# Patient Record
Sex: FEMALE | Race: WHITE | NOT HISPANIC OR LATINO | Employment: FULL TIME | URBAN - METROPOLITAN AREA
[De-identification: names, ages, dates, MRNs, and addresses within clinical notes are randomized per-mention and may not be internally consistent; named-entity substitution may affect disease eponyms.]

---

## 2017-06-26 ENCOUNTER — TELEPHONE (OUTPATIENT)
Dept: INTERNAL MEDICINE | Facility: CLINIC | Age: 62
End: 2017-06-26

## 2017-06-26 NOTE — TELEPHONE ENCOUNTER
----- Message from Mario Mosher sent at 6/22/2017  6:46 PM CDT -----  Contact: Patient's Daughter  Patient's Daughter is a patient of yours that really likes Dr. Garrett as her Doctor. She has referred her mother who needs a check up due to high blood pressure and heart problems.  Patient has scheduled an appoinment with Dr. Garrett but it isn't until 8/31 and they really need to be seen sooner. Please call Patient at [phone].

## 2017-06-26 NOTE — TELEPHONE ENCOUNTER
----- Message from Joann Arnold sent at 6/26/2017 10:48 AM CDT -----  Contact: Pt at [phone]  Patient is returning a phone call.  Who left a message for the patient:  Ekaterina

## 2017-06-28 ENCOUNTER — HOSPITAL ENCOUNTER (EMERGENCY)
Facility: HOSPITAL | Age: 62
Discharge: HOME | End: 2017-06-28
Attending: EMERGENCY MEDICINE
Payer: COMMERCIAL

## 2017-06-28 VITALS
HEIGHT: 59 IN | WEIGHT: 110 LBS | DIASTOLIC BLOOD PRESSURE: 53 MMHG | BODY MASS INDEX: 22.18 KG/M2 | HEART RATE: 59 BPM | RESPIRATION RATE: 16 BRPM | OXYGEN SATURATION: 100 % | SYSTOLIC BLOOD PRESSURE: 131 MMHG | TEMPERATURE: 98 F

## 2017-06-28 DIAGNOSIS — S69.91XA HAND INJURY, RIGHT, INITIAL ENCOUNTER: ICD-10-CM

## 2017-06-28 DIAGNOSIS — S09.90XA CLOSED HEAD INJURY, INITIAL ENCOUNTER: ICD-10-CM

## 2017-06-28 DIAGNOSIS — S00.93XA TRAUMATIC HEMATOMA OF HEAD, INITIAL ENCOUNTER: Primary | ICD-10-CM

## 2017-06-28 DIAGNOSIS — I10 ESSENTIAL HYPERTENSION: ICD-10-CM
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
## 2017-06-28 LAB
ALBUMIN SERPL BCP-MCNC: 4.4 G/DL
ALP SERPL-CCNC: 71 U/L
ALT SERPL W/O P-5'-P-CCNC: 17 U/L
ANION GAP SERPL CALC-SCNC: 11 MMOL/L
AST SERPL-CCNC: 22 U/L
BASOPHILS # BLD AUTO: 0.03 K/UL
BASOPHILS NFR BLD: 0.4 %
BILIRUB SERPL-MCNC: 0.5 MG/DL
BILIRUB UR QL STRIP: NEGATIVE
BUN SERPL-MCNC: 11 MG/DL
CALCIUM SERPL-MCNC: 9.9 MG/DL
CHLORIDE SERPL-SCNC: 105 MMOL/L
CLARITY UR: CLEAR
CO2 SERPL-SCNC: 24 MMOL/L
COLOR UR: YELLOW
CREAT SERPL-MCNC: 0.6 MG/DL
DIFFERENTIAL METHOD: ABNORMAL
EOSINOPHIL # BLD AUTO: 0.1 K/UL
EOSINOPHIL NFR BLD: 0.8 %
ERYTHROCYTE [DISTWIDTH] IN BLOOD BY AUTOMATED COUNT: 12.4 %
EST. GFR  (AFRICAN AMERICAN): >60 ML/MIN/1.73 M^2
EST. GFR  (NON AFRICAN AMERICAN): >60 ML/MIN/1.73 M^2
GLUCOSE SERPL-MCNC: 114 MG/DL
GLUCOSE UR QL STRIP: NEGATIVE
HCT VFR BLD AUTO: 41.6 %
HGB BLD-MCNC: 13.8 G/DL
HGB UR QL STRIP: ABNORMAL
KETONES UR QL STRIP: NEGATIVE
LEUKOCYTE ESTERASE UR QL STRIP: NEGATIVE
LYMPHOCYTES # BLD AUTO: 1.3 K/UL
LYMPHOCYTES NFR BLD: 16.9 %
MCH RBC QN AUTO: 28.9 PG
MCHC RBC AUTO-ENTMCNC: 33.2 %
MCV RBC AUTO: 87 FL
MONOCYTES # BLD AUTO: 0.8 K/UL
MONOCYTES NFR BLD: 10.2 %
NEUTROPHILS # BLD AUTO: 5.6 K/UL
NEUTROPHILS NFR BLD: 71.4 %
NITRITE UR QL STRIP: NEGATIVE
PH UR STRIP: >8 [PH] (ref 5–8)
PLATELET # BLD AUTO: 312 K/UL
PMV BLD AUTO: 9.8 FL
POCT GLUCOSE: 124 MG/DL (ref 70–110)
POTASSIUM SERPL-SCNC: 3.9 MMOL/L
PROT SERPL-MCNC: 7.6 G/DL
PROT UR QL STRIP: NEGATIVE
RBC # BLD AUTO: 4.78 M/UL
SODIUM SERPL-SCNC: 140 MMOL/L
SP GR UR STRIP: 1.01 (ref 1–1.03)
TROPONIN I SERPL DL<=0.01 NG/ML-MCNC: 0.01 NG/ML
URN SPEC COLLECT METH UR: ABNORMAL
UROBILINOGEN UR STRIP-ACNC: NEGATIVE EU/DL
WBC # BLD AUTO: 7.86 K/UL

## 2017-06-28 PROCEDURE — 63600175 PHARM REV CODE 636 W HCPCS: Performed by: PHYSICIAN ASSISTANT

## 2017-06-28 PROCEDURE — 93010 ELECTROCARDIOGRAM REPORT: CPT | Mod: S$GLB,,, | Performed by: INTERNAL MEDICINE

## 2017-06-28 PROCEDURE — 93005 ELECTROCARDIOGRAM TRACING: CPT

## 2017-06-28 PROCEDURE — 85025 COMPLETE CBC W/AUTO DIFF WBC: CPT

## 2017-06-28 PROCEDURE — 99284 EMERGENCY DEPT VISIT MOD MDM: CPT | Mod: 25

## 2017-06-28 PROCEDURE — 84484 ASSAY OF TROPONIN QUANT: CPT

## 2017-06-28 PROCEDURE — 25000003 PHARM REV CODE 250: Performed by: PHYSICIAN ASSISTANT

## 2017-06-28 PROCEDURE — 29125 APPL SHORT ARM SPLINT STATIC: CPT | Mod: RT

## 2017-06-28 PROCEDURE — 80053 COMPREHEN METABOLIC PANEL: CPT

## 2017-06-28 PROCEDURE — 82962 GLUCOSE BLOOD TEST: CPT

## 2017-06-28 PROCEDURE — 96374 THER/PROPH/DIAG INJ IV PUSH: CPT

## 2017-06-28 PROCEDURE — 81003 URINALYSIS AUTO W/O SCOPE: CPT

## 2017-06-28 RX ORDER — MECLIZINE HYDROCHLORIDE 25 MG/1
25 TABLET ORAL 3 TIMES DAILY PRN
Qty: 20 TABLET | Refills: 0 | Status: SHIPPED | OUTPATIENT
Start: 2017-06-28 | End: 2017-09-07 | Stop reason: SDUPTHER

## 2017-06-28 RX ORDER — NAPROXEN 375 MG/1
375 TABLET ORAL 2 TIMES DAILY WITH MEALS
Qty: 30 TABLET | Refills: 0 | Status: SHIPPED | OUTPATIENT
Start: 2017-06-28 | End: 2017-08-17

## 2017-06-28 RX ORDER — MECLIZINE HYDROCHLORIDE 25 MG/1
25 TABLET ORAL
Status: COMPLETED | OUTPATIENT
Start: 2017-06-28 | End: 2017-06-28

## 2017-06-28 RX ORDER — KETOROLAC TROMETHAMINE 30 MG/ML
15 INJECTION, SOLUTION INTRAMUSCULAR; INTRAVENOUS
Status: COMPLETED | OUTPATIENT
Start: 2017-06-28 | End: 2017-06-28

## 2017-06-28 RX ORDER — ONDANSETRON 2 MG/ML
4 INJECTION INTRAMUSCULAR; INTRAVENOUS
Status: DISCONTINUED | OUTPATIENT
Start: 2017-06-28 | End: 2017-06-28

## 2017-06-28 RX ADMIN — MECLIZINE HYDROCHLORIDE 25 MG: 25 TABLET ORAL at 11:06

## 2017-06-28 RX ADMIN — KETOROLAC TROMETHAMINE 15 MG: 30 INJECTION, SOLUTION INTRAMUSCULAR at 11:06

## 2017-06-28 NOTE — ED NOTES
Patient presents to ED secondary to fall. Pt states while taking shower this morning she fell and is unsure of why she fell. Unsure of LOC. Pt landed on right wrist and struck right side of head. C/o headache, nausea and ears ringing. States has been having some uncertainty with blood pressure medicines recently and a recent episode of asymptomatic bradycardia. Hematoma noted to right forehead and right wrist swelling noted.

## 2017-06-28 NOTE — ED PROVIDER NOTES
Encounter Date: 6/28/2017       History     Chief Complaint   Patient presents with    Fall     Fall while taking a shower this morning, landed on R wrist and hitting R side of head, +headache, +nausea, +ears ringing. Unsure if lost consciousness.      Nontoxic/afebrile 61-year-old female with PMH of HTN and breast cancer presents to the ED with pain following a fall.  She states that she was in the shower and is unsure of mechanism as felt some dizziness and headache today but reports she could've slipped as well.  She states that she was able to get up without assistance and did not have a prolonged period on the ground.  She is unsure of LOC and complains of right-sided headache, nausea and ringing of bilateral ears.  She denies vision changes, posterior neck pain, vomiting, chest pain, shortness of breath, abdominal pain, numbness, tingling, weakness, bowel or bladder incontinence.  She presents to the ED with hematoma to the right forehead.  She complains of right wrist and right third toe pain that began after fall.  She reports pain to wrist as a constant ache that is exacerbated by palpation and certain movements.  She describes the taping is in minimal discomfort and is able to bear weight.  She states that she has been having headaches intermittently for some time that she believes to be related to her blood pressure as this typically occurs when her blood pressure is elevated.      The history is provided by the patient.     Review of patient's allergies indicates:   Allergen Reactions    Demerol [meperidine] Other (See Comments)     headache    Pyridium [phenazopyridine] Nausea Only     Past Medical History:   Diagnosis Date    Cancer     breast    Hypertension      Past Surgical History:   Procedure Laterality Date    BREAST SURGERY      HERNIA REPAIR      HYSTERECTOMY       History reviewed. No pertinent family history.  Social History   Substance Use Topics    Smoking status: Never Smoker     Smokeless tobacco: Never Used    Alcohol use Not on file     Review of Systems   Constitutional: Negative for activity change, appetite change, chills and fever.   HENT: Positive for facial swelling. Negative for ear pain and sore throat.         Ringing of ears   Eyes: Negative for photophobia and visual disturbance.   Respiratory: Negative for cough, chest tightness and shortness of breath.    Cardiovascular: Negative for chest pain, palpitations and leg swelling.   Gastrointestinal: Positive for nausea. Negative for abdominal pain and vomiting.   Genitourinary: Negative for dysuria and flank pain.   Musculoskeletal: Positive for arthralgias. Negative for back pain, neck pain and neck stiffness.        Right wrist pain, right toe pain   Skin: Positive for color change. Negative for rash and wound.   Neurological: Positive for headaches. Negative for seizures, weakness and numbness.        Possible LOC   Hematological: Does not bruise/bleed easily.   Psychiatric/Behavioral: Negative for confusion.       Physical Exam     Initial Vitals [06/28/17 0927]   BP Pulse Resp Temp SpO2   (!) 145/65 69 16 98.1 °F (36.7 °C) 98 %      MAP       91.67         Physical Exam    Nursing note and vitals reviewed.  Constitutional: Vital signs are normal. She appears well-developed and well-nourished. She is cooperative.  Non-toxic appearance. She does not appear ill.   HENT:   Head: Normocephalic. Head is with contusion. Head is without raccoon's eyes, without Hatch's sign and without abrasion.       Right Ear: No hemotympanum.   Left Ear: No hemotympanum.   Eyes: Conjunctivae, EOM and lids are normal. Pupils are equal, round, and reactive to light.   Neck: Normal range of motion. Neck supple. No spinous process tenderness and no muscular tenderness present. No neck rigidity.       Cardiovascular: Normal rate and regular rhythm.   Pulmonary/Chest: Breath sounds normal. No respiratory distress. She has no wheezes. She has no  rhonchi.   Abdominal: Soft. Normal appearance and bowel sounds are normal. There is no rigidity and no guarding.   Musculoskeletal:        Right wrist: She exhibits decreased range of motion, tenderness and swelling. She exhibits no crepitus and no deformity.        Right foot: There is tenderness. There is normal range of motion, no bony tenderness, no swelling and no crepitus.        Feet:    Neurological: She is alert and oriented to person, place, and time. No sensory deficit. Gait normal. GCS eye subscore is 4. GCS verbal subscore is 5. GCS motor subscore is 6.   Skin: Skin is warm, dry and intact. Bruising and ecchymosis noted. No rash noted.   Psychiatric: She has a normal mood and affect. Her speech is normal and behavior is normal. Thought content normal.         ED Course   Splint Application  Date/Time: 6/28/2017 9:13 AM  Performed by: JEREMIAH SOLIS  Authorized by: LEFORT, GUY J.   Consent Done: Yes  Consent: Verbal consent obtained.  Consent given by: parent  Patient understanding: patient states understanding of the procedure being performed  Imaging studies: imaging studies available  Location details: right arm  Splint type: thumb spica  Supplies used: cotton padding,  elastic bandage and plaster  Post-procedure: The splinted body part was neurovascularly unchanged following the procedure.  Patient tolerance: Patient tolerated the procedure well with no immediate complications        Labs Reviewed   CBC W/ AUTO DIFFERENTIAL - Abnormal; Notable for the following:        Result Value    Lymph% 16.9 (*)     All other components within normal limits   COMPREHENSIVE METABOLIC PANEL - Abnormal; Notable for the following:     Glucose 114 (*)     All other components within normal limits   URINALYSIS - Abnormal; Notable for the following:     pH, UA >8.0 (*)     Occult Blood UA Trace (*)     All other components within normal limits   POCT GLUCOSE - Abnormal; Notable for the following:     POCT Glucose  124 (*)     All other components within normal limits   TROPONIN I         Imaging Results          CT Cervical Spine Without Contrast (Final result)  Result time 06/28/17 11:20:59    Final result by Richy Porras MD (06/28/17 11:20:59)                 Impression:        1.  No acute fractures.  2.  Hypertrophic facet arthropathy at C3-C4, C4-C5 and C5-C6 as above.            Electronically signed by: RICHY PORRAS MD  Date:     06/28/17  Time:    11:20              Narrative:    History: Trauma.    Procedure: Axial CT scans of the cervical spine are performed from the posterior fossa to T1 vertebra..  Coronal and sagittal reformats performed.    Findings:    There is normal anatomic alignment of the osseous segments of the cervical spine without subluxations.  Throughout the cervical spine there are no acute fractures.  No osteoblastic or lytic lesions.  Prevertebral soft tissues are within normal limits.  The craniovertebral alignment is also within normal limits.  No facet subluxations or locked facets noted.    C2-C3: No disc herniations or spinal stenosis or foraminal stenosis.    C3-C4: There is a foraminal uncovertebral joint osteophyte and severe hypertrophic facet arthropathy on the left resulting in a moderate to severe left foraminal stenosis.  Within the center canal there is a left lateral central canal osteophyte extending into the left neural foramen.  Right neural foramen is unremarkable.  No significant central canal spinal stenosis.    C4-C5: Spondylosis associated posterior osteophytes.  There is marked hypertrophic right facet arthropathy encroaching the right neural foramen resulting in moderate right foraminal stenosis.  There is mild left foraminal stenosis.    C5-C6: There is a right hypertrophic degenerative facet arthropathy with mild to moderate right foraminal stenosis.  Left neural foramen is unremarkable.  There is mild degenerative disc disease.    C6-C7: No spinal stenosis or  foraminal stenosis.    C7-T1: No spinal stenosis or foraminal stenosis.                             CT Head Without Contrast (Final result)  Result time 06/28/17 11:13:07    Final result by Richy Porras MD (06/28/17 11:13:07)                 Impression:        1.  Small scalp hematoma as above.  2.  No acute intracranial processes.        Electronically signed by: RICHY PORRAS MD  Date:     06/28/17  Time:    11:13              Narrative:    History: Patient fell.    Procedure: Axial CT scans of head are performed from the base of the skull to the cranial vertex without contrast.  Coronal and sagittal reformats are performed.    Findings:    Findings:    Slight asymmetry of the lateral ventricles is felt to be a normal variant.  No hydrocephalus, acute intracranial hemorrhages, abnormal extra-axial fluid collections or shift of midline structures.  There is no parenchymal hemorrhages or signs for herniations.  The gray/white matter delineation is preserved throughout the cervical hemispheres.    Posterior fossa structures are unremarkable.    There is no cranial vault fractures.  Small scalp lipoma or the right frontotemporal region.                             X-Ray Chest PA And Lateral (Final result)  Result time 06/28/17 10:50:21    Final result by Halima Ramirez MD (06/28/17 10:50:21)                 Impression:     No acute cardiopulmonary process is identified.        Electronically signed by: HALIMA RAMIREZ MD  Date:     06/28/17  Time:    10:50              Narrative:    XR CHEST PA AND LATERAL.  There are no previous examinations for comparison.  Surgical clips overlie the chest. The cardiac silhouette is not enlarged.  Pulmonary vascularity is not increased.  The lungs are free of lobar consolidation and alveolar edema.  There is no pleural effusion or pneumothorax.  There is a mild thoracic dextroscoliosis.  Otherwise, the visualized osseous structures are intact. There are atherosclerotic  calcifications of the aorta.                             X-Ray Wrist Complete Right (Final result)  Result time 06/28/17 10:48:38    Final result by Halima Ramirez MD (06/28/17 10:48:38)                 Impression:     No fracture.      Electronically signed by: HALIMA RAMIREZ MD  Date:     06/28/17  Time:    10:48              Narrative:    XR WRIST COMPLETE 3 VIEWS RIGHT.  There are no previous examinations for comparison. No acute fracture or bony destructive process is seen.  Alignment is normal.                             X-Ray Hand 3 View Right (Final result)  Result time 06/28/17 10:48:17    Final result by Halima Ramirez MD (06/28/17 10:48:17)                 Impression:     As above.      Electronically signed by: HALIMA RAMIREZ MD  Date:     06/28/17  Time:    10:48              Narrative:    XR HAND COMPLETE 3 VIEW RIGHT.  There are no previous examinations for comparison.  Device on the index finger obscures anatomy somewhat.  Well-corticated ossific density along the radial aspect of the interphalangeal joint of the thumb is most likely related to a remote injury, but clinical correlation as to the site of the patient's symptoms is recommended given history of trauma.  There is mild narrowing of the interphalangeal joints of the fingers and thumb. No acute fracture or bony destructive process is seen.  Alignment is normal.                             X-Ray Toe 2 or More Views Right (Final result)  Result time 06/28/17 10:45:25    Final result by Halima Ramirez MD (06/28/17 10:45:25)                 Impression:     No fracture.      Electronically signed by: HALIMA RAMIREZ MD  Date:     06/28/17  Time:    10:45              Narrative:    XR TOE 2 VIEW.  There are no previous examinations for comparison. No acute fracture or bony destructive process is seen.  Alignment is normal.                                   Medical Decision Making:   Initial Assessment:   Nontoxic/afebrile  61-year-old female with PMH of HTN and breast cancer presents to the ED with pain following a fall.  She states that she was in the shower and is unsure of mechanism as felt some dizziness and headache today but reports she could've slipped as well.  She states that she was able to get up without assistance and did not have a prolonged period on the ground.  She is unsure of LOC and complains of right-sided headache, nausea and ringing of bilateral ears.  She denies vision changes, posterior neck pain, vomiting, chest pain, shortness of breath, abdominal pain, numbness, tingling, weakness, bowel or bladder incontinence.  She presents to the ED with hematoma to the right forehead.  She complains of right wrist and right third toe pain that began after fall.  She reports pain to wrist as a constant ache that is exacerbated by palpation and certain movements.  She describes the taping is in minimal discomfort and is able to bear weight.  She states that she has been having headaches intermittently for some time that she believes to be related to her blood pressure as this typically occurs when her blood pressure is elevated.  Physical exam reveals well-appearing female in no obvious distress that is alert and oriented ×3.  HEENT is significant for right-sided forehead hematoma with TTP; no crepitus or bony instability.  Remaining head exam is unremarkable.  TM with no hemotympanum, nose normal oropharynx clear, mucous membranes moist and free of pallor.  Neck with full range of motion mild TTP of the right trapezius region with no midline tenderness, deformity or step-off.  Lungs clear to auscultation; heart regular rate and rhythm.  Abdomen soft and nontender.  Full range of motion of all extremities and strength 5 out of 5 bilaterally with exception of the right wrist.  Limited range of motion on extension and TTP of the distal radius some minimal edema noted no bony deformity or  Crepitus.  TTP of the right third  toe with no edema, deformity, crepitus intact with full range of motion.  Neurovascularly intact.  Cranial nerves II through XII intact.  Differential Diagnosis:   Acute intracranial abnormality, hematoma, fracture, neck strain, acute coronary syndrome, arrhythmia, mild dehydration, electrolyte abnormality, UTI, uncontrolled hypertension  Clinical Tests:   Lab Tests: Ordered and Reviewed  Radiological Study: Ordered and Reviewed  ED Management:  CT of head and neck unremarkable for acute changes other than soft tissue hematoma.  Labs are unremarkable.  EKG unremarkable for acute changes.  X-ray of the right wrist with no definite fracture although as patient has some tenderness to palpation of the affected region we will place an thumb spica splint with instructions to follow-up with her primary in 1 week for reevaluation.  X-ray of the right foot unremarkable.  Symptoms reduced with Antivert and Toradol in the ED. Discussed monitoring BP as it was elevated, low sodium diet, and that patient should follow up with PCP next week. Patient was cautioned on when to return to ED. Patient verbalized understanding and agreement with the treatment plan                         ED Course     Clinical Impression:   The primary encounter diagnosis was Traumatic hematoma of head, initial encounter. Diagnoses of Fall, Hand injury, right, initial encounter, Closed head injury, initial encounter, and Essential hypertension were also pertinent to this visit.                           ALLISON Rodríguez  07/04/17 0916

## 2017-07-05 ENCOUNTER — NURSE TRIAGE (OUTPATIENT)
Dept: ADMINISTRATIVE | Facility: CLINIC | Age: 62
End: 2017-07-05

## 2017-07-05 NOTE — TELEPHONE ENCOUNTER
Answer Assessment - Initial Assessment Questions  Pt seen in ER last week after a fall in shower. Has a partial cast on arm up to elbow with thumb covered. Has a possible wrist fx but is to f/u with ortho -has appt next week for evaluation. Today has had intermittent tingling/numbness to thumb which has not been present in the past. No swelling/ daughter reported the tip of thumb is pink/warm but cannot visualize the rest of the thumb.    Protocols used: [facility] [employer] DEFICIT-A-    Pt reluctant to go to ER due to high deductible- contacted Dr Mike's nurse who advised Dr Mike would send her to ER - advised pt of this- if she doesn't go to ER would recommend she monitor for any swelling/discoloration or temp changes in part of thumb she can see. F/u with clinic in am if she doesn't seek tx this pm

## 2017-07-06 ENCOUNTER — OFFICE VISIT (OUTPATIENT)
Dept: INTERNAL MEDICINE | Facility: CLINIC | Age: 62
End: 2017-07-06
Payer: COMMERCIAL

## 2017-07-06 ENCOUNTER — HOSPITAL ENCOUNTER (OUTPATIENT)
Dept: RADIOLOGY | Facility: HOSPITAL | Age: 62
Discharge: HOME | End: 2017-07-06
Attending: INTERNAL MEDICINE
Payer: COMMERCIAL

## 2017-07-06 VITALS
HEART RATE: 68 BPM | TEMPERATURE: 98 F | BODY MASS INDEX: 21.34 KG/M2 | WEIGHT: 108.69 LBS | SYSTOLIC BLOOD PRESSURE: 138 MMHG | DIASTOLIC BLOOD PRESSURE: 63 MMHG | HEIGHT: 60 IN

## 2017-07-06 DIAGNOSIS — M85.89 OSTEOPENIA OF MULTIPLE SITES: ICD-10-CM
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
## 2017-07-06 DIAGNOSIS — S00.83XD TRAUMATIC ECCHYMOSIS OF FACE, SUBSEQUENT ENCOUNTER: ICD-10-CM

## 2017-07-06 DIAGNOSIS — M79.601 RIGHT ARM PAIN: ICD-10-CM
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
## 2017-07-06 PROCEDURE — 99999 PR PBB SHADOW E&M-EST. PATIENT-LVL III: CPT | Mod: PBBFAC,,, | Performed by: INTERNAL MEDICINE

## 2017-07-06 PROCEDURE — 73090 X-RAY EXAM OF FOREARM: CPT | Mod: 26,RT,, | Performed by: RADIOLOGY

## 2017-07-06 PROCEDURE — 73130 X-RAY EXAM OF HAND: CPT | Mod: TC,PO,RT

## 2017-07-06 PROCEDURE — 99204 OFFICE O/P NEW MOD 45 MIN: CPT | Mod: S$GLB,,, | Performed by: INTERNAL MEDICINE

## 2017-07-06 PROCEDURE — 73090 X-RAY EXAM OF FOREARM: CPT | Mod: TC,PO,RT

## 2017-07-06 PROCEDURE — 73130 X-RAY EXAM OF HAND: CPT | Mod: 26,RT,, | Performed by: RADIOLOGY

## 2017-07-06 RX ORDER — SYRINGE WITH NEEDLE, 1 ML 25GX5/8"
SYRINGE, EMPTY DISPOSABLE MISCELLANEOUS
Refills: 0 | COMMUNITY
Start: 2017-06-29 | End: 2018-02-16 | Stop reason: SDUPTHER

## 2017-07-06 RX ORDER — LOSARTAN POTASSIUM 50 MG/1
25 TABLET ORAL
COMMUNITY
Start: 2017-06-27 | End: 2017-08-17 | Stop reason: SDUPTHER

## 2017-07-06 RX ORDER — CYANOCOBALAMIN 1000 UG/ML
INJECTION, SOLUTION INTRAMUSCULAR; SUBCUTANEOUS
COMMUNITY
Start: 2017-06-27 | End: 2018-02-19 | Stop reason: SDUPTHER

## 2017-07-06 RX ORDER — CLONIDINE HYDROCHLORIDE 0.1 MG/1
0.1 TABLET ORAL DAILY PRN
COMMUNITY
End: 2018-04-17

## 2017-07-06 RX ORDER — AMLODIPINE BESYLATE 5 MG/1
5 TABLET ORAL DAILY
Refills: 0 | COMMUNITY
Start: 2017-05-20 | End: 2017-08-17 | Stop reason: SDUPTHER

## 2017-07-06 RX ORDER — METOPROLOL SUCCINATE 50 MG/1
25 TABLET, EXTENDED RELEASE ORAL DAILY
COMMUNITY
End: 2017-08-17 | Stop reason: SDUPTHER

## 2017-07-06 NOTE — PROGRESS NOTES
Subjective:       Patient ID: Penny Cervantes is a 61 y.o. female who presents for Numbness (right thumb); Fall; and Arm Injury      Arm Pain    The incident occurred 5 to 7 days ago. The incident occurred at home. The injury mechanism was a fall. The pain is present in the right fingers and right wrist. The quality of the pain is described as aching. The pain does not radiate. The pain is moderate. The pain has been intermittent since the incident. Associated symptoms include numbness (episode of thumb numbness yesterday that resolved). Pertinent negatives include no chest pain or tingling. Nothing aggravates the symptoms. She has tried immobilization for the symptoms. The treatment provided mild relief.   Fall   The accident occurred 5 to 7 days ago. The fall occurred while standing. Impact surface: in shower. There was no blood loss. The point of impact was the face and right wrist. The pain is present in the right hand and right wrist. The pain is mild. The symptoms are aggravated by pressure on injury. Associated symptoms include numbness (episode of thumb numbness yesterday that resolved). Pertinent negatives include no abdominal pain, bowel incontinence, fever, headaches (resolved), loss of consciousness, nausea, tingling, visual change or vomiting. She has tried immobilization for the symptoms.      60yo F with breast CA s/p chemo/XRT in 2010 treated at  who presents to clinic for ER follow-up after a possible syncopal episode in her home on 6/28. She felt her usual self prior to the episode and denies dizziness, no palpitations and no chest pain prior. She cannot recall event and may have lost consciousness. She reports headache, confusion and tinnitus right ear. She went to the ER for evaluation. CT cervical spine showed degenerative changes, CT head showed small scalp hematoma, hand x-ray showed density IP joint of thumb but no obvious fracture. Thumb splint was applied and right arm was wrapped in  the ER on 6/28.       Review of Systems   Constitutional: Negative for chills, fatigue and fever.   HENT: Negative for congestion, rhinorrhea, sinus pressure and tinnitus (resolved).    Eyes: Negative for visual disturbance.   Respiratory: Negative for cough, chest tightness and shortness of breath.    Cardiovascular: Negative for chest pain, palpitations and leg swelling.   Gastrointestinal: Negative for abdominal pain, bowel incontinence, diarrhea, nausea and vomiting.   Musculoskeletal: Negative for arthralgias and myalgias.   Skin: Negative for rash.   Neurological: Positive for numbness (episode of thumb numbness yesterday that resolved). Negative for dizziness, tingling, tremors, loss of consciousness, speech difficulty, weakness, light-headedness and headaches (resolved).   Hematological: Negative for adenopathy.   Psychiatric/Behavioral: Negative for confusion.       Objective:      Physical Exam   Constitutional: She is oriented to person, place, and time. Vital signs are normal. She appears well-developed and well-nourished. No distress.   HENT:   Head: Normocephalic and atraumatic.   Right Ear: Hearing and external ear normal.   Left Ear: Hearing and external ear normal.   Nose: Nose normal.   Mouth/Throat: Uvula is midline and oropharynx is clear and moist. No oropharyngeal exudate.   Eyes: Lids are normal.   Neck: Normal range of motion. Neck supple.   Cardiovascular: Normal rate, regular rhythm, normal heart sounds and intact distal pulses.    No murmur heard.  Pulmonary/Chest: Effort normal and breath sounds normal. She has no wheezes.   Abdominal: Soft. Bowel sounds are normal. There is no tenderness.   Musculoskeletal: She exhibits no edema.        Right shoulder: She exhibits tenderness.        Right wrist: She exhibits normal range of motion and no bony tenderness.        Cervical back: She exhibits no bony tenderness, no pain and no spasm.        Thoracic back: She exhibits no bony tenderness and  no pain.        Lumbar back: She exhibits no pain.        Right hand: She exhibits normal range of motion.   Normal sensation in fingers, no color change, no edema   Neurological: She is alert and oriented to person, place, and time. Coordination and gait normal.   Skin: Skin is warm, dry and intact. No rash noted. She is not diaphoretic.   Psychiatric: She has a normal mood and affect.   Vitals reviewed.      Assessment:       1. Fall, subsequent encounter    2. Right arm pain    3. Traumatic ecchymosis of face, subsequent encounter    4. Osteopenia of multiple sites        Plan:       -- will check x-ray of wrist and forearm, keep in sling for now  -- may use tylenol three times daily as needed for pain  -- may use ice and elevate arm if needed  -- will obtain outside records from Dr. Chau, Dr. Rosenberg and Dr. Santana    [facility] once records are received and will establish care at [facility]

## 2017-07-07 ENCOUNTER — OFFICE VISIT (OUTPATIENT)
Dept: INTERNAL MEDICINE | Facility: CLINIC | Age: 62
End: 2017-07-07
Payer: COMMERCIAL

## 2017-07-07 VITALS
DIASTOLIC BLOOD PRESSURE: 70 MMHG | BODY MASS INDEX: 21.34 KG/M2 | HEART RATE: 73 BPM | WEIGHT: 108.69 LBS | HEIGHT: 60 IN | TEMPERATURE: 98 F | SYSTOLIC BLOOD PRESSURE: 114 MMHG
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
## 2017-07-07 DIAGNOSIS — M25.539 WRIST PAIN, ACUTE, UNSPECIFIED LATERALITY: Primary | ICD-10-CM

## 2017-07-07 DIAGNOSIS — M79.644 THUMB PAIN, RIGHT: ICD-10-CM

## 2017-07-07 PROCEDURE — 99999 PR PBB SHADOW E&M-EST. PATIENT-LVL III: CPT | Mod: PBBFAC,,, | Performed by: INTERNAL MEDICINE

## 2017-07-07 PROCEDURE — 99213 OFFICE O/P EST LOW 20 MIN: CPT | Mod: S$GLB,,, | Performed by: INTERNAL MEDICINE

## 2017-07-07 NOTE — PROGRESS NOTES
Subjective:       Patient ID: Penny Cervantes is a 61 y.o. female who presents for Numbness (right hand)      Hand Pain    The incident occurred at home. The injury mechanism was a fall. The pain is present in the right fingers and right wrist. The quality of the pain is described as aching. The pain does not radiate. The pain is mild. The pain has been fluctuating since the incident. Pertinent negatives include no chest pain or tingling. The symptoms are aggravated by movement. She has tried immobilization for the symptoms. The treatment provided mild relief.      Previous x-ray showed an area of density along IP joint of right thumb that could not be clearly seen on last x-ray. Removed splinting material.      Review of Systems   Constitutional: Negative for chills, fatigue and fever.   HENT: Negative for congestion, rhinorrhea and sinus pressure.    Eyes: Negative for visual disturbance.   Respiratory: Negative for cough and shortness of breath.    Cardiovascular: Negative for chest pain and leg swelling.   Gastrointestinal: Negative for abdominal pain, nausea and vomiting.   Musculoskeletal: Negative for arthralgias and myalgias.   Skin: Negative for rash.   Neurological: Negative for dizziness, tingling, weakness, light-headedness and headaches.       Objective:      Physical Exam   Constitutional: She is oriented to person, place, and time. Vital signs are normal. She appears well-developed and well-nourished. No distress.   HENT:   Head: Normocephalic and atraumatic.   Right Ear: Hearing and external ear normal.   Left Ear: Hearing and external ear normal.   Nose: Nose normal.   Mouth/Throat: Uvula is midline.   Eyes: Lids are normal.   Neck: Normal range of motion. Neck supple.   Cardiovascular: Normal rate, regular rhythm, normal heart sounds and intact distal pulses.    No murmur heard.  Pulmonary/Chest: Effort normal and breath sounds normal. She has no wheezes.   Abdominal: Soft. Bowel sounds are  normal. There is no tenderness.   Musculoskeletal: She exhibits no edema.        Right wrist: She exhibits tenderness (mild tenderness to palpation base of thumb). She exhibits normal range of motion, no swelling, no deformity and no laceration.   Neurological: She is alert and oriented to person, place, and time.   Skin: Skin is warm, dry and intact. No rash noted. She is not diaphoretic.   Psychiatric: She has a normal mood and affect.   Vitals reviewed.      Assessment:       1. Wrist pain, acute, unspecified laterality    2. Thumb pain, right    3. Fall, subsequent encounter        Plan:     -- removed splint material and examined wrist  -- advised removable wrist splint to wear day and night  -- tylenol as needed for pain    Kate Gil MD

## 2017-08-17 ENCOUNTER — OFFICE VISIT (OUTPATIENT)
Dept: INTERNAL MEDICINE | Facility: CLINIC | Age: 62
End: 2017-08-17
Payer: COMMERCIAL

## 2017-08-17 VITALS
HEART RATE: 67 BPM | WEIGHT: 110.88 LBS | SYSTOLIC BLOOD PRESSURE: 129 MMHG | HEIGHT: 60 IN | TEMPERATURE: 98 F | BODY MASS INDEX: 21.77 KG/M2 | RESPIRATION RATE: 16 BRPM | DIASTOLIC BLOOD PRESSURE: 74 MMHG

## 2017-08-17 DIAGNOSIS — Z85.3 HISTORY OF LEFT BREAST CANCER: ICD-10-CM

## 2017-08-17 DIAGNOSIS — D22.9 CHANGE IN MOLE: ICD-10-CM

## 2017-08-17 DIAGNOSIS — Z12.31 SCREENING MAMMOGRAM FOR HIGH-RISK PATIENT: ICD-10-CM

## 2017-08-17 DIAGNOSIS — I10 ESSENTIAL HYPERTENSION: Primary | ICD-10-CM

## 2017-08-17 DIAGNOSIS — Z85.828 HISTORY OF SKIN CANCER: ICD-10-CM

## 2017-08-17 PROCEDURE — 3078F DIAST BP <80 MM HG: CPT | Mod: S$GLB,,, | Performed by: INTERNAL MEDICINE

## 2017-08-17 PROCEDURE — 99999 PR PBB SHADOW E&M-EST. PATIENT-LVL IV: CPT | Mod: PBBFAC,,, | Performed by: INTERNAL MEDICINE

## 2017-08-17 PROCEDURE — 3008F BODY MASS INDEX DOCD: CPT | Mod: S$GLB,,, | Performed by: INTERNAL MEDICINE

## 2017-08-17 PROCEDURE — 3074F SYST BP LT 130 MM HG: CPT | Mod: S$GLB,,, | Performed by: INTERNAL MEDICINE

## 2017-08-17 PROCEDURE — 99213 OFFICE O/P EST LOW 20 MIN: CPT | Mod: S$GLB,,, | Performed by: INTERNAL MEDICINE

## 2017-08-17 RX ORDER — LOSARTAN POTASSIUM 50 MG/1
25 TABLET ORAL DAILY
Qty: 90 TABLET | Refills: 1 | Status: SHIPPED | OUTPATIENT
Start: 2017-08-17 | End: 2018-02-16 | Stop reason: SDUPTHER

## 2017-08-17 RX ORDER — AMLODIPINE BESYLATE 5 MG/1
5 TABLET ORAL DAILY
Qty: 90 TABLET | Refills: 1 | Status: SHIPPED | OUTPATIENT
Start: 2017-08-17 | End: 2018-02-16 | Stop reason: SDUPTHER

## 2017-08-17 RX ORDER — METOPROLOL SUCCINATE 25 MG/1
25 TABLET, EXTENDED RELEASE ORAL DAILY
Qty: 90 TABLET | Refills: 1 | Status: SHIPPED | OUTPATIENT
Start: 2017-08-17 | End: 2018-02-16 | Stop reason: SDUPTHER

## 2017-08-25 DIAGNOSIS — Z12.11 COLON CANCER SCREENING: ICD-10-CM

## 2017-09-01 ENCOUNTER — HOSPITAL ENCOUNTER (OUTPATIENT)
Dept: RADIOLOGY | Facility: HOSPITAL | Age: 62
Discharge: HOME | End: 2017-09-01
Attending: INTERNAL MEDICINE
Payer: COMMERCIAL

## 2017-09-01 VITALS — BODY MASS INDEX: 22.18 KG/M2 | WEIGHT: 110 LBS | HEIGHT: 59 IN

## 2017-09-01 DIAGNOSIS — Z85.3 HISTORY OF LEFT BREAST CANCER: ICD-10-CM

## 2017-09-01 DIAGNOSIS — Z12.31 SCREENING MAMMOGRAM FOR HIGH-RISK PATIENT: ICD-10-CM

## 2017-09-01 PROCEDURE — 77066 DX MAMMO INCL CAD BI: CPT | Mod: TC

## 2017-09-01 PROCEDURE — 77062 BREAST TOMOSYNTHESIS BI: CPT | Mod: 26,,, | Performed by: RADIOLOGY

## 2017-09-01 PROCEDURE — 77066 DX MAMMO INCL CAD BI: CPT | Mod: 26,,, | Performed by: RADIOLOGY

## 2017-09-06 ENCOUNTER — TELEPHONE (OUTPATIENT)
Dept: INTERNAL MEDICINE | Facility: CLINIC | Age: 62
End: 2017-09-06

## 2017-09-06 NOTE — TELEPHONE ENCOUNTER
Spoke with patient, notified some records received. resubmited request to Dr Rosenberg with new dates this am

## 2017-09-07 ENCOUNTER — OFFICE VISIT (OUTPATIENT)
Dept: INTERNAL MEDICINE | Facility: CLINIC | Age: 62
End: 2017-09-07
Payer: COMMERCIAL

## 2017-09-07 VITALS
BODY MASS INDEX: 21.82 KG/M2 | WEIGHT: 111.13 LBS | HEIGHT: 60 IN | HEART RATE: 65 BPM | SYSTOLIC BLOOD PRESSURE: 110 MMHG | TEMPERATURE: 98 F | DIASTOLIC BLOOD PRESSURE: 62 MMHG

## 2017-09-07 DIAGNOSIS — Z00.00 ANNUAL PHYSICAL EXAM: Primary | ICD-10-CM

## 2017-09-07 DIAGNOSIS — E53.8 B12 DEFICIENCY: ICD-10-CM

## 2017-09-07 DIAGNOSIS — N30.10 INTERSTITIAL CYSTITIS: ICD-10-CM

## 2017-09-07 DIAGNOSIS — I10 ESSENTIAL HYPERTENSION: ICD-10-CM

## 2017-09-07 DIAGNOSIS — Z85.3 HISTORY OF BREAST CANCER: ICD-10-CM

## 2017-09-07 DIAGNOSIS — M85.89 OSTEOPENIA OF MULTIPLE SITES: ICD-10-CM

## 2017-09-07 DIAGNOSIS — Z11.59 NEED FOR HEPATITIS C SCREENING TEST: ICD-10-CM

## 2017-09-07 PROCEDURE — 99999 PR PBB SHADOW E&M-EST. PATIENT-LVL V: CPT | Mod: PBBFAC,,, | Performed by: INTERNAL MEDICINE

## 2017-09-07 PROCEDURE — 99396 PREV VISIT EST AGE 40-64: CPT | Mod: S$GLB,,, | Performed by: INTERNAL MEDICINE

## 2017-09-07 RX ORDER — MECLIZINE HYDROCHLORIDE 25 MG/1
25 TABLET ORAL 3 TIMES DAILY PRN
Qty: 30 TABLET | Refills: 0 | Status: ON HOLD | OUTPATIENT
Start: 2017-09-07 | End: 2018-07-10 | Stop reason: HOSPADM

## 2017-09-07 RX ORDER — MECLIZINE HYDROCHLORIDE 25 MG/1
25 TABLET ORAL 3 TIMES DAILY PRN
Qty: 30 TABLET | Refills: 0 | Status: SHIPPED | OUTPATIENT
Start: 2017-09-07 | End: 2017-09-07 | Stop reason: SDUPTHER

## 2017-09-07 RX ORDER — TRIAMCINOLONE ACETONIDE 1 MG/G
PASTE DENTAL 2 TIMES DAILY
Qty: 5 G | Refills: 2 | Status: SHIPPED | OUTPATIENT
Start: 2017-09-07 | End: 2017-10-07

## 2017-09-07 NOTE — PROGRESS NOTES
"Subjective:      Penny Cervantes is a 61 y.o. female who presents for annual exam.    Family History:  family history includes Heart disease in her father and mother; Heart disease (age of onset: 43) in her brother; Heart disease (age of onset: 55) in her sister; Hypertension in her sister; Ovarian cancer in her sister.    Health Maintenance:  Health Maintenance       Date Due Completion Date    Hepatitis C Screening 1955 ---    Lipid Panel 1955 ---    TETANUS VACCINE 10/19/1974 ---    Colonoscopy 10/19/2006 ---    Zoster Vaccine 10/19/2016 ---    Influenza Vaccine 08/01/2017 ---    Mammogram 09/01/2019 9/1/2017      MMG 8/2017  Colonoscopy - Dr. Nieves  OB/GYN ~3 years ago    Shingles vaccine done    Eye exam: glasses, within last year, Dr. Katz  Dental Exam: due    Exercise: walks regularly 30 minutes 5x wek  Diet: overall healthy  Body mass index is 22.07 kg/m².    Meds:   Current Outpatient Prescriptions:     amlodipine (NORVASC) 5 MG tablet, Take 1 tablet (5 mg total) by mouth once daily., Disp: 90 tablet, Rfl: 1    cloNIDine (CATAPRES) 0.1 MG tablet, Take 0.1 mg by mouth daily as needed., Disp: , Rfl:     cyanocobalamin 1,000 mcg/mL injection, , Disp: , Rfl:     ERGOCALCIFEROL, VITAMIN D2, (VITAMIN D ORAL), Take by mouth., Disp: , Rfl:     losartan (COZAAR) 50 MG tablet, Take 0.5 tablets (25 mg total) by mouth once daily., Disp: 90 tablet, Rfl: 1    meclizine (ANTIVERT) 25 mg tablet, Take 1 tablet (25 mg total) by mouth 3 (three) times daily as needed., Disp: 30 tablet, Rfl: 0    metoprolol succinate (TOPROL-XL) 25 MG 24 hr tablet, Take 1 tablet (25 mg total) by mouth once daily., Disp: 90 tablet, Rfl: 1    BD LUER-MERCY SYRINGE 3 mL 25 gauge x 1" Syrg, use as directed EVERY 2 WEEKS., Disp: , Rfl: 0    triamcinolone acetonide 0.1% (KENALOG) 0.1 % paste, Place onto teeth 2 (two) times daily., Disp: 5 g, Rfl: 2    PMHx:   Past Medical History:   Diagnosis Date    Benign positional " vertigo     Cancer     breast    Hypertension     Interstitial cystitis        PSHx:  Past Surgical History:   Procedure Laterality Date    BONE RESECTION, RIB      right side    BREAST BIOPSY Right 2005    BREAST LUMPECTOMY Left 2009    BREAST SURGERY      HERNIA REPAIR      HYSTERECTOMY         SocHx:   Social History     Social History    Marital status:      Spouse name: N/A    Number of children: N/A    Years of education: N/A     Social History Main Topics    Smoking status: Never Smoker    Smokeless tobacco: Never Used    Alcohol use Yes      Comment: 2-3 per month    Drug use: Unknown    Sexual activity: Not Asked     Other Topics Concern    None     Social History Narrative    None       Review of Systems   Constitutional: Negative for chills, diaphoresis, fatigue and fever.   HENT: Positive for mouth sores (frequent, treats with kenalog in orabase). Negative for congestion, dental problem, ear pain, postnasal drip, rhinorrhea, sinus pressure and sore throat.    Eyes: Negative for visual disturbance.   Respiratory: Negative for cough, chest tightness, shortness of breath and wheezing.    Cardiovascular: Negative for chest pain, palpitations and leg swelling.   Gastrointestinal: Negative for abdominal pain, constipation, diarrhea, nausea and vomiting.   Genitourinary: Negative for decreased urine volume, dysuria, flank pain and hematuria.   Musculoskeletal: Negative for arthralgias and myalgias.   Skin: Negative for rash.   Neurological: Positive for dizziness (intermittent dizziness related to BPPV, evaluated by ENT physicians, underwent vistibular rehabilitation). Negative for weakness, light-headedness, numbness and headaches.   Hematological: Negative for adenopathy.   Psychiatric/Behavioral: Negative for dysphoric mood. The patient is not nervous/anxious.        Objective:      Physical Exam   Constitutional: She is oriented to person, place, and time. Vital signs are normal.  She appears well-developed and well-nourished. No distress.   HENT:   Head: Normocephalic and atraumatic.   Right Ear: Hearing, tympanic membrane, external ear and ear canal normal. Tympanic membrane is not erythematous and not bulging.   Left Ear: Hearing, tympanic membrane, external ear and ear canal normal. Tympanic membrane is not erythematous and not bulging.   Nose: Nose normal.   Mouth/Throat: Uvula is midline, oropharynx is clear and moist and mucous membranes are normal. No oropharyngeal exudate or posterior oropharyngeal erythema.   Eyes: Conjunctivae, EOM and lids are normal. Pupils are equal, round, and reactive to light. No scleral icterus.   Neck: Normal range of motion. Neck supple. No thyroid mass and no thyromegaly present.   Cardiovascular: Normal rate, regular rhythm, normal heart sounds and intact distal pulses.    No murmur heard.  Pulmonary/Chest: Effort normal and breath sounds normal. She has no wheezes.   Abdominal: Soft. Bowel sounds are normal. She exhibits no distension. There is no hepatosplenomegaly. There is no tenderness. There is no rigidity, no rebound and no guarding.   Musculoskeletal: Normal range of motion. She exhibits no edema.   Lymphadenopathy:     She has no cervical adenopathy.        Right: No supraclavicular adenopathy present.        Left: No supraclavicular adenopathy present.   Neurological: She is alert and oriented to person, place, and time. She has normal reflexes. Coordination and gait normal.   Skin: Skin is warm, dry and intact. No rash noted. She is not diaphoretic.   Psychiatric: She has a normal mood and affect.   Vitals reviewed.      Assessment:       1. Annual physical exam    2. Essential hypertension    3. Interstitial cystitis    4. B12 deficiency    5. Osteopenia of multiple sites    6. History of breast cancer    7. Need for hepatitis C screening test        Plan:       1,7. Ordered CBC, CMP, TSH, screening hepatitis C, vitamin D, lipid panel and  U/A. Will obtain records from outside providers. Referral to OB/GYN.  2. BP is well controlled, has not needed PRN clonidine in a few weeks  3. Previously on Urised but off market, will refer to Urology  4. Check vitamin B12, MMA, homocysteine  5. Adequate calcium and vitamin D intake, may use supplement containing 1200mg calcium and 800 IU vitamin D.  6. Will obtain records from oncologist, recent MMG normal.    RTC in 4 months or sooner if needed    Kate Gil MD

## 2017-09-10 PROBLEM — I10 ESSENTIAL HYPERTENSION: Status: ACTIVE | Noted: 2017-09-10

## 2017-09-10 PROBLEM — N30.10 INTERSTITIAL CYSTITIS: Status: ACTIVE | Noted: 2017-09-10

## 2017-09-10 PROBLEM — E53.8 B12 DEFICIENCY: Status: ACTIVE | Noted: 2017-09-10

## 2017-09-13 ENCOUNTER — LAB VISIT (OUTPATIENT)
Dept: LAB | Facility: HOSPITAL | Age: 62
End: 2017-09-13
Attending: INTERNAL MEDICINE
Payer: COMMERCIAL

## 2017-09-13 DIAGNOSIS — Z00.00 ANNUAL PHYSICAL EXAM: ICD-10-CM

## 2017-09-13 DIAGNOSIS — Z11.59 NEED FOR HEPATITIS C SCREENING TEST: ICD-10-CM

## 2017-09-13 DIAGNOSIS — E53.8 B12 DEFICIENCY: ICD-10-CM

## 2017-09-13 LAB
25(OH)D3+25(OH)D2 SERPL-MCNC: 56 NG/ML
ALBUMIN SERPL BCP-MCNC: 4.1 G/DL
ALP SERPL-CCNC: 67 U/L
ALT SERPL W/O P-5'-P-CCNC: 22 U/L
ANION GAP SERPL CALC-SCNC: 9 MMOL/L
AST SERPL-CCNC: 20 U/L
BASOPHILS # BLD AUTO: 0.05 K/UL
BASOPHILS NFR BLD: 1.3 %
BILIRUB SERPL-MCNC: 0.6 MG/DL
BUN SERPL-MCNC: 11 MG/DL
CALCIUM SERPL-MCNC: 9.5 MG/DL
CHLORIDE SERPL-SCNC: 106 MMOL/L
CHOLEST SERPL-MCNC: 216 MG/DL
CHOLEST/HDLC SERPL: 3.5 {RATIO}
CO2 SERPL-SCNC: 27 MMOL/L
CREAT SERPL-MCNC: 0.6 MG/DL
DIFFERENTIAL METHOD: ABNORMAL
EOSINOPHIL # BLD AUTO: 0.2 K/UL
EOSINOPHIL NFR BLD: 4 %
ERYTHROCYTE [DISTWIDTH] IN BLOOD BY AUTOMATED COUNT: 12.6 %
EST. GFR  (AFRICAN AMERICAN): >60 ML/MIN/1.73 M^2
EST. GFR  (NON AFRICAN AMERICAN): >60 ML/MIN/1.73 M^2
ESTIMATED AVG GLUCOSE: 103 MG/DL
GLUCOSE SERPL-MCNC: 87 MG/DL
HBA1C MFR BLD HPLC: 5.2 %
HCT VFR BLD AUTO: 40.4 %
HCYS SERPL-SCNC: 6.8 UMOL/L
HDLC SERPL-MCNC: 61 MG/DL
HDLC SERPL: 28.2 %
HGB BLD-MCNC: 13.2 G/DL
LDLC SERPL CALC-MCNC: 141.6 MG/DL
LYMPHOCYTES # BLD AUTO: 1.5 K/UL
LYMPHOCYTES NFR BLD: 37.5 %
MCH RBC QN AUTO: 28.8 PG
MCHC RBC AUTO-ENTMCNC: 32.7 G/DL
MCV RBC AUTO: 88 FL
MONOCYTES # BLD AUTO: 0.6 K/UL
MONOCYTES NFR BLD: 14.5 %
NEUTROPHILS # BLD AUTO: 1.7 K/UL
NEUTROPHILS NFR BLD: 42.7 %
NONHDLC SERPL-MCNC: 155 MG/DL
PLATELET # BLD AUTO: 286 K/UL
PMV BLD AUTO: 10.1 FL
POTASSIUM SERPL-SCNC: 3.8 MMOL/L
PROT SERPL-MCNC: 7.5 G/DL
RBC # BLD AUTO: 4.58 M/UL
SODIUM SERPL-SCNC: 142 MMOL/L
TRIGL SERPL-MCNC: 67 MG/DL
TSH SERPL DL<=0.005 MIU/L-ACNC: 1.03 UIU/ML
VIT B12 SERPL-MCNC: >2000 PG/ML
WBC # BLD AUTO: 4 K/UL

## 2017-09-13 PROCEDURE — 80061 LIPID PANEL: CPT

## 2017-09-13 PROCEDURE — 84443 ASSAY THYROID STIM HORMONE: CPT

## 2017-09-13 PROCEDURE — 80053 COMPREHEN METABOLIC PANEL: CPT

## 2017-09-13 PROCEDURE — 83921 ORGANIC ACID SINGLE QUANT: CPT

## 2017-09-13 PROCEDURE — 83090 ASSAY OF HOMOCYSTEINE: CPT

## 2017-09-13 PROCEDURE — 85025 COMPLETE CBC W/AUTO DIFF WBC: CPT

## 2017-09-13 PROCEDURE — 86803 HEPATITIS C AB TEST: CPT

## 2017-09-13 PROCEDURE — 82306 VITAMIN D 25 HYDROXY: CPT

## 2017-09-13 PROCEDURE — 83036 HEMOGLOBIN GLYCOSYLATED A1C: CPT

## 2017-09-13 PROCEDURE — 82607 VITAMIN B-12: CPT

## 2017-09-14 ENCOUNTER — DOCUMENTATION ONLY (OUTPATIENT)
Dept: INTERNAL MEDICINE | Facility: CLINIC | Age: 62
End: 2017-09-14

## 2017-09-14 LAB — HCV AB SERPL QL IA: NEGATIVE

## 2017-09-15 NOTE — PROGRESS NOTES
Reviewed records from [facility] from 2/2017, DEXA scan 2/11/2016 that showed osteopenia right femoral neck, lumbar spine and left femoral neck. Received records from Dr. Santana. Patient has non-obstructive CAD by cath 5/2012. Records scanned to [facility].

## 2017-09-16 ENCOUNTER — PATIENT MESSAGE (OUTPATIENT)
Dept: INTERNAL MEDICINE | Facility: CLINIC | Age: 62
End: 2017-09-16

## 2017-09-16 LAB — METHYLMALONATE SERPL-SCNC: 0.13 UMOL/L

## 2017-09-18 NOTE — TELEPHONE ENCOUNTER
Would like to cut back on B12 injections to once a month due to elevated B12 levels. Forwarded to Dr Gil.

## 2017-10-06 ENCOUNTER — INITIAL CONSULT (OUTPATIENT)
Dept: DERMATOLOGY | Facility: CLINIC | Age: 62
End: 2017-10-06
Payer: COMMERCIAL

## 2017-10-06 DIAGNOSIS — Z85.828 HISTORY OF SKIN CANCER: ICD-10-CM

## 2017-10-06 DIAGNOSIS — D22.9 NEVUS OF MULTIPLE SITES: ICD-10-CM

## 2017-10-06 DIAGNOSIS — I78.1 SPIDER VEINS: ICD-10-CM

## 2017-10-06 DIAGNOSIS — L81.4 LENTIGINES: ICD-10-CM

## 2017-10-06 DIAGNOSIS — L81.1 MELASMA: ICD-10-CM

## 2017-10-06 DIAGNOSIS — L57.0 MULTIPLE ACTINIC KERATOSES: Primary | ICD-10-CM

## 2017-10-06 PROCEDURE — 17003 DESTRUCT PREMALG LES 2-14: CPT | Mod: S$GLB,,, | Performed by: DERMATOLOGY

## 2017-10-06 PROCEDURE — 17000 DESTRUCT PREMALG LESION: CPT | Mod: S$GLB,,, | Performed by: DERMATOLOGY

## 2017-10-06 PROCEDURE — 99999 PR PBB SHADOW E&M-EST. PATIENT-LVL II: CPT | Mod: PBBFAC,,, | Performed by: DERMATOLOGY

## 2017-10-06 PROCEDURE — 99203 OFFICE O/P NEW LOW 30 MIN: CPT | Mod: 25,S$GLB,, | Performed by: DERMATOLOGY

## 2017-10-06 RX ORDER — HYDROQUINONE 40 MG/G
CREAM TOPICAL
Qty: 28.35 G | Refills: 3 | Status: SHIPPED | OUTPATIENT
Start: 2017-10-06 | End: 2017-10-13 | Stop reason: SDUPTHER

## 2017-10-06 RX ORDER — METOPROLOL TARTRATE 25 MG/1
TABLET, FILM COATED ORAL
COMMUNITY
Start: 2017-08-17 | End: 2018-02-16 | Stop reason: SDUPTHER

## 2017-10-06 NOTE — LETTER
October 6, 2017      Kate Gil MD  [address]  [place] [address]           [place] [facility]  [address]  [place] [address]  Phone: [phone]  Fax: [phone]          Patient: Penny Cervantes   MR Number: [id number]   YOB: 1955   Date of Visit: 10/6/2017       Dear Dr. Kate Gil:    Thank you for referring Penny Cervantes to me for evaluation. Attached you will find relevant portions of my assessment and plan of care.    If you have questions, please do not hesitate to call me. I look forward to following Penny Cervantes along with you.    Sincerely,    Maegan Elena MD    Enclosure  CC:  No Recipients    If you would like to receive this communication electronically, please contact [email or web address] or [phone] to request more information on [employer] access.    For providers and/or their staff who would like to refer a patient to [facility], please contact us through our one-stop-shop provider referral line, [facility], at [phone].    If you feel you have received this communication in error or would no longer like to receive these types of communications, please e-mail [email or web address]

## 2017-10-06 NOTE — PROGRESS NOTES
"  Subjective:       Patient ID:  Penny Cervantes is a 61 y.o. female who presents for   Chief Complaint   Patient presents with    Spot     left arm      History of Present Illness: The patient presents with chief complaint of lesions.  Location: right arm  Duration: months  Signs/Symptoms: none    Prior treatments: none    Also would like mole check no lesions of concern.           Review of Systems   Constitutional: Negative for fever.   Skin: Negative for itching and rash.   Hematologic/Lymphatic: Does not bruise/bleed easily.        Objective:    Physical Exam   Constitutional: She appears well-developed and well-nourished. No distress.   Neurological: She is alert and oriented to person, place, and time. She is not disoriented.   Psychiatric: She has a normal mood and affect.   Skin:   Areas Examined (abnormalities noted in diagram):   Scalp / Hair Palpated and Inspected  Head / Face Inspection Performed  Neck Inspection Performed  Chest / Axilla Inspection Performed  Abdomen Inspection Performed  Genitals / Buttocks / Groin Inspection Performed  Back Inspection Performed  RUE Inspected  LUE Inspection Performed  RLE Inspected  LLE Inspection Performed  Nails and Digits Inspection Performed                   Diagram Legend     Erythematous scaling macule/papule c/w actinic keratosis       See annotation      Assessment / Plan:        Multiple actinic keratoses   Cryosurgery Procedure Note    Verbal consent from the patient is obtained and the patient is aware of the precancerous quality and need for treatment of these lesions. Liquid nitrogen cryosurgery is applied to the 2 actinic keratoses, as detailed in the physical exam, to produce a freeze injury.    History of skin cancer  Comments:  scc left arm removed elsewhere no recurrence                3. Melasma  hydroquinone 4 % Crea   4. Spider veins   Referred for tx    5. Lentigines   sunscreen    6. Nevus of multiple sites   The "ABCD" rules to observe " pigmented lesions were reviewed.  Brochure provided                   Return in about 1 year (around 10/6/2018). Sooner if lesions on left arm recur

## 2017-10-11 ENCOUNTER — PATIENT MESSAGE (OUTPATIENT)
Dept: DERMATOLOGY | Facility: CLINIC | Age: 62
End: 2017-10-11

## 2017-10-13 ENCOUNTER — PATIENT MESSAGE (OUTPATIENT)
Dept: DERMATOLOGY | Facility: CLINIC | Age: 62
End: 2017-10-13

## 2017-10-13 DIAGNOSIS — L81.1 MELASMA: ICD-10-CM

## 2017-10-13 RX ORDER — HYDROQUINONE 40 MG/G
CREAM TOPICAL
Qty: 28.35 G | Refills: 3 | Status: SHIPPED | OUTPATIENT
Start: 2017-10-13 | End: 2017-10-18 | Stop reason: SDUPTHER

## 2017-10-18 ENCOUNTER — CLINICAL SUPPORT (OUTPATIENT)
Dept: CARDIOLOGY | Facility: CLINIC | Age: 62
End: 2017-10-18
Attending: INTERNAL MEDICINE
Payer: COMMERCIAL

## 2017-10-18 ENCOUNTER — OFFICE VISIT (OUTPATIENT)
Dept: INTERNAL MEDICINE | Facility: CLINIC | Age: 62
End: 2017-10-18
Payer: COMMERCIAL

## 2017-10-18 VITALS
SYSTOLIC BLOOD PRESSURE: 142 MMHG | HEART RATE: 67 BPM | WEIGHT: 109.81 LBS | RESPIRATION RATE: 16 BRPM | DIASTOLIC BLOOD PRESSURE: 86 MMHG | TEMPERATURE: 98 F | HEIGHT: 60 IN | BODY MASS INDEX: 21.56 KG/M2

## 2017-10-18 DIAGNOSIS — R20.0 NUMBNESS OF LEFT FOOT: ICD-10-CM

## 2017-10-18 DIAGNOSIS — L81.1 MELASMA: ICD-10-CM

## 2017-10-18 DIAGNOSIS — M79.662 PAIN OF LEFT CALF: ICD-10-CM

## 2017-10-18 DIAGNOSIS — R07.89 CHEST TIGHTNESS: Primary | ICD-10-CM

## 2017-10-18 PROCEDURE — 99214 OFFICE O/P EST MOD 30 MIN: CPT | Mod: S$GLB,,, | Performed by: INTERNAL MEDICINE

## 2017-10-18 PROCEDURE — 99999 PR PBB SHADOW E&M-EST. PATIENT-LVL III: CPT | Mod: PBBFAC,,, | Performed by: INTERNAL MEDICINE

## 2017-10-18 PROCEDURE — 93010 ELECTROCARDIOGRAM REPORT: CPT | Mod: S$GLB,,, | Performed by: INTERNAL MEDICINE

## 2017-10-18 PROCEDURE — 93971 EXTREMITY STUDY: CPT | Mod: S$GLB,,, | Performed by: INTERNAL MEDICINE

## 2017-10-18 PROCEDURE — 93005 ELECTROCARDIOGRAM TRACING: CPT | Mod: S$GLB,,, | Performed by: INTERNAL MEDICINE

## 2017-10-18 RX ORDER — HYDROQUINONE 40 MG/G
CREAM TOPICAL
Qty: 28.35 G | Refills: 3 | Status: SHIPPED | OUTPATIENT
Start: 2017-10-18 | End: 2021-07-28

## 2017-10-18 NOTE — PROGRESS NOTES
Subjective:       Patient ID: Penny Cervantes is a 61 y.o. female.    Chief Complaint: Numbness (tingling in left foot and leg ) and Fatigue (in shoulders)    HPI  This is a new patient to me but established to [facility].    Numbness of left foot and discomfort of left calf. Started four days ago. No weakness of leg. No new shoes. No swelling in calf. No trauma. Walks 30 minutes daily. Calf pain now resolved, but still with paresthesias of left dorsal and plantar foot.    Indigestion started yesterday. Some tightness/ pressure in chest. Waxed and waned yesterday, not constant. No dyspnea, lightheadedness. Resolved without intervention by today. BP was elevated during the time, but unable to check it until later when she got home and systolic was 147.    Review of Systems   Constitutional: Positive for fatigue. Negative for activity change.   Respiratory: Positive for chest tightness. Negative for shortness of breath.    Cardiovascular: Negative for chest pain and leg swelling.   Gastrointestinal: Negative for nausea and vomiting.   Endocrine: Negative for polydipsia.   Genitourinary: Positive for frequency.   Musculoskeletal: Positive for arthralgias.   Skin: Negative for color change, rash and wound.   Neurological: Positive for numbness. Negative for weakness.   Hematological: Negative for adenopathy.       Objective:        Vitals:    10/18/17 1043   BP: (!) 142/86   Pulse: 67   Resp: 16   Temp: 98.2 °F (36.8 °C)     Body mass index is 21.8 kg/m².    Physical Exam   Constitutional: She is oriented to person, place, and time. She appears well-developed and well-nourished. No distress.   HENT:   Head: Normocephalic and atraumatic.   Nose: Nose normal.   Eyes: Conjunctivae and EOM are normal. Right eye exhibits no discharge. Left eye exhibits no discharge.   Neck: Normal range of motion. Neck supple.   Cardiovascular: Normal rate, regular rhythm, normal heart sounds and intact distal pulses.    Pulses:        Dorsalis pedis pulses are 2+ on the left side.   Pulmonary/Chest: Effort normal and breath sounds normal.   Abdominal: Soft. Bowel sounds are normal.   Musculoskeletal: Normal range of motion. She exhibits no edema.        Left ankle: She exhibits normal range of motion.        Left lower leg: She exhibits no tenderness and no swelling.        Left foot: There is normal range of motion, no tenderness and no deformity.   Pain in left calf when foot dorsiflexed  Paresthesias of left dorsi and plantar foot, no tenderness   Feet:   Left Foot:   Skin Integrity: Negative for ulcer, blister, skin breakdown, erythema or warmth.   Neurological: She is alert and oriented to person, place, and time.   Skin: Skin is warm and dry. She is not diaphoretic. No erythema.   Psychiatric: She has a normal mood and affect. Her behavior is normal. Thought content normal.       Assessment:     1. Chest tightness    2. Pain of left calf    3. Numbness of left foot           Plan:         1. Chest tightness  - EKG 12-lead; Future - unchanged from baseline, no ischemic changes  - potentially GI related as it resolved with eating- try H2 blocker if returns, also discussed ER precautions.    2. Pain of left calf  - Cardiology Lab US Lower Extremity Veins Left; Future    3. Numbness of left foot  - Ambulatory referral to Podiatry  - possibly plantar fasciitis     If symptoms worsen/ do not improve, patient was advised to return to clinic or go to the emergency room.

## 2017-10-19 ENCOUNTER — PATIENT MESSAGE (OUTPATIENT)
Dept: INTERNAL MEDICINE | Facility: CLINIC | Age: 62
End: 2017-10-19

## 2017-11-20 ENCOUNTER — DOCUMENTATION ONLY (OUTPATIENT)
Dept: INTERNAL MEDICINE | Facility: CLINIC | Age: 62
End: 2017-11-20

## 2017-11-20 NOTE — PROGRESS NOTES
Received records from Dr. Rosenberg, treated for triple negative left breast infiltrating ductal carcinoma s/p lumpectomy 10/20/2009. She was treated with chemo and radiation. DEXA scan showed osteopenia 9/2011 but no other bone scans were found. Will scan records to [facility].

## 2017-12-21 ENCOUNTER — LAB VISIT (OUTPATIENT)
Dept: LAB | Facility: HOSPITAL | Age: 62
End: 2017-12-21
Attending: INTERNAL MEDICINE
Payer: COMMERCIAL

## 2017-12-21 DIAGNOSIS — Z12.11 COLON CANCER SCREENING: ICD-10-CM

## 2017-12-21 LAB — HEMOCCULT STL QL IA: NEGATIVE

## 2017-12-21 PROCEDURE — 82274 ASSAY TEST FOR BLOOD FECAL: CPT

## 2018-02-16 ENCOUNTER — LAB VISIT (OUTPATIENT)
Dept: LAB | Facility: HOSPITAL | Age: 63
End: 2018-02-16
Attending: INTERNAL MEDICINE
Payer: COMMERCIAL

## 2018-02-16 ENCOUNTER — OFFICE VISIT (OUTPATIENT)
Dept: INTERNAL MEDICINE | Facility: CLINIC | Age: 63
End: 2018-02-16
Payer: COMMERCIAL

## 2018-02-16 VITALS
DIASTOLIC BLOOD PRESSURE: 80 MMHG | BODY MASS INDEX: 21.47 KG/M2 | TEMPERATURE: 98 F | WEIGHT: 109.38 LBS | HEART RATE: 72 BPM | HEIGHT: 60 IN | SYSTOLIC BLOOD PRESSURE: 130 MMHG

## 2018-02-16 DIAGNOSIS — N30.10 INTERSTITIAL CYSTITIS: ICD-10-CM

## 2018-02-16 DIAGNOSIS — E53.8 B12 DEFICIENCY: ICD-10-CM

## 2018-02-16 DIAGNOSIS — R82.90 FOUL SMELLING URINE: ICD-10-CM

## 2018-02-16 DIAGNOSIS — I10 ESSENTIAL HYPERTENSION: Primary | ICD-10-CM

## 2018-02-16 DIAGNOSIS — M85.89 OSTEOPENIA OF MULTIPLE SITES: ICD-10-CM

## 2018-02-16 LAB
BASOPHILS # BLD AUTO: 0.06 K/UL
BASOPHILS NFR BLD: 1.2 %
DIFFERENTIAL METHOD: NORMAL
EOSINOPHIL # BLD AUTO: 0.1 K/UL
EOSINOPHIL NFR BLD: 1.4 %
ERYTHROCYTE [DISTWIDTH] IN BLOOD BY AUTOMATED COUNT: 12.4 %
HCT VFR BLD AUTO: 40.3 %
HGB BLD-MCNC: 13.2 G/DL
IMM GRANULOCYTES # BLD AUTO: 0.01 K/UL
IMM GRANULOCYTES NFR BLD AUTO: 0.2 %
LYMPHOCYTES # BLD AUTO: 1.8 K/UL
LYMPHOCYTES NFR BLD: 36.2 %
MCH RBC QN AUTO: 29.1 PG
MCHC RBC AUTO-ENTMCNC: 32.8 G/DL
MCV RBC AUTO: 89 FL
MONOCYTES # BLD AUTO: 0.6 K/UL
MONOCYTES NFR BLD: 12.3 %
NEUTROPHILS # BLD AUTO: 2.4 K/UL
NEUTROPHILS NFR BLD: 48.7 %
NRBC BLD-RTO: 0 /100 WBC
PLATELET # BLD AUTO: 317 K/UL
PMV BLD AUTO: 10.2 FL
RBC # BLD AUTO: 4.54 M/UL
VIT B12 SERPL-MCNC: 790 PG/ML
WBC # BLD AUTO: 4.89 K/UL

## 2018-02-16 PROCEDURE — 3008F BODY MASS INDEX DOCD: CPT | Mod: S$GLB,,, | Performed by: INTERNAL MEDICINE

## 2018-02-16 PROCEDURE — 99213 OFFICE O/P EST LOW 20 MIN: CPT | Mod: S$GLB,,, | Performed by: INTERNAL MEDICINE

## 2018-02-16 PROCEDURE — 36415 COLL VENOUS BLD VENIPUNCTURE: CPT | Mod: PO

## 2018-02-16 PROCEDURE — 82607 VITAMIN B-12: CPT

## 2018-02-16 PROCEDURE — 85025 COMPLETE CBC W/AUTO DIFF WBC: CPT

## 2018-02-16 PROCEDURE — 99999 PR PBB SHADOW E&M-EST. PATIENT-LVL III: CPT | Mod: PBBFAC,,, | Performed by: INTERNAL MEDICINE

## 2018-02-16 RX ORDER — SYRINGE WITH NEEDLE, 1 ML 25GX5/8"
SYRINGE, EMPTY DISPOSABLE MISCELLANEOUS
Qty: 30 SYRINGE | Refills: 0 | Status: SHIPPED | OUTPATIENT
Start: 2018-02-16 | End: 2021-10-13

## 2018-02-16 RX ORDER — LOSARTAN POTASSIUM 50 MG/1
25 TABLET ORAL DAILY
Qty: 90 TABLET | Refills: 3 | Status: SHIPPED | OUTPATIENT
Start: 2018-02-16 | End: 2019-03-04 | Stop reason: SDUPTHER

## 2018-02-16 RX ORDER — METOPROLOL SUCCINATE 25 MG/1
25 TABLET, EXTENDED RELEASE ORAL DAILY
Qty: 90 TABLET | Refills: 3 | Status: SHIPPED | OUTPATIENT
Start: 2018-02-16 | End: 2018-02-20 | Stop reason: SDUPTHER

## 2018-02-16 RX ORDER — AMLODIPINE BESYLATE 5 MG/1
5 TABLET ORAL DAILY
Qty: 90 TABLET | Refills: 3 | Status: SHIPPED | OUTPATIENT
Start: 2018-02-16 | End: 2019-05-08 | Stop reason: SDUPTHER

## 2018-02-16 NOTE — PROGRESS NOTES
Subjective:       Patient ID: Penny Cervantes is a 62 y.o. female who presents for Follow-up and Hypertension      Hypertension   This is a chronic problem. The current episode started more than 1 year ago. The problem is unchanged. The problem is controlled. Pertinent negatives include no anxiety, chest pain, headaches, malaise/fatigue, palpitations or shortness of breath. There are no associated agents to hypertension. Risk factors for coronary artery disease include family history. Past treatments include angiotensin blockers and beta blockers. The current treatment provides moderate improvement. There are no compliance problems.  There is no history of kidney disease or heart failure. There is no history of chronic renal disease or a thyroid problem.          Review of Systems   Constitutional: Negative for chills, fatigue, fever and malaise/fatigue.   HENT: Negative for congestion, rhinorrhea and sinus pressure.    Eyes: Negative for redness and visual disturbance.   Respiratory: Negative for cough and shortness of breath.    Cardiovascular: Negative for chest pain, palpitations and leg swelling.   Gastrointestinal: Negative for abdominal pain, diarrhea, nausea and vomiting.   Genitourinary: Negative for dysuria, hematuria, vaginal bleeding and vaginal discharge.        Reports changes in vaginal smell or urine smell   Musculoskeletal: Negative for arthralgias and myalgias.   Skin: Negative for rash.   Neurological: Negative for dizziness, weakness, light-headedness and headaches.   Hematological: Negative for adenopathy.       Objective:      Physical Exam   Constitutional: She is oriented to person, place, and time. Vital signs are normal. She appears well-developed and well-nourished. No distress.   HENT:   Head: Normocephalic and atraumatic.   Right Ear: Hearing and external ear normal.   Left Ear: Hearing and external ear normal.   Nose: Nose normal.   Mouth/Throat: Uvula is midline and mucous membranes  are normal.   Eyes: Lids are normal.   Neck: Full passive range of motion without pain.   Cardiovascular: Normal rate, regular rhythm, normal heart sounds and intact distal pulses.    No murmur heard.  Pulmonary/Chest: Effort normal and breath sounds normal. She has no wheezes.   Abdominal: Soft. Bowel sounds are normal. She exhibits no distension. There is no tenderness.   Musculoskeletal: Normal range of motion. She exhibits no edema.   Neurological: She is alert and oriented to person, place, and time.   Skin: Skin is warm, dry and intact. No rash noted. She is not diaphoretic.   Psychiatric: She has a normal mood and affect.   Vitals reviewed.      Assessment:       1. Essential hypertension    2. Osteopenia of multiple sites    3. B12 deficiency    4. Interstitial cystitis    5. Foul smelling urine        Plan:       1. Essential hypertension  - amLODIPine (NORVASC) 5 MG tablet; Take 1 tablet (5 mg total) by mouth once daily.  Dispense: 90 tablet; Refill: 3  - metoprolol succinate (TOPROL-XL) 25 MG 24 hr tablet; Take 1 tablet (25 mg total) by mouth once daily.  Dispense: 90 tablet; Refill: 3  - losartan (COZAAR) 50 MG tablet; Take 0.5 tablets (25 mg total) by mouth once daily.  Dispense: 90 tablet; Refill: 3    2. Osteopenia of multiple sites  - DXA Bone Density Spine And Hip; Future    3. B12 deficiency  - Vitamin B12; Future  - CBC auto differential; Future    4. Interstitial cystitis  - [id number]-[id number].blue-sal-naphos (URIMAR-T) 120-0.12-10.8 mg Tab; Take 1 tablet by mouth 4 (four) times daily as needed.  Dispense: 40 tablet; Refill: 1  - patient will re-establish care with Urology    5. Foul smelling urine  - Urinalysis; Future  - Urine culture; Future    Kate Gil MD

## 2018-02-19 ENCOUNTER — PATIENT MESSAGE (OUTPATIENT)
Dept: INTERNAL MEDICINE | Facility: CLINIC | Age: 63
End: 2018-02-19

## 2018-02-19 ENCOUNTER — APPOINTMENT (OUTPATIENT)
Dept: RADIOLOGY | Facility: CLINIC | Age: 63
End: 2018-02-19
Attending: INTERNAL MEDICINE
Payer: COMMERCIAL

## 2018-02-19 DIAGNOSIS — M85.89 OSTEOPENIA OF MULTIPLE SITES: ICD-10-CM

## 2018-02-19 DIAGNOSIS — I10 ESSENTIAL HYPERTENSION: ICD-10-CM

## 2018-02-19 PROCEDURE — 77080 DXA BONE DENSITY AXIAL: CPT | Mod: 26,,, | Performed by: INTERNAL MEDICINE

## 2018-02-19 PROCEDURE — 77080 DXA BONE DENSITY AXIAL: CPT | Mod: TC,PO

## 2018-02-19 RX ORDER — CYANOCOBALAMIN 1000 UG/ML
1000 INJECTION, SOLUTION INTRAMUSCULAR; SUBCUTANEOUS
Qty: 1 ML | Refills: 5 | Status: SHIPPED | OUTPATIENT
Start: 2018-02-19 | End: 2018-10-14 | Stop reason: SDUPTHER

## 2018-02-20 RX ORDER — METOPROLOL TARTRATE 25 MG/1
25 TABLET, FILM COATED ORAL DAILY
Qty: 90 TABLET | Refills: 3 | Status: SHIPPED | OUTPATIENT
Start: 2018-02-20 | End: 2018-03-13

## 2018-02-20 NOTE — TELEPHONE ENCOUNTER
Spoke with patient, Pharmacy filled generic toprol xl(metaprolol succinate) 25mg/ 24hr tablet.  She has problems with this medication so she has been taking Metoprolol tartate(lopressor) 25mg tablet daily. Will send pics of rx tomorrow to verify and update. She has good results with metoprolol tartate as she has been taking them. Would like to be changed back to generic lopressor.

## 2018-02-22 ENCOUNTER — PATIENT MESSAGE (OUTPATIENT)
Dept: INTERNAL MEDICINE | Facility: CLINIC | Age: 63
End: 2018-02-22

## 2018-02-22 ENCOUNTER — TELEPHONE (OUTPATIENT)
Dept: INTERNAL MEDICINE | Facility: CLINIC | Age: 63
End: 2018-02-22

## 2018-02-22 NOTE — TELEPHONE ENCOUNTER
Bone density was done but final report not available.     Readings from 2/19/18 bone scan shows osteopenia at the femoral neck and lumbar spine. The measurements are a little lower than her last bone scan numbers in 2011.     Will release report once available.

## 2018-02-26 ENCOUNTER — OFFICE VISIT (OUTPATIENT)
Dept: OBSTETRICS AND GYNECOLOGY | Facility: CLINIC | Age: 63
End: 2018-02-26
Payer: COMMERCIAL

## 2018-02-26 ENCOUNTER — PATIENT MESSAGE (OUTPATIENT)
Dept: INTERNAL MEDICINE | Facility: CLINIC | Age: 63
End: 2018-02-26

## 2018-02-26 VITALS
HEIGHT: 59 IN | SYSTOLIC BLOOD PRESSURE: 112 MMHG | BODY MASS INDEX: 22.06 KG/M2 | DIASTOLIC BLOOD PRESSURE: 68 MMHG | WEIGHT: 109.44 LBS

## 2018-02-26 DIAGNOSIS — Z12.4 ENCOUNTER FOR SCREENING FOR CERVICAL CANCER: Primary | ICD-10-CM

## 2018-02-26 DIAGNOSIS — N89.8 VAGINAL ODOR: ICD-10-CM

## 2018-02-26 PROCEDURE — 99999 PR PBB SHADOW E&M-EST. PATIENT-LVL III: CPT | Mod: PBBFAC,,, | Performed by: OBSTETRICS & GYNECOLOGY

## 2018-02-26 PROCEDURE — 99386 PREV VISIT NEW AGE 40-64: CPT | Mod: S$GLB,,, | Performed by: OBSTETRICS & GYNECOLOGY

## 2018-02-26 RX ORDER — METRONIDAZOLE 500 MG/1
500 TABLET ORAL 2 TIMES DAILY
Qty: 14 TABLET | Refills: 0 | Status: SHIPPED | OUTPATIENT
Start: 2018-02-26 | End: 2018-03-05

## 2018-02-26 RX ORDER — TRIAMCINOLONE ACETONIDE 1 MG/G
PASTE DENTAL
COMMUNITY
Start: 2018-02-13 | End: 2018-10-09

## 2018-02-26 NOTE — PROGRESS NOTES
"  Chief Complaint: Well Woman Exam, Abnormal Vaginal Discharge     HPI:      Penny Cervantes is a 62 y.o.  who presents for annual exam. She is currently complaining of vaginal odor. Odor is intermittent and not associated with any discharge. Patient has had atrophy for years.      Ms. Cervantes is not currently sexually active. Patient had a hysterectomy in  secondary to endometriosis. No LMP recorded. Patient is postmenopausal.     Previous Pap:  NILM ()  Previous Mammogram: Bi-Rads 2 (2017)  Most Recent Dexa: Pending (2017)    Ms. Cervantes confirms that she wears her seatbelt when riding in the car and does not text while driving.     OB History      Para Term  AB Living    4 3 3   1 2    SAB TAB Ectopic Multiple Live Births            2          ROS:     GENERAL: Denies unintentional weight gain or weight loss. Feeling well overall.   SKIN: Denies rash or lesions.   HEENT: Denies headaches, or vision changes.   CARDIOVASCULAR: Denies palpitations or chest pain.   RESPIRATORY: Denies shortness of breath or dyspnea on exertion.  BREASTS: Denies pain, lumps, or nipple discharge.   ABDOMEN: Denies abdominal pain, constipation, diarrhea, nausea, vomiting, or change in appetite.   URINARY: Denies frequency, dysuria, hematuria.  NEUROLOGIC: Denies syncope or weakness.   PSYCHIATRIC: Denies depression, anxiety or mood swings.      Physical Exam:      PHYSICAL EXAM:  /68   Ht 4' 11" (1.499 m)   Wt 49.6 kg (109 lb 7.3 oz)   BMI 22.11 kg/m²   Body mass index is 22.11 kg/m².     APPEARANCE: Well nourished, well developed, in no acute distress.  PSYCH: Appropriate mood and affect.  SKIN: No acne or hirsutism  NECK: Neck symmetric without masses or thyromegaly  NODES: No inguinal, axillary, or supraclavicular lymph node enlargement  CHEST: Normal respiratory effort.  ABDOMEN: Soft.  No tenderness or masses.   BREASTS: Symmetrical, no skin changes or visible lesions.  No palpable masses " or nipple discharge bilaterally.  PELVIC: Normal external genitalia without lesions.  Normal hair distribution.  Adequate perineal body, normal urethral meatus.  Vagina moist and well rugated without lesions or discharge.  Cervix pink, without lesions, discharge or tenderness.  No significant cystocele or rectocele.  Bimanual exam shows uterus to be normal size, regular, mobile and nontender.  Adnexa without masses or tenderness.    EXTREMITIES: No edema.    Assessment/Plan:     Encounter for screening for cervical cancer   -     Liquid-based pap smear, screening  -     HPV High Risk Genotypes, PCR    Vaginal odor  -     metroNIDAZOLE (FLAGYL) 500 MG tablet; Take 1 tablet (500 mg total) by mouth 2 (two) times daily. Do not drink alcohol while taking this medication.  Dispense: 14 tablet; Refill: 0        -     If odor persists, discussed topical estrogen tx.    Counseling:     Patient was counseled today on current ASCCP pap guidelines, the recommendation for yearly pelvic exams, healthy diet and exercise routines, breast self awareness and annual mammograms. She is to see her PCP for other health maintenance.       Use of the [facility] Patient Portal discussed and encouraged during today's visit.

## 2018-02-27 NOTE — TELEPHONE ENCOUNTER
Spoke with patient. Results reviewed per Dr. Gil notes. Verbalized understanding. Will call with further concerns

## 2018-03-13 ENCOUNTER — OFFICE VISIT (OUTPATIENT)
Dept: CARDIOLOGY | Facility: CLINIC | Age: 63
End: 2018-03-13
Payer: COMMERCIAL

## 2018-03-13 VITALS
WEIGHT: 109 LBS | HEART RATE: 73 BPM | HEIGHT: 59 IN | DIASTOLIC BLOOD PRESSURE: 74 MMHG | BODY MASS INDEX: 21.97 KG/M2 | SYSTOLIC BLOOD PRESSURE: 166 MMHG

## 2018-03-13 DIAGNOSIS — I10 ESSENTIAL HYPERTENSION: ICD-10-CM

## 2018-03-13 DIAGNOSIS — N30.10 INTERSTITIAL CYSTITIS: ICD-10-CM

## 2018-03-13 DIAGNOSIS — R07.9 CHEST PAIN, UNSPECIFIED TYPE: Primary | ICD-10-CM

## 2018-03-13 PROCEDURE — 99999 PR PBB SHADOW E&M-EST. PATIENT-LVL III: CPT | Mod: PBBFAC,,, | Performed by: INTERNAL MEDICINE

## 2018-03-13 PROCEDURE — 93000 ELECTROCARDIOGRAM COMPLETE: CPT | Mod: S$GLB,,, | Performed by: INTERNAL MEDICINE

## 2018-03-13 PROCEDURE — 3077F SYST BP >= 140 MM HG: CPT | Mod: CPTII,S$GLB,, | Performed by: INTERNAL MEDICINE

## 2018-03-13 PROCEDURE — 99204 OFFICE O/P NEW MOD 45 MIN: CPT | Mod: S$GLB,,, | Performed by: INTERNAL MEDICINE

## 2018-03-13 PROCEDURE — 3078F DIAST BP <80 MM HG: CPT | Mod: CPTII,S$GLB,, | Performed by: INTERNAL MEDICINE

## 2018-03-13 RX ORDER — METOPROLOL SUCCINATE 25 MG/1
25 TABLET, EXTENDED RELEASE ORAL DAILY
COMMUNITY
End: 2019-03-06 | Stop reason: SDUPTHER

## 2018-03-16 NOTE — PROGRESS NOTES
Subjective:    Patient ID:  Penny Cervantes is a 62 y.o. female who presents for evaluation of Chest Pain      HPI  63 y/o female with hx of HTN, interstitial cystitis who presents for evaluation of CP. Has been having intermittent, exertional CP with increasing frequency. ECG with NSR with nonspecific ST/TW. Has some mild MURILLO. Denies orthopnea, PND, syncope, palps, LE edema. Non smoker and rare EtOH. BP in clinic today elevated in 160's.     Review of Systems   Cardiovascular: Positive for chest pain and dyspnea on exertion.        Objective:    Physical Exam   Constitutional: She is oriented to person, place, and time. She appears well-developed and well-nourished.   HENT:   Head: Normocephalic and atraumatic.   Neck: Neck supple. No JVD present.   Cardiovascular: Normal rate, regular rhythm and normal heart sounds.    Pulses:       Carotid pulses are 2+ on the right side, and 2+ on the left side.       Radial pulses are 2+ on the right side, and 2+ on the left side.        Femoral pulses are 2+ on the right side, and 2+ on the left side.       Dorsalis pedis pulses are 2+ on the right side, and 2+ on the left side.        Posterior tibial pulses are 2+ on the right side, and 2+ on the left side.   Pulmonary/Chest: Effort normal and breath sounds normal.   Abdominal: Soft. Bowel sounds are normal.   Musculoskeletal: She exhibits no edema.   Neurological: She is alert and oriented to person, place, and time.   Skin: Skin is warm and dry.   Psychiatric: She has a normal mood and affect. Her behavior is normal. Thought content normal.         Assessment:       1. Chest pain, unspecified type    2. Essential hypertension    3. Interstitial cystitis      63 y/o female with hx and presentation as above. CP concerning for angina and she has risk factors for CAD and will evaluate with non invasive cardiac stress imaging and 2DE. Needs better BP control. Had been taking metoprolol tartrate once daily. Will D/C and start  succinate. BP log BID.        Plan:       -Nuclear SPECT  -2DE with CFD  -BP log  -F/u in 1 month

## 2018-03-20 ENCOUNTER — HOSPITAL ENCOUNTER (OUTPATIENT)
Dept: CARDIOLOGY | Facility: HOSPITAL | Age: 63
Discharge: HOME | End: 2018-03-20
Attending: INTERNAL MEDICINE
Payer: COMMERCIAL

## 2018-03-20 ENCOUNTER — HOSPITAL ENCOUNTER (OUTPATIENT)
Dept: RADIOLOGY | Facility: HOSPITAL | Age: 63
Discharge: HOME | End: 2018-03-20
Attending: INTERNAL MEDICINE
Payer: COMMERCIAL

## 2018-03-20 DIAGNOSIS — R07.9 CHEST PAIN, UNSPECIFIED TYPE: ICD-10-CM

## 2018-03-20 LAB
DIASTOLIC DYSFUNCTION: NO
DIASTOLIC DYSFUNCTION: NO
MITRAL VALVE MOBILITY: NORMAL
RETIRED EF AND QEF - SEE NOTES: 55 (ref 55–65)

## 2018-03-20 PROCEDURE — A9502 TC99M TETROFOSMIN: HCPCS

## 2018-03-20 PROCEDURE — 93016 CV STRESS TEST SUPVJ ONLY: CPT | Mod: ,,, | Performed by: INTERNAL MEDICINE

## 2018-03-20 PROCEDURE — 93017 CV STRESS TEST TRACING ONLY: CPT

## 2018-03-20 PROCEDURE — 93306 TTE W/DOPPLER COMPLETE: CPT

## 2018-03-20 PROCEDURE — 93018 CV STRESS TEST I&R ONLY: CPT | Mod: ,,, | Performed by: INTERNAL MEDICINE

## 2018-03-20 PROCEDURE — 78452 HT MUSCLE IMAGE SPECT MULT: CPT | Mod: 26,,, | Performed by: RADIOLOGY

## 2018-03-20 PROCEDURE — 93306 TTE W/DOPPLER COMPLETE: CPT | Mod: 26,,, | Performed by: INTERNAL MEDICINE

## 2018-03-26 ENCOUNTER — CLINICAL SUPPORT (OUTPATIENT)
Dept: CARDIOLOGY | Facility: CLINIC | Age: 63
End: 2018-03-26
Attending: INTERNAL MEDICINE
Payer: COMMERCIAL

## 2018-03-26 ENCOUNTER — OFFICE VISIT (OUTPATIENT)
Dept: INTERNAL MEDICINE | Facility: CLINIC | Age: 63
End: 2018-03-26
Payer: COMMERCIAL

## 2018-03-26 ENCOUNTER — TELEPHONE (OUTPATIENT)
Dept: INTERNAL MEDICINE | Facility: CLINIC | Age: 63
End: 2018-03-26

## 2018-03-26 ENCOUNTER — HOSPITAL ENCOUNTER (OUTPATIENT)
Dept: RADIOLOGY | Facility: HOSPITAL | Age: 63
Discharge: HOME | End: 2018-03-26
Attending: INTERNAL MEDICINE
Payer: COMMERCIAL

## 2018-03-26 VITALS
WEIGHT: 108.94 LBS | DIASTOLIC BLOOD PRESSURE: 71 MMHG | BODY MASS INDEX: 21.96 KG/M2 | TEMPERATURE: 98 F | HEIGHT: 59 IN | RESPIRATION RATE: 16 BRPM | HEART RATE: 66 BPM | SYSTOLIC BLOOD PRESSURE: 133 MMHG

## 2018-03-26 DIAGNOSIS — R58 ECCHYMOSIS: ICD-10-CM

## 2018-03-26 DIAGNOSIS — I80.8 PHLEBITIS OF LEFT ARM: Primary | ICD-10-CM

## 2018-03-26 DIAGNOSIS — M79.89 LEFT ARM SWELLING: ICD-10-CM

## 2018-03-26 DIAGNOSIS — Z85.3 HISTORY OF LEFT BREAST CANCER: ICD-10-CM

## 2018-03-26 DIAGNOSIS — M79.622 AXILLARY PAIN, LEFT: ICD-10-CM

## 2018-03-26 PROCEDURE — 3075F SYST BP GE 130 - 139MM HG: CPT | Mod: CPTII,S$GLB,, | Performed by: INTERNAL MEDICINE

## 2018-03-26 PROCEDURE — 99214 OFFICE O/P EST MOD 30 MIN: CPT | Mod: S$GLB,,, | Performed by: INTERNAL MEDICINE

## 2018-03-26 PROCEDURE — 76882 US LMTD JT/FCL EVL NVASC XTR: CPT | Mod: TC

## 2018-03-26 PROCEDURE — 76882 US LMTD JT/FCL EVL NVASC XTR: CPT | Mod: 26,,, | Performed by: RADIOLOGY

## 2018-03-26 PROCEDURE — 93971 EXTREMITY STUDY: CPT | Mod: S$GLB,,, | Performed by: INTERNAL MEDICINE

## 2018-03-26 PROCEDURE — 99999 PR PBB SHADOW E&M-EST. PATIENT-LVL IV: CPT | Mod: PBBFAC,,, | Performed by: INTERNAL MEDICINE

## 2018-03-26 PROCEDURE — 3078F DIAST BP <80 MM HG: CPT | Mod: CPTII,S$GLB,, | Performed by: INTERNAL MEDICINE

## 2018-03-26 NOTE — PROGRESS NOTES
Subjective:       Patient ID: Penny Cervantes is a 62 y.o. female who presents for bruise on lt arm      HPI     61 yo F with HTN who presents with left arm tenderness, mild swelling and upper arm ecchymoses starting a few days ago. She was recently evaluated for chest pain and had an exercise stress test on 3/20 with injection into the dorsal right hand during the procedure. There were no problems during the injection. She denies arm erythema, no fever or chills, no sweats. Denies any open lesions on the arm.       Review of Systems   Constitutional: Negative for chills, fatigue and fever.   HENT: Negative for congestion, rhinorrhea and sinus pressure.    Eyes: Negative for visual disturbance.   Respiratory: Negative for cough and shortness of breath.    Cardiovascular: Negative for chest pain, palpitations and leg swelling.   Gastrointestinal: Negative for abdominal pain, blood in stool, diarrhea, nausea and vomiting.   Musculoskeletal: Negative for arthralgias and myalgias.        Mild swelling left wrist   Skin: Positive for color change (left upper arm ecchymoses). Negative for rash.   Neurological: Negative for dizziness, weakness, light-headedness and headaches.       Objective:      Physical Exam   Constitutional: She is oriented to person, place, and time. Vital signs are normal. She appears well-developed and well-nourished. No distress.   HENT:   Head: Normocephalic and atraumatic.   Right Ear: Hearing and external ear normal.   Left Ear: Hearing and external ear normal.   Nose: Nose normal.   Mouth/Throat: Uvula is midline and mucous membranes are normal.   Eyes: Conjunctivae and lids are normal.   Neck: Full passive range of motion without pain.   Cardiovascular: Normal rate, regular rhythm, normal heart sounds and intact distal pulses.    No murmur heard.  Pulmonary/Chest: Effort normal and breath sounds normal. She has no wheezes.   Abdominal: Soft. Bowel sounds are normal. She exhibits no  distension. There is no tenderness.   Musculoskeletal: Normal range of motion. She exhibits no edema.        Left shoulder: She exhibits tenderness and pain. She exhibits normal range of motion and no swelling.        Left elbow: She exhibits normal range of motion. No tenderness found.        Left wrist: She exhibits tenderness and swelling. She exhibits normal range of motion.        Left upper arm: She exhibits tenderness. She exhibits no swelling.        Left forearm: She exhibits tenderness. She exhibits no swelling.   + tenderness along left upper arm, + tenderness in left axilla   Neurological: She is alert and oriented to person, place, and time.   Skin: Skin is warm, dry and intact. Ecchymosis (left upper arm) noted. No rash noted. She is not diaphoretic.   Psychiatric: She has a normal mood and affect.   Vitals reviewed.      Assessment:       1. Phlebitis of left arm    2. Left arm swelling    3. Ecchymosis    4. Axillary pain, left    5. History of left breast cancer        Plan:       1. Phlebitis of left arm  - related to IV placement during procedure  - may use tylenol as needed for pain, cold compresses as needed    2. Left arm swelling  - CBC auto differential; Future  - Basic metabolic panel; Future  - Sedimentation rate, manual; Future  - C-reactive protein; Future  - Cardiology Lab US Upper Extremity Veins Left; Future  - US Extremity Non Vascular Limited Left; Future    3. Ecchymosis  - CBC auto differential; Future  - Protime-INR; Future  - Cardiology Lab US Upper Extremity Veins Left; Future  - US Extremity Non Vascular Limited Left; Future    4. Axillary pain, left  - US Upper Extremity Veins Left; Future    5. History of left breast cancer  - US Upper Extremity Veins Left; Future  - Cardiology Lab US Upper Extremity Veins Left; Future    Will delay labs and will be done if no improvement over the next few days    Kate Gil MD

## 2018-04-17 ENCOUNTER — OFFICE VISIT (OUTPATIENT)
Dept: CARDIOLOGY | Facility: CLINIC | Age: 63
End: 2018-04-17
Payer: COMMERCIAL

## 2018-04-17 VITALS
DIASTOLIC BLOOD PRESSURE: 65 MMHG | SYSTOLIC BLOOD PRESSURE: 122 MMHG | HEIGHT: 59 IN | HEART RATE: 74 BPM | WEIGHT: 108 LBS | BODY MASS INDEX: 21.77 KG/M2

## 2018-04-17 DIAGNOSIS — M85.89 OSTEOPENIA OF MULTIPLE SITES: ICD-10-CM

## 2018-04-17 DIAGNOSIS — E53.8 B12 DEFICIENCY: ICD-10-CM

## 2018-04-17 DIAGNOSIS — N30.10 INTERSTITIAL CYSTITIS: ICD-10-CM

## 2018-04-17 DIAGNOSIS — I10 ESSENTIAL HYPERTENSION: Primary | ICD-10-CM

## 2018-04-17 PROCEDURE — 3078F DIAST BP <80 MM HG: CPT | Mod: CPTII,S$GLB,, | Performed by: INTERNAL MEDICINE

## 2018-04-17 PROCEDURE — 3074F SYST BP LT 130 MM HG: CPT | Mod: CPTII,S$GLB,, | Performed by: INTERNAL MEDICINE

## 2018-04-17 PROCEDURE — 99214 OFFICE O/P EST MOD 30 MIN: CPT | Mod: S$GLB,,, | Performed by: INTERNAL MEDICINE

## 2018-04-17 PROCEDURE — 99999 PR PBB SHADOW E&M-EST. PATIENT-LVL III: CPT | Mod: PBBFAC,,, | Performed by: INTERNAL MEDICINE

## 2018-04-17 NOTE — PROGRESS NOTES
"Subjective:    Patient ID:  Penny Cervantes is a 62 y.o. female who presents for follow-up of Hypertension      HPI  63 y/o female with hx of HTN, interstitial cystitis who presents for f/u.   Last clinic visit was seen for CP and subsequent stress test and 2DE were normal. BP had been erratic and was taking lopressor once daily. Was switched to toprol once daily. Has BP log available and -130's. A few outliers but average appears close to goal. Symptoms have significantly improved. She "knows" when her BP is high bc she feels lightheadedness. On further evaluation this mostly happens when she bends over to do something and stands up abruptly. Denies orthopnea, PND, syncope, palps, LE edema. Non smoker and rare EtOH. SBP in clinic today 130's.     Review of Systems   Constitution: Negative for weakness and malaise/fatigue.   HENT: Negative for congestion.    Eyes: Negative for blurred vision.   Cardiovascular: Negative for chest pain, claudication, cyanosis, dyspnea on exertion, irregular heartbeat, leg swelling, near-syncope, orthopnea, palpitations, paroxysmal nocturnal dyspnea and syncope.   Respiratory: Negative for shortness of breath.    Endocrine: Negative for polyuria.   Hematologic/Lymphatic: Negative for bleeding problem.   Skin: Negative for itching and rash.   Musculoskeletal: Negative for joint swelling, muscle cramps and muscle weakness.   Gastrointestinal: Negative for abdominal pain, hematemesis, hematochezia, melena, nausea and vomiting.   Genitourinary: Negative for dysuria and hematuria.   Neurological: Negative for dizziness, focal weakness, headaches, light-headedness and loss of balance.   Psychiatric/Behavioral: Negative for depression. The patient is not nervous/anxious.         Objective:    Physical Exam   Constitutional: She is oriented to person, place, and time. She appears well-developed and well-nourished.   HENT:   Head: Normocephalic and atraumatic.   Neck: Neck supple. No " JVD present.   Cardiovascular: Normal rate, regular rhythm and normal heart sounds.    Pulses:       Carotid pulses are 2+ on the right side, and 2+ on the left side.       Radial pulses are 2+ on the right side, and 2+ on the left side.        Femoral pulses are 2+ on the right side, and 2+ on the left side.       Dorsalis pedis pulses are 2+ on the right side, and 2+ on the left side.        Posterior tibial pulses are 2+ on the right side, and 2+ on the left side.   Pulmonary/Chest: Effort normal and breath sounds normal.   Abdominal: Soft. Bowel sounds are normal.   Musculoskeletal: She exhibits no edema.   Neurological: She is alert and oriented to person, place, and time.   Skin: Skin is warm and dry.   Psychiatric: She has a normal mood and affect. Her behavior is normal. Thought content normal.         Assessment:       1. Essential hypertension    2. Interstitial cystitis    3. B12 deficiency    4. Osteopenia of multiple sites      63 y/o female with hx and presentation as above. Improved clinically and BP mostly controlled on current regimen. No changes. Told to check BP intermittently, limit salt intake, and regular exercise.        Plan:       -Continue current medical management  - f/u in 6 months

## 2018-07-09 ENCOUNTER — HOSPITAL ENCOUNTER (INPATIENT)
Facility: HOSPITAL | Age: 63
LOS: 2 days | Discharge: HOME | DRG: 069 | End: 2018-07-12
Attending: EMERGENCY MEDICINE | Admitting: INTERNAL MEDICINE
Payer: COMMERCIAL

## 2018-07-09 DIAGNOSIS — I63.9 STROKE: ICD-10-CM

## 2018-07-09 DIAGNOSIS — R27.0 ATAXIA: ICD-10-CM

## 2018-07-09 DIAGNOSIS — G45.9 TIA (TRANSIENT ISCHEMIC ATTACK): Primary | ICD-10-CM

## 2018-07-09 DIAGNOSIS — R53.1 GENERALIZED WEAKNESS: ICD-10-CM

## 2018-07-09 DIAGNOSIS — R26.81 GAIT INSTABILITY: ICD-10-CM

## 2018-07-09 DIAGNOSIS — G45.9 TRANSIENT CEREBRAL ISCHEMIA, UNSPECIFIED TYPE: ICD-10-CM

## 2018-07-09 DIAGNOSIS — M25.571 RIGHT ANKLE PAIN: ICD-10-CM

## 2018-07-09 DIAGNOSIS — I10 ESSENTIAL HYPERTENSION: ICD-10-CM

## 2018-07-09 LAB
ALBUMIN SERPL BCP-MCNC: 4.3 G/DL
ALP SERPL-CCNC: 84 U/L
ALT SERPL W/O P-5'-P-CCNC: 19 U/L
ANION GAP SERPL CALC-SCNC: 7 MMOL/L
AST SERPL-CCNC: 20 U/L
BASOPHILS # BLD AUTO: 0.03 K/UL
BASOPHILS NFR BLD: 0.5 %
BILIRUB SERPL-MCNC: 0.4 MG/DL
BILIRUB UR QL STRIP: NEGATIVE
BUN SERPL-MCNC: 14 MG/DL
CALCIUM SERPL-MCNC: 9.9 MG/DL
CHLORIDE SERPL-SCNC: 106 MMOL/L
CHOLEST SERPL-MCNC: 194 MG/DL
CHOLEST/HDLC SERPL: 3.1 {RATIO}
CLARITY UR: CLEAR
CO2 SERPL-SCNC: 28 MMOL/L
COLOR UR: YELLOW
CREAT SERPL-MCNC: 0.7 MG/DL (ref 0.5–1.4)
CREAT SERPL-MCNC: 0.8 MG/DL
DIFFERENTIAL METHOD: ABNORMAL
EOSINOPHIL # BLD AUTO: 0.1 K/UL
EOSINOPHIL NFR BLD: 2 %
ERYTHROCYTE [DISTWIDTH] IN BLOOD BY AUTOMATED COUNT: 12.4 %
EST. GFR  (AFRICAN AMERICAN): >60 ML/MIN/1.73 M^2
EST. GFR  (NON AFRICAN AMERICAN): >60 ML/MIN/1.73 M^2
GLUCOSE SERPL-MCNC: 113 MG/DL
GLUCOSE UR QL STRIP: NEGATIVE
HCT VFR BLD AUTO: 38.4 %
HDLC SERPL-MCNC: 63 MG/DL
HDLC SERPL: 32.5 %
HGB BLD-MCNC: 12.2 G/DL
HGB UR QL STRIP: ABNORMAL
INR PPP: 0.9
KETONES UR QL STRIP: NEGATIVE
LDLC SERPL CALC-MCNC: 111.2 MG/DL
LEUKOCYTE ESTERASE UR QL STRIP: NEGATIVE
LYMPHOCYTES # BLD AUTO: 2.1 K/UL
LYMPHOCYTES NFR BLD: 32.4 %
MCH RBC QN AUTO: 28 PG
MCHC RBC AUTO-ENTMCNC: 31.8 G/DL
MCV RBC AUTO: 88 FL
MONOCYTES # BLD AUTO: 0.9 K/UL
MONOCYTES NFR BLD: 14.3 %
NEUTROPHILS # BLD AUTO: 3.3 K/UL
NEUTROPHILS NFR BLD: 50.6 %
NITRITE UR QL STRIP: NEGATIVE
NONHDLC SERPL-MCNC: 131 MG/DL
PH UR STRIP: 7 [PH] (ref 5–8)
PLATELET # BLD AUTO: 279 K/UL
PMV BLD AUTO: 9.6 FL
POCT GLUCOSE: 109 MG/DL (ref 70–110)
POTASSIUM SERPL-SCNC: 3.9 MMOL/L
PROT SERPL-MCNC: 7.3 G/DL
PROT UR QL STRIP: NEGATIVE
PROTHROMBIN TIME: 10 SEC
RBC # BLD AUTO: 4.36 M/UL
SAMPLE: NORMAL
SODIUM SERPL-SCNC: 141 MMOL/L
SP GR UR STRIP: 1.01 (ref 1–1.03)
TRIGL SERPL-MCNC: 99 MG/DL
TSH SERPL DL<=0.005 MIU/L-ACNC: 1.38 UIU/ML
URN SPEC COLLECT METH UR: ABNORMAL
UROBILINOGEN UR STRIP-ACNC: NEGATIVE EU/DL
WBC # BLD AUTO: 6.45 K/UL

## 2018-07-09 PROCEDURE — 80053 COMPREHEN METABOLIC PANEL: CPT

## 2018-07-09 PROCEDURE — 25000003 PHARM REV CODE 250: Performed by: EMERGENCY MEDICINE

## 2018-07-09 PROCEDURE — 99244 OFF/OP CNSLTJ NEW/EST MOD 40: CPT | Mod: GT,,, | Performed by: PSYCHIATRY & NEUROLOGY

## 2018-07-09 PROCEDURE — 80061 LIPID PANEL: CPT

## 2018-07-09 PROCEDURE — 99285 EMERGENCY DEPT VISIT HI MDM: CPT | Mod: 25

## 2018-07-09 PROCEDURE — 85025 COMPLETE CBC W/AUTO DIFF WBC: CPT

## 2018-07-09 PROCEDURE — 81003 URINALYSIS AUTO W/O SCOPE: CPT

## 2018-07-09 PROCEDURE — 85610 PROTHROMBIN TIME: CPT

## 2018-07-09 PROCEDURE — 84443 ASSAY THYROID STIM HORMONE: CPT

## 2018-07-09 PROCEDURE — 93005 ELECTROCARDIOGRAM TRACING: CPT

## 2018-07-09 PROCEDURE — 82962 GLUCOSE BLOOD TEST: CPT

## 2018-07-09 PROCEDURE — 82565 ASSAY OF CREATININE: CPT

## 2018-07-09 RX ORDER — ASPIRIN 325 MG
325 TABLET, DELAYED RELEASE (ENTERIC COATED) ORAL
Status: COMPLETED | OUTPATIENT
Start: 2018-07-09 | End: 2018-07-09

## 2018-07-09 RX ADMIN — ASPIRIN 325 MG: 325 TABLET, DELAYED RELEASE ORAL at 11:07

## 2018-07-10 PROBLEM — G45.9 TIA (TRANSIENT ISCHEMIC ATTACK): Status: ACTIVE | Noted: 2018-07-10

## 2018-07-10 PROBLEM — I63.9 STROKE: Status: ACTIVE | Noted: 2018-07-10

## 2018-07-10 PROBLEM — R53.1 GENERALIZED WEAKNESS: Status: RESOLVED | Noted: 2018-07-09 | Resolved: 2018-07-10

## 2018-07-10 LAB
APTT BLDCRRT: 28.5 SEC
BASOPHILS # BLD AUTO: 0.03 K/UL
BASOPHILS NFR BLD: 0.6 %
CK MB SERPL-MCNC: 1.5 NG/ML
CK MB SERPL-RTO: 1.1 %
CK SERPL-CCNC: 136 U/L
DIASTOLIC DYSFUNCTION: YES
DIFFERENTIAL METHOD: NORMAL
EOSINOPHIL # BLD AUTO: 0.1 K/UL
EOSINOPHIL NFR BLD: 2 %
ERYTHROCYTE [DISTWIDTH] IN BLOOD BY AUTOMATED COUNT: 12.4 %
ESTIMATED AVG GLUCOSE: 105 MG/DL
ESTIMATED PA SYSTOLIC PRESSURE: 27.01
HBA1C MFR BLD HPLC: 5.3 %
HCT VFR BLD AUTO: 39.9 %
HGB BLD-MCNC: 13 G/DL
INR PPP: 1
LYMPHOCYTES # BLD AUTO: 1.7 K/UL
LYMPHOCYTES NFR BLD: 31.4 %
MCH RBC QN AUTO: 28.4 PG
MCHC RBC AUTO-ENTMCNC: 32.6 G/DL
MCV RBC AUTO: 87 FL
MITRAL VALVE MOBILITY: NORMAL
MONOCYTES # BLD AUTO: 0.8 K/UL
MONOCYTES NFR BLD: 14.3 %
NEUTROPHILS # BLD AUTO: 2.8 K/UL
NEUTROPHILS NFR BLD: 51.5 %
PLATELET # BLD AUTO: 305 K/UL
PMV BLD AUTO: 9.6 FL
PROTHROMBIN TIME: 10.4 SEC
RBC # BLD AUTO: 4.58 M/UL
RETIRED EF AND QEF - SEE NOTES: 65 (ref 55–65)
TROPONIN I SERPL DL<=0.01 NG/ML-MCNC: <0.006 NG/ML
WBC # BLD AUTO: 5.45 K/UL

## 2018-07-10 PROCEDURE — 97166 OT EVAL MOD COMPLEX 45 MIN: CPT

## 2018-07-10 PROCEDURE — 82553 CREATINE MB FRACTION: CPT

## 2018-07-10 PROCEDURE — 97530 THERAPEUTIC ACTIVITIES: CPT

## 2018-07-10 PROCEDURE — 85610 PROTHROMBIN TIME: CPT

## 2018-07-10 PROCEDURE — 97161 PT EVAL LOW COMPLEX 20 MIN: CPT

## 2018-07-10 PROCEDURE — 82550 ASSAY OF CK (CPK): CPT

## 2018-07-10 PROCEDURE — 85025 COMPLETE CBC W/AUTO DIFF WBC: CPT

## 2018-07-10 PROCEDURE — 25000003 PHARM REV CODE 250: Performed by: STUDENT IN AN ORGANIZED HEALTH CARE EDUCATION/TRAINING PROGRAM

## 2018-07-10 PROCEDURE — 84484 ASSAY OF TROPONIN QUANT: CPT

## 2018-07-10 PROCEDURE — 93306 TTE W/DOPPLER COMPLETE: CPT | Mod: 26,,, | Performed by: INTERNAL MEDICINE

## 2018-07-10 PROCEDURE — 25000003 PHARM REV CODE 250: Performed by: INTERNAL MEDICINE

## 2018-07-10 PROCEDURE — 85730 THROMBOPLASTIN TIME PARTIAL: CPT

## 2018-07-10 PROCEDURE — G8997 SWALLOW GOAL STATUS: HCPCS | Mod: CH

## 2018-07-10 PROCEDURE — 92610 EVALUATE SWALLOWING FUNCTION: CPT

## 2018-07-10 PROCEDURE — 94761 N-INVAS EAR/PLS OXIMETRY MLT: CPT

## 2018-07-10 PROCEDURE — G8988 SELF CARE GOAL STATUS: HCPCS | Mod: CH

## 2018-07-10 PROCEDURE — A4216 STERILE WATER/SALINE, 10 ML: HCPCS | Performed by: STUDENT IN AN ORGANIZED HEALTH CARE EDUCATION/TRAINING PROGRAM

## 2018-07-10 PROCEDURE — G0378 HOSPITAL OBSERVATION PER HR: HCPCS

## 2018-07-10 PROCEDURE — 93306 TTE W/DOPPLER COMPLETE: CPT

## 2018-07-10 PROCEDURE — G8998 SWALLOW D/C STATUS: HCPCS | Mod: CH

## 2018-07-10 PROCEDURE — G8987 SELF CARE CURRENT STATUS: HCPCS | Mod: CI

## 2018-07-10 PROCEDURE — G8996 SWALLOW CURRENT STATUS: HCPCS | Mod: CH

## 2018-07-10 PROCEDURE — 36415 COLL VENOUS BLD VENIPUNCTURE: CPT

## 2018-07-10 PROCEDURE — 83036 HEMOGLOBIN GLYCOSYLATED A1C: CPT

## 2018-07-10 RX ORDER — ATORVASTATIN CALCIUM 40 MG/1
40 TABLET, FILM COATED ORAL DAILY
Qty: 90 TABLET | Refills: 3 | Status: SHIPPED | OUTPATIENT
Start: 2018-07-11 | End: 2018-07-13 | Stop reason: SDUPTHER

## 2018-07-10 RX ORDER — AMLODIPINE BESYLATE 5 MG/1
5 TABLET ORAL DAILY
Status: DISCONTINUED | OUTPATIENT
Start: 2018-07-10 | End: 2018-07-11

## 2018-07-10 RX ORDER — ASPIRIN 81 MG/1
81 TABLET ORAL DAILY
Refills: 0 | COMMUNITY
Start: 2018-07-11 | End: 2019-02-14

## 2018-07-10 RX ORDER — ATORVASTATIN CALCIUM 40 MG/1
40 TABLET, FILM COATED ORAL DAILY
Status: DISCONTINUED | OUTPATIENT
Start: 2018-07-10 | End: 2018-07-12 | Stop reason: HOSPADM

## 2018-07-10 RX ORDER — ASPIRIN 81 MG/1
81 TABLET ORAL DAILY
Status: DISCONTINUED | OUTPATIENT
Start: 2018-07-10 | End: 2018-07-12 | Stop reason: HOSPADM

## 2018-07-10 RX ORDER — TRAMADOL HYDROCHLORIDE 50 MG/1
50 TABLET ORAL ONCE
Status: COMPLETED | OUTPATIENT
Start: 2018-07-10 | End: 2018-07-10

## 2018-07-10 RX ORDER — SODIUM CHLORIDE 0.9 % (FLUSH) 0.9 %
3 SYRINGE (ML) INJECTION EVERY 8 HOURS
Status: DISCONTINUED | OUTPATIENT
Start: 2018-07-10 | End: 2018-07-12 | Stop reason: HOSPADM

## 2018-07-10 RX ORDER — ACETAMINOPHEN 325 MG/1
325 TABLET ORAL EVERY 6 HOURS PRN
Status: DISCONTINUED | OUTPATIENT
Start: 2018-07-10 | End: 2018-07-12 | Stop reason: HOSPADM

## 2018-07-10 RX ORDER — ONDANSETRON 2 MG/ML
4 INJECTION INTRAMUSCULAR; INTRAVENOUS EVERY 6 HOURS PRN
Status: DISCONTINUED | OUTPATIENT
Start: 2018-07-10 | End: 2018-07-12 | Stop reason: HOSPADM

## 2018-07-10 RX ADMIN — TRAMADOL HYDROCHLORIDE 50 MG: 50 TABLET, COATED ORAL at 06:07

## 2018-07-10 RX ADMIN — ATORVASTATIN CALCIUM 40 MG: 40 TABLET, FILM COATED ORAL at 10:07

## 2018-07-10 RX ADMIN — ACETAMINOPHEN 325 MG: 325 TABLET, FILM COATED ORAL at 12:07

## 2018-07-10 RX ADMIN — AMLODIPINE BESYLATE 5 MG: 5 TABLET ORAL at 09:07

## 2018-07-10 RX ADMIN — SODIUM CHLORIDE, PRESERVATIVE FREE 3 ML: 5 INJECTION INTRAVENOUS at 02:07

## 2018-07-10 RX ADMIN — SODIUM CHLORIDE, PRESERVATIVE FREE 3 ML: 5 INJECTION INTRAVENOUS at 05:07

## 2018-07-10 RX ADMIN — ASPIRIN 81 MG: 81 TABLET, COATED ORAL at 10:07

## 2018-07-10 RX ADMIN — SODIUM CHLORIDE, PRESERVATIVE FREE 3 ML: 5 INJECTION INTRAVENOUS at 09:07

## 2018-07-10 NOTE — PLAN OF CARE
Problem: Physical Therapy Goal  Goal: Physical Therapy Goal  Outcome: Unable to achieve outcome(s) by discharge  PT evaluated pt and pt would benefit from OPPT. Pt states she hopes to be returning to work tomorrow. Pt was agreeable to OPPT if her gait instability and decreased balance have not improved. Pt's RW was delivered to room and PT fitted pt for walker.

## 2018-07-10 NOTE — CONSULTS
"[facility]  Neurology Consult Note    Patient Name: Penny Cervantes  MRN: [id number]  Admission Date: 7/9/2018  Hospital Length of Stay: 0 days  Principal Problem:TIA (transient ischemic attack)    Consults  Subjective:     63 y/o female with pmhx of  Breast cancer (squamous cell ca. S/p lumpectomy + chemoradiation), BPPV, CAD, HTN, who presented to ER complaining of headache and generalized weakness. Patient states that she would feel light headed when attempting to a stand from a sitting position. Patient noticed episode  When she would have elevated blood pressure which she endorsed a "whooshing feeling" followed by a headache. Patient states that she has numbness and tingling in her lower extremities causing her to fall. Patient states that she was taking clonidine in the past, but her cardiologist discontinued her medication. Patient experienced difficulty with ambulation which prompted her visit to the ER. Patient denied any fevers, chills, n/v, night sweats, vision changes, loss of hearing, SOB abdominal pain or urinary or fecal incontience.       Past Medical History:   Diagnosis Date    Benign positional vertigo     Breast cancer     s/p chemo XRT, left breast triple negative    Coronary artery disease, non-occlusive     cath in 2012    Hypertension     Interstitial cystitis     Osteopenia     Squamous cell carcinoma        Past Surgical History:   Procedure Laterality Date    BONE RESECTION, RIB      right side    BREAST BIOPSY Right 2005    BREAST LUMPECTOMY Left 2009    BREAST SURGERY      HERNIA REPAIR      HYSTERECTOMY         Review of patient's allergies indicates:   Allergen Reactions    Demerol [meperidine] Other (See Comments)     headache    Pyridium [phenazopyridine] Nausea Only       Current Neurological Medications:     No current facility-administered medications on file prior to encounter.      Current Outpatient Prescriptions on File Prior to Encounter " "  Medication Sig    amLODIPine (NORVASC) 5 MG tablet Take 1 tablet (5 mg total) by mouth once daily.    BD LUER-MERCY SYRINGE 3 mL 25 gauge x 1" Syrg use as directed EVERY 2 WEEKS.    cyanocobalamin 1,000 mcg/mL injection Inject 1 mL (1,000 mcg total) into the muscle every 30 days.    ERGOCALCIFEROL, VITAMIN D2, (VITAMIN D ORAL) Take by mouth.    hydroquinone 4 % Crea Use hs on dark spots    losartan (COZAAR) 50 MG tablet Take 0.5 tablets (25 mg total) by mouth once daily.    ncksnm-dmxtn-u.blue-sal-naphos (URIMAR-T) 120-0.12-10.8 mg Tab Take 1 tablet by mouth 4 (four) times daily as needed.    metoprolol succinate (TOPROL-XL) 25 MG 24 hr tablet Take 25 mg by mouth once daily.    meclizine (ANTIVERT) 25 mg tablet Take 1 tablet (25 mg total) by mouth 3 (three) times daily as needed.    triamcinolone acetonide 0.1% (KENALOG) 0.1 % paste       Family History     Problem Relation (Age of Onset)    Heart disease Mother, Father, Sister (55), Brother (43)    Hypertension Sister    Ovarian cancer Sister        Social History Main Topics    Smoking status: Never Smoker    Smokeless tobacco: Never Used    Alcohol use Yes      Comment: 2-3 per month    Drug use: No    Sexual activity: No     Review of Systems  Objective:     Vital Signs (Most Recent):  Temp: 96.3 °F (35.7 °C) (07/10/18 0755)  Pulse: 73 (07/10/18 0800)  Resp: 18 (07/10/18 0755)  BP: 137/62 (07/10/18 0755)  SpO2: 96 % (07/10/18 0747) Vital Signs (24h Range):  Temp:  [96.3 °F (35.7 °C)-97.7 °F (36.5 °C)] 96.3 °F (35.7 °C)  Pulse:  [60-79] 73  Resp:  [12-21] 18  SpO2:  [95 %-100 %] 96 %  BP: (117-179)/(51-77) 137/62     Weight: 48.6 kg (107 lb 3.2 oz)  Body mass index is 21.65 kg/m².    Physical Exam   Constitutional: She is oriented to person, place, and time.   Neurological: She is oriented to person, place, and time. She has a normal Finger-Nose-Finger Test and a normal Heel to Shin Test.   Reflex Scores:       Bicep reflexes are 2+ on the right " side and 2+ on the left side.       Brachioradialis reflexes are 2+ on the right side and 2+ on the left side.       Achilles reflexes are 2+ on the right side and 2+ on the left side.  Psychiatric: Her speech is normal.       NEUROLOGICAL EXAMINATION:     MENTAL STATUS   Oriented to person, place, and time.   Attention: normal.   Speech: speech is normal     CRANIAL NERVES   Cranial nerves II through XII intact.     MOTOR EXAM   Muscle bulk: normal  Overall muscle tone: normal  Right arm tone: normal  Left arm tone: normal  Left leg tone: normal    Strength   Right biceps: 5/5  Left biceps: 5/5  Right triceps: 5/5  Left triceps: 5/5  Right quadriceps: 5/5  Left quadriceps: 5/5  Right hamstrin/5  Left hamstrin/5    REFLEXES     Reflexes   Right brachioradialis: 2+  Left brachioradialis: 2+  Right biceps: 2+  Left biceps: 2+  Right achilles: 2+  Left achilles: 2+    SENSORY EXAM   Light touch normal.   Proprioception normal.     GAIT AND COORDINATION      Coordination   Finger to nose coordination: normal  Heel to shin coordination: normal       Patient does show signs of ataxia 2/2 to peripheral neuropathy.        Significant Labs:     Recent Labs  Lab 07/10/18  0501   WBC 5.45   RBC 4.58   HGB 13.0   HCT 39.9      MCV 87   MCH 28.4   MCHC 32.6       Recent Labs  Lab 18  2215      K 3.9      CO2 28   BUN 14   CREATININE 0.8     Imaging  Imaging Results          X-Ray Ankle Complete Right (Final result)  Result time 07/10/18 00:01:21    Final result by Lucio Saldivar MD (07/10/18 00:01:21)                 Impression:      No acute osseous abnormality identified.      Electronically signed by: Lucio Saldivar MD  Date:    07/10/2018  Time:    00:01             Narrative:    EXAMINATION:  XR ANKLE COMPLETE 3 VIEW RIGHT    CLINICAL HISTORY:  Pain in right ankle and joints of right foot    TECHNIQUE:  AP, lateral, and oblique images of the right ankle were  performed.    COMPARISON:  None    FINDINGS:  No evidence of fracture, dislocation, or osseous destructive process.  Ankle mortise is maintained.                               X-Ray Chest AP Portable (Final result)  Result time 07/09/18 23:01:06    Final result by Kartik Ontiveros MD (07/09/18 23:01:06)                 Impression:      No radiographic evidence of acute intrathoracic process.      Electronically signed by: Kartik Ontiveros MD  Date:    07/09/2018  Time:    23:01             Narrative:    EXAMINATION:  XR CHEST AP PORTABLE    CLINICAL HISTORY:  Stroke;    TECHNIQUE:  Single frontal view of the chest was performed.    COMPARISON:  06/28/2017    FINDINGS:  Cardiac monitoring leads overlie the chest.  Cardiomediastinal silhouette appears within normal limits.  The lungs appear symmetrically aerated without evidence of confluent airspace consolidation or pleural effusion.  There is no evidence of pneumothorax.  Surgical clips overlie the bilateral luca thoraces.  Osseous structures demonstrate stable appearing degenerative changes.                               CT Head Without Contrast (Final result)  Result time 07/09/18 22:30:04    Final result by Lucio Saldivar MD (07/09/18 22:30:04)                 Impression:      No acute intracranial abnormalities identified.      Electronically signed by: Lucio Saldivar MD  Date:    07/09/2018  Time:    22:30             Narrative:    EXAMINATION:  CT HEAD WITHOUT CONTRAST    CLINICAL HISTORY:  ataxia;    TECHNIQUE:  Low dose axial images were obtained through the head.  Coronal and sagittal reformations were also performed. Contrast was not administered.    COMPARISON:  CT head from June 2017.    FINDINGS:  No evidence of acute/recent major vascular distribution cerebral infarction, intraparenchymal hemorrhage, or intra-axial space occupying lesion. The ventricular system is normal in size and configuration with no evidence of hydrocephalus. No effacement of  the skull-base cisterns. No abnormal extra-axial fluid collections or blood products. The visualized paranasal sinuses and mastoid air cells are clear. The calvarium shows no significant abnormality.                                Assessment and Plan:   61 y/o female with pmhx of  Breast cancer (squamous cell ca. S/p lumpectomy + chemoradiation), BPPV, CAD, HTN who has Hypertensive Emergency.     -Continue ASA/Statin daily   -Continue blood pressure control   -Patient to follow up with cardiologist for close monitoring   -Patient may benefit from a front wheel walker 2/2 peripheral neuropathy due to chemotherapy vs. b12 deficiency under treatment and impaired glucose tolerance   -Outpatient PT   -Have patient follow up in Neurology clinic in 4 weeks       Neurology will sign off. Thank you for the consult     Discussed with Neurology resident and Dr. Ledesma.    Wilmer Leiva MD  Family Medicine, PGY-1     Active Diagnoses:    Diagnosis Date Noted POA    PRINCIPAL PROBLEM:  TIA (transient ischemic attack) [G45.9] 07/10/2018 Yes    Generalized weakness [R53.1] 07/09/2018 Yes      Problems Resolved During this Admission:    Diagnosis Date Noted Date Resolved POA       VTE Risk Mitigation         Ordered     IP VTE LOW RISK PATIENT  Once      07/10/18 0224     Place sequential compression device  Until discontinued      07/10/18 0224              Wilmer Leiva MD  Family Medicine, PGY-1   [facility]

## 2018-07-10 NOTE — PLAN OF CARE
Problem: Occupational Therapy Goal  Goal: Occupational Therapy Goal  Goals to be met by: 08/10/18     Patient will increase functional independence with ADLs by performing:    LE Dressing with Modified [place].  Grooming while standing at sink with Modified [place].  Increased functional strength to WFL for ADLs.    Outcome: Ongoing (interventions implemented as appropriate)  OT eval/tx completed this date. Pt lives alone in [facility] w/ THE; T/S combo. PLOF: (I) w/ all I/BADLs incl standing tub showers, driving & home mgmt. Currently, pt c/o mod HA. BP in supine 147/68, /70, standing 130/65. Pt w/ LOB to L x 2 during 2 min static stand, but no A req'ed for recovery. Pt w/ unsteady gait amb w/o DME around room for toileting on toilet w/ Sup. Pt req'ed immediate return to supine 2/2 increased intensity & pounding of HA. Nsg notified & ice pack provided. Edu/tx w/ return demo of UB HEP w/ deep breathing. Rec use of shower chair upon return home 2/2 PMHx: BPV. Pt/friend verbalized understanding.    Pt presents w/ decreased overall endurance/conditioning, balance/mobility & coordination w/ subsequent decline in (I)/safety w/ BADLs, fxnl mobility & fxnl t/f's. OT 3x/wk to increase phys/fxnl status & maximize potential to achieve established goals for d/c-->home w/ HHOT/PT (pending progress) & rec shower chair.

## 2018-07-10 NOTE — SUBJECTIVE & OBJECTIVE
"    Woke up with symptoms?: no  Last known normal:    1800    Recent bleeding noted: no  Does the patient take any Blood Thinners? no  Medications: No relevant medications      Past Medical History: HTN, cystitis, B12 def    Past Surgical History: no major surgeries within the last 2 weeks    Family History: no relevant history    Social History: no smoking, no drinking, no drugs    Allergies: Demerol [Meperidine]  Pyridium [Phenazopyridine] No relevant allergies    Review of Systems   Constitutional: Negative for appetite change.   HENT: Negative for congestion.    Eyes: Negative for discharge.   Respiratory: Negative for shortness of breath.    Cardiovascular: Negative for chest pain.   Gastrointestinal: Negative for abdominal pain.   Endocrine: Negative for cold intolerance.   Genitourinary: Negative for difficulty urinating.   Musculoskeletal: Negative for joint swelling.   Skin: Negative for color change.     Objective:   Vitals: Blood pressure (!) 179/77, pulse 79, temperature 97.7 °F (36.5 °C), temperature source Oral, resp. rate 18, height 4' 11" (1.499 m), weight 49.9 kg (110 lb), SpO2 99 %.     CT READ: Yes  No hemmorhage. No mass effect. No early infarct signs.     Physical Exam   Constitutional: No distress.   HENT:   Head: Normocephalic.   Right Ear: External ear normal.   Left Ear: External ear normal.   Eyes: Conjunctivae are normal.   Neck: Normal range of motion.   Pulmonary/Chest: Effort normal. No stridor.   Abdominal: There is no tenderness.   Skin: Skin is dry. No rash noted.         "

## 2018-07-10 NOTE — PT/OT/SLP EVAL
Occupational Therapy   Evaluation & tx    Name: Penny Cervantes  MRN: [id number]  Admitting Diagnosis:  TIA (transient ischemic attack)      Recommendations:     Discharge Recommendations: home health OT, home health PT  Discharge Equipment Recommendations:  shower chair, grab bar  Barriers to discharge:  Decreased caregiver support    History:     Occupational Profile:  Living Environment: alone in [facility] w/ THE; T/S combo  Previous level of function: (I) w/o DME  Roles and Routines: drives, works FT, cooks, home mgmt, standing tub showers  Equipment Owned:  none  Assistance upon Discharge: PRN S/A    Past Medical History:   Diagnosis Date    Benign positional vertigo     Breast cancer     s/p chemo XRT, left breast triple negative    Coronary artery disease, non-occlusive     cath in 2012    Hypertension     Interstitial cystitis     Osteopenia     Squamous cell carcinoma        Past Surgical History:   Procedure Laterality Date    BONE RESECTION, RIB      right side    BREAST BIOPSY Right 2005    BREAST LUMPECTOMY Left 2009    BREAST SURGERY      HERNIA REPAIR      HYSTERECTOMY         Subjective     Chief Complaint: weakness, HA, unsteady gair  Patient/Family stated goals: return to PLOF  Communicated with: nsg prior to session.  Pain/Comfort:  · Pain Rating 1: 4/10  · Location 1: head  · Pain Addressed 1: Reposition, Distraction, Cessation of Activity, Nurse notified (ice pack provided)  · Pain Rating Post-Intervention 1: 5/10    Patients cultural, spiritual, Spiritism conflicts given the current situation:      Objective:     Patient found with: bed alarm, telemetry    General Precautions: Standard, fall   Orthopedic Precautions:N/A   Braces: N/A     Occupational Performance:    Bed Mobility:    · Patient completed Supine to Sit with supervision  · Patient completed Sit to Supine with supervision    Functional Mobility/Transfers:  · Patient completed Sit <> Stand Transfer with supervision  with  no  assistive device   · Patient completed Toilet Transfer Step Transfer technique with supervision with  no AD  · Functional Mobility: w/o DME around room w/ Sup    Activities of Daily Living:  · Grooming: stand by assistance standing at sink  · Toileting: supervision on toilet    Cognitive/Visual Perceptual:  Grossly WFL    Physical Exam:  B UEs WFL at 4-/5    Sit balance: F to F+    Stand balance: F- to F    Patient left HOB elevated with all lines intact, call button in reach, bed alarm on, nsg notified and friend present    [facility] 6 Click:  [facility] Total Score: 20    Treatment & Education:    Education:  OT eval/tx completed this date. Pt lives alone in [facility] w/ THE; T/S combo. PLOF: (I) w/ all I/BADLs incl standing tub showers, driving & home mgmt. Currently, pt c/o mod HA. BP in supine 147/68, /70, standing 130/65. Pt w/ LOB to L x 2 during 2 min static stand, but no A req'ed for recovery. Pt w/ unsteady gait amb w/o DME around room for toileting on toilet w/ Sup. Pt req'ed immediate return to supine 2/2 increased intensity & pounding of HA. Nsg notified & ice pack provided. Edu/tx w/ return demo of UB HEP w/ deep breathing. Rec use of shower chair upon return home 2/2 PMHx: BPV. Pt/friend verbalized understanding.      Assessment:   Pt presents w/ decreased overall endurance/conditioning, balance/mobility & coordination w/ subsequent decline in (I)/safety w/ BADLs, fxnl mobility & fxnl t/f's. OT 3x/wk to increase phys/fxnl status & maximize potential to achieve established goals for d/c-->home w/ HHOT/PT (pending progress) & rec shower chair.    Penny Cervantes is a 62 y.o. female with a medical diagnosis of TIA (transient ischemic attack).  She presents with ..  Performance deficits affecting function are weakness, impaired endurance, gait instability, impaired functional mobilty, impaired self care skills, impaired balance, decreased lower extremity function, decreased upper extremity function, decreased  "coordination, decreased safety awareness, pain.      Rehab Prognosis:  good; patient would benefit from acute skilled OT services to address these deficits and reach maximum level of function.         Clinical Decision Makin.  OT Mod:  "Pt evaluation falls under moderate complexity for evaluation coding due to identification of 3-5 performance deficits noted as stated above. Eval required Min/Mod assistance to complete on this date and detailed assessment(s) were utilized. Moreover, an expanded review of history and occupational profile obtained with additional review of cognitive, physical and psychosocial hx."     Plan:     Patient to be seen 3 x/week to address the above listed problems via self-care/home management, therapeutic activities, therapeutic exercises  · Plan of Care Expires: 08/10/18  · Plan of Care Reviewed with: patient, friend    This Plan of care has been discussed with the patient who was involved in its development and understands and is in agreement with the identified goals and treatment plan    GOALS:    Occupational Therapy Goals        Problem: Occupational Therapy Goal    Goal Priority Disciplines Outcome Interventions   Occupational Therapy Goal     OT, PT/OT Ongoing (interventions implemented as appropriate)    Description:  Goals to be met by: 08/10/18     Patient will increase functional independence with ADLs by performing:    LE Dressing with Modified [place].  Grooming while standing at sink with Modified [place].  Increased functional strength to WFL for ADLs.                      Time Tracking:     OT Date of Treatment: 07/10/18  OT Start Time: 1104  OT Stop Time: 1142  OT Total Time (min): 38 min    Billable Minutes:Evaluation 10  Therapeutic Activity 28  Total Time 38    JUAN A Booth  7/10/2018    "

## 2018-07-10 NOTE — PT/OT/SLP EVAL
"Physical Therapy Evaluation and Treatment session    Patient Name:  Penny Cervantes   MRN:  [id number]    Recommendations:     Discharge Recommendations:  outpatient PT   Discharge Equipment Recommendations: walker, rolling, shower chair, grab bar   Barriers to discharge: Decreased caregiver support    Assessment:     Penny Cervantes is a 62 y.o. female admitted with a medical diagnosis of TIA (transient ischemic attack).  She presents with the following impairments/functional limitations:  impaired balance, gait instability. Mrs. Cervantes presents with impaired balance, improvements noted with RW; however, pt had 2 episodes of LOB during ambulation with RW but was able to self correct to prevent LOB. Pt initially did not want HHPT/OPPT as pt works and is hoping to get back to work tomorrow. Pt was agreeable to OPPT if impaired balance has not improved by her follow up appointment with MD. Pt also agreeable and verbalized understanding of importance of safety and her current need to use RW.      Rehab Prognosis:  Fair+; patient would benefit from acute skilled PT services to address these deficits and reach maximum level of function.      Recent Surgery: * No surgery found *      Plan:    Pt to be discharged from acute care PT services 2* to hospital discharge. Pt would benefit from OPPT.     Subjective     Communicated with MILAN Summers prior to session.  Patient found supine with HOB elevated upon PT entry to room, agreeable to evaluation.      Chief Complaint: HA, decreased balance and strength. "If I can just go home and sleep in my own bed I think I'll be better"  Patient comments/goals: improve current impairments and return to PLOF   Pain/Comfort:  · Pain Rating 1:  (Pt did not rate pain. )  · Location 1: head  · Pain Addressed 1: Reposition, Distraction, Nurse notified  · Pain Rating Post-Intervention 1:  (pt did not rate pain )    Patients cultural, spiritual, Hinduism conflicts given the current " "situation: None Verbalized to PT    Living Environment:  Pt lives alone in a H with TTE and a tub/shower. Pt works full time as an  and was completely independent PTA. Pt endorses 1 fall yesterday as well as a "bad fall" 2 years ago. Pt denies having vertigo and related her HA and impaired balance to when she has high blood pressure. Pt denies using/owning DME.  Patient has the following equipment: none.  DME owned (not currently used): none.  Upon discharge, patient will have assistance from her daughter.     Objective:     Patient found with: bed alarm, telemetry     General Precautions: Standard, fall   Orthopedic Precautions:N/A   Braces: N/A     Exams:  · Cognitive Exam:  Patient is oriented to Person, Place, Time and Situation and follows 100% of all  commands   · RLE ROM: WFL  · RLE Strength: WFL  · LLE ROM: WFL  · LLE Strength: WFL    Functional Mobility:  · Bed Mobility:     · Scooting: independence  · Supine to Sit: independence  · Sit to Supine: independence  · Transfers:     · Sit to Stand:  stand by assistance with no AD  · Toilet Transfer: contact guard assistance with  no AD  using  ambulation   · Gait: ~175 feet with CGA/Min A with RW. See details below.   · Balance: Fair     AM-PAC 6 CLICK MOBILITY  Total Score:21       Therapeutic Activities and Exercises:  ·  Bed mobility and transfers as listed above.   · Sit <>Stand without AD and SBA.   · Pt ambulated ~175 feet without AD and CGA. Pt with 4 episodes of LOB requiring Min A to recover.     PM Treatment session: from 7815-5605  · Pt ambulated ~175 feet with RW and CGA. Pt with 2 episode of LOB   · PT educated pt on proper safety techniques for transfers.     Patient left supine with all lines intact, call button in reach, bed alarm on and MILAN Summers notified.    GOALS:    Physical Therapy Goals        Problem: Physical Therapy Goal    Goal Priority Disciplines Outcome Goal Variances Interventions   Physical Therapy Goal     PT/OT, PT " Unable to achieve outcome(s) by discharge                     History:     Past Medical History:   Diagnosis Date    Benign positional vertigo     Breast cancer     s/p chemo XRT, left breast triple negative    Coronary artery disease, non-occlusive     cath in 2012    Hypertension     Interstitial cystitis     Osteopenia     Squamous cell carcinoma     Stroke 7/10/2018       Past Surgical History:   Procedure Laterality Date    BONE RESECTION, RIB      right side    BREAST BIOPSY Right 2005    BREAST LUMPECTOMY Left 2009    BREAST SURGERY      HERNIA REPAIR      HYSTERECTOMY         Clinical Decision Making:     History  Co-morbidities and personal factors that may impact the plan of care Examination  Body Structures and Functions, activity limitations and participation restrictions that may impact the plan of care Clinical Presentation   Decision Making/ Complexity Score   Co-morbidities:   [] Time since onset of injury / illness / exacerbation  [] Status of current condition  []Patient's cognitive status and safety concerns    [] Multiple Medical Problems (see med hx)  Personal Factors:   [] Patient's age  [] Prior Level of function   [] Patient's home situation (environment and family support)  [] Patient's level of motivation  [] Expected progression of patient      HISTORY:(criteria)    [] 32598 - no personal factors/history    [] 21057 - has 1-2 personal factor/comorbidity     [] 73519 - has >3 personal factor/comorbidity     Body Regions:  [] Objective examination findings  [] Head     []  Neck  [] Trunk   [] Upper Extremity  [] Lower Extremity    Body Systems:  [] For communication ability, affect, cognition, language, and learning style: the assessment of the ability to make needs known, consciousness, orientation (person, place, and time), expected emotional /behavioral responses, and learning preferences (eg, learning barriers, education  needs)  [] For the neuromuscular system: a general  assessment of gross coordinated movement (eg, balance, gait, locomotion, transfers, and transitions) and motor function  (motor control and motor learning)  [] For the musculoskeletal system: the assessment of gross symmetry, gross range of motion, gross strength, height, and weight  [] For the integumentary system: the assessment of pliability(texture), presence of scar formation, skin color, and skin integrity  [] For cardiovascular/pulmonary system: the assessment of heart rate, respiratory rate, blood pressure, and edema     Activity limitations:    [] Patient's cognitive status and saf ety concerns          [] Status of current condition      [] Weight bearing restriction  [] Cardiopulmunary Restriction    Participation Restrictions:   [] Goals and goal agreement with the patient     [] Rehab potential (prognosis) and probable outcome      Examination of Body System: (criteria)    [] 04954 - addressing 1-2 elements    [] 52197 - addressing a total of 3 or more elements     [] 19850 -  Addressing a total of 4 or more elements         Clinical Presentation: (criteria)  Choose one     On examination of body system using standardized tests and measures patient presents with (CHOOSE ONE) elements from any of the following: body structures and functions, activity limitations, and/or participation restrictions.  Leading to a clinical presentation that is considered (CHOOSE ONE)                              Clinical Decision Making  (Eval Complexity):  Choose One     Time Tracking:     PT Received On: 07/10/18  PT Start Time: 1500     PT Stop Time: 1512  PT Total Time (min): 12 min  + 15    Billable Minutes: Evaluation 12 and Therapeutic Activity 15      Sam Ghosh, PT, DPT  07/10/2018

## 2018-07-10 NOTE — ASSESSMENT & PLAN NOTE
Nonspecific global weakness, tremulousness in all limbs on the exam, history w/o focality. Yesterday fall possible syncope vs. Seizure. Today w/ elevated BP but labile, concern for dysautonomia.  At this time low suspicion for acute neurovascular syndrome. No further neurovascular workup warranted.

## 2018-07-10 NOTE — CONSULTS
[facility]  Vascular Neurology  Comprehensive Stroke Center  Tele-Consultation Note      Inpatient consult to Telemedicine-Stroke  Consult performed by: ORESTES BARCENAS  Consult ordered by: TAYLA ERVIN          Consulting Provider: Spoke Physician:: Dr. Tayla Ervin  Current Providers  No providers found    Patient Location: [facility]  Spoke hospital nurse at bedside with patient assisting consultant.     Patient information was obtained from patient.       Assessment/Plan:     STROKE DOCUMENTATION     Acute Stroke Times:   Acute Stroke Times   Stroke Team Arrival Time: 2231  CT Interpretation Time: 2231    NIH Scale:  1a. Level Of Consciousness: 0-->Alert: keenly responsive  1b. LOC Questions: 0-->Answers both questions correctly  1c. LOC Commands: 0-->Performs both tasks correctly  2. Best Gaze: 0-->Normal  3. Visual: 0-->No visual loss  4. Facial Palsy: 0-->Normal symmetrical movements  5a. Motor Arm, Left: 0-->No drift: limb holds 90 (or 45) degrees for full 10 secs  5b. Motor Arm, Right: 0-->No drift: limb holds 90 (or 45) degrees for full 10 secs  6a. Motor Leg, Left: 0-->No drift: leg holds 30 degree position for full 5 secs  6b. Motor Leg, Right: 0-->No drift: leg holds 30 degree position for full 5 secs  7. Limb Ataxia: 0-->Absent  8. Sensory: 0-->Normal: no sensory loss  9. Best Language: 0-->No aphasia: normal  10. Dysarthria: 0-->Normal  11. Extinction and Inattention (formerly Neglect): 0-->No abnormality  Total (NIH Stroke Scale): 0     Modified Jenna    [place] Coma Scale:    ABCD2 Score:    [id number] Score:   HAS -BLED Score:   ICH Score:   Hunt & Sawant Classification:       Diagnoses:   Generalized weakness    Nonspecific global weakness, tremulousness in all limbs on the exam, history w/o focality. Yesterday fall possible syncope vs. Seizure. Today w/ elevated BP but labile, concern for dysautonomia.  At this time low  "suspicion for acute neurovascular syndrome. No further neurovascular workup warranted.            Blood pressure (!) 179/77, pulse 79, temperature 97.7 °F (36.5 °C), temperature source Oral, resp. rate 18, height 4' 11" (1.499 m), weight 49.9 kg (110 lb), SpO2 99 %.  Alteplase Eligible?: No  Alteplase Recommendation: Alteplase not recommended due to Outside of treatment window   Possible Interventional Revascularization Candidate? No; No large vessel occlusion    Disposition Recommendation: admit to inpatient      Subjective:     History of Present Illness:  62F at around 6 pm sat down to eat and started getting "heavy headed" and a weird feeling, tried to ignore it and felt that it may have been her blood pressure, she got up to go to the restroom she got a real heavy head again. She was very off balance but says that there was nothing spinning or no movement. Blood pressure at one point went above 200 mmHg systolic. Generally weak and unable to ambulate, not one side specifically. The aforementioned symptoms have never happened before. There are no identified triggers or modifying factors. There have been no recurrent events. There are no other associated symptoms.    Had a fall yesterday, doesn't recall what happened, next thing she remembers she was on the wall, doesn't remember the fall, felt disoriented like she had the wind knocked out of her.          Woke up with symptoms?: no  Last known normal:    1800    Recent bleeding noted: no  Does the patient take any Blood Thinners? no  Medications: No relevant medications      Past Medical History: HTN, cystitis, B12 def    Past Surgical History: no major surgeries within the last 2 weeks    Family History: no relevant history    Social History: no smoking, no drinking, no drugs    Allergies: Demerol [Meperidine]  Pyridium [Phenazopyridine] No relevant allergies    Review of Systems   Constitutional: Negative for appetite change.   HENT: Negative for congestion.  " "  Eyes: Negative for discharge.   Respiratory: Negative for shortness of breath.    Cardiovascular: Negative for chest pain.   Gastrointestinal: Negative for abdominal pain.   Endocrine: Negative for cold intolerance.   Genitourinary: Negative for difficulty urinating.   Musculoskeletal: Negative for joint swelling.   Skin: Negative for color change.     Objective:   Vitals: Blood pressure (!) 179/77, pulse 79, temperature 97.7 °F (36.5 °C), temperature source Oral, resp. rate 18, height 4' 11" (1.499 m), weight 49.9 kg (110 lb), SpO2 99 %.     CT READ: Yes  No hemmorhage. No mass effect. No early infarct signs.     Physical Exam   Constitutional: No distress.   HENT:   Head: Normocephalic.   Right Ear: External ear normal.   Left Ear: External ear normal.   Eyes: Conjunctivae are normal.   Neck: Normal range of motion.   Pulmonary/Chest: Effort normal. No stridor.   Abdominal: There is no tenderness.   Skin: Skin is dry. No rash noted.             Recommended the emergency room physician to have a brief discussion with the patient and/or family if available regarding the risks and benefits of treatment, and to briefly document the occurrence of that discussion in his clinical encounter note.     The attending portion of this evaluation, treatment, and documentation was performed per Juan C Ramirez MD via audiovisual.    Billing code:  (non-stroke, some mimics)    · This patient has neurological symptom(s)/condition/illness, with minimal potential for morbidity and mortality.  · There is a low probability for acute neurological change leading to clinical and possibly life-threatening deterioration requiring highest level of physician preparedness for urgent intervention.  · Care was coordinated with other physicians involved in the patient's care.  · Radiologic studies and laboratory data were reviewed and interpreted, and plan of care was re-assessed based on the results.  · Diagnosis, treatment options and " prognosis may have been discussed with the patient and/or family members or caregiver.      Consult End Time: 10:46 PM     Juan C Ramirez MD  [facility]  Vascular Neurology   [facility]

## 2018-07-10 NOTE — H&P
"[facility] Internal Medicine History and Physical - Resident Note  Patient: Penny Cervantes   MRN: [id number]      Admitting Team: [facility] Hospitalist Team B  Attending Physician: Dr. Sylvester Dennis MD  Resident: Yosef  Interns: Dr. Chan    Date of Admit: 7/9/2018      Chief Complaint and Duration     Hypertension (To ER wtih c/o headache and hypertension starting earlier today.  pt states that she still feels "heavy headed" after taking her night blood pressure medications.  pt c/o falling yesterday but denies LOC or hitting her head.  daughter states that her gait was unsteady prior to arrival.) and Headache      Subjective:      History of Present Illness:  Penny Cervantes is a 62 y.o. Caucasion female with benign positional vertigo, h/o breast CA (squamous cell carcinoma s/p lumpectomy, chemoradiation), CAD (h/o non-occlusive cath), HTN, B12 Deficiency who patient presented to [facility] on 7/9/2018 with a primary complaint generalized weakness with Hypertension (To ER wtih c/o headache and hypertension starting earlier today.  pt states that she still feels "heavy headed" after taking her night blood pressure medications.  pt c/o falling yesterday but denies LOC or hitting her head.  daughter states that her gait was unsteady prior to arrival.) and Headache. Patient reports symptoms starting around 6-630pm with head feeling "heavy" and elevated BP. She attempted to take her home BP meds but did not help resolve her condition. She noted that her coordination was off as well. Persistent "heavy" headed feeling, elevated BP (at one point SBP ~200), difficulty with ambulation prompted patient to present to ED. Last known normal ~1800.    Of note, she had one unwitnessed fall yesterday with likely LOC (patient does not recall events). Did hurt back and ankle from fall. Was able to ambulate and no issues with BP until evening on day of presentation. No recent travel history, no sick contacts, no new foods, no new activities or " "events attended.    ED course:   - Stroke activation in ED  - Tele Stroke Dr. Villalba evaluated patient in ED, NIH stroke scale 0, low suspicion for acute neurovascular syndrome  - Recommends admit to inpatient    [facility] Hospitalist team consulted for Stroke activation/TIA. Symptoms resolved at time of evaluation.    History obtained from: Patient and Daughter    Review of Systems:  Pertinent items are noted in HPI. 12 point ROS negative other than noted in HPI and below.    Denies fever, chills, night sweats, fatigue, vision changes, hearing changes, chest pain, SOB, cough, abdominal pain, N/V/D, constipation, dysuria, polyuria, diarrhea, extremity tingling or numbness. Denies loss of continence or LOC during today's events.      Past Medical History:  Past Medical History:   Diagnosis Date    Benign positional vertigo     Breast cancer     s/p chemo XRT, left breast triple negative    Coronary artery disease, non-occlusive     cath in 2012    Hypertension     Interstitial cystitis     Osteopenia     Squamous cell carcinoma        Past Surgical History:  Past Surgical History:   Procedure Laterality Date    BONE RESECTION, RIB      right side    BREAST BIOPSY Right 2005    BREAST LUMPECTOMY Left 2009    BREAST SURGERY      HERNIA REPAIR      HYSTERECTOMY         Allergies:  Review of patient's allergies indicates:   Allergen Reactions    Demerol [meperidine] Other (See Comments)     headache    Pyridium [phenazopyridine] Nausea Only       Home Medications:  Prior to Admission medications    Medication Sig Start Date End Date Taking? Authorizing Provider   amLODIPine (NORVASC) 5 MG tablet Take 1 tablet (5 mg total) by mouth once daily. 2/16/18   Kate Gil MD   BD LUER-MERCY SYRINGE 3 mL 25 gauge x 1" Syrg use as directed EVERY 2 WEEKS. 2/16/18   Kate Gil MD   cyanocobalamin 1,000 mcg/mL injection Inject 1 mL (1,000 mcg total) into the muscle every 30 days. 2/19/18   Kate Gil MD " "  ERGOCALCIFEROL, VITAMIN D2, (VITAMIN D ORAL) Take by mouth.    Historical Provider, MD   hydroquinone 4 % Crea Use hs on dark spots 10/18/17   Maegan Elena MD   losartan (COZAAR) 50 MG tablet Take 0.5 tablets (25 mg total) by mouth once daily. 18   Kate Gil MD   meclizine (ANTIVERT) 25 mg tablet Take 1 tablet (25 mg total) by mouth 3 (three) times daily as needed. 17   Kate Gil MD   uyvaxh-sahpy-y.blue-sal-naphos (URIMAR-T) 120-0.12-10.8 mg Tab Take 1 tablet by mouth 4 (four) times daily as needed. 18   Kate Gil MD   metoprolol succinate (TOPROL-XL) 25 MG 24 hr tablet Take 25 mg by mouth once daily.    Historical Provider, MD   triamcinolone acetonide 0.1% (KENALOG) 0.1 % paste  18   Historical Provider, MD     Family History:  Family History   Problem Relation Age of Onset    Heart disease Mother     Heart disease Father     Heart disease Sister 55    Heart disease Brother 43    Hypertension Sister     Ovarian cancer Sister        Social History:  Social History   Substance Use Topics    Smoking status: Never Smoker    Smokeless tobacco: Never Used    Alcohol use Yes      Comment: 2-3 per month       Health Maintenance:     Primary Care Physician: Kate Gil MD    Immunizations:   Currently on File with [facility]: Reports uptodate with immunizations but not documented in system.  Most Recent Immunizations   Administered Date(s) Administered    Influenza 2017       Cancer Screening:  PAP: Not indicated    Mammogram: UTD  Colonoscopy/Colonc CA screen: is up to date with [employer].       Objective:     Last 24 Hour Vital Signs:  BP  Min: 117/58  Max: 179/77  Temp  Av.7 °F (36.5 °C)  Min: 97.7 °F (36.5 °C)  Max: 97.7 °F (36.5 °C)  Pulse  Av.8  Min: 60  Max: 79  Resp  Av.6  Min: 12  Max: 21  SpO2  Av.9 %  Min: 97 %  Max: 100 %  Height  Av' 11" (149.9 cm)  Min: 4' 11" (149.9 cm)  Max: 4' 11" (149.9 cm)  Weight  Av.9 " "kg (110 lb)  Min: 49.9 kg (110 lb)  Max: 49.9 kg (110 lb)  Body mass index is 22.22 kg/m².  No intake/output data recorded.    24 HR Intake & Output   No intake/output data recorded.     Physical Examination:  BP (!) 117/58   Pulse 62   Temp 97.7 °F (36.5 °C)   Resp 18   Ht 4' 11" (1.499 m)   Wt 49.9 kg (110 lb)   SpO2 97%   Breastfeeding? No   BMI 22.22 kg/m²     General appearance: alert, appears stated age, cooperative and no distress  HEENT: NCAT, EOMI, PERRLA, OP clear, MMM  Neck: no adenopathy, no carotid bruit, no JVD, supple, symmetrical, trachea midline and thyroid not enlarged, symmetric, no tenderness/mass/nodules  Back: symmetric, no curvature. ROM normal. No CVA tenderness.  Lungs: clear to auscultation bilaterally and no wheezing, no crackles  Chest wall: no tenderness  Heart: RRR, no m/r/g, cap refill <2sec, 2+ pulses throughout  Abdomen: Soft, ND, BS+, no TTP  Extremities: no c/c/e  Skin: Skin color, texture, turgor normal. No rashes or lesions  Neurologic: AAO x4 (person, place, time, events), 5/5 muscle strength throughout, sensation intact, finger to nose smooth and coordinated, CNII-XII grossly intact without focal deficits appreciated    Laboratory:  Most Recent Data:      Recent Labs  Lab 07/09/18  2215 07/09/18  2223   WBC  --  6.45   HGB  --  12.2   HCT  --  38.4   PLT  --  279   MCV  --  88   RDW  --  12.4     --    K 3.9  --      --    CO2 28  --    BUN 14  --    CREATININE 0.8  --    *  --    CALCIUM 9.9  --    PROT 7.3  --    ALBUMIN 4.3  --    BILITOT 0.4  --    AST 20  --    ALKPHOS 84  --    ALT 19  --        Coags:   Lab Results   Component Value Date    INR 0.9 07/09/2018       FLP:   Lab Results   Component Value Date    CHOL 194 07/09/2018    HDL 63 07/09/2018    LDLCALC 111.2 07/09/2018    TRIG 99 07/09/2018    CHOLHDL 32.5 07/09/2018       DM:   Lab Results   Component Value Date    HGBA1C 5.2 09/13/2017    LDLCALC 111.2 07/09/2018    CREATININE 0.8 " 07/09/2018       Thyroid:   Lab Results   Component Value Date    TSH 1.381 07/09/2018       Anemia:   Lab Results   Component Value Date    ZRKELKGP23 790 02/16/2018       Cardiac:   Lab Results   Component Value Date    TROPONINI 0.006 06/28/2017       Urinalysis:   Lab Results   Component Value Date    LABURIN No growth 02/16/2018    COLORU Yellow 07/09/2018    SPECGRAV 1.010 07/09/2018    NITRITE Negative 07/09/2018    KETONESU Negative 07/09/2018    UROBILINOGEN Negative 07/09/2018    WBCUA 0 02/16/2018       Trended Cardiac Data:  No results for input(s): TROPONINI, CKTOTAL, CKMB, BNP in the last 168 hours.    Microbiology Data:  None    Radiology:  X-ray Ankle Complete Right    Result Date: 7/10/2018  EXAMINATION: XR ANKLE COMPLETE 3 VIEW RIGHT CLINICAL HISTORY: Pain in right ankle and joints of right foot TECHNIQUE: AP, lateral, and oblique images of the right ankle were performed. COMPARISON: None FINDINGS: No evidence of fracture, dislocation, or osseous destructive process.  Ankle mortise is maintained.     No acute osseous abnormality identified. Electronically signed by: Lucio Saldivar MD Date:    07/10/2018 Time:    00:01    Ct Head Without Contrast    Result Date: 7/9/2018  EXAMINATION: CT HEAD WITHOUT CONTRAST CLINICAL HISTORY: ataxia; TECHNIQUE: Low dose axial images were obtained through the head.  Coronal and sagittal reformations were also performed. Contrast was not administered. COMPARISON: CT head from June 2017. FINDINGS: No evidence of acute/recent major vascular distribution cerebral infarction, intraparenchymal hemorrhage, or intra-axial space occupying lesion. The ventricular system is normal in size and configuration with no evidence of hydrocephalus. No effacement of the skull-base cisterns. No abnormal extra-axial fluid collections or blood products. The visualized paranasal sinuses and mastoid air cells are clear. The calvarium shows no significant abnormality.     No acute  intracranial abnormalities identified. Electronically signed by: Lucio Saldivar MD Date:    07/09/2018 Time:    22:30    X-ray Chest Ap Portable    Result Date: 7/9/2018  EXAMINATION: XR CHEST AP PORTABLE CLINICAL HISTORY: Stroke; TECHNIQUE: Single frontal view of the chest was performed. COMPARISON: 06/28/2017 FINDINGS: Cardiac monitoring leads overlie the chest.  Cardiomediastinal silhouette appears within normal limits.  The lungs appear symmetrically aerated without evidence of confluent airspace consolidation or pleural effusion.  There is no evidence of pneumothorax.  Surgical clips overlie the bilateral luca thoraces.  Osseous structures demonstrate stable appearing degenerative changes.     No radiographic evidence of acute intrathoracic process. Electronically signed by: Kartik Ontiveros MD Date:    07/09/2018 Time:    23:01      Current Medications:     Infusions:       Scheduled:       PRN:      Antibiotics and Day Number of Therapy:  None    Lines and Day Number of Therapy:  PIV     Assessment:     Penny Cervantes is a 62 y.o.female with  Present on Admission:   Generalized weakness   TIA (transient ischemic attack)       Plan:     1. Transient Ischemic Attack  - Patient presented with acute symptoms as indicated above in HPI that prompted stroke activation. Subsequently, Dr. Ramirez evaluated patient via telemedicine who deemed patient without need for TPA, reviewed CT, with no further recommendations other than to monitor closely and admit to inpatient.  - Stroke/TIA order set initiated  - Given , will continue on ASA 81  - Started on Lipitor 40mg PO daily  - Will plan to obtain ECHO and Carotid US  - Patient passed swallow eval without issue  - PT/OT on board  - Inpatient Neuro and Dietary to eval patient later in morning  - AM labs pending    2. HTN  - Patient's BP significantly elevated on presentation. Patient's BP improved gradually while in ED  - On my evaluation, patient's BP back at  baseline ~120/80 and without symptoms  - Will plan to resume home BP meds (patient takes all meds in PM) and monitor closely    3. Generalized Weakness likely 2/2 TIA - resolved  - Presented with weakness as noted in HPI, has since resolved  - Continue to monitor closely    4.   - PCP: Kate Gil MD  - Immunizations:   Immunization History   Administered Date(s) Administered    Influenza 09/29/2017   - Future appointments: No future appointments.    F: None  E: Replete as needed  N: Cardiac     Prophylaxis: SCD    Code Status: Full     Disposition: Admit to monitor closely, follow up consultants    Kaiden Navas DO  \[facility]\"" Internal Medicine-Pediatrics PGY-IV  \[facility]\"" Hospitalist Service Team B    \[facility]\"" Medicine Hospitalist Pager numbers:   \[facility]\"" Hospitalist Medicine Team A (Mary/George): [phone]  \[facility]\"" Hospitalist Medicine Team B (Sonny/Ravi):  [phone]

## 2018-07-10 NOTE — ED NOTES
"Pt presents to ED secondary to c/o HTN with accompanying symptoms including "heavy headed" which feels like dizziness, headache and feeling off balance per patient. Denies any numbness, weakness, tingling, CP, SOB but when attempting to ambulate patient- knees buckled and had to be assisted back in bed by this nurse and MD. Also reports an unwitnessed fall yesterday and unsure of LOC. C/o right ankle pain and right "rib/back" pain s/p fall. States was able to bear weight to ankle prior to this incident. Will continue to monitor. MD notified of patients symptoms and asked to come assess patient right away.   "

## 2018-07-10 NOTE — PT/OT/SLP EVAL
"Speech Language Pathology Evaluation  Bedside Swallow  Discharge    Patient Name:  Penny Cervantes   MRN:  [id number]  Admitting Diagnosis: TIA (transient ischemic attack)    Recommendations:                 General Recommendations:  Follow-up not indicated  Diet recommendations:  Regular, Thin   Aspiration Precautions: 1 bite/sip at a time, Alternating bites/sips, Feed only when awake/alert, HOB to 90 degrees, Meds whole 1 at a time, Remain upright 30 minutes post meal, Small bites/sips and Standard aspiration precautions   General Precautions: Standard, fall  Communication strategies:  none    History:     Past Medical History:   Diagnosis Date    Benign positional vertigo     Breast cancer     s/p chemo XRT, left breast triple negative    Coronary artery disease, non-occlusive     cath in 2012    Hypertension     Interstitial cystitis     Osteopenia     Squamous cell carcinoma     Stroke 7/10/2018       Past Surgical History:   Procedure Laterality Date    BONE RESECTION, RIB      right side    BREAST BIOPSY Right 2005    BREAST LUMPECTOMY Left 2009    BREAST SURGERY      HERNIA REPAIR      HYSTERECTOMY     History of Present Illness:  Penny Cervantes is a 62 y.o. Caucasion female with benign positional vertigo, h/o breast CA (squamous cell carcinoma s/p lumpectomy, chemoradiation), CAD (h/o non-occlusive cath), HTN, B12 Deficiency who [facility] with a primary complaint generalized weakness with Hypertension (To ER wtih c/o headache and hypertension starting earlier today.  pt states that she still feels "heavy headed" after taking her night blood pressure medications.  pt c/o falling yesterday but denies LOC or hitting her head.  daughter states that her gait was unsteady prior to arrival.) and Headache. Patient reports symptoms starting around 6-630pm with head feeling "heavy" and elevated BP. She attempted to take her home BP meds but did not help resolve her " "condition. She noted that her coordination was off as well. Persistent "heavy" headed feeling, elevated BP (at one point SBP ~200), difficulty with ambulation prompted patient to present to ED. Last known normal ~1800.     Of note, she had one unwitnessed fall yesterday with likely LOC (patient does not recall events). Did hurt back and ankle from fall. Was able to ambulate and no issues with BP until evening on day of presentation. No recent travel history, no sick contacts, no new foods, no new activities or events attended.     ED course:   - Stroke activation in ED  - Tele Stroke Dr. Villalba evaluated patient in ED, NIH stroke scale 0, low suspicion for acute neurovascular syndrome  - Recommends admit to inpatient     [facility] Hospitalist team consulted for Stroke activation/TIA. Symptoms resolved at time of evaluation.    Social History: Patient lives at home in [place], pt is indep with ADLs. Pt works full time    Prior Intubation HX:  none    Modified Barium Swallow: none per EMR    Chest X-Rays: The lungs appear symmetrically aerated without evidence of confluent airspace consolidation or pleural effusion.    Prior diet: reg/thin liquid diet    Occupation/hobbies/homemakinth grade education, works full time    Subjective     Consult received for clinical swallow eval/speech/lang eval this date, SLP did communicate with RN prior to eval/treat.    Patient goals: "I have a headache."    Pain/Comfort:  · Pain Rating 1: 5/10  · Location 1:  (headache)    Objective:     Pt seen in room for swallow eval. Pt admitted for TIA but symptoms have resolved.   Pt alert, awake and oriented x4. Pt with fluent verbal expression and able to follow simple and complex commands. Pt able to express self with no issues.   Pt reporting pain and SLP did notify RN.   Family member, sister in room during eval.     Oral Musculature Evaluation  · Oral Musculature: WFL  · Dentition: present and adequate  · Mucosal Quality: good, " adequate  · Mandibular Strength and Mobility: WFL  · Oral Labial Strength and Mobility: WFL  · Lingual Strength and Mobility: WFL  · Velar Elevation: WFL  · Buccal Strength and Mobility: WFL  · Volitional Cough: elicited  · Volitional Swallow: timely swallow  · Voice Prior to PO Intake: clear voice  · Oral Musculature Comments: no facial droop     Bedside Swallow Eval:   Consistencies Assessed:  · Thin liquids 1 icechip, 1 tsp sip water, 2 cup sips w/ straw, 2 cup sips w/o straw and 3 self regulated cup sips w/o straw  · Puree 2 bites of pudding  · Solids 2 bites of cracker     Oral Phase:   · WFL    Pharyngeal Phase:   · Pt demonstrated no coughing/choking on initial presentations.   · Pt demonstrated brief choking episode on bite of pudding while talking to SLP, pt was also semi reclined at 70 degree angle due to bad headache.  · Pt demonstrated no wet voice with any PO trials.  · Pt was able to throat clear in attempts to expel any material in throat.   · Pt able to recover from coughing event and demonstrated clear voice and adequate time to catch her breath.   · Pt was able to commence with further PO trials.    Compensatory Strategies  · Volitional cough/throat clear    Treatment: no further ST needs.     Assessment:     Penny Cervantes is a 62 y.o. female admitted with TIA who presents with timely oral and pharyngeal phase of the swallow.       Goals:    SLP Goals     Not on file          Multidisciplinary Problems (Resolved)        Problem: SLP Goal    Goal Priority Disciplines Outcome   SLP Goal   (Resolved)     SLP Outcome(s) achieved   Description:  Short Term Goals:  1. Pt will participate in ongoing swallow assessment to determine least restrictive diet. MET 7/10                      Plan:     · Patient to be seen:      · Plan of Care expires:     · Plan of Care reviewed with:  patient (sister, Yesenia)   · SLP Follow-Up:  No       Discharge recommendations:   (NO ST needs)   Barriers to Discharge:   "    Time Tracking:     SLP Treatment Date:   07/10/18  Speech Start Time:  1207  Speech Stop Time:  1222     Speech Total Time (min):  15 min    Billable Minutes: Eval Swallow and Oral Function 15    JF Villareal, CCC-SLP  07/10/2018     "I certify that I was present in the room directing the student and service delivery and guiding them using my skilled judgement. As the co-signing therapist, I have reviewed the student's documentation, and am responsible for the treatment, assessment and plan."     Darling NATH   Clinician  07/10/2018             "

## 2018-07-10 NOTE — CONSULTS
"  [facility]  Adult Nutrition  Consult Note    SUMMARY     Recommendations    Recommendation/Intervention:   1. Provided handout to aid with home diet   2. [place] preferences as able   3. RD to monitor    Goals: Meet >85% EEN  Nutrition Goal Status: new  Communication of RD Recs: reviewed with RN (plan of care)    D/C planning: Cardiac diet    Reason for Assessment    Reason for Assessment: consult  Diagnosis:  (TIA)  Relevant Medical History: HTN    General Information Comments: Pt denies issues with n/v. Diet advanced and pt tolerating diet but does not care for hospital food.  Discussed always available menu. Pt reports she follows a low sodium, heart healthy diet at home. Denies appetite or weight changes. Reports UBW: 108#. Pt appears thin with mild to moderate loss of lean body mass and fat mass in extremities. Does not utilize supplements at home.       Nutrition Risk Screen    Nutrition Risk Screen: no indicators present    Nutrition/Diet History    Food Preferences: Denies cultural, [place] food preferences  Do you have any cultural, spiritual, [place] conflicts, given your current situation?: no  Factors Affecting Nutritional Intake: None identified at this time    Anthropometrics    Temp: 96.3 °F (35.7 °C)  Height Method: Stated  Height: 4' 11" (149.9 cm)  Height (inches): 59 in  Weight Method: Bed Scale  Weight: 48.6 kg (107 lb 2.3 oz)  Weight (lb): 107.14 lb  Ideal Body Weight (IBW), Female: 95 lb  % Ideal Body Weight, Female (lb): 112.78 lb  BMI (Calculated): 21.7  BMI Grade: 18.5-24.9 - normal       Lab/Procedures/Meds    Pertinent Labs Reviewed: reviewed  Pertinent Medications Reviewed: reviewed    Physical Findings/Assessment    Overall Physical Appearance:  (thin)  Oral/Mouth Cavity: WDL  Skin: intact    Estimated/Assessed Needs    Weight Used For Calorie Calculations: 48.6 kg (107 lb 2.3 oz)  Energy Calorie Requirements (kcal): 9903-9040 kcal  Energy Need Method: " Kcal/kg  Protein Requirements: 50g  Weight Used For Protein Calculations: 48.6 kg (107 lb 2.3 oz)     Fluid Need Method: RDA Method  RDA Method (mL): 1200     Nutrition Prescription Ordered    Current Diet Order: cardiac    Evaluation of Received Nutrient/Fluid Intake    I/O: not fully recorded  Energy Calories Required: not meeting needs  Protein Required: not meeting needs  Fluid Required: not meeting needs  % Intake of Estimated Energy Needs:  50-75 %  % Meal Intake:  50 %    Nutrition Risk    Level of Risk/Frequency of Follow-up:  (1 x week)     Assessment and Plan    Nutrition Dx: Decreased need for sodium r/t HTN as evidenced by TIA  Nutrition Dx Status: new       Monitor and Evaluation    Food and Nutrient Intake: energy intake, food and beverage intake  Food and Nutrient Adminstration: diet order  Knowledge/Beliefs/Attitudes: food and nutrition knowledge/skill  Physical Activity and Function: nutrition-related ADLs and IADLs  Anthropometric Measurements: weight, weight change  Biochemical Data, Medical Tests and Procedures: electrolyte and renal panel  Nutrition-Focused Physical Findings: overall appearance     Nutrition Follow-Up    RD Follow-up?: Yes

## 2018-07-10 NOTE — PLAN OF CARE
Patient accompanied by sister.    Patient lives alone, is independent and drives, has daughter and her [facility].    Patient prefers appointments as late as possible.    [place] set for Neuro and PCP, will cancel Neuro if not needed.  PCP only has 10am appt for hospital follow ups.  Future Appointments  Date Time Provider Department Center   7/18/2018 10:00 AM Kate Gil MD [facility] IM [place]   8/29/2018 4:00 PM Demetrius Asif MD [facility] NEURO [facility]            07/10/18 1141   Discharge Assessment   Assessment Type Discharge Planning Assessment   Confirmed/corrected address and phone number on facesheet? Yes   Assessment information obtained from? Patient   Prior to hospitilization cognitive status: Alert/Oriented   Prior to hospitalization functional status: Independent   Current cognitive status: Alert/Oriented   Current Functional Status: Independent   Facility Arrived From: home or   Lives With alone   Able to Return to Prior Arrangements yes   Is patient able to care for self after discharge? Yes   Who are your caregiver(s) and their phone number(s)? Salvatore Cervantes Daughter     [phone]    Patient's perception of discharge disposition home or selfcare   Readmission Within The Last 30 Days no previous admission in last 30 days   Patient currently being followed by outpatient case management? No   Patient currently receives any other outside agency services? No   Equipment Currently Used at Home none   Do you have any problems affording any of your prescribed medications? No   Is the patient taking medications as prescribed? yes   Does the patient have transportation home? Yes   Transportation Available family or friend will provide   Discharge Plan A Home   Patient/Family In Agreement With Plan yes

## 2018-07-10 NOTE — ED NOTES
Pt updated on plan of care. Daughter at bedside. Belongings placed in bag and at bedside. Call light within reach. NAD noted. Will continue to monitor closely.

## 2018-07-10 NOTE — PLAN OF CARE
Problem: SLP Goal  Goal: SLP Goal  Short Term Goals:  1. Pt will participate in ongoing swallow assessment to determine least restrictive diet. MET 7/10    Outcome: Outcome(s) achieved Date Met: 07/10/18  Clinical swallow eval completed this date, pt safe for regular diet and thin liquids. No further ST needs at this time.

## 2018-07-10 NOTE — HPI
"62F at around 6 pm sat down to eat and started getting "heavy headed" and a weird feeling, tried to ignore it and felt that it may have been her blood pressure, she got up to go to the restroom she got a real heavy head again. She was very off balance but says that there was nothing spinning or no movement. Blood pressure at one point went above 200 mmHg systolic. Generally weak and unable to ambulate, not one side specifically. The aforementioned symptoms have never happened before. There are no identified triggers or modifying factors. There have been no recurrent events. There are no other associated symptoms.    Had a fall yesterday, doesn't recall what happened, next thing she remembers she was on the wall, doesn't remember the fall, felt disoriented like she had the wind knocked out of her.    "

## 2018-07-10 NOTE — ED PROVIDER NOTES
"Encounter Date: 7/9/2018    SCRIBE #1 NOTE: I, Hussein Crooks, am scribing for, and in the presence of,  Dr. Choi. I have scribed the entire note.       History     Chief Complaint   Patient presents with    Hypertension     To ER wtih c/o headache and hypertension starting earlier today.  pt states that she still feels "heavy headed" after taking her night blood pressure medications.  pt c/o falling yesterday but denies LOC or hitting her head.  daughter states that her gait was unsteady prior to arrival.    Headache     Time seen by provider: 10:04 PM    This is a 61 y/o female who presents with complaint of HTN that started at about 6 PM today. She states that she has dull HA and that her head feels "heavy". She admits to elevated BP of 150/80s. Pt has also been feeling off balanced and notes she has to hold onto something to ambulate due to feeling "wacky".  Denies any numbness, weakness, tingling, CP, SOB, dysuria, hematuria, or syncope. States her BP at home has been 120/70s over the past week. She was feeling nauseated earlier today after dinner which she notes has resolved.  Pt has a history of vertigo but states her symptoms today do not feel like vertigo.      The history is provided by the patient.     Review of patient's allergies indicates:   Allergen Reactions    Demerol [meperidine] Other (See Comments)     headache    Pyridium [phenazopyridine] Nausea Only     Past Medical History:   Diagnosis Date    Benign positional vertigo     Breast cancer     s/p chemo XRT, left breast triple negative    Coronary artery disease, non-occlusive     cath in 2012    Hypertension     Interstitial cystitis     Osteopenia     Squamous cell carcinoma      Past Surgical History:   Procedure Laterality Date    BONE RESECTION, RIB      right side    BREAST BIOPSY Right 2005    BREAST LUMPECTOMY Left 2009    BREAST SURGERY      HERNIA REPAIR      HYSTERECTOMY       Family History   Problem Relation Age of " "Onset    Heart disease Mother     Heart disease Father     Heart disease Sister 55    Heart disease Brother 43    Hypertension Sister     Ovarian cancer Sister      Social History   Substance Use Topics    Smoking status: Never Smoker    Smokeless tobacco: Never Used    Alcohol use Yes      Comment: 2-3 per month     Review of Systems   Constitutional: Negative for chills and fever.        Feels "wacky" with ambulation.   HENT: Negative for congestion, rhinorrhea and sore throat.         Unsure of head trauma.   Eyes: Negative for redness and visual disturbance.   Respiratory: Negative for cough, shortness of breath and wheezing.    Cardiovascular: Negative for chest pain and palpitations.   Gastrointestinal: Negative for abdominal pain, diarrhea, nausea and vomiting.   Genitourinary: Negative for dysuria and hematuria.   Musculoskeletal: Negative for back pain, myalgias and neck pain.   Skin: Negative for rash.   Neurological: Positive for dizziness and headaches. Negative for syncope, weakness, light-headedness and numbness.        Off balanced while walking. No tingling. Unsure of LOC. No vertigo.   Psychiatric/Behavioral: Negative for confusion.   All other systems reviewed and are negative.      Physical Exam     Initial Vitals [07/09/18 2150]   BP Pulse Resp Temp SpO2   (!) 179/77 79 18 97.7 °F (36.5 °C) 99 %      MAP       --         Physical Exam    Nursing note and vitals reviewed.  Constitutional: She appears well-developed and well-nourished. She is not diaphoretic. No distress.   HENT:   Head: Normocephalic and atraumatic.   Right Ear: External ear normal.   Left Ear: External ear normal.   Nose: Nose normal.   Mouth/Throat: Oropharynx is clear and moist.   Eyes: Conjunctivae and EOM are normal. Pupils are equal, round, and reactive to light. Right eye exhibits no discharge. Left eye exhibits no discharge. No scleral icterus.   Neck: Normal range of motion. Neck supple.   Cardiovascular: Normal " rate, regular rhythm, normal heart sounds and intact distal pulses. Exam reveals no gallop and no friction rub.    No murmur heard.  Pulmonary/Chest: Breath sounds normal. No respiratory distress. She has no wheezes. She has no rhonchi. She has no rales.   Abdominal: Soft. She exhibits no distension. There is no tenderness. There is no rebound and no guarding.   Musculoskeletal: Normal range of motion.   Neurological: She is alert and oriented to person, place, and time. She has normal strength. No cranial nerve deficit or sensory deficit.   Abnormal finger nose finger test on the left. Abnormal bilateral heel to shin test. ataxic gait and unable to stand or ambulate on her own   Skin: Skin is warm and dry. Capillary refill takes less than 2 seconds. No pallor.   Psychiatric: She has a normal mood and affect.         ED Course   Procedures  Labs Reviewed   COMPREHENSIVE METABOLIC PANEL - Abnormal; Notable for the following:        Result Value    Glucose 113 (*)     Anion Gap 7 (*)     All other components within normal limits   URINALYSIS - Abnormal; Notable for the following:     Occult Blood UA Trace (*)     All other components within normal limits   CBC W/ AUTO DIFFERENTIAL - Abnormal; Notable for the following:     MCHC 31.8 (*)     All other components within normal limits   PROTIME-INR   TSH   LIPID PANEL   POCT GLUCOSE   POCT GLUCOSE   ISTAT CREATININE     EKG Readings: (Independently Interpreted)   EKG: Rate 76 bpm. Normal sinus rhythm. Non-specific T wave changes. Normal ST segments. No STEMI. Normal conduction. Normal intervals.       Imaging Results          X-Ray Ankle Complete Right (Final result)  Result time 07/10/18 00:01:21    Final result by Lucio Saldivar MD (07/10/18 00:01:21)                 Impression:      No acute osseous abnormality identified.      Electronically signed by: Lucio Saldivar MD  Date:    07/10/2018  Time:    00:01             Narrative:    EXAMINATION:  XR ANKLE COMPLETE  3 VIEW RIGHT    CLINICAL HISTORY:  Pain in right ankle and joints of right foot    TECHNIQUE:  AP, lateral, and oblique images of the right ankle were performed.    COMPARISON:  None    FINDINGS:  No evidence of fracture, dislocation, or osseous destructive process.  Ankle mortise is maintained.                               X-Ray Chest AP Portable (Final result)  Result time 07/09/18 23:01:06    Final result by Kartik Ontiveros MD (07/09/18 23:01:06)                 Impression:      No radiographic evidence of acute intrathoracic process.      Electronically signed by: Kartik Ontiveros MD  Date:    07/09/2018  Time:    23:01             Narrative:    EXAMINATION:  XR CHEST AP PORTABLE    CLINICAL HISTORY:  Stroke;    TECHNIQUE:  Single frontal view of the chest was performed.    COMPARISON:  06/28/2017    FINDINGS:  Cardiac monitoring leads overlie the chest.  Cardiomediastinal silhouette appears within normal limits.  The lungs appear symmetrically aerated without evidence of confluent airspace consolidation or pleural effusion.  There is no evidence of pneumothorax.  Surgical clips overlie the bilateral luca thoraces.  Osseous structures demonstrate stable appearing degenerative changes.                               CT Head Without Contrast (Final result)  Result time 07/09/18 22:30:04    Final result by Lucio Saldivar MD (07/09/18 22:30:04)                 Impression:      No acute intracranial abnormalities identified.      Electronically signed by: Lucio Saldivar MD  Date:    07/09/2018  Time:    22:30             Narrative:    EXAMINATION:  CT HEAD WITHOUT CONTRAST    CLINICAL HISTORY:  ataxia;    TECHNIQUE:  Low dose axial images were obtained through the head.  Coronal and sagittal reformations were also performed. Contrast was not administered.    COMPARISON:  CT head from June 2017.    FINDINGS:  No evidence of acute/recent major vascular distribution cerebral infarction, intraparenchymal hemorrhage,  or intra-axial space occupying lesion. The ventricular system is normal in size and configuration with no evidence of hydrocephalus. No effacement of the skull-base cisterns. No abnormal extra-axial fluid collections or blood products. The visualized paranasal sinuses and mastoid air cells are clear. The calvarium shows no significant abnormality.                                 Medical Decision Making:   Initial Assessment:   This is a 61 y/o female who presents with complaint of HTN that started earlier today. Plan to CT head, CXR, obtain basic labs, and re-assess  Differential Diagnosis:   CVA, TIA, electrolyte abnormality, hypoglycemia, [place] Palsy, seizure, migraine, syncope.    Independently Interpreted Test(s):   I have ordered and independently interpreted EKG Reading(s) - see prior notes  Clinical Tests:   Lab Tests: Ordered and Reviewed  Radiological Study: Ordered and Reviewed  Medical Tests: Ordered and Reviewed  ED Management:  Patient's symptoms concerning for posterior TIA.  Symptoms improved in ED.  Will admit for further evaluation.      10:42 PM Case discussed with Neurologist, Dr. Ramirez, who evaluated the patient.     11:44 PM Case discussed with [facility]; they will admit patient.              Attending Attestation:         Attending Critical Care:   Critical Care Times:   ==============================================================  · Total Critical Care Time - exclusive of procedural time: 40 minutes.  ==============================================================  Critical care was necessary to treat or prevent imminent or life-threatening deterioration of the following conditions: stroke.                  Clinical Impression:     1. TIA    2. Right ankle pain            Disposition:   Disposition: Admitted  Condition: Stable       ITayla,  personally performed the services described in this documentation. All medical record entries made by the scribe were at my  direction and in my presence.  I have reviewed the chart and agree that the record reflects my personal performance and is accurate and complete. Tayla Choi M.D. 1:16 AM07/10/2018                   Tayla Choi MD  07/10/18 0116

## 2018-07-10 NOTE — PLAN OF CARE
Recommendations     Recommendation/Intervention:   1. Provided handout to aid with home diet   2. [place] preferences as able   3. RD to monitor     Goals: Meet >85% EEN  Nutrition Goal Status: new  Communication of RD Recs: reviewed with RN (plan of care)     D/C planning: Cardiac diet

## 2018-07-11 PROBLEM — R26.81 GAIT INSTABILITY: Status: ACTIVE | Noted: 2018-07-11

## 2018-07-11 PROBLEM — I16.0 HYPERTENSIVE URGENCY: Status: ACTIVE | Noted: 2018-07-11

## 2018-07-11 PROBLEM — I16.0 HYPERTENSIVE URGENCY: Status: RESOLVED | Noted: 2018-07-10 | Resolved: 2018-07-10

## 2018-07-11 PROBLEM — R27.0 ATAXIA: Status: ACTIVE | Noted: 2018-07-11

## 2018-07-11 LAB
ALBUMIN SERPL BCP-MCNC: 4.1 G/DL
ALP SERPL-CCNC: 68 U/L
ALT SERPL W/O P-5'-P-CCNC: 15 U/L
ANION GAP SERPL CALC-SCNC: 7 MMOL/L
AST SERPL-CCNC: 16 U/L
BASOPHILS # BLD AUTO: 0.02 K/UL
BASOPHILS NFR BLD: 0.4 %
BILIRUB SERPL-MCNC: 0.5 MG/DL
BUN SERPL-MCNC: 14 MG/DL
CALCIUM SERPL-MCNC: 9.7 MG/DL
CHLORIDE SERPL-SCNC: 105 MMOL/L
CO2 SERPL-SCNC: 27 MMOL/L
CREAT SERPL-MCNC: 0.6 MG/DL
DIFFERENTIAL METHOD: NORMAL
EOSINOPHIL # BLD AUTO: 0.1 K/UL
EOSINOPHIL NFR BLD: 2 %
ERYTHROCYTE [DISTWIDTH] IN BLOOD BY AUTOMATED COUNT: 12.5 %
EST. GFR  (AFRICAN AMERICAN): >60 ML/MIN/1.73 M^2
EST. GFR  (NON AFRICAN AMERICAN): >60 ML/MIN/1.73 M^2
GLUCOSE SERPL-MCNC: 112 MG/DL
HCT VFR BLD AUTO: 41.7 %
HCYS SERPL-SCNC: 5.2 UMOL/L
HGB BLD-MCNC: 13.6 G/DL
LYMPHOCYTES # BLD AUTO: 1.3 K/UL
LYMPHOCYTES NFR BLD: 24.8 %
MAGNESIUM SERPL-MCNC: 2.1 MG/DL
MCH RBC QN AUTO: 28.4 PG
MCHC RBC AUTO-ENTMCNC: 32.6 G/DL
MCV RBC AUTO: 87 FL
MONOCYTES # BLD AUTO: 0.7 K/UL
MONOCYTES NFR BLD: 13.8 %
NEUTROPHILS # BLD AUTO: 3 K/UL
NEUTROPHILS NFR BLD: 58.8 %
PHOSPHATE SERPL-MCNC: 3.2 MG/DL
PLATELET # BLD AUTO: 296 K/UL
PMV BLD AUTO: 9.6 FL
POTASSIUM SERPL-SCNC: 4 MMOL/L
PROT SERPL-MCNC: 7.4 G/DL
RBC # BLD AUTO: 4.79 M/UL
SODIUM SERPL-SCNC: 139 MMOL/L
WBC # BLD AUTO: 5.08 K/UL

## 2018-07-11 PROCEDURE — A4216 STERILE WATER/SALINE, 10 ML: HCPCS | Performed by: STUDENT IN AN ORGANIZED HEALTH CARE EDUCATION/TRAINING PROGRAM

## 2018-07-11 PROCEDURE — 94761 N-INVAS EAR/PLS OXIMETRY MLT: CPT

## 2018-07-11 PROCEDURE — 86703 HIV-1/HIV-2 1 RESULT ANTBDY: CPT

## 2018-07-11 PROCEDURE — 84166 PROTEIN E-PHORESIS/URINE/CSF: CPT

## 2018-07-11 PROCEDURE — 83921 ORGANIC ACID SINGLE QUANT: CPT

## 2018-07-11 PROCEDURE — 84591 ASSAY OF NOS VITAMIN: CPT

## 2018-07-11 PROCEDURE — 97116 GAIT TRAINING THERAPY: CPT

## 2018-07-11 PROCEDURE — 97110 THERAPEUTIC EXERCISES: CPT

## 2018-07-11 PROCEDURE — 36415 COLL VENOUS BLD VENIPUNCTURE: CPT

## 2018-07-11 PROCEDURE — 84207 ASSAY OF VITAMIN B-6: CPT

## 2018-07-11 PROCEDURE — 85025 COMPLETE CBC W/AUTO DIFF WBC: CPT

## 2018-07-11 PROCEDURE — 86256 FLUORESCENT ANTIBODY TITER: CPT | Mod: 91

## 2018-07-11 PROCEDURE — 80053 COMPREHEN METABOLIC PANEL: CPT

## 2018-07-11 PROCEDURE — 84165 PROTEIN E-PHORESIS SERUM: CPT | Mod: 26,,, | Performed by: PATHOLOGY

## 2018-07-11 PROCEDURE — 83735 ASSAY OF MAGNESIUM: CPT

## 2018-07-11 PROCEDURE — 83655 ASSAY OF LEAD: CPT

## 2018-07-11 PROCEDURE — 86431 RHEUMATOID FACTOR QUANT: CPT

## 2018-07-11 PROCEDURE — 11000001 HC ACUTE MED/SURG PRIVATE ROOM

## 2018-07-11 PROCEDURE — 84165 PROTEIN E-PHORESIS SERUM: CPT

## 2018-07-11 PROCEDURE — 86334 IMMUNOFIX E-PHORESIS SERUM: CPT

## 2018-07-11 PROCEDURE — 86235 NUCLEAR ANTIGEN ANTIBODY: CPT

## 2018-07-11 PROCEDURE — 86235 NUCLEAR ANTIGEN ANTIBODY: CPT | Mod: 59

## 2018-07-11 PROCEDURE — 84311 SPECTROPHOTOMETRY: CPT

## 2018-07-11 PROCEDURE — 82300 ASSAY OF CADMIUM: CPT | Mod: 91

## 2018-07-11 PROCEDURE — 25000003 PHARM REV CODE 250: Performed by: STUDENT IN AN ORGANIZED HEALTH CARE EDUCATION/TRAINING PROGRAM

## 2018-07-11 PROCEDURE — 84425 ASSAY OF VITAMIN B-1: CPT

## 2018-07-11 PROCEDURE — 84120 ASSAY OF URINE PORPHYRINS: CPT

## 2018-07-11 PROCEDURE — 25500020 PHARM REV CODE 255: Performed by: INTERNAL MEDICINE

## 2018-07-11 PROCEDURE — 86038 ANTINUCLEAR ANTIBODIES: CPT

## 2018-07-11 PROCEDURE — 84100 ASSAY OF PHOSPHORUS: CPT

## 2018-07-11 PROCEDURE — A9585 GADOBUTROL INJECTION: HCPCS | Performed by: INTERNAL MEDICINE

## 2018-07-11 PROCEDURE — 86334 IMMUNOFIX E-PHORESIS SERUM: CPT | Mod: 26,,, | Performed by: PATHOLOGY

## 2018-07-11 PROCEDURE — 83090 ASSAY OF HOMOCYSTEINE: CPT

## 2018-07-11 RX ORDER — LOSARTAN POTASSIUM 25 MG/1
25 TABLET ORAL DAILY
Status: DISCONTINUED | OUTPATIENT
Start: 2018-07-11 | End: 2018-07-11

## 2018-07-11 RX ORDER — AMLODIPINE BESYLATE 5 MG/1
5 TABLET ORAL NIGHTLY
Status: DISCONTINUED | OUTPATIENT
Start: 2018-07-11 | End: 2018-07-12 | Stop reason: HOSPADM

## 2018-07-11 RX ORDER — LORAZEPAM 1 MG/1
1 TABLET ORAL ONCE
Status: DISCONTINUED | OUTPATIENT
Start: 2018-07-11 | End: 2018-07-11

## 2018-07-11 RX ORDER — DOCUSATE SODIUM 50 MG/5ML
100 LIQUID ORAL 2 TIMES DAILY PRN
Status: DISCONTINUED | OUTPATIENT
Start: 2018-07-12 | End: 2018-07-12 | Stop reason: HOSPADM

## 2018-07-11 RX ORDER — GADOBUTROL 604.72 MG/ML
10 INJECTION INTRAVENOUS
Status: COMPLETED | OUTPATIENT
Start: 2018-07-11 | End: 2018-07-11

## 2018-07-11 RX ORDER — METOPROLOL SUCCINATE 25 MG/1
25 TABLET, EXTENDED RELEASE ORAL NIGHTLY
Status: DISCONTINUED | OUTPATIENT
Start: 2018-07-11 | End: 2018-07-12 | Stop reason: HOSPADM

## 2018-07-11 RX ORDER — LOSARTAN POTASSIUM 25 MG/1
25 TABLET ORAL NIGHTLY
Status: DISCONTINUED | OUTPATIENT
Start: 2018-07-11 | End: 2018-07-12 | Stop reason: HOSPADM

## 2018-07-11 RX ORDER — POLYETHYLENE GLYCOL 3350 17 G/17G
17 POWDER, FOR SOLUTION ORAL ONCE
Status: COMPLETED | OUTPATIENT
Start: 2018-07-11 | End: 2018-07-11

## 2018-07-11 RX ORDER — LORAZEPAM 1 MG/1
1 TABLET ORAL EVERY 12 HOURS PRN
Status: DISCONTINUED | OUTPATIENT
Start: 2018-07-12 | End: 2018-07-11

## 2018-07-11 RX ADMIN — SODIUM CHLORIDE, PRESERVATIVE FREE 3 ML: 5 INJECTION INTRAVENOUS at 02:07

## 2018-07-11 RX ADMIN — ASPIRIN 81 MG: 81 TABLET, COATED ORAL at 08:07

## 2018-07-11 RX ADMIN — ATORVASTATIN CALCIUM 40 MG: 40 TABLET, FILM COATED ORAL at 08:07

## 2018-07-11 RX ADMIN — POLYETHYLENE GLYCOL 3350 17 G: 17 POWDER, FOR SOLUTION ORAL at 02:07

## 2018-07-11 RX ADMIN — AMLODIPINE BESYLATE 5 MG: 5 TABLET ORAL at 08:07

## 2018-07-11 RX ADMIN — ACETAMINOPHEN 325 MG: 325 TABLET, FILM COATED ORAL at 09:07

## 2018-07-11 RX ADMIN — ACETAMINOPHEN 325 MG: 325 TABLET, FILM COATED ORAL at 05:07

## 2018-07-11 RX ADMIN — METOPROLOL SUCCINATE 25 MG: 25 TABLET, FILM COATED, EXTENDED RELEASE ORAL at 09:07

## 2018-07-11 RX ADMIN — GADOBUTROL 10 ML: 604.72 INJECTION INTRAVENOUS at 10:07

## 2018-07-11 RX ADMIN — LOSARTAN POTASSIUM 25 MG: 25 TABLET ORAL at 08:07

## 2018-07-11 RX ADMIN — SODIUM CHLORIDE, PRESERVATIVE FREE 3 ML: 5 INJECTION INTRAVENOUS at 05:07

## 2018-07-11 RX ADMIN — SODIUM CHLORIDE, PRESERVATIVE FREE 3 ML: 5 INJECTION INTRAVENOUS at 09:07

## 2018-07-11 NOTE — PROGRESS NOTES
Patient DC help overnight, undergoing further evaluation, accompanied by daughter, expecting likely DC tomorrow.  HelenSalvatore Daughter     [phone]

## 2018-07-11 NOTE — PLAN OF CARE
Problem: Physical Therapy Goal  Goal: Physical Therapy Goal  Goals to be met by: 8/10/18     Patient will increase functional independence with mobility by performin. Supine <> sit with Modified [place]  2. Sit to stand transfer with Modified [place]  3. Bed to chair transfer with Modified [place] using Rolling Walker  4. Gait  x 200 feet with Modified [place] using Rolling Walker.      Outcome: Ongoing (interventions implemented as appropriate)      Pt continues to work and progress toward established goals.

## 2018-07-11 NOTE — PLAN OF CARE
Problem: Patient Care Overview  Goal: Plan of Care Review  Outcome: Revised  Received patient upon rounds, lying in bed, conscious coherent to time, place and person, GCS 15. Ambulating to the toilet with assistance. NO weakness in both arms and lower extremities. Patient has a subjective complaint of pain upon rounds aroud 1900H , she just took tramadol PO around 1845H, Patient denies Nausea and no vomiting, Dr Dennis team informed regarding her headache and her anti-hypertensive medication if they will resume, amlodipine 5mg PO resumed in the night. MRI of the head  And spine was ordered. Patient on telemetry with normal sinus rhythm. Side rails kept secure, Bed alarm On, Ishmael escudero within reach. Safety fall precaution measures noted. Patient verbalized that she was relieved with the tramadol PO and was able to sleep during the night.   Patient complaint of constipation, Dr Dennis team informed, to be reviewed in the morning by the team.  For MRI of head, lumbar and spine today.

## 2018-07-11 NOTE — PROGRESS NOTES
"[facility] Medicine Progress Note    Primary Team: [facility] Hospitalist Team B  Attending Physician: Sylvester Dennis MD  Resident: Dr. Navas  Intern: Dr. Chan    Subjective:      Today the patient states that she is feeling better.  The heaviness that she was feeling has improved, although she still feels it if occurs now when she ambulates.  She also reported that the squeezing headache had improved after tramadol and it has not reoccurred.  Denies SOB, chest pain, abdominal pain.  Patient still feels unsteady on her feet. Patient notes that when she was admitted she was urinating frequently, however that resolved overnight.       Objective:     Last 24 Hour Vital Signs:  BP  Min: 127/62  Max: 168/74  Temp  Av.1 °F (36.2 °C)  Min: 96.2 °F (35.7 °C)  Max: 98.6 °F (37 °C)  Pulse  Av.2  Min: 60  Max: 80  Resp  Av  Min: 18  Max: 18  SpO2  Av.2 %  Min: 96 %  Max: 98 %  Height  Av' 11" (149.9 cm)  Min: 4' 11" (149.9 cm)  Max: 4' 11" (149.9 cm)  Weight  Av.6 kg (107 lb 2.3 oz)  Min: 48.6 kg (107 lb 2.3 oz)  Max: 48.6 kg (107 lb 2.3 oz)  I/O last 3 completed shifts:  In: -   Out: 1225 [Urine:1225]    Physical Examination:  General appearance: alert, appears stated age, cooperative and no distress  HEENT: NCAT, EOMI, PERRLA, OP clear, MMM  Neck: no adenopathy, no carotid bruit, no JVD, supple, symmetrical, trachea midline and thyroid not enlarged, symmetric, no tenderness/mass/nodules  Back: symmetric, no curvature. ROM normal. No CVA tenderness.  Lungs: clear to auscultation bilaterally and no wheezing, no crackles  Chest wall: no tenderness  Heart: RRR, no m/r/g, cap refill <2sec, 2+ pulses throughout  Abdomen: Soft, ND, BS+, not TTP  Extremities: no edema   Skin: Skin color, texture, turgor normal. No rashes or lesions  Neurologic: AAO x4 (person, place, time, events), 5/5 muscle strength throughout, sensation intact, finger to nose smooth and coordinated, CNII-XII grossly intact without " focal deficits appreciated     Laboratory:  Laboratory Data Reviewed: yes  Pertinent Findings:  H&H: 13.6/41.7    Other Results:  EKG (my interpretation): normal EKG, normal sinus rhythm, unchanged from previous tracings.    Radiology Data Reviewed:yes  Pertinent Findings:  Echo 7/10: CONCLUSIONS     1 - Normal left ventricular systolic function (EF 60-65%).     2 - Impaired LV relaxation, normal LAP (grade 1 diastolic dysfunction).     3 - Normal right ventricular systolic function .     4 - The estimated PA systolic pressure is 27 mmHg.     CT Head 7/10: No acute intracranial abnormalities identified.      Electronically signed by: Lucio Saldivar MD  Date: 07/09/2018  Time: 22:30    X-Ray Chest: No radiographic evidence of acute intrathoracic process.      Electronically signed by: Kartik Ontiveros MD  Date: 07/09/2018  Time: 23:01    X-ray ankle: No acute osseous abnormality identified.      Electronically signed by: Lucio Saldivar MD  Date: 07/10/2018  Time: 00:01    US Carotid 7/10: No evidence of a hemodynamically significant carotid bifurcation stenosis.    Asymmetric velocities within the common carotid arteries bilaterally with normal waveforms.  This can be a normal finding but can also be seen with a proximal stenosis.  If there is concern for proximal stenosis or other abnormality, CTA of the head neck could be performed,      Electronically signed by: Mushtaq Love MD  Date: 07/10/2018  Time: 10:49    Current Medications:     Infusions:       Scheduled:   amLODIPine  5 mg Oral Daily    aspirin  81 mg Oral Daily    atorvastatin  40 mg Oral Daily    losartan  25 mg Oral Daily    sodium chloride 0.9%  3 mL Intravenous Q8H        PRN:  acetaminophen, ondansetron    Lines and Day Number of Therapy:  PIV    Assessment:     Penny Cervantes is a 62 y.o.female with  Patient Active Problem List    Diagnosis Date Noted    TIA (transient ischemic attack) 07/10/2018    Stroke 07/10/2018    Interstitial  cystitis 09/10/2017    B12 deficiency 09/10/2017    Essential hypertension 09/10/2017    Osteopenia of multiple sites 07/06/2017        Plan:     1. Transient Ischemic Attack  - Patient presented with acute symptoms as indicated above in HPI that prompted stroke activation. Subsequently, Dr. Ramirez evaluated patient via telemedicine who deemed patient without need for TPA, reviewed CT, with no further recommendations other than to monitor closely and admit to inpatient.  - Stroke/TIA order set initiated  - Given , will continue on ASA 81  - Started on Lipitor 40mg PO daily  - Echo and Carotid US normal  - Patient passed swallow eval without issue  - PT/OT on board  - Neuro recommended MRI to evaluate for NPH       2. HTN  - Patient's BP significantly elevated on presentation. Patient's BP improved gradually while in ED  - On my evaluation, patient's BP back at baseline ~120/80 and without symptoms  - Will plan to resume home BP meds (patient takes all meds in PM) and monitor closely     3. Generalized Weakness likely 2/2 TIA - resolved  - Presented with weakness as noted in HPI, has since resolved  - Continue to monitor closely     4.   - PCP: MD Carole Baldwin, DO  [facility] Internal Medicine HO-1    [facility] Medicine Hospitalist Pager numbers:   [facility] Hospitalist Medicine Team A (Mary/George): [phone]  [facility] Hospitalist Medicine Team B (Sonny/Ravi):  [phone]

## 2018-07-11 NOTE — PLAN OF CARE
Problem: Physical Therapy Goal  Goal: Physical Therapy Goal  Goals to be met by: 8/10/18     Patient will increase functional independence with mobility by performin. Supine <> sit with Modified [place]  2. Sit to stand transfer with Modified [place]  3. Bed to chair transfer with Modified [place] using Rolling Walker  4. Gait  x 200 feet with Modified [place] using Rolling Walker.     Outcome: Ongoing (interventions implemented as appropriate)  PT evaluation completed yesterday, plan was for pt to d/c home yesterday therefore goals not created. Goals created today's date as pt will benefit from continued skilled PT services during acute stay and OP PT upon d/c.

## 2018-07-11 NOTE — PT/OT/SLP PROGRESS
Occupational Therapy  Visit Attempt    Patient Name:  Penny Cervantes   MRN:  [id number]    9:24 - Patient not seen today secondary to DIAN in MRI. 11:28 - Patient working with PTA. 14:08 - Patient getting blood drawn for labs. Will follow-up next avail date.     EMI Summers  7/11/2018

## 2018-07-11 NOTE — PT/OT/SLP PROGRESS
Physical Therapy Treatment    Patient Name:  Penny Cervantes   MRN:  [id number]    Recommendations:     Discharge Recommendations:  outpatient PT   Discharge Equipment Recommendations: walker, rolling, shower chair, grab bar   Barriers to discharge: Decreased caregiver support    Assessment:     Penny Cervantes is a 62 y.o. female admitted with a medical diagnosis of TIA (transient ischemic attack).  She presents with the following impairments/functional limitations:  weakness, impaired endurance, impaired functional mobilty, gait instability, impaired balance, decreased coordination, decreased lower extremity function, decreased safety awareness. Pt ambulated ~200-220 ft with RW and 1 loss of balance that was self corrected. Pt with 2 episodes of ataxia noted. 1 during ambulation and 1 while performing sit to stand transfers. Would benefit from continued PT services to increase pt's overall muscular strength and endurance and address all impairments listed above.    Rehab Prognosis:  good; patient would benefit from acute skilled PT services to address these deficits and reach maximum level of function.      Recent Surgery: * No surgery found *      Plan:     During this hospitalization, patient to be seen 6 x/week to address the above listed problems via gait training, therapeutic activities, therapeutic exercises  · Plan of Care Expires:  08/10/18   Plan of Care Reviewed with: patient    Subjective     Communicated with nurse prior to session.  Patient found supine upon PT entry to room, agreeable to treatment.      Chief Complaint: No complaints of pain. Concerned about not being able to go to work.  Patient comments/goals: To be able to go home and resume her life  Pain/Comfort:  · Pain Rating 1:  (Did not express any pain)    Patients cultural, spiritual, Mandaeism conflicts given the current situation: None reported to PTA    Objective:     Patient found with: bed alarm, telemetry     General  Precautions: Standard, fall   Orthopedic Precautions:N/A   Braces: N/A     Functional Mobility:  · Bed Mobility:     · Rolling Left:  independence  · Scooting: independence  · Supine to Sit: independence  · Transfers:     · Sit to Stand:  stand by assistance with rolling walker  · Gait: ~200-220 ft with RW and min/CGA  · Balance: static sitting=G, dynamic sitting (ther ex)=G, static stand with RW=G/F+, dynamic stand (ambulation)=F      AM-PAC 6 CLICK MOBILITY  Turning over in bed (including adjusting bedclothes, sheets and blankets)?: 4  Sitting down on and standing up from a chair with arms (e.g., wheelchair, bedside commode, etc.): 3  Moving from lying on back to sitting on the side of the bed?: 4  Moving to and from a bed to a chair (including a wheelchair)?: 4  Need to walk in hospital room?: 3  Climbing 3-5 steps with a railing?: 3  Basic Mobility Total Score: 21       Therapeutic Activities and Exercises:   Pt ambulated ~200-220 ft with RW and min/CGA with 1 loss of balance that was self corrected. This was ataxic in nature. Reiterated to pt the importance of using RW for safety at this time. Seated therapeutic exercises 1 x 10 reps BLE's consisting of LAQ's(with an approximate 3 second hold at end range), hip add/abd. Glut isometrics with an approximate 3 second contraction hold. 10 sit to stand trials from B/S chair. Attempted to sit to stand with bilateral hands placed on quadriceps pt demonstrated an episode of ataxia requiring mod A to complete. Pt instructed to perform sit to stand transfers with hands pushing from chair. Also reiterated to pt the importance of strength training to return to her previous level of functioning. Pt nodded in agreement.    Patient left up in chair with all lines intact, call button in reach, chair alarm on and nurse notified..    GOALS:    Physical Therapy Goals        Problem: Physical Therapy Goal    Goal Priority Disciplines Outcome Goal Variances Interventions   Physical  Therapy Goal     PT/OT, PT Ongoing (interventions implemented as appropriate)     Description:  Goals to be met by: 8/10/18     Patient will increase functional independence with mobility by performin. Supine <> sit with Modified Bibb  2. Sit to stand transfer with Modified Bibb  3. Bed to chair transfer with Modified Bibb using Rolling Walker  4. Gait  x 200 feet with Modified Bibb using Rolling Walker.                       Time Tracking:     PT Received On: 18  PT Start Time: 1126     PT Stop Time: 1205  PT Total Time (min): 39 min     Billable Minutes: Gait Training   15 and Therapeutic Exercise   24    Treatment Type: Treatment  PT/PTA: PTA     PTA Visit Number: 1     Obdulia Andrews, PTA  2018

## 2018-07-11 NOTE — PLAN OF CARE
Problem: Patient Care Overview  Goal: Plan of Care Review  Outcome: Ongoing (interventions implemented as appropriate)  Plan of care reviewed with patient. Patient verbalized complete understanding. Patient requested hot water so she could make her own (powdered) coffee. Mirlax was added to her coffee to help the patient have a BM. Patient had family at the bedside during the shift. Patient went for part 1 of her MRI she will go tomorrow for the 2nd part. She had PRN medication to help calm her before the second part of her MRI tomorrow.  Patient's daughter is an RN at the bedside. Fall precautions maintained. Bed in lowest position, locked, call light within reach, and bed alarm on. Side rails up x's 2 with slip resistant socks on. Nurse instructed patient to notify staff for any assistance and pt verbalized complete understanding. Patient on telemetry throughout shift with no ectopy noted. Will continue to monitor

## 2018-07-11 NOTE — PROGRESS NOTES
[facility]  Neurology  Progress Note    Patient Name: Penny Cervantes  MRN: [id number]  Admission Date: 7/9/2018  Hospital Length of Stay: 1 days    Subjective:     Please read the following history below:                Patient states that her symptoms from her elevated blood pressure were apparent to her for the last 7-10 years. The patient lived an active life lifestyle and had no ambulatory restrictions.     8 years ago patient was treated with chemotherapy for her breast cancer in which she received 4 cycles of chemotherapy + radiation.    Within the last 5-6, patient states that she saw her primary care physician, who referred her to the cardiologist for uncontrolled blood pressure. The patient was unable to stand up straight with her eyes closed, had minimal sensory loss and did not note any unsteadiness or balance issues at that time.     One year ago, the patient fell for the first time in the shower. She suffered head trauma but does not remember the event, how it occurred or why she fell.     On Sunday before admission, the patient fell backwards onto her back and suffered an ankle injury. Despite falling, patient did not suffer any back pain, sensory issues or imbalance.     On Monday prior to admission the patient had a HA, which occurs when she has elevated blood pressure; was unrelieved with her blood pressure medications, which she typically takes in the evenings. Therefore, unrelieved blood pressure symptoms despite medication use prompted her admission to the ED. In addition, patient noted to have urinary frequency every 25 min, which she did not suffer from in the past. The patient states that she did not have generalized weakness in her lower extremities until she arrived in the emergency department when they tried to ambulate her.         Patient is tolerating po intake, passing flatus and has adequate uop. Patient has yet to have a BM, and primary will be notified to giver  her medication. Denies any HA, n/v, fevers, chills, chest pain, SOB, abdominal pain or urinary or fecal incontinence. Patient admits to weakness when ambulating.         Current Facility-Administered Medications   Medication Dose Route Frequency Provider Last Rate Last Dose    acetaminophen tablet 325 mg  325 mg Oral Q6H PRN Cindy Chan MD   325 mg at 18 0548    amLODIPine tablet 5 mg  5 mg Oral Daily Dina Silver, DO   5 mg at 18 0852    aspirin EC tablet 81 mg  81 mg Oral Daily Kaiden Navas, DO   81 mg at 18 0852    atorvastatin tablet 40 mg  40 mg Oral Daily Kaiden Navas, DO   40 mg at 18 0852    losartan tablet 25 mg  25 mg Oral Daily Kaiden Navas, DO   25 mg at 18 0852    ondansetron injection 4 mg  4 mg Intravenous Q6H PRN Cindy Chan MD        sodium chloride 0.9% flush 3 mL  3 mL Intravenous Q8H Kaiden Navas, DO   3 mL at 18 0550       Review of Systems as per above    Objective:     Vital Signs (Most Recent):  Temp: 96.9 °F (36.1 °C) (18 0842)  Pulse: 71 (18 0842)  Resp: 18 (18 0842)  BP: 125/60 (18 0842)  SpO2: 97 % (18 0357) Vital Signs (24h Range):  Temp:  [96.2 °F (35.7 °C)-98.6 °F (37 °C)] 96.9 °F (36.1 °C)  Pulse:  [62-80] 71  Resp:  [18] 18  SpO2:  [97 %-98 %] 97 %  BP: (125-168)/(60-74) 125/60     Weight: 48.6 kg (107 lb 2.3 oz)  Body mass index is 21.64 kg/m².    Physical Exam   NEUROLOGICAL EXAMINATION:      MENTAL STATUS   Oriented to person, place, and time.   Attention: normal.   Speech: speech is normal      CRANIAL NERVES   Cranial nerves II through XII intact.      MOTOR EXAM   Muscle bulk: normal  Overall muscle tone: normal  Right arm tone: normal   Left arm tone: normal  Left leg tone: decreased   Right leg tone: decreased      Strength   Right biceps: 5/5  Left biceps: 5/5  Right triceps: 5/5  Left triceps: 5/5  Right quadriceps: 5/5  Left quadriceps: 5/5  Right hamstrin/5  Left  hamstrin/5     REFLEXES   Patient is hyperreflexic throughout on exam      Right brachioradialis: 3+  Left brachioradialis: 3+  Right biceps: 3+  Left biceps: 3+  Right patellar 3+  Left patellar 3+  Right achilles: 3+  Left achilles: 3+       SENSORY EXAM   Loss of temperature sensation along the medial aspect of the lower extremities bilaterally. Up to mid-shin on the left and knee on the right    Light touch, Proprioception and vibration intact     GAIT AND COORDINATION      Coordination   Rhomberg positive   Finger to nose coordination: normal  Heel to shin coordination: normal  Slightly delayed rapid alternating movement    Patient does show signs of ataxia 2/2 to peripheral  neuropathy.       Significant Labs:     Recent Labs  Lab 18  0451      K 4.0      CO2 27   BUN 14   CREATININE 0.6   MG 2.1       Recent Labs  Lab 18  0451   WBC 5.08   RBC 4.79   HGB 13.6   HCT 41.7      MCV 87   MCH 28.4   MCHC 32.6       Significant Imaging:  Imaging Results          X-Ray Ankle Complete Right (Final result)  Result time 07/10/18 00:01:21    Final result by Lucio Saldivar MD (07/10/18 00:01:21)                 Impression:      No acute osseous abnormality identified.      Electronically signed by: Lucio Saldivar MD  Date:    07/10/2018  Time:    00:01             Narrative:    EXAMINATION:  XR ANKLE COMPLETE 3 VIEW RIGHT    CLINICAL HISTORY:  Pain in right ankle and joints of right foot    TECHNIQUE:  AP, lateral, and oblique images of the right ankle were performed.    COMPARISON:  None    FINDINGS:  No evidence of fracture, dislocation, or osseous destructive process.  Ankle mortise is maintained.                               X-Ray Chest AP Portable (Final result)  Result time 18 23:01:06    Final result by Kartik Ontiveros MD (18 23:01:06)                 Impression:      No radiographic evidence of acute intrathoracic process.      Electronically signed  by: Kartik Ontiveros MD  Date:    07/09/2018  Time:    23:01             Narrative:    EXAMINATION:  XR CHEST AP PORTABLE    CLINICAL HISTORY:  Stroke;    TECHNIQUE:  Single frontal view of the chest was performed.    COMPARISON:  06/28/2017    FINDINGS:  Cardiac monitoring leads overlie the chest.  Cardiomediastinal silhouette appears within normal limits.  The lungs appear symmetrically aerated without evidence of confluent airspace consolidation or pleural effusion.  There is no evidence of pneumothorax.  Surgical clips overlie the bilateral luca thoraces.  Osseous structures demonstrate stable appearing degenerative changes.                               CT Head Without Contrast (Final result)  Result time 07/09/18 22:30:04    Final result by Lucio Saldivar MD (07/09/18 22:30:04)                 Impression:      No acute intracranial abnormalities identified.      Electronically signed by: Lucio Saldivar MD  Date:    07/09/2018  Time:    22:30             Narrative:    EXAMINATION:  CT HEAD WITHOUT CONTRAST    CLINICAL HISTORY:  ataxia;    TECHNIQUE:  Low dose axial images were obtained through the head.  Coronal and sagittal reformations were also performed. Contrast was not administered.    COMPARISON:  CT head from June 2017.    FINDINGS:  No evidence of acute/recent major vascular distribution cerebral infarction, intraparenchymal hemorrhage, or intra-axial space occupying lesion. The ventricular system is normal in size and configuration with no evidence of hydrocephalus. No effacement of the skull-base cisterns. No abnormal extra-axial fluid collections or blood products. The visualized paranasal sinuses and mastoid air cells are clear. The calvarium shows no significant abnormality.                                Assessment and Plan:   63 y/o female with pmhx of  Breast cancer (squamous cell ca. S/p lumpectomy + chemoradiation), BPPV, CAD, HTN who has Hypertensive Emergency. Patient is unsteady with  poor postural reflex, decreased tone in lower extremities, but hyperreflexic throughout. Patient has loss of temperature sensation along the medial aspect of legs b/l. Upon further history review, patient may have dysautonomia and neurogenic bladder, both of which are suggestive of a peripheral neuropathy. Upon literature review, many patients who have chemo-induced peripheral neuropathy  occurs on average of 6 years after treatment, which makes it a possible etiology. However, other causes must be further evaluated and are reflected in workup ordered below.     Peripheral Neuropathy workup:  RF, KASHMIR, Paraneoplastic AB, Vitamins B1/B3/B6, SSA/SSB, Heavy metal screen, HIV, Porphyrins, SPEP    - Follow up MRI brain, cervical spine, thoracic spine and lumbar Spine  -Continue ASA/Statin daily   -Continue blood pressure control             Discussed with Neurology resident and Dr. Ledesma.         Wilmer Leiva MD  Family Medicine, PGY-1

## 2018-07-12 VITALS
TEMPERATURE: 99 F | RESPIRATION RATE: 16 BRPM | BODY MASS INDEX: 21.6 KG/M2 | HEIGHT: 59 IN | WEIGHT: 107.13 LBS | SYSTOLIC BLOOD PRESSURE: 114 MMHG | OXYGEN SATURATION: 98 % | HEART RATE: 82 BPM | DIASTOLIC BLOOD PRESSURE: 59 MMHG

## 2018-07-12 LAB
ALBUMIN SERPL BCP-MCNC: 3.9 G/DL
ALBUMIN SERPL ELPH-MCNC: 4.56 G/DL
ALP SERPL-CCNC: 67 U/L
ALPHA1 GLOB SERPL ELPH-MCNC: 0.32 G/DL
ALPHA2 GLOB SERPL ELPH-MCNC: 0.77 G/DL
ALT SERPL W/O P-5'-P-CCNC: 19 U/L
ANA SER QL IF: NORMAL
ANION GAP SERPL CALC-SCNC: 7 MMOL/L
ANTI-SSA ANTIBODY: 6.9 EU
ANTI-SSA INTERPRETATION: NEGATIVE
ANTI-SSB ANTIBODY: 0 EU
ANTI-SSB INTERPRETATION: NEGATIVE
AST SERPL-CCNC: 17 U/L
B-GLOBULIN SERPL ELPH-MCNC: 0.95 G/DL
BILIRUB SERPL-MCNC: 0.7 MG/DL
BUN SERPL-MCNC: 19 MG/DL
CALCIUM SERPL-MCNC: 9.6 MG/DL
CHLORIDE SERPL-SCNC: 104 MMOL/L
CO2 SERPL-SCNC: 26 MMOL/L
CREAT SERPL-MCNC: 0.6 MG/DL
EST. GFR  (AFRICAN AMERICAN): >60 ML/MIN/1.73 M^2
EST. GFR  (NON AFRICAN AMERICAN): >60 ML/MIN/1.73 M^2
GAMMA GLOB SERPL ELPH-MCNC: 1.09 G/DL
GLUCOSE SERPL-MCNC: 105 MG/DL
HIV 1+2 AB+HIV1 P24 AG SERPL QL IA: NEGATIVE
INTERPRETATION SERPL IFE-IMP: NORMAL
PATHOLOGIST INTERPRETATION IFE: NORMAL
PATHOLOGIST INTERPRETATION SPE: NORMAL
POTASSIUM SERPL-SCNC: 4.1 MMOL/L
PROT SERPL-MCNC: 7.1 G/DL
PROT SERPL-MCNC: 7.7 G/DL
PROT UR-MCNC: 21 MG/DL
RHEUMATOID FACT SERPL-ACNC: <10 IU/ML
SODIUM SERPL-SCNC: 137 MMOL/L
VIT B12 SERPL-MCNC: 645 PG/ML

## 2018-07-12 PROCEDURE — 36415 COLL VENOUS BLD VENIPUNCTURE: CPT

## 2018-07-12 PROCEDURE — 63600175 PHARM REV CODE 636 W HCPCS: Performed by: STUDENT IN AN ORGANIZED HEALTH CARE EDUCATION/TRAINING PROGRAM

## 2018-07-12 PROCEDURE — 82607 VITAMIN B-12: CPT

## 2018-07-12 PROCEDURE — A4216 STERILE WATER/SALINE, 10 ML: HCPCS | Performed by: STUDENT IN AN ORGANIZED HEALTH CARE EDUCATION/TRAINING PROGRAM

## 2018-07-12 PROCEDURE — 84166 PROTEIN E-PHORESIS/URINE/CSF: CPT | Mod: 26,,, | Performed by: PATHOLOGY

## 2018-07-12 PROCEDURE — 94761 N-INVAS EAR/PLS OXIMETRY MLT: CPT

## 2018-07-12 PROCEDURE — A9585 GADOBUTROL INJECTION: HCPCS | Performed by: INTERNAL MEDICINE

## 2018-07-12 PROCEDURE — 84166 PROTEIN E-PHORESIS/URINE/CSF: CPT

## 2018-07-12 PROCEDURE — 25000003 PHARM REV CODE 250: Performed by: STUDENT IN AN ORGANIZED HEALTH CARE EDUCATION/TRAINING PROGRAM

## 2018-07-12 PROCEDURE — 97116 GAIT TRAINING THERAPY: CPT

## 2018-07-12 PROCEDURE — 80053 COMPREHEN METABOLIC PANEL: CPT

## 2018-07-12 PROCEDURE — 25500020 PHARM REV CODE 255: Performed by: INTERNAL MEDICINE

## 2018-07-12 PROCEDURE — 84156 ASSAY OF PROTEIN URINE: CPT

## 2018-07-12 PROCEDURE — 97530 THERAPEUTIC ACTIVITIES: CPT

## 2018-07-12 RX ORDER — GADOBUTROL 604.72 MG/ML
5 INJECTION INTRAVENOUS
Status: COMPLETED | OUTPATIENT
Start: 2018-07-12 | End: 2018-07-12

## 2018-07-12 RX ADMIN — SODIUM CHLORIDE, PRESERVATIVE FREE 3 ML: 5 INJECTION INTRAVENOUS at 06:07

## 2018-07-12 RX ADMIN — ATORVASTATIN CALCIUM 40 MG: 40 TABLET, FILM COATED ORAL at 08:07

## 2018-07-12 RX ADMIN — GADOBUTROL 5 ML: 604.72 INJECTION INTRAVENOUS at 04:07

## 2018-07-12 RX ADMIN — LORAZEPAM 0.5 MG: 2 INJECTION INTRAMUSCULAR; INTRAVENOUS at 02:07

## 2018-07-12 RX ADMIN — ASPIRIN 81 MG: 81 TABLET, COATED ORAL at 08:07

## 2018-07-12 NOTE — DISCHARGE SUMMARY
[facility] Hospital Medicine Discharge Summary    Primary Team: [facility] Hospitalist Team B  Attending Physician: Sylvester Dennis MD  Resident: Yosef  Intern: Dr. Chan    Date of Admit: 7/9/2018  Date of Discharge: 7/12/2018    Discharge to: Home with planned outpatient PT  Condition: Stable    DME: Ordered rolling walker    Discharge Diagnoses     Patient Active Problem List   Diagnosis    Osteopenia of multiple sites    Interstitial cystitis    B12 deficiency    Essential hypertension    TIA (transient ischemic attack)    Stroke    Ataxia    Gait instability       Consultants and Procedures     Consultants:  Neurology  Nutrition  Vascular Neurology - Stroke activation - tele consult    Procedures:   MRI C/T/L, MRI Brain    Imaging:    X-ray Ankle Complete Right  Result Date: 7/10/2018   No acute osseous abnormality identified. Electronically signed by: Lucio Saldivar MD Date: 07/10/2018 Time: 00:01    Ct Head Without Contrast  Result Date: 7/9/2018  No acute intracranial abnormalities identified.   Electronically signed by: Lucio Saldivar MD Date: 07/09/2018 Time: 22:30    Mri Brain W Wo Contrast  Result Date: 7/11/2018  1. No acute intracranial processes. 2. Findings suspicious for gray matter at toe brooke along the body of the left lateral ventricle.   Electronically signed by: Richy Rondon MD Date: 07/11/2018 Time: 11:29    Mri Cervical Spine W Wo Cont  Result Date: 7/11/2018  1. Mild spondylotic changes C3-4 and C4-5 disc spaces. 2. MRI of the cervical spine otherwise is unremarkable.   Electronically signed by: Richy Rondon MD Date: 07/11/2018 Time: 11:33    Us Carotid Bilateral  Result Date: 7/10/2018  No evidence of a hemodynamically significant carotid bifurcation stenosis. Asymmetric velocities within the common carotid arteries bilaterally with normal waveforms.  This can be a normal finding but can also be seen with a proximal stenosis.  If there is concern for proximal stenosis or other abnormality, CTA of  "the head neck could be performed,   Electronically signed by: Mushtaq Love MD Date 07/10/2018 Time: 10:49        Brief History of Present Illness      Penny Cervantes is a 62 y.o. Caucasion female with benign positional vertigo, h/o breast CA (squamous cell carcinoma s/p lumpectomy, chemoradiation), CAD (h/o non-occlusive cath), HTN, B12 Deficiency who patient presented to [facility] on 7/9/2018 with a primary complaint generalized weakness with Hypertension (To ER wtih c/o headache and hypertension starting earlier today.  pt states that she still feels "heavy headed" after taking her night blood pressure medications.  pt c/o falling yesterday but denies LOC or hitting her head.  daughter states that her gait was unsteady prior to arrival.) and Headache. Patient reports symptoms starting around 6-630pm with head feeling "heavy" and elevated BP. She attempted to take her home BP meds but did not help resolve her condition. She noted that her coordination was off as well. Persistent "heavy" headed feeling, elevated BP (at one point SBP ~200), difficulty with ambulation prompted patient to present to ED. Last known normal ~1800.     Of note, she had one unwitnessed fall yesterday with likely LOC (patient does not recall events). Did hurt back and ankle from fall. Was able to ambulate and no issues with BP until evening on day of presentation. No recent travel history, no sick contacts, no new foods, no new activities or events attended.     ED course:   - Stroke activation in ED  - Tele Stroke Dr. Villalba evaluated patient in ED, NIH stroke scale 0, low suspicion for acute neurovascular syndrome  - Recommends admit to inpatient     [facility] Hospitalist team consulted for Stroke activation/TIA. Symptoms resolved at time of evaluation.    For the full HPI please refer to the History & Physical from this admission.    Hospital Course By Problem with Pertinent Findings     1. Transient Ischemic Attack  - Patient presented with " "acute symptoms as indicated above in HPI that prompted stroke activation. Subsequently, Dr. Ramirez evaluated patient via telemedicine who deemed patient without need for TPA, reviewed CT, with no further recommendations other than to monitor closely and admit to inpatient.  - Stroke/TIA order set initiated on admit, Given  in ED  - Patient passed swallow eval; on cardiac diet  - ECHO: normal EF, grade 1 diastolic dysfunction  - Carotid US: No evidence of hemodynamically significant carotid stenosis  - Neuro consulted, recommended MRI spine and labs; MRI C/T/L, MRI Brain not concerning at this time  - Continued on ASA, Lipitor at discharge  - Patient to have close follow up with Neurology to discuss rest of lab workup     2. HTN  - Resumed patient's home regimen (norvasc, BB, ARB)  - Continued home meds at discharge    3. Gait Instability  - Neuro following; MRI's pending; labs pending  - PT/OT on board, appreciate time/recs  - PT recs: outpatient PT with rolling walker/shower chair/grab chair; ordered  - Outpatient PT ordered; patient to follow    4. Anxiety  - Patient with elevated anxiety with procedures and lab work  - Several doses of ativan required for imaging  - Will need to be addressed as outpatient    Discharge Medications        Medication List      START taking these medications    aspirin 81 MG EC tablet  Commonly known as:  ECOTRIN  Take 1 tablet (81 mg total) by mouth once daily.     atorvastatin 40 MG tablet  Commonly known as:  LIPITOR  Take 1 tablet (40 mg total) by mouth once daily.        CONTINUE taking these medications    amLODIPine 5 MG tablet  Commonly known as:  NORVASC  Take 1 tablet (5 mg total) by mouth once daily.     BD LUER-MERCY SYRINGE 3 mL 25 gauge x 1" Syrg  Generic drug:  syringe with needle  use as directed EVERY 2 WEEKS.     cyanocobalamin 1,000 mcg/mL injection  Inject 1 mL (1,000 mcg total) into the muscle every 30 days.     hydroquinone 4 % Crea  Use hs on dark spots   "   losartan 50 MG tablet  Commonly known as:  COZAAR  Take 0.5 tablets (25 mg total) by mouth once daily.     fgzuuu-wnslo-v.blue-sal-naphos 120-0.12-10.8 mg Tab  Commonly known as:  URIMAR-T  Take 1 tablet by mouth 4 (four) times daily as needed.     metoprolol succinate 25 MG 24 hr tablet  Commonly known as:  TOPROL-XL     multivitamin with minerals tablet     triamcinolone acetonide 0.1% 0.1 % paste  Commonly known as:  KENALOG     VITAMIN D ORAL        STOP taking these medications    meclizine 25 mg tablet  Commonly known as:  ANTIVERT           Where to Get Your Medications      You can get these medications from any pharmacy    Bring a paper prescription for each of these medications  · atorvastatin 40 MG tablet  You don't need a prescription for these medications  · aspirin 81 MG EC tablet         Discharge Information:     Diet:  Cardiac    Physical Activity:  As tolerated    Instructions:  1. Take all medications as prescribed  2. Keep all follow-up appointments  3. Return to the hospital or call your primary care physicians if any worsening symptoms such as fever, chest pain, shortness of breath, return of symptoms, or any other concerns.    Follow-Up Appointments:    Future Appointments  Date Time Provider Department Center   7/18/2018 10:00 AM Kate Gil MD [facility] IM [place]   8/29/2018 4:00 PM Demetrius Asif MD [facility] NEURO [place] Prince       Thank you for allowing us to care for Penny Cervantes.   Please call with any questions.    Kaiden Navas DO  [facility] Internal Medicine-Pediatrics PGY-IV  [facility] Hospitalist Service Team B    [facility] Medicine Hospitalist Pager numbers:   [facility] Hospitalist Medicine Team A (Mary/George): [phone]  [facility] Hospitalist Medicine Team B (Sonny/Ravi):  [phone]

## 2018-07-12 NOTE — PROGRESS NOTES
[facility]  Neurology  Progress Note    Patient Name: Penny Cervantes  MRN: [id number]  Admission Date: 7/9/2018  Hospital Length of Stay: 2 days    Subjective:     Upon my entering the patient's room this AM, patient states that she is all better. She states that she is walking just fine now.     Current Facility-Administered Medications   Medication Dose Route Frequency Provider Last Rate Last Dose    acetaminophen tablet 325 mg  325 mg Oral Q6H PRN Cindy Chan MD   325 mg at 07/11/18 2139    amLODIPine tablet 5 mg  5 mg Oral QHS Kaiden Navas, DO        aspirin EC tablet 81 mg  81 mg Oral Daily Kaiedn Navas, DO   81 mg at 07/12/18 0840    atorvastatin tablet 40 mg  40 mg Oral Daily Kaiden Navas, DO   40 mg at 07/12/18 0840    docusate 50 mg/5 mL liquid 100 mg  100 mg Oral BID PRN Cindy Chan MD        lorazepam (ATIVAN) injection 0.5 mg  0.5 mg Intravenous Once PRN Kaiden Navas, DO        losartan tablet 25 mg  25 mg Oral QHS Kaiden Navas, DO        metoprolol succinate (TOPROL-XL) 24 hr tablet 25 mg  25 mg Oral QHS Kaiden Navas, DO   25 mg at 07/11/18 2135    ondansetron injection 4 mg  4 mg Intravenous Q6H PRN Cindy Chan MD        sodium chloride 0.9% flush 3 mL  3 mL Intravenous Q8H Kaiden Navas, DO   3 mL at 07/12/18 0600       Review of Systems: no fever, chills, headache, dizziness, mood disturbance.    Objective:     Vital Signs (Most Recent):  Temp: 98.6 °F (37 °C) (07/12/18 1151)  Pulse: 82 (07/12/18 1200)  Resp: 16 (07/12/18 1151)  BP: (!) 114/59 (07/12/18 1151)  SpO2: 98 % (07/12/18 1630) Vital Signs (24h Range):  Temp:  [97.4 °F (36.3 °C)-98.6 °F (37 °C)] 98.6 °F (37 °C)  Pulse:  [63-98] 82  Resp:  [16-18] 16  SpO2:  [98 %-100 %] 98 %  BP: (111-138)/(55-63) 114/59     Weight: 48.6 kg (107 lb 2.3 oz)  Body mass index is 21.64 kg/m².    Physical Exam   NEUROLOGICAL EXAMINATION:      MENTAL STATUS   Oriented to person, place, and  time.   Attention: normal.   Speech: speech is normal      CRANIAL NERVES   Cranial nerves II through XII intact.      MOTOR EXAM   Muscle bulk: normal  Overall muscle tone: normal  Right arm tone: normal   Left arm tone: normal  Left leg tone: decreased   Right leg tone: decreased      Strength   Right biceps: 5/5  Left biceps: 5/5  Right triceps: 5/5  Left triceps: 5/5  Right quadriceps: 5/5  Left quadriceps: 5/5  Right hamstrin/5  Left hamstrin/5     REFLEXES   Patient is hyperreflexic throughout on exam      Right brachioradialis: 3+  Left brachioradialis: 3+  Right biceps: 3+  Left biceps: 3+  Right patellar 3+  Left patellar 3+  Right achilles: 3+  Left achilles: 3+       SENSORY EXAM   Loss of temperature sensation distal to mid shins bilaterally.  Light touch, Proprioception and vibration intact     GAIT AND COORDINATION   Rhomberg NEGATIVE today  Finger to nose coordination: normal bilaterally   Heel to shin coordination: normal bilaterally  Should width stance and gait without unsteadiness. Good stride length. Slightly unsteady on heel to toe ambulation.      Significant Labs:     Recent Labs  Lab 18  0544      K 4.1      CO2 26   BUN 19   CREATININE 0.6     No results for input(s): WBC, RBC, HGB, HCT, PLT, MCV, MCH, MCHC in the last 24 hours.    Significant Imaging:  Imaging Results          X-Ray Ankle Complete Right (Final result)  Result time 07/10/18 00:01:21    Final result by Lucio Saldivar MD (07/10/18 00:01:21)                 Impression:      No acute osseous abnormality identified.      Electronically signed by: Lucio Saldivar MD  Date:    07/10/2018  Time:    00:01             Narrative:    EXAMINATION:  XR ANKLE COMPLETE 3 VIEW RIGHT    CLINICAL HISTORY:  Pain in right ankle and joints of right foot    TECHNIQUE:  AP, lateral, and oblique images of the right ankle were performed.    COMPARISON:  None    FINDINGS:  No evidence of fracture, dislocation, or osseous  destructive process.  Ankle mortise is maintained.                               X-Ray Chest AP Portable (Final result)  Result time 07/09/18 23:01:06    Final result by Kartik Ontiveros MD (07/09/18 23:01:06)                 Impression:      No radiographic evidence of acute intrathoracic process.      Electronically signed by: Kartik Ontiveros MD  Date:    07/09/2018  Time:    23:01             Narrative:    EXAMINATION:  XR CHEST AP PORTABLE    CLINICAL HISTORY:  Stroke;    TECHNIQUE:  Single frontal view of the chest was performed.    COMPARISON:  06/28/2017    FINDINGS:  Cardiac monitoring leads overlie the chest.  Cardiomediastinal silhouette appears within normal limits.  The lungs appear symmetrically aerated without evidence of confluent airspace consolidation or pleural effusion.  There is no evidence of pneumothorax.  Surgical clips overlie the bilateral luca thoraces.  Osseous structures demonstrate stable appearing degenerative changes.                               CT Head Without Contrast (Final result)  Result time 07/09/18 22:30:04    Final result by Lucio Saldivar MD (07/09/18 22:30:04)                 Impression:      No acute intracranial abnormalities identified.      Electronically signed by: Lucio Saldivar MD  Date:    07/09/2018  Time:    22:30             Narrative:    EXAMINATION:  CT HEAD WITHOUT CONTRAST    CLINICAL HISTORY:  ataxia;    TECHNIQUE:  Low dose axial images were obtained through the head.  Coronal and sagittal reformations were also performed. Contrast was not administered.    COMPARISON:  CT head from June 2017.    FINDINGS:  No evidence of acute/recent major vascular distribution cerebral infarction, intraparenchymal hemorrhage, or intra-axial space occupying lesion. The ventricular system is normal in size and configuration with no evidence of hydrocephalus. No effacement of the skull-base cisterns. No abnormal extra-axial fluid collections or blood products. The  "visualized paranasal sinuses and mastoid air cells are clear. The calvarium shows no significant abnormality.                                Assessment and Plan:   63 y/o female with initial complaint of long standing gait abnormality for years (as told to neurology in AM of 07/11) then change of complaint to gait abnormality not ongoing for years but to few days (as told to neurology later in afternoon 07/11). Patient also complained of history of years of mostly well controlled SBP that then once a month changes to 220's and accompanied by headache / "wooshy head feeling" that then resolves each time with self administration of anti-HTN med (last episode day of admission). MRI Brain, C/T/L spines W and WO done and largely non-concerning. Additionally, physical exam while did positive romberg on 07/11, cerebellar and proprioceptive physical exam was normal. Additionally, on 07/12 AM, patient reported full self-resolution of gait issue.      - Take together, these facts can be c/w conversion disorder.     - However, it is important that the workup is completed. There is still some labs that need to be followed-up on. RF, KASHMIR, Paraneoplastic AB, Vitamins B1/B3/B6, SSA/SSB, Heavy metal screen, HIV, Porphyrins, SPEP    - Please refer patient to be seen in neurology clinic within 4 weeks.    - Please have patient referred to outpatient PT and supply rolling walker for slight instability seen on heel to toe gait      Discussed above with Dr. Ledesma.     Ken Magallon MD   PGY-IV Neurology      "

## 2018-07-12 NOTE — PLAN OF CARE
Problem: Physical Therapy Goal  Goal: Physical Therapy Goal  Goals to be met by: 8/10/18     Patient will increase functional independence with mobility by performin. Supine <> sit with Modified Graves  2. Sit to stand transfer with Modified Graves  3. Bed to chair transfer with Modified Graves using Rolling Walker  4. Gait  x 200 feet with Modified Graves using Rolling Walker.      Outcome: Ongoing (interventions implemented as appropriate)  Continue working toward goals.

## 2018-07-12 NOTE — PROGRESS NOTES
"hospitals Hospitalist Medicine Team B Resident HO-4 Progress Note  Patient: Penny Cervantes   MRN: [id number]      Primary Team: hospitals Hospitalist Team B  Attending Physician: Dr. Sylvester Dennis MD  Date of Admit: 2018    LOS: 2    Subjective:      Slept well overnight. Afebrile. Feeling a little better compared to yesterday. Tolerating diet well. Scheduled for the other MRI's today. PRN ativan made available.      Objective:     Last 24 Hour Vital Signs:  BP  Min: 111/55  Max: 141/65  Temp  Av.7 °F (36.5 °C)  Min: 96.9 °F (36.1 °C)  Max: 98.2 °F (36.8 °C)  Pulse  Av.4  Min: 63  Max: 99  Resp  Av.9  Min: 17  Max: 18  SpO2  Av.8 %  Min: 98 %  Max: 100 %    24 HR Intake & Output    0701 -  0700  In: 580 [P.O.:580]  Out: 1500 [Urine:1500]   Body mass index is 21.64 kg/m².    Physical Examination:  BP (!) 129/58 (BP Location: Right arm, Patient Position: Lying)   Pulse 75   Temp 97.8 °F (36.6 °C) (Oral)   Resp 18   Ht 4' 11" (1.499 m)   Wt 48.6 kg (107 lb 2.3 oz)   SpO2 100%   Breastfeeding? No   BMI 21.64 kg/m²     General appearance: alert, appears stated age, cooperative and no distress  HEENT: NCAT, EOMI, PERRLA, OP clear, MMM  Neck: no adenopathy, no carotid bruit, no JVD, supple, symmetrical, trachea midline and thyroid not enlarged, symmetric, no tenderness/mass/nodules  Back: symmetric, no curvature. ROM normal. No CVA tenderness.  Lungs: clear to auscultation bilaterally and no crackles, no wheezing, no increased work of breathing  Chest wall: no tenderness  Heart: RRR, no m/r/g  Abdomen: Soft, ND, BS+, no TTP  Extremities: no c/c/e  Skin: Skin color, texture, turgor normal. No rashes or lesions  Neurologic: AAO x4 (person, place, time, events), / muscle strength throughout, sensation intact    Laboratory:  Laboratory Data Reviewed: yes    Most Recent Data/Trended Lab Data:    Recent Labs  Lab 18  2215 18  2223 07/10/18  0501 18  0451 18  0544   WBC  " --  6.45 5.45 5.08  --    HGB  --  12.2 13.0 13.6  --    HCT  --  38.4 39.9 41.7  --    PLT  --  279 305 296  --    MCV  --  88 87 87  --    RDW  --  12.4 12.4 12.5  --      --   --  139 137   K 3.9  --   --  4.0 4.1     --   --  105 104   CO2 28  --   --  27 26   BUN 14  --   --  14 19   CREATININE 0.8  --   --  0.6 0.6   *  --   --  112* 105   CALCIUM 9.9  --   --  9.7 9.6   MG  --   --   --  2.1  --    PHOS  --   --   --  3.2  --    PROT 7.3  --   --  7.4 7.1   ALBUMIN 4.3  --   --  4.1 3.9   BILITOT 0.4  --   --  0.5 0.7   AST 20  --   --  16 17   ALKPHOS 84  --   --  68 67   ALT 19  --   --  15 19       Microbiology Data:    Microbiology Data Reviewed: yes  Microbiology Results (last 7 days)     ** No results found for the last 168 hours. **          Radiology:  X-ray Ankle Complete Right    Result Date: 7/10/2018  EXAMINATION: XR ANKLE COMPLETE 3 VIEW RIGHT CLINICAL HISTORY: Pain in right ankle and joints of right foot TECHNIQUE: AP, lateral, and oblique images of the right ankle were performed. COMPARISON: None FINDINGS: No evidence of fracture, dislocation, or osseous destructive process.  Ankle mortise is maintained.     No acute osseous abnormality identified. Electronically signed by: Lucio Saldivar MD Date:    07/10/2018 Time:    00:01    Ct Head Without Contrast    Result Date: 7/9/2018  EXAMINATION: CT HEAD WITHOUT CONTRAST CLINICAL HISTORY: ataxia; TECHNIQUE: Low dose axial images were obtained through the head.  Coronal and sagittal reformations were also performed. Contrast was not administered. COMPARISON: CT head from June 2017. FINDINGS: No evidence of acute/recent major vascular distribution cerebral infarction, intraparenchymal hemorrhage, or intra-axial space occupying lesion. The ventricular system is normal in size and configuration with no evidence of hydrocephalus. No effacement of the skull-base cisterns. No abnormal extra-axial fluid collections or blood products. The  visualized paranasal sinuses and mastoid air cells are clear. The calvarium shows no significant abnormality.     No acute intracranial abnormalities identified. Electronically signed by: Lucio Saldivar MD Date:    07/09/2018 Time:    22:30    Mri Brain W Wo Contrast    Result Date: 7/11/2018  EXAMINATION: MRI BRAIN W WO CONTRAST CLINICAL HISTORY: dizziness with hyperreflexia; TECHNIQUE: Multiplanar multisequence MR imaging of the brain was performed before and after the administration of 5 mL Gadavist intravenous contrast. COMPARISON: CT scan of the head 07/09/2018, 06/28/2017 FINDINGS: There is asymmetry of the lateral ventricles, felt to be a normal variant.  There is a mildly lobulated nonenhancing signal intensity lesion along the body of the left lateral ventricle just superior to the body of the caudate nucleus with similar signal intensities to the gray matter.  It is possible this represents gray matter ectopia.  No abnormal enhancement is noted.  There is no restricted diffusion. Rest of the lateral ventricles are normal. In the subcortical white matter of the right mid to posterior frontal lobe there is a single nonspecific focus of T2 hyperintensity representing nonspecific area of gliosis or may be from chronic microvascular ischemic change. In the rest of the white matter of the cerebral hemispheres there are no abnormal signal intensity lesions or restricted diffusion or abnormal enhancement.  There is no parenchymal hemorrhages.  There is no midline shift or signs for acute infarctions or neoplasms or herniations. In the posterior fossa there is a normal brainstem and cerebellar hemispheres and cisternal spaces.  Fourth ventricle is in the midline.  The flow void of the major vessels is also within normal limits. The visualized paranasal air sinuses are clear.     1. No acute intracranial processes. 2. Findings suspicious for gray matter at toe brooke along the body of the left lateral ventricle.  Electronically signed by: Richy Rondon MD Date:    07/11/2018 Time:    11:29    Mri Cervical Spine W Wo Cont    Result Date: 7/11/2018  EXAMINATION: MRI CERVICAL SPINE W WO CONTRAST CLINICAL HISTORY: dizziness with hyperreflexia; TECHNIQUE: Sagittal T1, T2, stir sequences and axial T1 and T2 weighted sequences and a 3D  mode sequences are performed through the cervical spine.  Postcontrast sagittal and axial T1 sequences are performed after patient was given intravenous gadolinium (5 cc of Gadavist. COMPARISON: None FINDINGS: There is normal cervical curvature.  There is disc dehydration throughout the cervical spine.  No subluxations or marrow replacing processes throughout the cervical spine.  There are no acute fractures.  Craniovertebral alignment is within normal limits.  There is no Chiari malformations. The signal intensities within the cervical cord are within normal limits.  There is no abnormal enhancement in the cervical cord.  No significant stenotic disease throughout the cervical spine or cord compression noted. C2-3: Mild posterior bulging of the annulus without cord compression or spinal stenosis or foraminal stenosis. C3-4: There is a mild left foraminal stenosis from uncovertebral joint osteophyte and left C3-4 facet arthropathy.  Right neural foramen is unremarkable.  No central canal spinal stenosis or cord compression. C4-5: Spondylotic changes associated with a mild broad-based posterior osteophyte and disc bulge complex slightly greater on the right.  No cord compression.  There is a moderate right foraminal stenosis and mild left foraminal stenosis. C5-6: No disc herniations or spinal stenosis or foraminal stenosis. C6-7: No disc herniations or spinal stenosis or foraminal stenosis. C7-T1: No disc herniations or spinal stenosis or foraminal stenosis.     1. Mild spondylotic changes C3-4 and C4-5 disc spaces. 2. MRI of the cervical spine otherwise is unremarkable. Electronically signed by: Richy  MD Alcon Date:    07/11/2018 Time:    11:33    X-ray Chest Ap Portable    Result Date: 7/9/2018  EXAMINATION: XR CHEST AP PORTABLE CLINICAL HISTORY: Stroke; TECHNIQUE: Single frontal view of the chest was performed. COMPARISON: 06/28/2017 FINDINGS: Cardiac monitoring leads overlie the chest.  Cardiomediastinal silhouette appears within normal limits.  The lungs appear symmetrically aerated without evidence of confluent airspace consolidation or pleural effusion.  There is no evidence of pneumothorax.  Surgical clips overlie the bilateral luca thoraces.  Osseous structures demonstrate stable appearing degenerative changes.     No radiographic evidence of acute intrathoracic process. Electronically signed by: Kartik Ontiveros MD Date:    07/09/2018 Time:    23:01    Us Carotid Bilateral    Result Date: 7/10/2018  EXAMINATION: US CAROTID BILATERAL CLINICAL HISTORY: eval for stenosis; TECHNIQUE: Grayscale and color Doppler ultrasound examination of the carotid and vertebral artery systems bilaterally.  Stenosis estimates are per the NASCET measurement criteria. COMPARISON: None. FINDINGS: Right: Internal Carotid Artery (ICA) peak systolic velocity 86.4 cm/sec Peak systolic velocity in the right common carotid artery is 73.3 centimeters/second ICA/CCA peak systolic velocity ratio: 1.18 Plaque formation: No significant Vertebral artery: Antegrade flow and normal waveform. Left: Internal Carotid Artery (ICA)  peak systolic velocity 109 centimeters/second Peak systolic velocity in the left common carotid artery is 119 centimeters/second ICA/CCA peak systolic velocity ratio: 0.92 Plaque formation: No significant Vertebral artery: Antegrade flow and normal waveform.     No evidence of a hemodynamically significant carotid bifurcation stenosis. Asymmetric velocities within the common carotid arteries bilaterally with normal waveforms.  This can be a normal finding but can also be seen with a proximal stenosis.  If there is  concern for proximal stenosis or other abnormality, CTA of the head neck could be performed, Electronically signed by: Mushtaq Love MD Date:    07/10/2018 Time:    10:49      Current Medications:     Infusions:       Scheduled:   amLODIPine  5 mg Oral QHS    aspirin  81 mg Oral Daily    atorvastatin  40 mg Oral Daily    losartan  25 mg Oral QHS    metoprolol succinate  25 mg Oral QHS    sodium chloride 0.9%  3 mL Intravenous Q8H        PRN:  acetaminophen, docusate, lorazepam, lorazepam, ondansetron    Antibiotics and Day Number of Therapy:  None    Lines and Day Number of Therapy:  PIV     Assessment:     Penny Cervantes is a 62 y.o.female with  Present on Admission:   (Resolved) Generalized weakness   TIA (transient ischemic attack)   Stroke   Ataxia   Essential hypertension   Gait instability   (Resolved) Hypertensive urgency       Plan:     1. Transient Ischemic Attack  - Patient presented with acute symptoms as indicated above in HPI that prompted stroke activation. Subsequently, Dr. Ramirez evaluated patient via telemedicine who deemed patient without need for TPA, reviewed CT, with no further recommendations other than to monitor closely and admit to inpatient.  - Stroke/TIA order set initiated on admit, Given  in ED  - Patient passed swallow eval; on cardiac diet  - ECHO: normal EF, grade 1 diastolic dysfunction  - Carotid US: No evidence of hemodynamically significant carotid stenosis  - Neuro consulted, recommended MRI spine and labs; will touch base with Neuro regarding further recommendations  - To complete rest of MRI today, PRN ativan available if needed  - Continue ASA, Lipitor    2. HTN  - Resumed patient's home regimen (norvasc, BB, ARB)  - Continue to monitor    3. Gait instability  - Neuro following; MRI's pending; labs pending  - PT/OT on board, appreciate time/recs  - PT recs: outpatient PT with rolling walker/shower chair/grab chair  - Awaiting OT recs    HM  - PCP:  Kate Gil MD  - Future appointments: Future Appointments  Date Time Provider Department Center   7/18/2018 10:00 AM Kate Gil MD [facility] IM [place]   8/29/2018 4:00 PM Demetrius Asif MD [facility] NEURO [facility]       F: None  E: Replete as needed  N: Cardiac    Prophylaxis: SD    Code Status: FULL    Disposition: Pending MRI today and Final Neuro recs; possible discharge today    Kaiden Navas DO  U Internal Medicine-Pediatrics PGY-IV  [facility] Hospitalist Service Team B    [facility] Medicine Hospitalist Pager number:   [facility] Hospitalist Medicine Team B (Sonny/Ravi):  [phone]

## 2018-07-12 NOTE — PLAN OF CARE
Follow-up With  Details  Why  Contact Info   Demetrius Asif MD  Go on 8/29/2018  @ 4pm, Neurology follow up  [address]  [address]  [place] [address]  [phone]   Kate Gil MD  Go on 7/18/2018  @ 10am (only time available)  [address]  [place] [address]  [phone]     Ubaldo's daughter is at bedside for DC home.       07/12/18 1606   Final Note   Assessment Type Discharge Planning Assessment   Discharge Disposition Home   What phone number can be called within the next 1-3 days to see how you are doing after discharge? [phone]   Hospital Follow Up  Appt(s) scheduled? Yes   Discharge plans and expectations educations in teach back method with documentation complete? Yes   Right Care Referral Info   Post Acute Recommendation No Care

## 2018-07-12 NOTE — PT/OT/SLP PROGRESS
"Physical Therapy Treatment    Patient Name:  Penny Cervantes   MRN:  [id number]    Recommendations:     Discharge Recommendations:  outpatient PT   Discharge Equipment Recommendations: walker, rolling, shower chair, grab bar   Barriers to discharge: Decreased caregiver support    Assessment:     Penny Cervantes is a 62 y.o. female admitted with a medical diagnosis of TIA (transient ischemic attack).  She presents with the following impairments/functional limitations:  weakness, impaired endurance, impaired functional mobilty, gait instability, impaired balance, decreased coordination, decreased lower extremity function.  Pt stated she feels like she is doing better today and asked PTA if she could try and walk without a RW.  Pt ambulated with CGA without RW, but did demonstrate 2 bouts of unsteadiness with decreased step length without LOB.  Pt was instructed to call nsg prior to getting OOB, and to use RW when discharged until balance has improves more as she works with outpatient therapy after discharge.      Rehab Prognosis:  good; patient would benefit from acute skilled PT services to address these deficits and reach maximum level of function.      Recent Surgery: * No surgery found *      Plan:     During this hospitalization, patient to be seen 6 x/week to address the above listed problems via gait training, therapeutic activities, therapeutic exercises  · Plan of Care Expires:  08/10/18   Plan of Care Reviewed with: patient    Subjective     Communicated with Rn (Or[place]) prior to session.  Patient found sitting up in b/s chair upon PT entry to room, agreeable to treatment.      Patient comments/goals: "I have to use the bathroom."  Pt agreed to tx.  Pain/Comfort:  · Pain Rating 1: 0/10  · Pain Rating Post-Intervention 1: 0/10    Patients cultural, spiritual, Muslim conflicts given the current situation: None reported to PTA    Objective:     Patient found with: telemetry (chair alarm)     General " Precautions: Standard, fall   Orthopedic Precautions:N/A   Braces:       Functional Mobility:  · Bed Mobility:     · Sit to Supine: modified independence  · Transfers:     · Sit to Stand:  stand by assistance with no AD  · Toilet Transfer: stand by assistance and contact guard assistance with  grab bars  using  Step Transfer  · Gait: ~5ft to and from bathroom without A.D. and CGA.  200ft x 2 without A.D. and CGA.  Pt ambulates with decreased tere with two bouts of unsteadiness without LOB.  · Balance: sitting good, standing without A.D. fair+, gait without A.D. fair      AM-PAC 6 CLICK MOBILITY  Turning over in bed (including adjusting bedclothes, sheets and blankets)?: 4  Sitting down on and standing up from a chair with arms (e.g., wheelchair, bedside commode, etc.): 3  Moving from lying on back to sitting on the side of the bed?: 4  Moving to and from a bed to a chair (including a wheelchair)?: 3  Need to walk in hospital room?: 3  Climbing 3-5 steps with a railing?: 3  Basic Mobility Total Score: 20       Therapeutic Activities and Exercises:   transfer to toilet with SBA/CGA and grab bars.        Patient left supine with all lines intact, call button in reach, bed alarm on and RN notified..    GOALS:    Physical Therapy Goals        Problem: Physical Therapy Goal    Goal Priority Disciplines Outcome Goal Variances Interventions   Physical Therapy Goal     PT/OT, PT Ongoing (interventions implemented as appropriate)     Description:  Goals to be met by: 8/10/18     Patient will increase functional independence with mobility by performin. Supine <> sit with Modified [place]  2. Sit to stand transfer with Modified [place]  3. Bed to chair transfer with Modified [place] using Rolling Walker  4. Gait  x 200 feet with Modified [place] using Rolling Walker.                       Time Tracking:     PT Received On: 18  PT Start Time: 1257     PT Stop Time: 1320  PT Total Time (min):  23 min     Billable Minutes: Gait Training 15 and Therapeutic Activity 8    Treatment Type: Treatment  PT/PTA: PTA     PTA Visit Number: 2     Tanvi Ugarte PTA  07/12/2018

## 2018-07-12 NOTE — DISCHARGE INSTRUCTIONS
Atorvastatin tablets (English) View Edit Remove  Acetaminophen chewable tablets (English) View Edit Remove    Atorvastatin tablets (English) View Edit Remove   Acetaminophen chewable tablets (English) View Edit Remove   Stroke, Risk Factors for (English) View Edit Remove

## 2018-07-12 NOTE — PLAN OF CARE
Problem: Patient Care Overview  Goal: Plan of Care Review  Outcome: Revised  Received patient upon rounds, sitting  in bed, conscious coherent to time, place and person, GCS 15. Ambulating to the toilet with assistance. NO weakness on both arms and lower extremities. Patient has a subjective complaint of headache and relieved with tylenol PO.  Patient denies Nausea and  vomiting, Dr Dennis team, Dr Basurto informed regarding vitamin B12 not added to the blood works of the patient today as it was send out. Verbal order by Dr Basurto to place add on order for this test to be included in the morning blood extraction.  Continuation of partial  MRI of the head  And spine will be continued in the morning, patient noted. Patient on telemetry with normal sinus rhythm. Side rails kept secure, Bed alarm On, Ishmael escudero within reach. Safety fall precaution measures noted. With good bowel movement during the day. Additional urine sample sent to the lab. Patient requested to take her anti-hypertensive medications during the night instead of day time. Will continue to monitor patient for any untoward signs and symptoms.

## 2018-07-13 ENCOUNTER — PATIENT OUTREACH (OUTPATIENT)
Dept: ADMINISTRATIVE | Facility: CLINIC | Age: 63
End: 2018-07-13

## 2018-07-13 LAB — METHYLMALONATE SERPL-SCNC: 0.1 UMOL/L

## 2018-07-13 RX ORDER — ATORVASTATIN CALCIUM 40 MG/1
40 TABLET, FILM COATED ORAL NIGHTLY
Qty: 90 TABLET | Refills: 3 | Status: SHIPPED | OUTPATIENT
Start: 2018-07-13 | End: 2018-10-09 | Stop reason: ALTCHOICE

## 2018-07-13 NOTE — PATIENT INSTRUCTIONS
Transient Ischemic Attack (TIA)     Your symptoms were caused by a TIA, or mini-stroke. Even though your symptoms have gone away, this condition is as serious as a full stroke. It means you are more likely to have a full stroke. About 1 in 3 people who have a TIA go on to have a full stroke. And 4% to 10% of those people will have the stroke within 2 days.  A TIA is caused when something decreases or blocks blood flow to a part of your brain. A TIA often happens when a blood clot travels to a blood vessel in the brain. The clot reduces or blocks blood flow. This causes the symptoms you had. After a short while, the clot dissolves. Blood flows again, and the symptoms go away. People with hardening of the arteries (atherosclerosis) are at higher risk for a TIA. So are people who have an irregular heartbeat called atrial fibrillation.  A TIA causes symptoms similar to a stroke, but they last less than 24 hours. A full stroke causes symptoms that last more than 24 hours and may be permanent. But even if your symptoms only lasted a short time, the TIA may have damaged your brain tissue. Once you have had a TIA, you are at risk of having a full stroke. You will need tests to look at the blood flow to your brain. The tests can also rule out other causes of your symptoms. The tests may include an ultrasound of the arteries in your neck and an evaluation of your heart. They may also include a CT scan of your brain, an MRI scan of your brain, or both. If your healthcare provider finds problems, he or she will recommend treatment with medicines, procedures, or both.  Your provider may prescribe medicines to reduce your chance of having another TIA and stroke. These may include medicines that prevent blood clots, such as antiplatelet medicines and blood thinners (anticoagulants). Your doctor may recommend other treatment. This may include a procedure to open up a blocked artery in your neck.  Home care  The following  guidelines will help you take care of yourself at home:  · Take any medicines your doctor has prescribed as directed. These may include antiplatelet medicines or medicines for other conditions, such as high blood pressure or high cholesterol.  · A TIA is a serious event that puts you at risk of having a full stroke. Because of this, it is important to take steps to help prevent a stroke from happening. Your doctor will look at all of your risk factors when deciding on what other treatment you may need.  Ways to reduce your risk for stroke  High blood pressure, diabetes, high cholesterol, heavy drinking, and smoking are risk factors for stroke and heart disease. You can control these by taking medicines and making diet and lifestyle changes. One way to help prevent a stroke is to take aspirin or a similar medicine every day. But don't take daily aspirin unless your healthcare provider tells you to.  Your provider will work with you to make lifestyle changes to help prevent a stroke.  Diet  Your healthcare provider will give you information about changes you may need to make to your diet. You may need to see a registered dietitian for help with diet changes. Changes may include:  · Eating less fat and cholesterol  · Eating less salt (sodium). This is especially important if you have high blood pressure.  · Eating more fresh fruits and vegetables  · Eating lean proteins, such as fish, poultry, beans, and peas  · Eating less red meat and processed meats  · Using low-fat dairy products  · Using vegetable and nut oils in limited amounts  · Limiting how many sweets and processed foods such as chips, cookies, and baked goods you eat  · Limiting how much alcohol you drink  Physical activity  Your healthcare provider may recommend that you get more exercise if you have not been as active as possible. He or she may suggest that you get 40 minutes of moderate to vigorous physical activity each day. You should do this at least 3  to 4 days a week. A few examples of moderate to vigorous exercise are:  · Walking at a brisk pace, about 3 to 4 miles per hour  · Jogging or running  · Swimming or water aerobics  · Hiking  · Dancing  · Martial arts  · Tennis  · Riding a bike  Other ways to reduce your risk  · Weight management. If you are overweight or obese, your healthcare provider will work with you to lose weight and lower your body mass index (BMI) to a normal or near-normal level. Making diet changes and increasing physical activity can help.  · Smoking. If you smoke, break the habit. Enroll in a stop smoking program to improve your chances of success.  · Stress. Learn how to manage your stress. This will help you deal with stress at home and at work.  Follow-up care  Call your doctor for an appointment in the next few days for another evaluation, or as advised. This is to make a plan for preventing another TIA or stroke. You may need to see a neurologist to follow up on your TIA. A neurologist is a doctor who specializes in treating brain and nervous system problems. You may need other tests or procedures.  If you had an X-ray, CT scan, MRI scan, or ECG (electrocardiogram), a specialist will review it. Youll be told of any new findings that will affect your care.  Call 911  Call 911 if any of these occur:  · Any of your TIA symptoms return  · New problems with speech, vision, walking, or weakness or numbness of the face or on one side of the body  · Severe headache, fainting spell, dizziness, or seizure  Date Last Reviewed: 10/1/2016  © 2867-2394 [employer]. [address] [place] [address], [place] [address]. All rights reserved. This information is not intended as a substitute for professional medical care. Always follow your healthcare professional's instructions.

## 2018-07-14 LAB
ARSENIC BLD-MCNC: NORMAL UG/DL
CADMIUM BLD-MCNC: NORMAL NG/ML
CITY: NORMAL
COUNTY: NORMAL
GUARDIAN FIRST NAME: NORMAL
GUARDIAN LAST NAME: NORMAL
HOME PHONE: NORMAL
LEAD BLD-MCNC: NORMAL UG/DL
MERCURY BLD-MCNC: NORMAL NG/ML
RACE: NORMAL
STATE: NORMAL
STREET ADDRESS: NORMAL
VENOUS/CAPILLARY: NORMAL
ZIP: NORMAL

## 2018-07-16 LAB
CLINICAL BIOCHEMIST REVIEW: NORMAL
COLLECT DURATION TIME SPEC: 2 HR
COPRO1/CREAT UR-SRTO: 2 UMOL/MOL CRT (ref 0–6)
COPRO3/CREAT UR-SRTO: 7 UMOL/MOL CRT (ref 0–14)
CREAT 24H UR-MRATE: NORMAL MG/D (ref 500–1400)
CREAT UR-MCNC: 201 MG/DL
HEPTACARBOXYLATE/CREAT UR-SRTO: 0 UMOL/MOL CRT (ref 0–2)
PORPHYRIN FRACT 24H UR-IMP: NEGATIVE
PORPHYRINS REVIEWED BY: NORMAL
PORPHYRINS SERPL-MCNC: <1 MCG/DL
SPECIMEN VOL ?TM UR: 30 ML
UROPOR/CREAT 24H UR-SRTO: 1 UMOL/MOL CRT (ref 0–4)

## 2018-07-17 LAB
ARSENIC/CREAT UR: 8 MCG/G CR
CADMIUM/CREAT UR: 0.7 MCG/G CR
CREAT UR-MCNC: 179 MG/DL
LEAD/CREAT UR: 1 MCG/G CR
MERCURY/CREAT UR: <2 MCG/G CR
PYRIDOXAL SERPL-MCNC: 5 UG/L (ref 5–50)
VIT B1 SERPL-MCNC: 48 UG/L (ref 38–122)

## 2018-07-18 ENCOUNTER — OFFICE VISIT (OUTPATIENT)
Dept: INTERNAL MEDICINE | Facility: CLINIC | Age: 63
End: 2018-07-18
Payer: COMMERCIAL

## 2018-07-18 VITALS
HEART RATE: 67 BPM | SYSTOLIC BLOOD PRESSURE: 139 MMHG | BODY MASS INDEX: 21.77 KG/M2 | HEIGHT: 59 IN | RESPIRATION RATE: 16 BRPM | WEIGHT: 108 LBS | TEMPERATURE: 98 F | DIASTOLIC BLOOD PRESSURE: 67 MMHG

## 2018-07-18 DIAGNOSIS — Z09 HOSPITAL DISCHARGE FOLLOW-UP: Primary | ICD-10-CM

## 2018-07-18 DIAGNOSIS — G45.9 TRANSIENT CEREBRAL ISCHEMIA, UNSPECIFIED TYPE: ICD-10-CM

## 2018-07-18 DIAGNOSIS — I10 ESSENTIAL HYPERTENSION: ICD-10-CM

## 2018-07-18 LAB
ACHR BIND AB SER-SCNC: NORMAL NMOL/L
AMPHIPHYSIN AB TITR SER: NEGATIVE TITER
CV2 IGG TITR SER: NEGATIVE TITER
GLIAL NUC TYPE 1 AB TITR SER: NEGATIVE TITER
HU1 AB TITR SER: NEGATIVE TITER
HU2 AB TITR SER IF: NEGATIVE TITER
HU3 AB TITR SER: NEGATIVE TITER
IMMUNOLOGIST REVIEW: NORMAL
NACHR AB SER-SCNC: 0 NMOL/L
PATHOLOGIST INTERPRETATION UPE: NORMAL
PAVAL REFLEX TEST ADDED: NORMAL
PCA-1 AB TITR SER: NEGATIVE TITER
PCA-2 AB TITR SER: NEGATIVE TITER
PCA-TR AB TITR SER: NEGATIVE TITER
PROT PATTERN UR ELPH-IMP: NORMAL
STRIA MUS AB TITR SER: NEGATIVE TITER
VGCC-N BIND AB SER-SCNC: 0 NMOL/L
VGCC-P/Q BIND AB SER-SCNC: 0 NMOL/L
VGKC AB SER-SCNC: 0 NMOL/L

## 2018-07-18 PROCEDURE — 99999 PR PBB SHADOW E&M-EST. PATIENT-LVL III: CPT | Mod: PBBFAC,,, | Performed by: INTERNAL MEDICINE

## 2018-07-18 PROCEDURE — 99495 TRANSJ CARE MGMT MOD F2F 14D: CPT | Mod: S$GLB,,, | Performed by: INTERNAL MEDICINE

## 2018-07-18 NOTE — PROGRESS NOTES
Subjective:       Patient ID: Penny Cervantes is a 62 y.o. female who presents for Hospital Follow Up; Transient Ischemic Attack; and Hypertension      HPI       61yo F with h/o breast CA who was painting alone in the house and fell off a short ladder and injured her right leg. She also had some back pain when her back hit a paint can. She denies dizziness, no palpitation, no loss of consciousness. The following day, she reports sudden onset of malaise, head heaviness, weakness and RLL weakness. BP was elevated and she went to the ER for treatment. She was treated for TIA after evaluation.    Transitional Care Note    Family and/or Caretaker present at visit?  No.  Diagnostic tests reviewed/disposition: No diagnosic tests pending after this hospitalization.  Disease/illness education: na  Home health/community services discussion/referrals: Patient does not have home health established from hospital visit.  They do not need home health.  If needed, we will set up home health for the patient.   Establishment or re-establishment of referral orders for community resources: No other necessary community resources.   Discussion with other health care providers: No discussion with other health care providers necessary.       Review of Systems   Constitutional: Negative for chills and fever.   HENT: Negative for congestion, dental problem, rhinorrhea and sinus pressure.    Eyes: Negative for redness and visual disturbance.   Respiratory: Negative for cough and shortness of breath.    Cardiovascular: Negative for chest pain, palpitations and leg swelling.   Gastrointestinal: Negative for abdominal pain, constipation, diarrhea, nausea and vomiting.   Genitourinary: Negative for dysuria and hematuria.   Musculoskeletal: Negative for arthralgias and myalgias.   Skin: Negative for rash.   Neurological: Negative for dizziness, tremors, facial asymmetry, weakness, light-headedness, numbness and headaches.   Hematological:  Negative for adenopathy.       Objective:      Physical Exam   Constitutional: She is oriented to person, place, and time. Vital signs are normal. She appears well-developed and well-nourished. No distress.   HENT:   Head: Normocephalic and atraumatic.   Right Ear: Hearing and external ear normal.   Left Ear: Hearing and external ear normal.   Nose: Nose normal.   Mouth/Throat: Uvula is midline and mucous membranes are normal.   Eyes: Lids are normal.   Neck: Full passive range of motion without pain.   Cardiovascular: Normal rate, regular rhythm, normal heart sounds and intact distal pulses.    No murmur heard.  Pulmonary/Chest: Effort normal and breath sounds normal. She has no wheezes.   Abdominal: Soft. Bowel sounds are normal. She exhibits no distension. There is no tenderness.   Musculoskeletal: Normal range of motion. She exhibits no edema.   Neurological: She is alert and oriented to person, place, and time. Gait normal.   Skin: Skin is warm, dry and intact. No rash noted. She is not diaphoretic.   Psychiatric: She has a normal mood and affect.   Vitals reviewed.      Assessment:       1. Hospital discharge follow-up    2. Transient cerebral ischemia, unspecified type    3. Essential hypertension        Plan:           1. Hospital discharge follow-up  - reviewed discharge summary, will arrange Vascular Neurology referral    2. Transient cerebral ischemia, unspecified type  - stable, resolved completely  - continue medication regimen    3. Essential hypertension  - stable, controlled  - continue norvasc, losartan, metoprolol    Kate Gil MD

## 2018-07-19 LAB — NIACIN SERPL-MCNC: 2.03 UG/ML (ref 0.5–8.45)

## 2018-07-20 ENCOUNTER — TELEPHONE (OUTPATIENT)
Dept: INTERNAL MEDICINE | Facility: CLINIC | Age: 63
End: 2018-07-20

## 2018-07-20 ENCOUNTER — PATIENT MESSAGE (OUTPATIENT)
Dept: INTERNAL MEDICINE | Facility: CLINIC | Age: 63
End: 2018-07-20

## 2018-07-21 PROBLEM — R26.81 GAIT INSTABILITY: Status: RESOLVED | Noted: 2018-07-11 | Resolved: 2018-07-21

## 2018-07-25 ENCOUNTER — TELEPHONE (OUTPATIENT)
Dept: INTERNAL MEDICINE | Facility: CLINIC | Age: 63
End: 2018-07-25

## 2018-07-25 ENCOUNTER — PATIENT MESSAGE (OUTPATIENT)
Dept: INTERNAL MEDICINE | Facility: CLINIC | Age: 63
End: 2018-07-25

## 2018-07-25 NOTE — TELEPHONE ENCOUNTER
Spoke to patient, has appt. She will cancel 8/29/18 neurology appt and keep 8/28/18 neurology appt for now.

## 2018-08-03 ENCOUNTER — PATIENT MESSAGE (OUTPATIENT)
Dept: INTERNAL MEDICINE | Facility: CLINIC | Age: 63
End: 2018-08-03

## 2018-08-07 ENCOUNTER — OFFICE VISIT (OUTPATIENT)
Dept: NEUROLOGY | Facility: CLINIC | Age: 63
End: 2018-08-07
Payer: COMMERCIAL

## 2018-08-07 VITALS
HEART RATE: 67 BPM | BODY MASS INDEX: 21.39 KG/M2 | WEIGHT: 108.94 LBS | SYSTOLIC BLOOD PRESSURE: 121 MMHG | HEIGHT: 60 IN | DIASTOLIC BLOOD PRESSURE: 66 MMHG

## 2018-08-07 DIAGNOSIS — R26.89 ABNORMALITY OF GAIT DUE TO IMPAIRMENT OF BALANCE: Primary | ICD-10-CM

## 2018-08-07 DIAGNOSIS — R53.1 WEAKNESS: ICD-10-CM

## 2018-08-07 PROCEDURE — 3074F SYST BP LT 130 MM HG: CPT | Mod: CPTII,S$GLB,, | Performed by: PSYCHIATRY & NEUROLOGY

## 2018-08-07 PROCEDURE — 3078F DIAST BP <80 MM HG: CPT | Mod: CPTII,S$GLB,, | Performed by: PSYCHIATRY & NEUROLOGY

## 2018-08-07 PROCEDURE — 99999 PR PBB SHADOW E&M-EST. PATIENT-LVL III: CPT | Mod: PBBFAC,,, | Performed by: PSYCHIATRY & NEUROLOGY

## 2018-08-07 PROCEDURE — 3008F BODY MASS INDEX DOCD: CPT | Mod: CPTII,S$GLB,, | Performed by: PSYCHIATRY & NEUROLOGY

## 2018-08-07 PROCEDURE — 99214 OFFICE O/P EST MOD 30 MIN: CPT | Mod: S$GLB,,, | Performed by: PSYCHIATRY & NEUROLOGY

## 2018-08-07 NOTE — LETTER
August 8, 2018      Kate Gil MD  [address]  [place] [address]           [facility]  [address]  [place] [address]  Phone: [phone]  Fax: [phone]          Patient: Penny Cervantes   MR Number: [id number]   YOB: 1955   Date of Visit: 8/7/2018       Dear Dr. Kate Gil:    Thank you for referring Penny Cervantes to me for evaluation. Attached you will find relevant portions of my assessment and plan of care.    If you have questions, please do not hesitate to call me. I look forward to following Penny Cervantes along with you.    Sincerely,    Duncan Costa MD    Enclosure  CC:  No Recipients    If you would like to receive this communication electronically, please contact [email or web address] or [phone] to request more information on [employer] access.    For providers and/or their staff who would like to refer a patient to [facility], please contact us through our one-stop-shop provider referral line, [facility] [place], at [phone].    If you feel you have received this communication in error or would no longer like to receive these types of communications, please e-mail [email or web address]

## 2018-08-08 PROBLEM — G45.9 TIA (TRANSIENT ISCHEMIC ATTACK): Status: RESOLVED | Noted: 2018-07-10 | Resolved: 2018-08-08

## 2018-08-08 PROBLEM — I63.9 STROKE: Status: RESOLVED | Noted: 2018-07-10 | Resolved: 2018-08-08

## 2018-08-08 NOTE — PROGRESS NOTES
"  Penny Cervantes is a 62 y.o. year old female that  presents for neurology follow up after recent admission to the hospital for weakness, imbalance, falls.  Chief Complaint   Patient presents with    Stroke       HPI:  Mrs Cervantes has HTN, non occlusive CAD, left breast cancer s/p chemo and XRT, B12 deficiency, and vertigo.  She indicated that on 7/9/18 she was admitted to the hospital due to significantly elevated BP with associated HA, generalized weakness, gait impairment, and falls.   Patient said that her symptoms lasted few more days after discharged from the hospital but at this time she feels back to baseline and denies HA, vertigo, double vision, focal weakness, slurred speech, unsteadiness, language or vision impairment.  She was evaluated by vascular and general neurology and underwent extensive but non revealing neurological and medical work up including negative MRI brain and entire spine, normal CUS, negative NMO-IGG antibodies, unremarkable paraneoplastic and vasculitic profile, unimpressive urine heavy metals screen.     As per review of the available electronic medical records, " patient presented to [facility] on 7/9/2018 with a primary complaint generalized weakness with Hypertension (To ER wtih c/o headache and hypertension starting earlier today.  pt states that she still feels "heavy headed" after taking her night blood pressure medications.  pt c/o falling yesterday but denies LOC or hitting her head.  daughter states that her gait was unsteady prior to arrival.) and Headache. Patient reports symptoms starting around 6-630pm with head feeling "heavy" and elevated BP. She attempted to take her home BP meds but did not help resolve her condition. She noted that her coordination was off as well. Persistent "heavy" headed feeling, elevated BP (at one point SBP ~200), difficulty with ambulation prompted patient to present to ED. Last known normal ~1800.   Of note, she had one unwitnessed fall " yesterday with likely LOC (patient does not recall events). Did hurt back and ankle from fall. Was able to ambulate and no issues with BP until evening on day of presentation. No recent travel history, no sick contacts, no new foods, no new activities or events attended.   ED course:   - Stroke activation in ED  - Tele Stroke Dr. Villalba evaluated patient in ED, NIH stroke scale 0, low suspicion for acute neurovascular syndrome  - Recommends admit to inpatient   [facility] Hospitalist team consulted for Stroke activation/TIA. Symptoms resolved at time of evaluation.     For the full HPI please refer to the History & Physical from this admission.     Hospital Course By Problem with Pertinent Findings      1. Transient Ischemic Attack  - Patient presented with acute symptoms as indicated above in HPI that prompted stroke activation. Subsequently, Dr. Ramirez evaluated patient via telemedicine who deemed patient without need for TPA, reviewed CT, with no further recommendations other than to monitor closely and admit to inpatient.  - Stroke/TIA order set initiated on admit, Given  in ED  - Patient passed swallow eval; on cardiac diet  - ECHO: normal EF, grade 1 diastolic dysfunction  - Carotid US: No evidence of hemodynamically significant carotid stenosis  - Neuro consulted, recommended MRI spine and labs; MRI C/T/L, MRI Brain not concerning at this time  - Continued on ASA, Lipitor at discharge  - Patient to have close follow up with Neurology to discuss rest of lab workup     2. HTN  - Resumed patient's home regimen (norvasc, BB, ARB)  - Continued home meds at discharge     3. Gait Instability  - Neuro following; MRI's pending; labs pending  - PT/OT on board, appreciate time/recs  - PT recs: outpatient PT with rolling walker/shower chair/grab chair; ordered  - Outpatient PT ordered; patient to follow     4. Anxiety  - Patient with elevated anxiety with procedures and lab work  - Several doses of ativan required for  imaging  - Will need to be addressed as outpatient          Past Medical History:   Diagnosis Date    Benign positional vertigo     Breast cancer     s/p chemo XRT, left breast triple negative    Coronary artery disease, non-occlusive     cath in 2012    Hypertension     Interstitial cystitis     Osteopenia     Squamous cell carcinoma     Stroke 7/10/2018     Social History     Social History    Marital status:      Spouse name: N/A    Number of children: N/A    Years of education: N/A     Occupational History    Not on file.     Social History Main Topics    Smoking status: Never Smoker    Smokeless tobacco: Never Used    Alcohol use Yes      Comment: 2-3 per month    Drug use: No    Sexual activity: No     Other Topics Concern    Not on file     Social History Narrative    No narrative on file     Past Surgical History:   Procedure Laterality Date    BONE RESECTION, RIB      right side    BREAST BIOPSY Right 2005    BREAST LUMPECTOMY Left 2009    BREAST SURGERY      HERNIA REPAIR      HYSTERECTOMY       Family History   Problem Relation Age of Onset    Heart disease Mother     Heart disease Father     Heart disease Sister 55    Heart disease Brother 43    Hypertension Sister     Ovarian cancer Sister            Review of Systems  General ROS: negative for chills, fever or weight loss  Psychological ROS: negative for hallucination, depression or suicidal ideation  Ophthalmic ROS: negative for blurry vision, photophobia or eye pain  ENT ROS: negative for epistaxis, sore throat or rhinorrhea  Respiratory ROS: no cough, shortness of breath, or wheezing  Cardiovascular ROS: no chest pain or dyspnea on exertion  Gastrointestinal ROS: no abdominal pain, change in bowel habits, or black/ bloody stools  Genito-Urinary ROS: no dysuria, trouble voiding, or hematuria  Musculoskeletal ROS: negative for gait disturbance or muscular weakness  Neurological ROS: no syncope or seizures; no  "ataxia  Dermatological ROS: negative for pruritis, rash and jaundice      Physical Exam:  /66   Pulse 67   Ht 4' 11.5" (1.511 m)   Wt 49.4 kg (108 lb 14.5 oz)   BMI 21.63 kg/m²   General appearance: alert, cooperative, no distress  Constitutional:Oriented to person, place, and time.appears well-developed and well-nourished.   HEENT: Normocephalic, atraumatic, neck symmetrical, no nasal discharge   Eyes: conjunctivae/corneas clear, PERRL, EOM's intact  Lungs: clear to auscultation bilaterally, no dullness to percussion bilaterally  Heart: regular rate and rhythm without rub; no displacement of the PMI   Abdomen: soft, non-tender; bowel sounds normoactive; no organomegaly  Extremities: extremities symmetric; no clubbing, cyanosis, or edema  Integument: Skin color, texture, turgor normal; no rashes; hair distrubution normal  Neurologic:   Mental status: alert and awake, oriented x 4, comprehension, naming, and repetition intact. No right to left confusion. Performs serial 7's without difficulty .No dysarthria.  CN 2-12: pupils 4 mm bilaterally, reactive to light. Fundi without papilledema. Visual fields full to confrontation. EOM full without nystagmus. Face sensation normal in all distributions. Face symmetric. Hearing grossly intact. Palate elevates well. Tongue midline without atrophy or fasciculations.  Motor: 5/5 all over  Sensory: intact in all modalities.  DTR's: 2+ all over.  Plantars: no tested.  Coordination: finger to nose and heel-knee-shin intact.  Gait: no ataxia or bradykinesia  Psychiatric: no pressured speech; normal affect; no evidence of impaired cognition     LABS:    Complete Blood Count  Lab Results   Component Value Date    RBC 4.79 07/11/2018    HGB 13.6 07/11/2018    HCT 41.7 07/11/2018    MCV 87 07/11/2018    MCH 28.4 07/11/2018    MCHC 32.6 07/11/2018    RDW 12.5 07/11/2018     07/11/2018    MPV 9.6 07/11/2018    GRAN 3.0 07/11/2018    GRAN 58.8 07/11/2018    LYMPH 1.3 " 07/11/2018    LYMPH 24.8 07/11/2018    MONO 0.7 07/11/2018    MONO 13.8 07/11/2018    EOS 0.1 07/11/2018    BASO 0.02 07/11/2018    EOSINOPHIL 2.0 07/11/2018    BASOPHIL 0.4 07/11/2018    DIFFMETHOD Automated 07/11/2018       Comprehensive Metabolic Panel  Lab Results   Component Value Date     07/12/2018    BUN 19 07/12/2018    CREATININE 0.6 07/12/2018     07/12/2018    K 4.1 07/12/2018     07/12/2018    PROT 7.1 07/12/2018    ALBUMIN 3.9 07/12/2018    BILITOT 0.7 07/12/2018    AST 17 07/12/2018    ALKPHOS 67 07/12/2018    CO2 26 07/12/2018    ALT 19 07/12/2018    ANIONGAP 7 (L) 07/12/2018    EGFRNONAA >60 07/12/2018    ESTGFRAFRICA >60 07/12/2018       TSH  Lab Results   Component Value Date    TSH 1.381 07/09/2018         Assessment: 61 y/o with HTN, non occlusive CAD, left breast cancer s/p chemo and XRT, B12 deficiency, and vertigo, and recent admission to the hospital with a constellation of symptoms that are difficult to localize to a specific segment of the neuro-axis, and with extensive but non revealing neurological and medical testing.  Unclear etiology, wonder if transient neurological dysfunction in the setting of hypertensive emergency, but at the same time inpatient neurology team felt to particularly concerned about structural neurological disorder.  Will not pursue further testing at this time as she is currently asymptomatic from a neurological standpoint.  Advised to discontinue atorvastatin, as it is causing muscle pain and the overall presentation was not consistent with a neurovascular insult.  No diagnosis found.  There were no encounter diagnoses.      Plan:  1) Transient neurological dysfunction, no further intervention needed at this time.  2) HTN, as per PCP  3) Non occlusive CAD, managed by PCP and cardiology  4) Left breast Ca, on remission, follow by oncology  5) B12 deficiency, as per PCP  6) Vertigo, no active at this time.    No orders of the defined types were  placed in this encounter.          Duncan Costa MD

## 2018-09-11 ENCOUNTER — PATIENT MESSAGE (OUTPATIENT)
Dept: INTERNAL MEDICINE | Facility: CLINIC | Age: 63
End: 2018-09-11

## 2018-09-11 DIAGNOSIS — Z85.3 HISTORY OF BREAST CANCER: ICD-10-CM

## 2018-09-11 DIAGNOSIS — Z12.31 ENCOUNTER FOR SCREENING MAMMOGRAM FOR BREAST CANCER: Primary | ICD-10-CM

## 2018-09-11 PROBLEM — C50.919 TRIPLE NEGATIVE MALIGNANT NEOPLASM OF BREAST: Status: ACTIVE | Noted: 2018-09-11

## 2018-09-11 PROBLEM — Z17.421 TRIPLE NEGATIVE MALIGNANT NEOPLASM OF BREAST: Status: ACTIVE | Noted: 2018-09-11

## 2018-09-11 NOTE — TELEPHONE ENCOUNTER
Please enter orders for pt to have yearly mammogram done and alert staff so hand off can be made to referral coordinator. Thank you

## 2018-09-21 ENCOUNTER — HOSPITAL ENCOUNTER (OUTPATIENT)
Dept: RADIOLOGY | Facility: HOSPITAL | Age: 63
Discharge: HOME | End: 2018-09-21
Attending: INTERNAL MEDICINE
Payer: COMMERCIAL

## 2018-09-21 DIAGNOSIS — Z85.3 HISTORY OF BREAST CANCER: ICD-10-CM

## 2018-09-21 DIAGNOSIS — Z12.31 ENCOUNTER FOR SCREENING MAMMOGRAM FOR BREAST CANCER: ICD-10-CM

## 2018-09-21 PROCEDURE — 77062 BREAST TOMOSYNTHESIS BI: CPT | Mod: 26,,, | Performed by: RADIOLOGY

## 2018-09-21 PROCEDURE — 77066 DX MAMMO INCL CAD BI: CPT | Mod: 26,,, | Performed by: RADIOLOGY

## 2018-09-21 PROCEDURE — 77062 BREAST TOMOSYNTHESIS BI: CPT | Mod: TC,PO

## 2018-10-09 ENCOUNTER — TELEPHONE (OUTPATIENT)
Dept: INTERNAL MEDICINE | Facility: CLINIC | Age: 63
End: 2018-10-09

## 2018-10-09 ENCOUNTER — HOSPITAL ENCOUNTER (OUTPATIENT)
Dept: RADIOLOGY | Facility: HOSPITAL | Age: 63
Discharge: HOME | End: 2018-10-09
Attending: INTERNAL MEDICINE
Payer: COMMERCIAL

## 2018-10-09 ENCOUNTER — OFFICE VISIT (OUTPATIENT)
Dept: INTERNAL MEDICINE | Facility: CLINIC | Age: 63
End: 2018-10-09
Payer: COMMERCIAL

## 2018-10-09 VITALS
HEART RATE: 70 BPM | SYSTOLIC BLOOD PRESSURE: 104 MMHG | TEMPERATURE: 98 F | RESPIRATION RATE: 18 BRPM | OXYGEN SATURATION: 98 % | WEIGHT: 108.69 LBS | BODY MASS INDEX: 21.34 KG/M2 | DIASTOLIC BLOOD PRESSURE: 62 MMHG | HEIGHT: 60 IN

## 2018-10-09 DIAGNOSIS — R05.3 PERSISTENT COUGH: Primary | ICD-10-CM

## 2018-10-09 DIAGNOSIS — R06.89 DECREASED BREATH SOUNDS AT LEFT LUNG BASE: ICD-10-CM

## 2018-10-09 DIAGNOSIS — R05.3 PERSISTENT COUGH: ICD-10-CM

## 2018-10-09 DIAGNOSIS — Z85.3 HISTORY OF BREAST CANCER: ICD-10-CM

## 2018-10-09 PROCEDURE — 71046 X-RAY EXAM CHEST 2 VIEWS: CPT | Mod: 26,,, | Performed by: RADIOLOGY

## 2018-10-09 PROCEDURE — 3074F SYST BP LT 130 MM HG: CPT | Mod: CPTII,S$GLB,, | Performed by: INTERNAL MEDICINE

## 2018-10-09 PROCEDURE — 3008F BODY MASS INDEX DOCD: CPT | Mod: CPTII,S$GLB,, | Performed by: INTERNAL MEDICINE

## 2018-10-09 PROCEDURE — 71046 X-RAY EXAM CHEST 2 VIEWS: CPT | Mod: TC,PO

## 2018-10-09 PROCEDURE — 99213 OFFICE O/P EST LOW 20 MIN: CPT | Mod: S$GLB,,, | Performed by: INTERNAL MEDICINE

## 2018-10-09 PROCEDURE — 3078F DIAST BP <80 MM HG: CPT | Mod: CPTII,S$GLB,, | Performed by: INTERNAL MEDICINE

## 2018-10-09 PROCEDURE — 99999 PR PBB SHADOW E&M-EST. PATIENT-LVL IV: CPT | Mod: PBBFAC,,, | Performed by: INTERNAL MEDICINE

## 2018-10-09 RX ORDER — PROMETHAZINE HYDROCHLORIDE AND DEXTROMETHORPHAN HYDROBROMIDE 6.25; 15 MG/5ML; MG/5ML
5 SYRUP ORAL 2 TIMES DAILY PRN
Qty: 180 ML | Refills: 0 | Status: SHIPPED | OUTPATIENT
Start: 2018-10-09 | End: 2018-10-19

## 2018-10-09 RX ORDER — BENZONATATE 200 MG/1
200 CAPSULE ORAL 3 TIMES DAILY PRN
Qty: 30 CAPSULE | Refills: 0 | Status: SHIPPED | OUTPATIENT
Start: 2018-10-09 | End: 2018-10-19

## 2018-10-09 RX ORDER — TRIAMCINOLONE ACETONIDE 1 MG/G
PASTE DENTAL 2 TIMES DAILY
Qty: 5 G | Refills: 5 | Status: SHIPPED | OUTPATIENT
Start: 2018-10-09 | End: 2019-10-01 | Stop reason: SDUPTHER

## 2018-10-09 RX ORDER — ALBUTEROL SULFATE 90 UG/1
2 AEROSOL, METERED RESPIRATORY (INHALATION) EVERY 6 HOURS PRN
Qty: 18 G | Refills: 0 | Status: SHIPPED | OUTPATIENT
Start: 2018-10-09 | End: 2021-07-28

## 2018-10-09 NOTE — TELEPHONE ENCOUNTER
----- Message from Kate Gil MD sent at 10/9/2018  8:38 AM CDT -----  Set annual exam as November 1, 2018

## 2018-10-09 NOTE — PROGRESS NOTES
Subjective:       Patient ID: Penny Cervantes is a 62 y.o. female who presents for Cough      Cough   This is a new problem. The current episode started 1 to 4 weeks ago (started ~5 weeks ago). The problem has been unchanged. The problem occurs constantly. The cough is non-productive. Associated symptoms include ear pain (resolved), postnasal drip, a sore throat (with coughing spells) and sweats. Pertinent negatives include no chest pain, chills, eye redness, fever, headaches, hemoptysis, myalgias, nasal congestion, rash, rhinorrhea, shortness of breath or wheezing. Nothing aggravates the symptoms. She has tried prescription cough suppressant (tried phenergan DM) for the symptoms. The treatment provided mild relief. Her past medical history is significant for bronchitis. There is no history of asthma, COPD or environmental allergies.     Illness began with sore throat which has since then improved. Cough began immediately and patient has used promethazine DM at night for cough. Notes increased coughing when she eats or drinks but denies heartburn.      Review of Systems   Constitutional: Positive for diaphoresis (but reports that this is due to menopause). Negative for chills, fatigue and fever.   HENT: Positive for ear pain (resolved), postnasal drip and sore throat (with coughing spells). Negative for congestion, ear discharge, rhinorrhea, sinus pressure and sneezing.    Eyes: Negative for redness and itching.   Respiratory: Positive for cough. Negative for hemoptysis, chest tightness, shortness of breath and wheezing.         Does feel rattling sensation in chest   Cardiovascular: Negative for chest pain and palpitations.   Gastrointestinal: Negative for diarrhea, nausea and vomiting.   Musculoskeletal: Negative for arthralgias and myalgias.   Skin: Negative for rash.   Allergic/Immunologic: Negative for environmental allergies.   Neurological: Negative for dizziness, numbness and headaches.   Hematological:  Negative for adenopathy.       Objective:      Physical Exam   Constitutional: She is oriented to person, place, and time. Vital signs are normal. She appears well-developed and well-nourished. No distress.   HENT:   Head: Normocephalic and atraumatic.   Right Ear: Hearing, tympanic membrane, external ear and ear canal normal. Tympanic membrane is not erythematous and not bulging.   Left Ear: Hearing, tympanic membrane, external ear and ear canal normal. Tympanic membrane is not erythematous and not bulging.   Nose: Mucosal edema present. Right sinus exhibits no maxillary sinus tenderness and no frontal sinus tenderness. Left sinus exhibits no maxillary sinus tenderness and no frontal sinus tenderness.   Mouth/Throat: Uvula is midline. Posterior oropharyngeal erythema present. No oropharyngeal exudate.   Eyes: Lids are normal.   Neck: Full passive range of motion without pain.   Cardiovascular: Normal rate, regular rhythm, normal heart sounds and intact distal pulses.   No murmur heard.  Pulmonary/Chest: Effort normal. No tachypnea and no bradypnea. She has decreased breath sounds in the right lower field and the left lower field. She has no wheezes. She has no rhonchi.   Abdominal: Soft. Bowel sounds are normal. She exhibits no distension. There is no tenderness.   Musculoskeletal: Normal range of motion. She exhibits no edema.   Lymphadenopathy:        Head (right side): No submandibular, no preauricular and no posterior auricular adenopathy present.        Head (left side): No submandibular, no preauricular and no posterior auricular adenopathy present.     She has no cervical adenopathy.        Right: No supraclavicular adenopathy present.        Left: No supraclavicular adenopathy present.   Neurological: She is alert and oriented to person, place, and time.   Skin: Skin is warm, dry and intact. No rash noted. She is not diaphoretic.   Psychiatric: She has a normal mood and affect.   Vitals reviewed.       Assessment/Plan:       1. Persistent cough  - pneumonia vs bronchitis with bronchospasm, check CXR  - X-Ray Chest PA And Lateral; Future  - CBC auto differential; Future  - Comprehensive metabolic panel; Future  - albuterol (PROVENTIL/VENTOLIN HFA) 90 mcg/actuation inhaler; Inhale 2 puffs into the lungs every 6 (six) hours as needed for Wheezing. Rescue  Dispense: 18 g; Refill: 0  - benzonatate (TESSALON) 200 MG capsule; Take 1 capsule (200 mg total) by mouth 3 (three) times daily as needed.  Dispense: 30 capsule; Refill: 0  - promethazine-dextromethorphan (PROMETHAZINE-DM) 6.25-15 mg/5 mL Syrp; Take 5 mLs by mouth 2 (two) times daily as needed.  Dispense: 180 mL; Refill: 0    2. Decreased breath sounds at left lung base  - X-Ray Chest PA And Lateral; Future  - CBC auto differential; Future  - Comprehensive metabolic panel; Future    3. History of breast cancer    Overdue for annual exam    Kate Gil MD

## 2018-10-14 RX ORDER — CYANOCOBALAMIN 1000 UG/ML
INJECTION, SOLUTION INTRAMUSCULAR; SUBCUTANEOUS
Qty: 1 ML | Refills: 0 | Status: SHIPPED | OUTPATIENT
Start: 2018-10-14 | End: 2018-11-12 | Stop reason: SDUPTHER

## 2018-11-12 RX ORDER — CYANOCOBALAMIN 1000 UG/ML
INJECTION, SOLUTION INTRAMUSCULAR; SUBCUTANEOUS
Qty: 1 ML | Refills: 2 | Status: SHIPPED | OUTPATIENT
Start: 2018-11-12 | End: 2019-05-31 | Stop reason: SDUPTHER

## 2018-12-24 ENCOUNTER — OFFICE VISIT (OUTPATIENT)
Dept: OPHTHALMOLOGY | Facility: CLINIC | Age: 63
End: 2018-12-24
Payer: COMMERCIAL

## 2018-12-24 DIAGNOSIS — H43.812 SYMPTOMATIC POSTERIOR VITREOUS DETACHMENT OF LEFT EYE: ICD-10-CM

## 2018-12-24 PROCEDURE — 92004 COMPRE OPH EXAM NEW PT 1/>: CPT | Mod: S$GLB,,, | Performed by: OPHTHALMOLOGY

## 2018-12-24 PROCEDURE — 99999 PR PBB SHADOW E&M-EST. PATIENT-LVL II: CPT | Mod: PBBFAC,,, | Performed by: OPHTHALMOLOGY

## 2018-12-24 NOTE — PATIENT INSTRUCTIONS
Warning signs of a retinal detachment:  Sudden increase in floaters.  Sudden increase in flashes of light.  Part of vision obscured.  Call if any of the above occur.  Appointment with retina service.

## 2018-12-24 NOTE — PROGRESS NOTES
HPI     Pt states at work on Friday she felt like something was on her OS she   thought maybe her glasses were dirty. Pt states it feels like she as   pressure behind her OS sort of like a headache. Pt states she has been   noticing long strike of light as well in her OS. Pt denies any migraines.     Meds: AT's PRN OU    I have personally interviewed the patient, reviewed the history and   examined the patient and agree with the technician's exam.    Last edited by Yanick Fu MD on 12/24/2018 10:49 AM. (History)            Assessment /Plan     For exam results, see Encounter Report.    Symptomatic posterior vitreous detachment of left eye      Ms. Cervantes may have an area of traction or a small tear inferior temporally in her left eye. I will arrange for evaluation by the retinal service.  Warning signs of a retinal detachment:  Sudden increase in floaters.  Sudden increase in flashes of light.  Part of vision obscured.  Call if any of the above occur.

## 2019-01-10 ENCOUNTER — INITIAL CONSULT (OUTPATIENT)
Dept: OPHTHALMOLOGY | Facility: CLINIC | Age: 64
End: 2019-01-10
Payer: COMMERCIAL

## 2019-01-10 DIAGNOSIS — H43.812 SYMPTOMATIC POSTERIOR VITREOUS DETACHMENT OF LEFT EYE: Primary | ICD-10-CM

## 2019-01-10 DIAGNOSIS — H43.12 VITREOUS HEMORRHAGE, LEFT: ICD-10-CM

## 2019-01-10 PROCEDURE — 92225 PR SPECIAL EYE EXAM, INITIAL: ICD-10-PCS | Mod: LT,S$GLB,, | Performed by: OPHTHALMOLOGY

## 2019-01-10 PROCEDURE — 92014 COMPRE OPH EXAM EST PT 1/>: CPT | Mod: S$GLB,,, | Performed by: OPHTHALMOLOGY

## 2019-01-10 PROCEDURE — 99999 PR PBB SHADOW E&M-EST. PATIENT-LVL II: CPT | Mod: PBBFAC,,, | Performed by: OPHTHALMOLOGY

## 2019-01-10 PROCEDURE — 92225 PR SPECIAL EYE EXAM, INITIAL: CPT | Mod: LT,S$GLB,, | Performed by: OPHTHALMOLOGY

## 2019-01-10 PROCEDURE — 99999 PR PBB SHADOW E&M-EST. PATIENT-LVL II: ICD-10-PCS | Mod: PBBFAC,,, | Performed by: OPHTHALMOLOGY

## 2019-01-10 PROCEDURE — 92014 PR EYE EXAM, EST PATIENT,COMPREHESV: ICD-10-PCS | Mod: S$GLB,,, | Performed by: OPHTHALMOLOGY

## 2019-01-10 NOTE — PROGRESS NOTES
"HPI     PVD f/u per Dr. Fu   DLS- 10/24/2018 Dr. Fu    Pt sts dec 21st at work 10:30 am noticed "something in eye covering left peripheral vision in left eye" has been consistent since started never went away. Streaks of lights started the following morning, along with Headache & pressure sensation behind eye.  Denies pain   (-)Photophobia  (+)Glare night time no more than normal     No gtts once tried AT's did not help     No prev eye sx ----- (+)HTN   fam hx of several retinal tear - sister     Last edited by Li Mann on 1/10/2019  1:24 PM. (History)        A/P    1. Hemorrhagic PVD OS  Heme likely from ONH  No breaks on SD exam    RD precautions    2. Early NS       4-5 weeks Dilate OS      "

## 2019-01-10 NOTE — LETTER
January 10, 2019      Yanick Fu MD  [address] [address]           [place] [facility]  [address]  [place] [address]  Phone: [phone]  Fax: [phone]          Patient: Penny Cervantes   MR Number: [id number]   YOB: 1955   Date of Visit: 1/10/2019       Dear Dr. Yanick Fu:    Thank you for referring Penny Cervantes to me for evaluation. Attached you will find relevant portions of my assessment and plan of care.    If you have questions, please do not hesitate to call me. I look forward to following Penny Cervantes along with you.    Sincerely,    MARCELLO White MD    Enclosure  CC:  No Recipients    If you would like to receive this communication electronically, please contact [email or web address] or [phone] to request more information on [employer] Link access.    For providers and/or their staff who would like to refer a patient to [facility], please contact us through our one-stop-shop provider referral line, [facility], at [phone].    If you feel you have received this communication in error or would no longer like to receive these types of communications, please e-mail [email or web address]

## 2019-01-11 DIAGNOSIS — Z12.11 COLON CANCER SCREENING: ICD-10-CM

## 2019-02-14 ENCOUNTER — OFFICE VISIT (OUTPATIENT)
Dept: OPHTHALMOLOGY | Facility: CLINIC | Age: 64
End: 2019-02-14
Payer: COMMERCIAL

## 2019-02-14 DIAGNOSIS — H43.812 SYMPTOMATIC POSTERIOR VITREOUS DETACHMENT OF LEFT EYE: ICD-10-CM

## 2019-02-14 DIAGNOSIS — H43.12 VITREOUS HEMORRHAGE, LEFT: Primary | ICD-10-CM

## 2019-02-14 PROCEDURE — 92014 COMPRE OPH EXAM EST PT 1/>: CPT | Mod: S$GLB,,, | Performed by: OPHTHALMOLOGY

## 2019-02-14 PROCEDURE — 92226 PR SPECIAL EYE EXAM, SUBSEQUENT: CPT | Mod: LT,S$GLB,, | Performed by: OPHTHALMOLOGY

## 2019-02-14 PROCEDURE — 92226 PR SPECIAL EYE EXAM, SUBSEQUENT: ICD-10-PCS | Mod: LT,S$GLB,, | Performed by: OPHTHALMOLOGY

## 2019-02-14 PROCEDURE — 92014 PR EYE EXAM, EST PATIENT,COMPREHESV: ICD-10-PCS | Mod: S$GLB,,, | Performed by: OPHTHALMOLOGY

## 2019-02-14 PROCEDURE — 99999 PR PBB SHADOW E&M-EST. PATIENT-LVL III: CPT | Mod: PBBFAC,,, | Performed by: OPHTHALMOLOGY

## 2019-02-14 PROCEDURE — 99999 PR PBB SHADOW E&M-EST. PATIENT-LVL III: ICD-10-PCS | Mod: PBBFAC,,, | Performed by: OPHTHALMOLOGY

## 2019-02-14 RX ORDER — BIOTIN 1 MG
1000 TABLET ORAL DAILY
COMMUNITY

## 2019-02-14 NOTE — PROGRESS NOTES
HPI     5 week dilate OS   DLS- 01/10/2019 Dr. White     Pt sts no change in va still having pressure / headache pain behind left eye and also seeing the lights still and film over vision in OS.   (?)Floaters  (+)Photophobia  (+)Glare    No gtts       A/P    1. Hemorrhagic PVD OS  Heme likely from ONH  No breaks on SD exam    RD precautions    2. Early NS         3 months

## 2019-03-04 DIAGNOSIS — I10 ESSENTIAL HYPERTENSION: ICD-10-CM

## 2019-03-04 RX ORDER — LOSARTAN POTASSIUM 50 MG/1
TABLET ORAL
Qty: 90 TABLET | Refills: 0 | Status: SHIPPED | OUTPATIENT
Start: 2019-03-04 | End: 2019-08-02 | Stop reason: SDUPTHER

## 2019-03-06 RX ORDER — METOPROLOL SUCCINATE 25 MG/1
TABLET, EXTENDED RELEASE ORAL
Qty: 90 TABLET | Refills: 1 | Status: SHIPPED | OUTPATIENT
Start: 2019-03-06 | End: 2019-08-02 | Stop reason: SDUPTHER

## 2019-03-07 ENCOUNTER — PATIENT MESSAGE (OUTPATIENT)
Dept: INTERNAL MEDICINE | Facility: CLINIC | Age: 64
End: 2019-03-07

## 2019-05-08 ENCOUNTER — PATIENT MESSAGE (OUTPATIENT)
Dept: INTERNAL MEDICINE | Facility: CLINIC | Age: 64
End: 2019-05-08

## 2019-05-08 DIAGNOSIS — I10 ESSENTIAL HYPERTENSION: ICD-10-CM

## 2019-05-08 RX ORDER — AMLODIPINE BESYLATE 5 MG/1
TABLET ORAL
Qty: 90 TABLET | Refills: 3 | Status: SHIPPED | OUTPATIENT
Start: 2019-05-08 | End: 2020-03-30

## 2019-05-08 RX ORDER — AMLODIPINE BESYLATE 5 MG/1
5 TABLET ORAL DAILY
Qty: 90 TABLET | Refills: 3 | Status: CANCELLED | OUTPATIENT
Start: 2019-05-08

## 2019-05-31 RX ORDER — CYANOCOBALAMIN 1000 UG/ML
INJECTION, SOLUTION INTRAMUSCULAR; SUBCUTANEOUS
Qty: 1 ML | Refills: 3 | Status: SHIPPED | OUTPATIENT
Start: 2019-05-31 | End: 2019-10-01 | Stop reason: SDUPTHER

## 2019-05-31 RX ORDER — CYANOCOBALAMIN 1000 UG/ML
INJECTION, SOLUTION INTRAMUSCULAR; SUBCUTANEOUS
Qty: 1 ML | Refills: 4 | Status: SHIPPED | OUTPATIENT
Start: 2019-05-31 | End: 2019-05-31 | Stop reason: SDUPTHER

## 2019-07-23 ENCOUNTER — TELEPHONE (OUTPATIENT)
Dept: INTERNAL MEDICINE | Facility: CLINIC | Age: 64
End: 2019-07-23

## 2019-07-23 NOTE — TELEPHONE ENCOUNTER
----- Message from Marli Polk sent at 7/23/2019 12:44 PM CDT -----  Contact: self/ [phone]  Caller is requesting a sooner appointment. Caller declined first available appointment listed below. Caller will not accept being placed on the wait list and is requesting a message be sent to the provider.    When is the next available appointment:  9/2/19  Did you offer to schedule the next available appt and put the patient on the wait list?: yes    What visit type: ep  Symptoms:  Cough, chesty, low grade fever, feel run down  Patient preference of timeframe to be scheduled:    What is the reason the patient is requesting a sooner appointment? (insurance terminating, changing jobs):    Would the patient rather a call back or a response via [facility]?:    Comments:  Patient missed work the last two days, pleased call and advise.

## 2019-07-23 NOTE — TELEPHONE ENCOUNTER
Spoke with pt; scheduled to see dr simpson tomorrow morning; advised pt if chest pain gets worse to report to ER or urgent care.

## 2019-07-24 ENCOUNTER — OFFICE VISIT (OUTPATIENT)
Dept: INTERNAL MEDICINE | Facility: CLINIC | Age: 64
End: 2019-07-24
Payer: COMMERCIAL

## 2019-07-24 VITALS
TEMPERATURE: 98 F | RESPIRATION RATE: 18 BRPM | HEIGHT: 60 IN | SYSTOLIC BLOOD PRESSURE: 116 MMHG | HEART RATE: 78 BPM | BODY MASS INDEX: 21.17 KG/M2 | DIASTOLIC BLOOD PRESSURE: 72 MMHG | WEIGHT: 107.81 LBS

## 2019-07-24 DIAGNOSIS — I10 HTN, GOAL BELOW 130/80: ICD-10-CM

## 2019-07-24 DIAGNOSIS — J20.9 ACUTE BRONCHITIS, UNSPECIFIED ORGANISM: ICD-10-CM

## 2019-07-24 DIAGNOSIS — B97.89 VIRAL SINUSITIS: Primary | ICD-10-CM

## 2019-07-24 DIAGNOSIS — J32.9 VIRAL SINUSITIS: Primary | ICD-10-CM

## 2019-07-24 PROCEDURE — 3078F PR MOST RECENT DIASTOLIC BLOOD PRESSURE < 80 MM HG: ICD-10-PCS | Mod: CPTII,S$GLB,, | Performed by: INTERNAL MEDICINE

## 2019-07-24 PROCEDURE — 99214 OFFICE O/P EST MOD 30 MIN: CPT | Mod: 25,S$GLB,, | Performed by: INTERNAL MEDICINE

## 2019-07-24 PROCEDURE — 96372 THER/PROPH/DIAG INJ SC/IM: CPT | Mod: S$GLB,,, | Performed by: INTERNAL MEDICINE

## 2019-07-24 PROCEDURE — 3074F SYST BP LT 130 MM HG: CPT | Mod: CPTII,S$GLB,, | Performed by: INTERNAL MEDICINE

## 2019-07-24 PROCEDURE — 99214 PR OFFICE/OUTPT VISIT, EST, LEVL IV, 30-39 MIN: ICD-10-PCS | Mod: 25,S$GLB,, | Performed by: INTERNAL MEDICINE

## 2019-07-24 PROCEDURE — 3078F DIAST BP <80 MM HG: CPT | Mod: CPTII,S$GLB,, | Performed by: INTERNAL MEDICINE

## 2019-07-24 PROCEDURE — 99999 PR PBB SHADOW E&M-EST. PATIENT-LVL III: ICD-10-PCS | Mod: PBBFAC,,, | Performed by: INTERNAL MEDICINE

## 2019-07-24 PROCEDURE — 96372 PR INJECTION,THERAP/PROPH/DIAG2ST, IM OR SUBCUT: ICD-10-PCS | Mod: S$GLB,,, | Performed by: INTERNAL MEDICINE

## 2019-07-24 PROCEDURE — 3008F PR BODY MASS INDEX (BMI) DOCUMENTED: ICD-10-PCS | Mod: CPTII,S$GLB,, | Performed by: INTERNAL MEDICINE

## 2019-07-24 PROCEDURE — 99999 PR PBB SHADOW E&M-EST. PATIENT-LVL III: CPT | Mod: PBBFAC,,, | Performed by: INTERNAL MEDICINE

## 2019-07-24 PROCEDURE — 3008F BODY MASS INDEX DOCD: CPT | Mod: CPTII,S$GLB,, | Performed by: INTERNAL MEDICINE

## 2019-07-24 PROCEDURE — 3074F PR MOST RECENT SYSTOLIC BLOOD PRESSURE < 130 MM HG: ICD-10-PCS | Mod: CPTII,S$GLB,, | Performed by: INTERNAL MEDICINE

## 2019-07-24 RX ORDER — TRIAMCINOLONE ACETONIDE 40 MG/ML
40 INJECTION, SUSPENSION INTRA-ARTICULAR; INTRAMUSCULAR ONCE
Status: COMPLETED | OUTPATIENT
Start: 2019-07-24 | End: 2019-07-24

## 2019-07-24 RX ORDER — LEVOCETIRIZINE DIHYDROCHLORIDE 5 MG/1
5 TABLET, FILM COATED ORAL NIGHTLY
Qty: 30 TABLET | Refills: 11 | Status: SHIPPED | OUTPATIENT
Start: 2019-07-24 | End: 2019-10-01

## 2019-07-24 RX ORDER — FLUTICASONE PROPIONATE 50 MCG
2 SPRAY, SUSPENSION (ML) NASAL DAILY
Qty: 16 G | Refills: 12 | Status: SHIPPED | OUTPATIENT
Start: 2019-07-24 | End: 2019-08-23

## 2019-07-24 RX ORDER — PROMETHAZINE HYDROCHLORIDE AND DEXTROMETHORPHAN HYDROBROMIDE 6.25; 15 MG/5ML; MG/5ML
5 SYRUP ORAL 3 TIMES DAILY PRN
Qty: 240 ML | Refills: 0 | Status: SHIPPED | OUTPATIENT
Start: 2019-07-24 | End: 2019-08-03

## 2019-07-24 RX ADMIN — TRIAMCINOLONE ACETONIDE 40 MG: 40 INJECTION, SUSPENSION INTRA-ARTICULAR; INTRAMUSCULAR at 10:07

## 2019-07-24 NOTE — PROGRESS NOTES
Subjective:       Patient ID: Penny Cervantes is a 63 y.o. female.    Chief Complaint: Cough; balance issues; Chest Pain; and Knee Pain (right- yesterday-lasted for 5 mins, no pain this am)    HPI   Pt with HTN is here for evaluation of 5 days of persistent sinus/chest congestion, dry cough, post nasal drip, body aches, fatigue. Minimal relief with Tylenol.    Review of Systems   Constitutional: Positive for fatigue. Negative for activity change, appetite change, chills, diaphoresis, fever and unexpected weight change.   HENT: Positive for congestion, postnasal drip, rhinorrhea, sinus pressure and sinus pain. Negative for sneezing, sore throat, trouble swallowing and voice change.    Respiratory: Positive for cough. Negative for shortness of breath and wheezing.    Cardiovascular: Negative for chest pain, palpitations and leg swelling.   Gastrointestinal: Negative for abdominal pain, blood in stool, constipation, diarrhea, nausea and vomiting.   Genitourinary: Negative for dysuria.   Musculoskeletal: Positive for arthralgias and myalgias.   Skin: Negative for rash and wound.   Allergic/Immunologic: Negative for environmental allergies and food allergies.   Hematological: Negative for adenopathy. Does not bruise/bleed easily.       Objective:      Physical Exam   Constitutional: She is oriented to person, place, and time. She appears well-developed and well-nourished. No distress.   HENT:   Head: Normocephalic and atraumatic.   Right Ear: External ear normal.   Left Ear: External ear normal.   Nose: Mucosal edema and rhinorrhea present.   Mouth/Throat: Oropharynx is clear and moist. No oropharyngeal exudate.   Eyes: Pupils are equal, round, and reactive to light. Conjunctivae and EOM are normal. Right eye exhibits no discharge. Left eye exhibits no discharge. No scleral icterus.   Neck: Neck supple. No JVD present.   Cardiovascular: Normal rate, regular rhythm, normal heart sounds and intact distal pulses.    Pulmonary/Chest: Effort normal and breath sounds normal. No respiratory distress. She has no wheezes. She has no rales.   Abdominal: Soft. Bowel sounds are normal. There is no tenderness.   Musculoskeletal: She exhibits no edema.   Lymphadenopathy:     She has no cervical adenopathy.   Neurological: She is alert and oriented to person, place, and time.   Skin: Skin is warm and dry. No rash noted. She is not diaphoretic. No pallor.       Assessment:       1. Viral sinusitis    2. Acute bronchitis, unspecified organism    3. HTN, goal below 130/80        Plan:    1. Rx Xyzal/Flonase, Kenalog 40 mg IM       No decongestants 2/2 HTN   2. Rx Promethazine DM PRN   3. Stable, CPT

## 2019-08-02 DIAGNOSIS — I10 ESSENTIAL HYPERTENSION: ICD-10-CM

## 2019-08-02 RX ORDER — METOPROLOL SUCCINATE 25 MG/1
TABLET, EXTENDED RELEASE ORAL
Qty: 90 TABLET | Refills: 0 | Status: SHIPPED | OUTPATIENT
Start: 2019-08-02 | End: 2019-10-01 | Stop reason: SDUPTHER

## 2019-08-02 RX ORDER — LOSARTAN POTASSIUM 50 MG/1
TABLET ORAL
Qty: 90 TABLET | Refills: 0 | Status: SHIPPED | OUTPATIENT
Start: 2019-08-02 | End: 2019-10-01 | Stop reason: SDUPTHER

## 2019-08-21 ENCOUNTER — PATIENT MESSAGE (OUTPATIENT)
Dept: INTERNAL MEDICINE | Facility: CLINIC | Age: 64
End: 2019-08-21

## 2019-08-21 DIAGNOSIS — Z87.898 HX OF ABNORMAL MAMMOGRAM: ICD-10-CM

## 2019-08-21 DIAGNOSIS — Z85.3 HISTORY OF BREAST CANCER: Primary | ICD-10-CM

## 2019-08-21 NOTE — TELEPHONE ENCOUNTER
Message sent to [facility] Coord to call pt to schedule at [facility]

## 2019-08-23 ENCOUNTER — PATIENT OUTREACH (OUTPATIENT)
Dept: ADMINISTRATIVE | Facility: OTHER | Age: 64
End: 2019-08-23

## 2019-08-27 ENCOUNTER — CLINICAL SUPPORT (OUTPATIENT)
Dept: OTOLARYNGOLOGY | Facility: CLINIC | Age: 64
End: 2019-08-27
Payer: COMMERCIAL

## 2019-08-27 ENCOUNTER — OFFICE VISIT (OUTPATIENT)
Dept: OTOLARYNGOLOGY | Facility: CLINIC | Age: 64
End: 2019-08-27
Payer: COMMERCIAL

## 2019-08-27 VITALS
BODY MASS INDEX: 21.2 KG/M2 | WEIGHT: 108 LBS | HEIGHT: 60 IN | TEMPERATURE: 98 F | HEART RATE: 62 BPM | DIASTOLIC BLOOD PRESSURE: 71 MMHG | SYSTOLIC BLOOD PRESSURE: 117 MMHG

## 2019-08-27 DIAGNOSIS — H90.3 SENSORINEURAL HEARING LOSS (SNHL), BILATERAL: ICD-10-CM

## 2019-08-27 DIAGNOSIS — R42 VERTIGO: Primary | ICD-10-CM

## 2019-08-27 DIAGNOSIS — H90.3 SENSORINEURAL HEARING LOSS, BILATERAL: ICD-10-CM

## 2019-08-27 PROCEDURE — 99999 PR PBB SHADOW E&M-EST. PATIENT-LVL IV: ICD-10-PCS | Mod: PBBFAC,,, | Performed by: OTOLARYNGOLOGY

## 2019-08-27 PROCEDURE — 92567 TYMPANOMETRY: CPT | Mod: S$GLB,,, | Performed by: AUDIOLOGIST-HEARING AID FITTER

## 2019-08-27 PROCEDURE — 99203 PR OFFICE/OUTPT VISIT, NEW, LEVL III, 30-44 MIN: ICD-10-PCS | Mod: S$GLB,,, | Performed by: OTOLARYNGOLOGY

## 2019-08-27 PROCEDURE — 99999 PR PBB SHADOW E&M-EST. PATIENT-LVL I: CPT | Mod: PBBFAC,,, | Performed by: AUDIOLOGIST-HEARING AID FITTER

## 2019-08-27 PROCEDURE — 92557 COMPREHENSIVE HEARING TEST: CPT | Mod: S$GLB,,, | Performed by: AUDIOLOGIST-HEARING AID FITTER

## 2019-08-27 PROCEDURE — 3008F BODY MASS INDEX DOCD: CPT | Mod: CPTII,S$GLB,, | Performed by: OTOLARYNGOLOGY

## 2019-08-27 PROCEDURE — 3008F PR BODY MASS INDEX (BMI) DOCUMENTED: ICD-10-PCS | Mod: CPTII,S$GLB,, | Performed by: OTOLARYNGOLOGY

## 2019-08-27 PROCEDURE — 3078F DIAST BP <80 MM HG: CPT | Mod: CPTII,S$GLB,, | Performed by: OTOLARYNGOLOGY

## 2019-08-27 PROCEDURE — 3074F SYST BP LT 130 MM HG: CPT | Mod: CPTII,S$GLB,, | Performed by: OTOLARYNGOLOGY

## 2019-08-27 PROCEDURE — 99999 PR PBB SHADOW E&M-EST. PATIENT-LVL I: ICD-10-PCS | Mod: PBBFAC,,, | Performed by: AUDIOLOGIST-HEARING AID FITTER

## 2019-08-27 PROCEDURE — 3074F PR MOST RECENT SYSTOLIC BLOOD PRESSURE < 130 MM HG: ICD-10-PCS | Mod: CPTII,S$GLB,, | Performed by: OTOLARYNGOLOGY

## 2019-08-27 PROCEDURE — 92567 PR TYMPA2METRY: ICD-10-PCS | Mod: S$GLB,,, | Performed by: AUDIOLOGIST-HEARING AID FITTER

## 2019-08-27 PROCEDURE — 99999 PR PBB SHADOW E&M-EST. PATIENT-LVL IV: CPT | Mod: PBBFAC,,, | Performed by: OTOLARYNGOLOGY

## 2019-08-27 PROCEDURE — 92557 PR COMPREHENSIVE HEARING TEST: ICD-10-PCS | Mod: S$GLB,,, | Performed by: AUDIOLOGIST-HEARING AID FITTER

## 2019-08-27 PROCEDURE — 3078F PR MOST RECENT DIASTOLIC BLOOD PRESSURE < 80 MM HG: ICD-10-PCS | Mod: CPTII,S$GLB,, | Performed by: OTOLARYNGOLOGY

## 2019-08-27 PROCEDURE — 99203 OFFICE O/P NEW LOW 30 MIN: CPT | Mod: S$GLB,,, | Performed by: OTOLARYNGOLOGY

## 2019-08-27 RX ORDER — DIAZEPAM 2 MG/1
2 TABLET ORAL
Qty: 1 TABLET | Refills: 0 | Status: SHIPPED | OUTPATIENT
Start: 2019-08-27 | End: 2019-10-01

## 2019-08-27 NOTE — PROGRESS NOTES
Woo Roberts, CCC-A  [facility] [address]  [address]  [place] [address]      Patient: Penny Cervantes   MRN: [id number]  : 1955  MURILLO: 2019       AUDIOLOGICAL EVALUATION    CASE HISTORY:    Penny Cervantes, 63 y.o., was seen on the above date for an audiological evaluation. Patient reported episodes of dizziness that lasted a month. She reported that these symptoms have occurred before nearly 2 years ago. At that time she reportedly went to an ENT for an audiogram and VNG. She also went to vestibular therapy. She remembers doing at home exercises as well. She c/o dizziness described as having a heavy head like being under the influence, blurred vision and her symptoms are exacerbated when she bends her head down. No further history significant to hearing loss was reported.    TEST RESULTS:    Otoscopy was unremarkable for both ears.   Imittance testing revealed hypercompliant TMs bilaterally.   Speech reception thresholds (SRT) were obtained at 30 dB HL bilaterally, which was consistent with pure tone results.  Word recognitions scores of 100% were obtained in both ears using a presentation level of 70 dB HL.    IMPRESSION:   Audiological testing indicated that Penny Cervantes has a mild SNHL from 250 to 2K Hz.      RECOMMENDATIONS:   It is recommended that she:  Continue to receive audiological monitoring annually.  Follow up medically with a physician to assess her dizziness.     If you should have any questions or concerns regarding the above information, please do not hesitate to contact me at [phone].      _______________________________  Lela Roberts, CCC-A  Clinical Audiologist    enclosure: audiogram

## 2019-08-27 NOTE — PATIENT INSTRUCTIONS
[facility] [address]  [place] [address]  [phone]    Your physician may determine a need for one or more tests of your balance system at the time of your visit. One test is called a videonystagmogram (VNG), the other is called Posturography.  We ask that you prepare in the event that either of these tests are ordered.  The tests are simple and painless and take about 90 minutes (combined).  Please dress comfortably.  Certain medications will affect the results of theses tests.  In order to prevent this from happening, it will be necessary for you to follow these instructions:    1. DO NOT  take any of the following medications for at least 24 hours prior to the test:  a. Sleeping pills (Seconal, Donnatol, Dalmane, etc.)  b. Tranquilizers (Librium, Valium, Equanil, etc.)  c. Anti-histamines or over-the-counter cold medications  d. Anti-dizzy medications (Antivert, Dramamine, etc.)  e. Muscle relaxants    Seizure medications (Dilantin, Phenobartibal, etc.) interfere with the accuracy of the test.  They should be stopped BUT ONLY AFTER CLEARANCE FROM THE PHYSICIAN WHO PRESCRIBED THESE MEDICATIONS.    Heart or blood pressure medications ARE allowed.    2. DO NOT  eat or drink anything other than small sips of water for 4 hours prior to the VNG.    3. DO NOT drink alcoholic beverages for 24 hours prior to the test.      4. DO NOT wear makeup, especially mascara or eye liner for the VNG test.    5. Ladies who are scheduled for Posturography should wear slacks rather than skirts or dresses.    6. If you are diabetic, please inform the  when booking your appointment.  Please schedule a time that would make fasting for 4 hours most convenient to your medication routine.  If you have any concerns, check with your primary care physician.    Testing is scheduled at [facility] at [address] [place] [address]. After the physician orders your balance testing, a  representative from the main campus will call you to schedule your appointment. If you need to cancel or re-schedule, please call them at [phone].    If you have any questions, please call [phone] to reach us at [facility] in [place].        Vestibular Rehabilitation Therapy          TARGETS    Purpose of Activity: This activity will help you keep your vision stable with large head movements. This type of movement is often needed when changing lanes while driving.     1. While seated in a comfortable chair, find three objects in the room that are at eye level. One of the objects should be to your far left, one should be in front of you, and one should be on your far right.                        2. Move your head to the left target, then the center target, then the right target, then center, then left. This completes one cycle.   3. Repetitions: Repeat 30 to 40 cycles without stopping at each target.  4. Then repeat 30 to 40 cycles, but stop for one second at each target.  Do once per day, preferably at night before bedtime.             HORIZONTAL HEAD MOVEMENTS    Purpose of Activity: This activity will help you keep your vision stable with head movements. This is similar to watching for a break in traffic.   1. Sit in a comfortable chair with your feet flat on the floor and your hands on your thighs.  2. Keeping your trunk still, quickly turn your head and look to the right, then turn your head and look to the left without stopping in the center, and then look to the center and focus on an object for five seconds. This completes one cycle.   3. For best results, briefly focus your eyes on an object or target to both right and left directions.   4. Repetitions: Repeat 30 to 40 times.  Do once per day, preferably at night before bedtime.   HEAD CIRCLES    Purpose of Activity: This activity will help you keep your vision stable with smaller head movements. This would be similar to standing in  an aisle of a store and looking for an item on the shelves.   1. Sit in a comfortable chair and move your head in a circular motion with your eyes open. Let your eyes lead the way. Each full [place] completes one cycle.  2. Repeat step one with your eyes closed.  3. Repetitions: Repeat 30 to 40 times in the clockwise direction.  4. Repeat 30 to 40 times in the counter clockwise direction.  Do once per day, preferably at night before bedtime.   FOCUSING WITH HEAD TURNS    Purpose of Activity: This activity will help you stabilize your gaze with quick, short head movements. This type of movement is used while driving.   1. Sit in a comfortable chair and bring your index finger or a playing card approximately 10 inches in front of your nose.  2. Focus on your finger or the card while turning your head from side to side. Try not to let the object blur.  3. Gradually increase the speed of the head turns.  4. Repetitions: Repeat 30 to 40 times.   Do once per day, preferably at night before bedtime.   GAIT WITH HEAD MOVEMENT    Purpose of Activity: This activity will help you build stable head movements while walking. This type of movement occurs when walking down the aisle of a grocery store searching for an item.   1. Begin walking at your normal speed. Walk near a wall so that you can reach out to steady yourself if necessary. A hallway is an excellent place for this activity in the beginning.  2. After three steps, turn your head and look to the right while continuing to walk straight ahead.  3. After three more steps, turn your head and look to the left while continuing to walk straight ahead.  4. To increase the difficulty of this task, go from a solid floor to a carpeted floor, or walk outdoors on an uneven surface. Thick lawns usually are the most difficult surface.  5. Repetitions: Repeat 30 to 40 times.   Do once per day, preferably at night before bedtime.

## 2019-08-27 NOTE — PROGRESS NOTES
Chief Complaint   Patient presents with    Dizziness   .     HPI: Mrs. Cervantes is a 63 year old female who presents for evaluation of dizziness. The symptoms started about a month ago and have been intermittent and been daily. The attacks occur intermittently and last a few seconds. Positions that worsen symptoms: bending over and lying down.  Associated ear symptoms: none. Associated CNS symptoms: none. Recent infections: none. Head trauma: denied. Drug ingestion prior to onset: none. Noise exposure: no occupational exposure.Previous workup: none. Previous treatment includes: meclizine  Response to this treatment has been fair.  She relates that she has been a prior patient of Dr. LIZBETH Velasco(ENT) and has seen him in the past for vertigo.  She reports approximately a year ago she had similar symptoms and underwent a VNG.  She is unsure of the results of the VNG.  Approximately 1 year ago she reports that she had a TIA and underwent MRI of the brain. She reports pressure sensation in her left eye similar to when she had a vitreous hemorrhage in the past.      Past Medical History:   Diagnosis Date    Benign positional vertigo     Breast cancer 2009    s/p chemo XRT, left breast triple negative    Coronary artery disease, non-occlusive     cath in 2012    Hypertension     Interstitial cystitis     Osteopenia     Squamous cell carcinoma     Stroke 7/10/2018     Social History     Socioeconomic History    Marital status:      Spouse name: Not on file    Number of children: Not on file    Years of education: Not on file    Highest education level: Not on file   Occupational History    Not on file   Social Needs    Financial resource strain: Not on file    Food insecurity:     Worry: Not on file     Inability: Not on file    Transportation needs:     Medical: Not on file     Non-medical: Not on file   Tobacco Use    Smoking status: Never Smoker    Smokeless tobacco: Never Used   Substance and  Sexual Activity    Alcohol use: Yes     Comment: 2-3 per month    Drug use: No    Sexual activity: Never   Lifestyle    Physical activity:     Days per week: Not on file     Minutes per session: Not on file    Stress: Not on file   Relationships    Social connections:     Talks on phone: Not on file     Gets together: Not on file     Attends Taoist service: Not on file     Active member of club or organization: Not on file     Attends meetings of clubs or organizations: Not on file     Relationship status: Not on file   Other Topics Concern    Not on file   Social History Narrative    Not on file     Past Surgical History:   Procedure Laterality Date    BONE RESECTION, RIB      right side    BREAST BIOPSY Right 2005    BREAST CYST ASPIRATION      BREAST LUMPECTOMY Left 2009    BREAST SURGERY      HERNIA REPAIR      HYSTERECTOMY       Family History   Problem Relation Age of Onset    Heart disease Mother     Heart disease Father     Heart disease Sister 55    Heart disease Brother 43    Hypertension Sister     Ovarian cancer Sister            Review of Systems  General: negative for chills, fever or weight loss  Psychological: negative for mood changes or depression  Ophthalmic: negative for blurry vision, photophobia or eye pain  ENT: see HPI  Respiratory: no cough, shortness of breath, or wheezing  Cardiovascular: no chest pain or dyspnea on exertion  Gastrointestinal: no abdominal pain, change in bowel habits, or black/ bloody stools  Musculoskeletal: negative for gait disturbance or muscular weakness  Neurological: no syncope or seizures; no ataxia  Dermatological: negative for puritis,  rash and jaundice  Hematologic/lymphatic: no easy bruising, no new lumps or bumps      Physical Exam:    Vitals:    08/27/19 1347   BP: 117/71   Pulse: 62   Temp: 98.2 °F (36.8 °C)       Constitutional: Well appearing / communicating without difficutly.  NAD.  Eyes: EOM I Bilaterally  Head/Face:  Normocephalic.  Negative paranasal sinus pressure/tenderness.  Salivary glands WNL.  House Brackmann I Bilaterally.    Right Ear: Auricle normal appearance. External Auditory Canal within normal limits no lesions or masses,TM w/o masses/lesions/perforations. TM mobility noted.   Left Ear: Auricle normal appearance. External Auditory Canal within normal limits no lesions or masses,TM w/o masses/lesions/perforations. TM mobility noted.  Vestibular exam: negativehead thrust; could not complete  Fakuda step test; Positve Romberg; negative Right Dixx- Hallpike; negative left Dixx- Hallpike  Nose: No gross nasal septal deviation. Inferior Turbinates 3+ bilaterally. No septal perforation. No masses/lesions. External nasal skin appears normal without masses/lesions.  Oral Cavity: Gingiva/lips within normal limits.  Dentition/gingiva healthy appearing. Mucus membranes moist. Floor of mouth soft, no masses palpated. Oral Tongue mobile. Hard Palate appears normal.    Oropharynx: Base of tongue appears normal. No masses/lesions noted. Tonsillar fossa/pharyngeal wall without lesions. Posterior oropharynx WNL.  Soft palate without masses. Midline uvula.   Neck/Lymphatic: No LAD I-VI bilaterally.  No thyromegaly.  No masses noted on exam.    Mirror laryngoscopy/nasopharyngoscopy: Active gag reflex.  Unable to perform.    Neuro/Psychiatric: AOx3.  Normal mood and affect.   Cardiovascular: Normal carotid pulses bilaterally, no increasing jugular venous distention noted at cervical region bilaterally.    Respiratory: Normal respiratory effort, no stridor, no retractions noted.      Diagnostic studies reviewed:  Audiogram reviewed personally by myself and in detail with the patient today.       MRI brain 7/11/2018: 1. No acute intracranial processes.  2. Findings suspicious for gray matter at toe brooke along the body of the left lateral ventricle.        Assessment:    ICD-10-CM ICD-9-CM    1. Vertigo R42 780.4 MRI IAC/Temporal Bones W  W/O Contrast      Basic vestibular evaluation   2. Sensorineural hearing loss (SNHL), bilateral H90.3 389.18      The primary encounter diagnosis was Vertigo. A diagnosis of Sensorineural hearing loss (SNHL), bilateral was also pertinent to this visit.      Plan:  Orders Placed This Encounter   Procedures    MRI IAC/Temporal Bones W W/O Contrast    Basic vestibular evaluation         I had a long discussion with the patient regarding their symptoms.  Dizziness, vertigo and disequilibrium are common symptoms reported by adults during visits to their doctors. They are all symptoms that can result from a peripheral vestibular disorder (a dysfunction of the balance organs of the inner ear) or central vestibular disorder (a dysfunction of one or more parts of the central nervous system that help process balance and spatial information).  There are also non-vestibular causes of dizziness, and dizziness can be linked to a wide array of problems such as blood-flow irregularities from cardiovascular problems and blood pressure fluctuations.  I would recommend a VNG and MRI IAC for further diagnostic testing, and will contact the patient with further recommendations when that is complete.     Obtain previous records from Dr. Wilfrid Velasco(ENT).     Lara Butt MD

## 2019-08-28 ENCOUNTER — PATIENT OUTREACH (OUTPATIENT)
Dept: ADMINISTRATIVE | Facility: HOSPITAL | Age: 64
End: 2019-08-28

## 2019-08-28 ENCOUNTER — PATIENT MESSAGE (OUTPATIENT)
Dept: ADMINISTRATIVE | Facility: HOSPITAL | Age: 64
End: 2019-08-28

## 2019-08-28 ENCOUNTER — TELEPHONE (OUTPATIENT)
Dept: OTOLARYNGOLOGY | Facility: CLINIC | Age: 64
End: 2019-08-28

## 2019-08-28 NOTE — TELEPHONE ENCOUNTER
Spoke with patient she explained to me that is was going to cost her $1,000.00 to have her MRI.  stated she is already has bad debt with [facility],and on a payment plan through the . I gave her the [employer]ric[employer] office number so they can tell her how much the MRI and VNG test would cost. Patient will call me back and let me know if she was going to cancel her test. Patient voice understanding.

## 2019-08-28 NOTE — TELEPHONE ENCOUNTER
----- Message from Emani Martino sent at 8/28/2019 10:29 AM CDT -----  Contact: self / [phone]  Patient is requesting a call back regarding, her test. Please advise

## 2019-09-04 ENCOUNTER — TELEPHONE (OUTPATIENT)
Dept: OTOLARYNGOLOGY | Facility: CLINIC | Age: 64
End: 2019-09-04

## 2019-09-04 NOTE — TELEPHONE ENCOUNTER
Spoke with patient she don't want to have the Ct Scan done for vertigo. Patient states she is feeling better. Patient will call back if need to be seen. Patient voice understanding.

## 2019-09-06 ENCOUNTER — PATIENT MESSAGE (OUTPATIENT)
Dept: OTOLARYNGOLOGY | Facility: CLINIC | Age: 64
End: 2019-09-06

## 2019-09-11 ENCOUNTER — LAB VISIT (OUTPATIENT)
Dept: LAB | Facility: HOSPITAL | Age: 64
End: 2019-09-11
Attending: INTERNAL MEDICINE
Payer: COMMERCIAL

## 2019-09-11 DIAGNOSIS — Z12.11 COLON CANCER SCREENING: ICD-10-CM

## 2019-09-11 PROCEDURE — 82274 ASSAY TEST FOR BLOOD FECAL: CPT

## 2019-09-19 LAB — HEMOCCULT STL QL IA: NEGATIVE

## 2019-09-23 ENCOUNTER — HOSPITAL ENCOUNTER (OUTPATIENT)
Dept: RADIOLOGY | Facility: HOSPITAL | Age: 64
Discharge: HOME | End: 2019-09-23
Attending: INTERNAL MEDICINE
Payer: COMMERCIAL

## 2019-09-23 VITALS — BODY MASS INDEX: 21.2 KG/M2 | WEIGHT: 108 LBS | HEIGHT: 60 IN

## 2019-09-23 DIAGNOSIS — Z85.3 HISTORY OF BREAST CANCER: ICD-10-CM

## 2019-09-23 DIAGNOSIS — Z87.898 HX OF ABNORMAL MAMMOGRAM: ICD-10-CM

## 2019-09-23 PROCEDURE — 77062 BREAST TOMOSYNTHESIS BI: CPT | Mod: 26,,, | Performed by: RADIOLOGY

## 2019-09-23 PROCEDURE — 77066 MAMMO DIGITAL DIAGNOSTIC BILAT WITH TOMOSYNTHESIS_CAD: ICD-10-PCS | Mod: 26,,, | Performed by: RADIOLOGY

## 2019-09-23 PROCEDURE — 77062 BREAST TOMOSYNTHESIS BI: CPT | Mod: TC,PO

## 2019-09-23 PROCEDURE — 77066 DX MAMMO INCL CAD BI: CPT | Mod: 26,,, | Performed by: RADIOLOGY

## 2019-09-23 PROCEDURE — 77062 MAMMO DIGITAL DIAGNOSTIC BILAT WITH TOMOSYNTHESIS_CAD: ICD-10-PCS | Mod: 26,,, | Performed by: RADIOLOGY

## 2019-10-01 ENCOUNTER — OFFICE VISIT (OUTPATIENT)
Dept: INTERNAL MEDICINE | Facility: CLINIC | Age: 64
End: 2019-10-01
Payer: COMMERCIAL

## 2019-10-01 VITALS
OXYGEN SATURATION: 97 % | WEIGHT: 108 LBS | BODY MASS INDEX: 21.2 KG/M2 | DIASTOLIC BLOOD PRESSURE: 66 MMHG | HEIGHT: 60 IN | SYSTOLIC BLOOD PRESSURE: 120 MMHG

## 2019-10-01 DIAGNOSIS — R42 DIZZINESS: ICD-10-CM

## 2019-10-01 DIAGNOSIS — M85.89 OSTEOPENIA OF MULTIPLE SITES: ICD-10-CM

## 2019-10-01 DIAGNOSIS — Z85.3 HISTORY OF BREAST CANCER: ICD-10-CM

## 2019-10-01 DIAGNOSIS — N30.10 INTERSTITIAL CYSTITIS: ICD-10-CM

## 2019-10-01 DIAGNOSIS — I10 ESSENTIAL HYPERTENSION: ICD-10-CM

## 2019-10-01 DIAGNOSIS — Z00.00 ANNUAL PHYSICAL EXAM: Primary | ICD-10-CM

## 2019-10-01 DIAGNOSIS — E53.8 B12 DEFICIENCY: ICD-10-CM

## 2019-10-01 PROBLEM — H43.812 SYMPTOMATIC POSTERIOR VITREOUS DETACHMENT OF LEFT EYE: Status: RESOLVED | Noted: 2018-12-24 | Resolved: 2019-10-01

## 2019-10-01 PROBLEM — H43.12 VITREOUS HEMORRHAGE, LEFT: Status: RESOLVED | Noted: 2019-01-10 | Resolved: 2019-10-01

## 2019-10-01 PROCEDURE — 99999 PR PBB SHADOW E&M-EST. PATIENT-LVL V: CPT | Mod: PBBFAC,,, | Performed by: INTERNAL MEDICINE

## 2019-10-01 PROCEDURE — 3074F SYST BP LT 130 MM HG: CPT | Mod: CPTII,S$GLB,, | Performed by: INTERNAL MEDICINE

## 2019-10-01 PROCEDURE — 3078F DIAST BP <80 MM HG: CPT | Mod: CPTII,S$GLB,, | Performed by: INTERNAL MEDICINE

## 2019-10-01 PROCEDURE — 90686 IIV4 VACC NO PRSV 0.5 ML IM: CPT | Mod: S$GLB,,, | Performed by: INTERNAL MEDICINE

## 2019-10-01 PROCEDURE — 99999 PR PBB SHADOW E&M-EST. PATIENT-LVL V: ICD-10-PCS | Mod: PBBFAC,,, | Performed by: INTERNAL MEDICINE

## 2019-10-01 PROCEDURE — 90471 IMMUNIZATION ADMIN: CPT | Mod: S$GLB,,, | Performed by: INTERNAL MEDICINE

## 2019-10-01 PROCEDURE — 3074F PR MOST RECENT SYSTOLIC BLOOD PRESSURE < 130 MM HG: ICD-10-PCS | Mod: CPTII,S$GLB,, | Performed by: INTERNAL MEDICINE

## 2019-10-01 PROCEDURE — 90686 FLU VACCINE (QUAD) GREATER THAN OR EQUAL TO 3YO PRESERVATIVE FREE IM: ICD-10-PCS | Mod: S$GLB,,, | Performed by: INTERNAL MEDICINE

## 2019-10-01 PROCEDURE — 99396 PR PREVENTIVE VISIT,EST,40-64: ICD-10-PCS | Mod: 25,S$GLB,, | Performed by: INTERNAL MEDICINE

## 2019-10-01 PROCEDURE — 99396 PREV VISIT EST AGE 40-64: CPT | Mod: 25,S$GLB,, | Performed by: INTERNAL MEDICINE

## 2019-10-01 PROCEDURE — 3078F PR MOST RECENT DIASTOLIC BLOOD PRESSURE < 80 MM HG: ICD-10-PCS | Mod: CPTII,S$GLB,, | Performed by: INTERNAL MEDICINE

## 2019-10-01 PROCEDURE — 90471 FLU VACCINE (QUAD) GREATER THAN OR EQUAL TO 3YO PRESERVATIVE FREE IM: ICD-10-PCS | Mod: S$GLB,,, | Performed by: INTERNAL MEDICINE

## 2019-10-01 RX ORDER — TRIAMCINOLONE ACETONIDE 1 MG/G
PASTE DENTAL 2 TIMES DAILY
Qty: 5 G | Refills: 5 | Status: SHIPPED | OUTPATIENT
Start: 2019-10-01 | End: 2020-07-28 | Stop reason: SDUPTHER

## 2019-10-01 RX ORDER — CYANOCOBALAMIN 1000 UG/ML
INJECTION, SOLUTION INTRAMUSCULAR; SUBCUTANEOUS
Qty: 1 ML | Refills: 3 | Status: SHIPPED | OUTPATIENT
Start: 2019-10-01 | End: 2020-07-28

## 2019-10-01 RX ORDER — METOPROLOL SUCCINATE 25 MG/1
25 TABLET, EXTENDED RELEASE ORAL DAILY
Qty: 90 TABLET | Refills: 3 | Status: SHIPPED | OUTPATIENT
Start: 2019-10-01 | End: 2020-06-23

## 2019-10-01 RX ORDER — TRIAMCINOLONE ACETONIDE 1 MG/G
PASTE DENTAL 2 TIMES DAILY
Qty: 5 G | Refills: 5 | Status: SHIPPED | OUTPATIENT
Start: 2019-10-01 | End: 2019-10-01 | Stop reason: SDUPTHER

## 2019-10-01 RX ORDER — LOSARTAN POTASSIUM 25 MG/1
25 TABLET ORAL DAILY
Qty: 90 TABLET | Refills: 3 | Status: SHIPPED | OUTPATIENT
Start: 2019-10-01 | End: 2020-12-21

## 2019-10-01 RX ORDER — LOSARTAN POTASSIUM 50 MG/1
25 TABLET ORAL DAILY
Qty: 90 TABLET | Refills: 3 | Status: SHIPPED | OUTPATIENT
Start: 2019-10-01 | End: 2019-10-01 | Stop reason: SDUPTHER

## 2019-10-01 NOTE — PROGRESS NOTES
"Subjective:      Penny Cervantes is a 63 y.o. female who presents for annual exam.    Family History:  family history includes Heart disease in her father and mother; Heart disease (age of onset: 43) in her brother; Heart disease (age of onset: 55) in her sister; Hypertension in her sister; Ovarian cancer in her sister.    Health Maintenance:  Health Maintenance       Date Due Completion Date    TETANUS VACCINE 10/19/1974 ---    Aspirin/Antiplatelet Therapy 10/19/1974 ---    Cologuard 10/19/2006 ---    Shingles Vaccine (2 of 3) 2012 10/25/2012    Influenza Vaccine (1) 2019    Fecal Occult Blood Test (FOBT)/FitKit 2020    Mammogram 2021    Lipid Panel 2023        Eye exam: 2019  Dental Exam: due  S/p hysterectomy  MM2019    Influenza: due  Tetanus: declines  Shingrix needed  FitKit done 2019    Body mass index is 21.1 kg/m².    Meds:   Current Outpatient Medications:     albuterol (PROVENTIL/VENTOLIN HFA) 90 mcg/actuation inhaler, Inhale 2 puffs into the lungs every 6 (six) hours as needed for Wheezing. Rescue, Disp: 18 g, Rfl: 0    amLODIPine (NORVASC) 5 MG tablet, TAKE 1 TABLET BY MOUTH EVERY DAY, Disp: 90 tablet, Rfl: 3    BD LUER-MERCY SYRINGE 3 mL 25 gauge x 1" Syrg, use as directed EVERY 2 WEEKS., Disp: 30 Syringe, Rfl: 0    biotin 1 mg tablet, Take 1,000 mcg by mouth 3 (three) times daily., Disp: , Rfl:     cyanocobalamin 1,000 mcg/mL injection, INJECT 1 MILLILITER INTRAMUSCULARLY EVERY MONTH, Disp: 1 mL, Rfl: 3    ERGOCALCIFEROL, VITAMIN D2, (VITAMIN D ORAL), Take by mouth., Disp: , Rfl:     hydroquinone 4 % Crea, Use hs on dark spots, Disp: 28.35 g, Rfl: 3    losartan (COZAAR) 25 MG tablet, Take 1 tablet (25 mg total) by mouth once daily., Disp: 90 tablet, Rfl: 3    htattt-xwkht-p.blue-sal-naphos (URIMAR-T) 120-0.12-10.8 mg Tab, Take 1 tablet by mouth 4 (four) times daily as needed., Disp: 40 tablet, Rfl: 1    " metoprolol succinate (TOPROL-XL) 25 MG 24 hr tablet, Take 1 tablet (25 mg total) by mouth once daily., Disp: 90 tablet, Rfl: 3    triamcinolone acetonide 0.1% (KENALOG) 0.1 % paste, Place onto teeth 2 (two) times daily., Disp: 5 g, Rfl: 5    PMHx:   Past Medical History:   Diagnosis Date    Benign positional vertigo     Breast cancer 2009    s/p chemo XRT, left breast triple negative    Coronary artery disease, non-occlusive     cath in 2012    Hypertension     Interstitial cystitis     Osteopenia     Squamous cell carcinoma     Stroke 7/10/2018    Symptomatic posterior vitreous detachment of left eye 12/24/2018    Vitreous hemorrhage, left 1/10/2019       PSHx:  Past Surgical History:   Procedure Laterality Date    BONE RESECTION, RIB      right side    BREAST BIOPSY Right 2005    BREAST CYST ASPIRATION      BREAST LUMPECTOMY Left 2009    BREAST SURGERY      HERNIA REPAIR      HYSTERECTOMY         SocHx:   Social History     Socioeconomic History    Marital status:      Spouse name: Not on file    Number of children: Not on file    Years of education: Not on file    Highest education level: Not on file   Occupational History    Not on file   Social Needs    Financial resource strain: Not on file    Food insecurity:     Worry: Not on file     Inability: Not on file    Transportation needs:     Medical: Not on file     Non-medical: Not on file   Tobacco Use    Smoking status: Never Smoker    Smokeless tobacco: Never Used   Substance and Sexual Activity    Alcohol use: Yes     Comment: 2-3 per month    Drug use: No    Sexual activity: Never   Lifestyle    Physical activity:     Days per week: Not on file     Minutes per session: Not on file    Stress: Not on file   Relationships    Social connections:     Talks on phone: Not on file     Gets together: Not on file     Attends Alevism service: Not on file     Active member of club or organization: Not on file     Attends  meetings of clubs or organizations: Not on file     Relationship status: Not on file   Other Topics Concern    Not on file   Social History Narrative    Not on file       Review of Systems   Constitutional: Negative for chills, diaphoresis, fatigue and fever.   HENT: Negative for congestion, dental problem, ear pain, postnasal drip, rhinorrhea, sinus pressure, sore throat and trouble swallowing.    Eyes: Positive for visual disturbance. Negative for discharge and redness.   Respiratory: Negative for cough, chest tightness and shortness of breath.    Cardiovascular: Negative for chest pain, palpitations and leg swelling.   Gastrointestinal: Negative for abdominal pain, constipation, diarrhea, nausea and vomiting.   Endocrine: Negative for cold intolerance and heat intolerance.   Genitourinary: Positive for frequency and urgency. Negative for dysuria and hematuria.   Musculoskeletal: Negative for arthralgias, back pain and myalgias.   Skin: Negative for rash and wound.   Neurological: Negative for dizziness, weakness, numbness and headaches.        Episode of dizziness that started right before a URI a few months ago   Hematological: Negative for adenopathy.   Psychiatric/Behavioral: Negative for dysphoric mood and sleep disturbance. The patient is not nervous/anxious.        Objective:      Physical Exam   Constitutional: She is oriented to person, place, and time. Vital signs are normal. She appears well-developed and well-nourished. No distress.   HENT:   Head: Normocephalic and atraumatic.   Right Ear: Hearing, tympanic membrane, external ear and ear canal normal. Tympanic membrane is not erythematous and not bulging.   Left Ear: Hearing, tympanic membrane, external ear and ear canal normal. Tympanic membrane is not erythematous and not bulging.   Nose: Nose normal.   Mouth/Throat: Uvula is midline, oropharynx is clear and moist and mucous membranes are normal. No oropharyngeal exudate or posterior oropharyngeal  erythema.   Eyes: Pupils are equal, round, and reactive to light. Conjunctivae, EOM and lids are normal. No scleral icterus.   Neck: Normal range of motion. Neck supple. Carotid bruit is present (faint noted bilaterally). No thyroid mass and no thyromegaly present.   Cardiovascular: Normal rate, regular rhythm, normal heart sounds and intact distal pulses.   No murmur heard.  Pulmonary/Chest: Effort normal and breath sounds normal. She has no wheezes.   Abdominal: Soft. Bowel sounds are normal. She exhibits no distension. There is no hepatosplenomegaly. There is no tenderness. There is no rigidity, no rebound and no guarding.   Musculoskeletal: Normal range of motion. She exhibits no edema.   Lymphadenopathy:     She has no cervical adenopathy.        Right: No supraclavicular adenopathy present.        Left: No supraclavicular adenopathy present.   Neurological: She is alert and oriented to person, place, and time. She has normal reflexes. Coordination and gait normal.   Skin: Skin is warm, dry and intact. No rash noted. She is not diaphoretic.   Psychiatric: She has a normal mood and affect.   Vitals reviewed.      Assessment:       1. Annual physical exam    2. Essential hypertension    3. Interstitial cystitis    4. Dizziness    5. B12 deficiency    6. Osteopenia of multiple sites    7. History of breast cancer        Plan:         1. Annual physical exam  - Influenza - Quadrivalent (PF)  - CBC auto differential; Future  - Comprehensive metabolic panel; Future  - Hemoglobin A1c; Future  - Lipid panel; Future  - TSH; Future  - Urinalysis; Future  - Vitamin D; Future    2. Essential hypertension  - BP is controlled, continue metoprolol, losartan  - metoprolol succinate (TOPROL-XL) 25 MG 24 hr tablet; Take 1 tablet (25 mg total) by mouth once daily.  Dispense: 90 tablet; Refill: 3  - Comprehensive metabolic panel; Future  - losartan (COZAAR) 25 MG tablet; Take 1 tablet (25 mg total) by mouth once daily.  Dispense:  90 tablet; Refill: 3    3. Interstitial cystitis  - Ambulatory Referral to Urology  - obduliablue-sal-naphos (URIMAR-T) 120-0.12-10.8 mg Tab; Take 1 tablet by mouth 4 (four) times daily as needed.  Dispense: 40 tablet; Refill: 1    4. Dizziness  - US Carotid Bilateral; Future, faint carotid bruit heard    5. B12 deficiency  - resume vitamin B12  - cyanocobalamin 1,000 mcg/mL injection; INJECT 1 MILLILITER INTRAMUSCULARLY EVERY MONTH  Dispense: 1 mL; Refill: 3  - Vitamin B12; Future    6. Osteopenia of multiple sites  - seen on last DEXA, continue calcium intake in diet and Vitamin D supplement    7. History of breast cancer    RTC in 6 months or sooner if needed    Kate Gil MD

## 2019-10-01 NOTE — Clinical Note
6 mo fu HTNPlease also provide patient with phone # for financial department- she wants to know her cost for carotid us

## 2019-10-02 ENCOUNTER — PATIENT MESSAGE (OUTPATIENT)
Dept: INTERNAL MEDICINE | Facility: CLINIC | Age: 64
End: 2019-10-02

## 2019-10-02 ENCOUNTER — TELEPHONE (OUTPATIENT)
Dept: INTERNAL MEDICINE | Facility: CLINIC | Age: 64
End: 2019-10-02

## 2019-10-02 NOTE — TELEPHONE ENCOUNTER
Pt given number for financial services.  She will call us back or send portal message if she decides to have the US

## 2019-10-02 NOTE — TELEPHONE ENCOUNTER
----- Message from Kate Gil MD sent at 10/1/2019  5:33 PM CDT -----  6 mo fu HTN  Please also provide patient with phone # for financial department- she wants to know her cost for carotid us

## 2019-10-03 ENCOUNTER — HOSPITAL ENCOUNTER (OUTPATIENT)
Dept: RADIOLOGY | Facility: HOSPITAL | Age: 64
Discharge: HOME | End: 2019-10-03
Attending: INTERNAL MEDICINE
Payer: COMMERCIAL

## 2019-10-03 DIAGNOSIS — R42 DIZZINESS: ICD-10-CM

## 2019-10-03 PROCEDURE — 93880 EXTRACRANIAL BILAT STUDY: CPT | Mod: 26,,, | Performed by: RADIOLOGY

## 2019-10-03 PROCEDURE — 93880 US CAROTID BILATERAL: ICD-10-PCS | Mod: 26,,, | Performed by: RADIOLOGY

## 2019-10-03 PROCEDURE — 93880 EXTRACRANIAL BILAT STUDY: CPT | Mod: TC

## 2019-10-04 ENCOUNTER — LAB VISIT (OUTPATIENT)
Dept: LAB | Facility: HOSPITAL | Age: 64
End: 2019-10-04
Attending: INTERNAL MEDICINE
Payer: COMMERCIAL

## 2019-10-04 DIAGNOSIS — E53.8 B12 DEFICIENCY: ICD-10-CM

## 2019-10-04 DIAGNOSIS — I10 ESSENTIAL HYPERTENSION: ICD-10-CM

## 2019-10-04 DIAGNOSIS — Z00.00 ANNUAL PHYSICAL EXAM: ICD-10-CM

## 2019-10-04 LAB
25(OH)D3+25(OH)D2 SERPL-MCNC: 49 NG/ML (ref 30–96)
ALBUMIN SERPL BCP-MCNC: 4.2 G/DL (ref 3.5–5.2)
ALP SERPL-CCNC: 65 U/L (ref 55–135)
ALT SERPL W/O P-5'-P-CCNC: 19 U/L (ref 10–44)
ANION GAP SERPL CALC-SCNC: 8 MMOL/L (ref 8–16)
AST SERPL-CCNC: 16 U/L (ref 10–40)
BASOPHILS # BLD AUTO: 0.03 K/UL (ref 0–0.2)
BASOPHILS NFR BLD: 0.6 % (ref 0–1.9)
BILIRUB SERPL-MCNC: 0.5 MG/DL (ref 0.1–1)
BUN SERPL-MCNC: 18 MG/DL (ref 8–23)
CALCIUM SERPL-MCNC: 9.4 MG/DL (ref 8.7–10.5)
CHLORIDE SERPL-SCNC: 104 MMOL/L (ref 95–110)
CHOLEST SERPL-MCNC: 206 MG/DL (ref 120–199)
CHOLEST/HDLC SERPL: 3.4 {RATIO} (ref 2–5)
CO2 SERPL-SCNC: 27 MMOL/L (ref 23–29)
CREAT SERPL-MCNC: 0.7 MG/DL (ref 0.5–1.4)
DIFFERENTIAL METHOD: NORMAL
EOSINOPHIL # BLD AUTO: 0.1 K/UL (ref 0–0.5)
EOSINOPHIL NFR BLD: 2.3 % (ref 0–8)
ERYTHROCYTE [DISTWIDTH] IN BLOOD BY AUTOMATED COUNT: 13.1 % (ref 11.5–14.5)
EST. GFR  (AFRICAN AMERICAN): >60 ML/MIN/1.73 M^2
EST. GFR  (NON AFRICAN AMERICAN): >60 ML/MIN/1.73 M^2
ESTIMATED AVG GLUCOSE: 108 MG/DL (ref 68–131)
GLUCOSE SERPL-MCNC: 92 MG/DL (ref 70–110)
HBA1C MFR BLD HPLC: 5.4 % (ref 4–5.6)
HCT VFR BLD AUTO: 40.6 % (ref 37–48.5)
HDLC SERPL-MCNC: 61 MG/DL (ref 40–75)
HDLC SERPL: 29.6 % (ref 20–50)
HGB BLD-MCNC: 13.2 G/DL (ref 12–16)
LDLC SERPL CALC-MCNC: 134 MG/DL (ref 63–159)
LYMPHOCYTES # BLD AUTO: 1.5 K/UL (ref 1–4.8)
LYMPHOCYTES NFR BLD: 31.4 % (ref 18–48)
MCH RBC QN AUTO: 28.9 PG (ref 27–31)
MCHC RBC AUTO-ENTMCNC: 32.5 G/DL (ref 32–36)
MCV RBC AUTO: 89 FL (ref 82–98)
MONOCYTES # BLD AUTO: 0.6 K/UL (ref 0.3–1)
MONOCYTES NFR BLD: 13.1 % (ref 4–15)
NEUTROPHILS # BLD AUTO: 2.6 K/UL (ref 1.8–7.7)
NEUTROPHILS NFR BLD: 52.6 % (ref 38–73)
NONHDLC SERPL-MCNC: 145 MG/DL
PLATELET # BLD AUTO: 282 K/UL (ref 150–350)
PMV BLD AUTO: 9.6 FL (ref 9.2–12.9)
POTASSIUM SERPL-SCNC: 4 MMOL/L (ref 3.5–5.1)
PROT SERPL-MCNC: 7.4 G/DL (ref 6–8.4)
RBC # BLD AUTO: 4.57 M/UL (ref 4–5.4)
SODIUM SERPL-SCNC: 139 MMOL/L (ref 136–145)
TRIGL SERPL-MCNC: 55 MG/DL (ref 30–150)
TSH SERPL DL<=0.005 MIU/L-ACNC: 0.72 UIU/ML (ref 0.4–4)
VIT B12 SERPL-MCNC: 479 PG/ML (ref 210–950)
WBC # BLD AUTO: 4.87 K/UL (ref 3.9–12.7)

## 2019-10-04 PROCEDURE — 82607 VITAMIN B-12: CPT

## 2019-10-04 PROCEDURE — 80061 LIPID PANEL: CPT

## 2019-10-04 PROCEDURE — 83036 HEMOGLOBIN GLYCOSYLATED A1C: CPT

## 2019-10-04 PROCEDURE — 82306 VITAMIN D 25 HYDROXY: CPT

## 2019-10-04 PROCEDURE — 36415 COLL VENOUS BLD VENIPUNCTURE: CPT

## 2019-10-04 PROCEDURE — 84443 ASSAY THYROID STIM HORMONE: CPT

## 2019-10-04 PROCEDURE — 80053 COMPREHEN METABOLIC PANEL: CPT

## 2019-10-04 PROCEDURE — 85025 COMPLETE CBC W/AUTO DIFF WBC: CPT

## 2019-10-08 DIAGNOSIS — E53.8 INADEQUATE VITAMIN B12 INTAKE: Primary | ICD-10-CM

## 2019-10-14 NOTE — PROGRESS NOTES
CHIEF COMPLAINT:    Mrs. Cervantes is a 63 y.o. female presenting for a consultation at the request of Dr. Kate Gil. Patient presents with interstitial cystitis flare.    PRESENTING ILLNESS:    Penny Cervantes is a 63 y.o. female who has a long history of IC.  She was originally diagnosed by Dr. Bk Allen when he was at [facility].  She states she had been doing very well until recently, when she has had urgency, frequency, bladder pain for several months.  She has been tried on Detrol LA in the past and most recently, with Myrbetriq.  Neither of which have helped her frequency, urgency symptoms.  She saw Dr. Alcantar after Dr. Allen.  She generally gets relief with Urimar T but could not afford to get a new prescription and found that she was having to take it often.  She states bladder instillations have helped in the past.  She does not like the DMSO.      She denies any gross hematuria or recurrent UTI.     , hysterectomy for endometriosis, not sexually active (no partner), bm is normal.     REVIEW OF SYSTEMS:    Review of Systems   Constitutional: Negative.    HENT: Negative.    Eyes: Negative.    Respiratory: Negative.    Cardiovascular: Negative.    Gastrointestinal: Negative for constipation.   Genitourinary: Positive for dysuria, frequency and urgency.   Musculoskeletal: Positive for myalgias.   Skin: Negative.    Neurological: Negative.    Endo/Heme/Allergies: Negative.    Psychiatric/Behavioral: Negative.        PATIENT HISTORY:    Past Medical History:   Diagnosis Date    Benign positional vertigo     Breast cancer 2009    s/p chemo XRT, left breast triple negative    Coronary artery disease, non-occlusive     cath in     Hypertension     Interstitial cystitis     Osteopenia     Squamous cell carcinoma     Stroke 7/10/2018    Symptomatic posterior vitreous detachment of left eye 2018    Vitreous hemorrhage, left 1/10/2019       Past Surgical History:   Procedure  "Laterality Date    BONE RESECTION, RIB      right side    BREAST BIOPSY Right 2005    BREAST CYST ASPIRATION      BREAST LUMPECTOMY Left 2009    BREAST SURGERY      HERNIA REPAIR      HYSTERECTOMY         Family History   Problem Relation Age of Onset    Heart disease Mother     Heart disease Father     Heart disease Sister 55    Heart disease Brother 43    Hypertension Sister     Ovarian cancer Sister      Socioeconomic History    Marital status:    Tobacco Use    Smoking status: Never Smoker    Smokeless tobacco: Never Used   Substance and Sexual Activity    Alcohol use: Yes     Comment: 2-3 per month    Drug use: No    Sexual activity: Never       Allergies:  Demerol [meperidine] and Pyridium [phenazopyridine]    Medications:  Outpatient Encounter Medications as of 10/18/2019   Medication Sig Dispense Refill    amLODIPine (NORVASC) 5 MG tablet TAKE 1 TABLET BY MOUTH EVERY DAY 90 tablet 3    biotin 1 mg tablet Take 1,000 mcg by mouth once daily.       cyanocobalamin 1,000 mcg/mL injection INJECT 1 MILLILITER INTRAMUSCULARLY EVERY MONTH 1 mL 3    ERGOCALCIFEROL, VITAMIN D2, (VITAMIN D ORAL) Take by mouth.      losartan (COZAAR) 25 MG tablet Take 1 tablet (25 mg total) by mouth once daily. 90 tablet 3    metoprolol succinate (TOPROL-XL) 25 MG 24 hr tablet Take 1 tablet (25 mg total) by mouth once daily. 90 tablet 3    triamcinolone acetonide 0.1% (KENALOG) 0.1 % paste Place onto teeth 2 (two) times daily. 5 g 5    albuterol (PROVENTIL/VENTOLIN HFA) 90 mcg/actuation inhaler Inhale 2 puffs into the lungs every 6 (six) hours as needed for Wheezing. Rescue 18 g 0    BD LUER-MERCY SYRINGE 3 mL 25 gauge x 1" Syrg use as directed EVERY 2 WEEKS. 30 Syringe 0    hydroquinone 4 % Crea Use hs on dark spots (Patient not taking: Reported on 10/18/2019) 28.35 g 3    methen-sod phos-meth blue-hyos (UROGESIC-BLUE) 81.6-40.8-0.12 mg Tab Take 1 tablet by mouth every 6 (six) hours as needed. 30 " tablet 6    [DISCONTINUED] alnmra-bkxgy-h.blue-sal-naphos (URIMAR-T) 120-0.12-10.8 mg Tab Take 1 tablet by mouth 4 (four) times daily as needed. (Patient not taking: Reported on 10/18/2019) 40 tablet 1     Facility-Administered Encounter Medications as of 10/18/2019   Medication Dose Route Frequency Provider Last Rate Last Dose    [COMPLETED] bupivacaine (PF) 0.5% (5 mg/mL) injection 100 mg  20 mL Intravesical 1 time in Clinic/EDGAR Whatley MD   100 mg at 10/18/19 1009    [COMPLETED] heparin (porcine) injection 10,000 Units  10,000 Units Intravesical 1 time in Clinic/EDGAR Whatley MD   10,000 Units at 10/18/19 1009    [COMPLETED] hydrocortisone sodium succinate injection 100 mg  100 mg Intravesical 1 time in Clinic/EDGAR Whatley MD   100 mg at 10/18/19 1010    [COMPLETED] lidocaine HCL 10 mg/ml (1%) injection 20 mL  20 mL Intravesical 1 time in Clinic/hospitals Arabella Whatley MD   20 mL at 10/18/19 1010         PHYSICAL EXAMINATION:    The patient generally appears in good health, is appropriately interactive, and is in no apparent distress.    Skin: No lesions.    Mental: Cooperative with normal affect.    Neuro: Grossly intact.    HEENT: Normal. No evidence of lymphadenopathy.    Chest:  normal inspiratory effort.    Abdomen:  Soft, non-tender. No masses or organomegaly. Bladder is not palpable. No evidence of flank discomfort. No evidence of inguinal hernia.    Extremities: No clubbing, cyanosis, or edema    Normal external female genitalia  Grade II urogenital atrophy  Urethral meatus is normal  Urethra and bladder are nontender to bimanual exam  Well supported anteriorly and posteriorly   Uterus and cervix are normal  No adnexal masses  PVR by catheterization was 30 ml    LABS:    Lab Results   Component Value Date    BUN 18 10/04/2019    CREATININE 0.7 10/04/2019     UA 1.005, pH 8, otherwise, negative    IMPRESSION:    Encounter Diagnoses   Name Primary?    Interstitial cystitis Yes        PLAN:    1.  After prepping with povidone iodine, a 12 Fr red colindres catheter was placed and the bladder was drained.  A bladder instillation with lidocaine, Marcaine, solucortef and heparin was given under gravity drainage  2.  She is interested in Botox, would do urodynamics first  3.  At some point I would like to do a cystoscopy (this can be done at the same time as the Botox if she elects to do it)  She wanted to check on the prices so the CPT codes were provided along with the number to the financial clearance people   4.  urogesic blue Rx was printed out and given to the patient with a coupon  5.  IC information given     Copy to: Kate Gil MD

## 2019-10-16 ENCOUNTER — PATIENT OUTREACH (OUTPATIENT)
Dept: ADMINISTRATIVE | Facility: OTHER | Age: 64
End: 2019-10-16

## 2019-10-18 ENCOUNTER — OFFICE VISIT (OUTPATIENT)
Dept: UROLOGY | Facility: CLINIC | Age: 64
End: 2019-10-18
Payer: COMMERCIAL

## 2019-10-18 VITALS
WEIGHT: 106.5 LBS | DIASTOLIC BLOOD PRESSURE: 65 MMHG | BODY MASS INDEX: 20.91 KG/M2 | SYSTOLIC BLOOD PRESSURE: 121 MMHG | HEART RATE: 60 BPM | HEIGHT: 60 IN

## 2019-10-18 DIAGNOSIS — N30.10 INTERSTITIAL CYSTITIS: Primary | ICD-10-CM

## 2019-10-18 PROCEDURE — 3074F SYST BP LT 130 MM HG: CPT | Mod: CPTII,S$GLB,, | Performed by: UROLOGY

## 2019-10-18 PROCEDURE — 99999 PR PBB SHADOW E&M-EST. PATIENT-LVL IV: ICD-10-PCS | Mod: PBBFAC,,, | Performed by: UROLOGY

## 2019-10-18 PROCEDURE — 51700 PR IRRIGATION, BLADDER: ICD-10-PCS | Mod: S$GLB,,, | Performed by: UROLOGY

## 2019-10-18 PROCEDURE — 3008F PR BODY MASS INDEX (BMI) DOCUMENTED: ICD-10-PCS | Mod: CPTII,S$GLB,, | Performed by: UROLOGY

## 2019-10-18 PROCEDURE — 81002 PR URINALYSIS NONAUTO W/O SCOPE: ICD-10-PCS | Mod: S$GLB,,, | Performed by: UROLOGY

## 2019-10-18 PROCEDURE — 99999 PR PBB SHADOW E&M-EST. PATIENT-LVL IV: CPT | Mod: PBBFAC,,, | Performed by: UROLOGY

## 2019-10-18 PROCEDURE — 3078F DIAST BP <80 MM HG: CPT | Mod: CPTII,S$GLB,, | Performed by: UROLOGY

## 2019-10-18 PROCEDURE — 51700 IRRIGATION OF BLADDER: CPT | Mod: S$GLB,,, | Performed by: UROLOGY

## 2019-10-18 PROCEDURE — 3074F PR MOST RECENT SYSTOLIC BLOOD PRESSURE < 130 MM HG: ICD-10-PCS | Mod: CPTII,S$GLB,, | Performed by: UROLOGY

## 2019-10-18 PROCEDURE — 3078F PR MOST RECENT DIASTOLIC BLOOD PRESSURE < 80 MM HG: ICD-10-PCS | Mod: CPTII,S$GLB,, | Performed by: UROLOGY

## 2019-10-18 PROCEDURE — 99205 OFFICE O/P NEW HI 60 MIN: CPT | Mod: 25,S$GLB,, | Performed by: UROLOGY

## 2019-10-18 PROCEDURE — 99205 PR OFFICE/OUTPT VISIT, NEW, LEVL V, 60-74 MIN: ICD-10-PCS | Mod: 25,S$GLB,, | Performed by: UROLOGY

## 2019-10-18 PROCEDURE — 81002 URINALYSIS NONAUTO W/O SCOPE: CPT | Mod: S$GLB,,, | Performed by: UROLOGY

## 2019-10-18 PROCEDURE — 3008F BODY MASS INDEX DOCD: CPT | Mod: CPTII,S$GLB,, | Performed by: UROLOGY

## 2019-10-18 RX ORDER — URINARY ANTISEPTIC ANTISPASMODIC 81.6; 40.8; 10.8; .12 MG/1; MG/1; MG/1; MG/1
1 TABLET ORAL EVERY 6 HOURS PRN
Qty: 30 TABLET | Refills: 6 | Status: SHIPPED | OUTPATIENT
Start: 2019-10-18 | End: 2021-05-20

## 2019-10-18 RX ORDER — LIDOCAINE HYDROCHLORIDE 10 MG/ML
20 INJECTION INFILTRATION; PERINEURAL
Status: COMPLETED | OUTPATIENT
Start: 2019-10-18 | End: 2019-10-18

## 2019-10-18 RX ORDER — HEPARIN SODIUM 10000 [USP'U]/ML
10000 INJECTION, SOLUTION INTRAVENOUS; SUBCUTANEOUS
Status: COMPLETED | OUTPATIENT
Start: 2019-10-18 | End: 2019-10-18

## 2019-10-18 RX ORDER — BUPIVACAINE HYDROCHLORIDE 5 MG/ML
20 INJECTION, SOLUTION EPIDURAL; INTRACAUDAL
Status: COMPLETED | OUTPATIENT
Start: 2019-10-18 | End: 2019-10-18

## 2019-10-18 RX ADMIN — BUPIVACAINE HYDROCHLORIDE 100 MG: 5 INJECTION, SOLUTION EPIDURAL; INTRACAUDAL at 10:10

## 2019-10-18 RX ADMIN — LIDOCAINE HYDROCHLORIDE 20 ML: 10 INJECTION INFILTRATION; PERINEURAL at 10:10

## 2019-10-18 RX ADMIN — HEPARIN SODIUM 10000 UNITS: 10000 INJECTION, SOLUTION INTRAVENOUS; SUBCUTANEOUS at 10:10

## 2019-10-18 NOTE — PATIENT INSTRUCTIONS
Interstitial cystitis association www.ichelp.org  Interstitis cystitis network www.ic-network.com  ICN Food List (This is an alessandra from the Interstitial Cystitis Network that is available on iOS and Android.  Downloadable to your phone for easy access when going out or at the grocery store, to help make decisions on foods which may be bladder irritants)       Cystoscopy CPT 57737  Will be done under sedation so there will be an anesthesia charge

## 2019-10-18 NOTE — LETTER
October 18, 2019      Kate Gil MD  [address] [place] [address]           [facility] - Urology 4th Floor  [address]  [place] [address]  Phone: [phone]          Patient: Penny Cervantes   MR Number: [id number]   YOB: 1955   Date of Visit: 10/18/2019       Dear Dr. Kate Gil:    Thank you for referring Penny Cervantes to me for evaluation. Attached you will find relevant portions of my assessment and plan of care.    If you have questions, please do not hesitate to call me. I look forward to following Penny Cervantes along with you.    Sincerely,    Arabella Whatley MD    Enclosure  CC:  No Recipients    If you would like to receive this communication electronically, please contact [email or web address] or [phone] to request more information on [employer] access.    For providers and/or their staff who would like to refer a patient to [facility], please contact us through our one-stop-shop provider referral line, [facility], at [phone].    If you feel you have received this communication in error or would no longer like to receive these types of communications, please e-mail [email or web address]

## 2019-12-02 ENCOUNTER — TELEPHONE (OUTPATIENT)
Dept: INTERNAL MEDICINE | Facility: CLINIC | Age: 64
End: 2019-12-02

## 2019-12-02 NOTE — TELEPHONE ENCOUNTER
Pt given the number for [facility] billing.  [phone]    Requested she call me back if additional help is needed.

## 2019-12-02 NOTE — TELEPHONE ENCOUNTER
----- Message from Moses Villa sent at 12/2/2019 11:38 AM CST -----  Contact: Patient [phone]  Patient would like to get medical advice.    Comments: Patient calling regarding the appt that was on 10/03/19 US, stating the insurance did not cover because was not approved, patient stating was not responsible for not being approved, that appt was approved and 10/04/19 Fasting labs, stating also was charged for this appt as well, stating was not covered by insurance, would like a call back to discuss these two appts and why was charged to patient and not approve by the insurance co.    Please call an advise  Thank you

## 2020-03-29 DIAGNOSIS — I10 ESSENTIAL HYPERTENSION: ICD-10-CM

## 2020-03-30 RX ORDER — AMLODIPINE BESYLATE 5 MG/1
TABLET ORAL
Qty: 90 TABLET | Refills: 1 | Status: SHIPPED | OUTPATIENT
Start: 2020-03-30 | End: 2020-09-21

## 2020-03-31 ENCOUNTER — TELEPHONE (OUTPATIENT)
Dept: INTERNAL MEDICINE | Facility: CLINIC | Age: 65
End: 2020-03-31

## 2020-03-31 NOTE — TELEPHONE ENCOUNTER
Fax received from [facility]    50mg Losartan on back order.    Would like to change to 25mg equivalent dosing    Please advise.

## 2020-05-18 ENCOUNTER — PATIENT MESSAGE (OUTPATIENT)
Dept: UROLOGY | Facility: CLINIC | Age: 65
End: 2020-05-18

## 2020-05-19 ENCOUNTER — PATIENT OUTREACH (OUTPATIENT)
Dept: ADMINISTRATIVE | Facility: OTHER | Age: 65
End: 2020-05-19

## 2020-05-19 ENCOUNTER — OFFICE VISIT (OUTPATIENT)
Dept: UROLOGY | Facility: CLINIC | Age: 65
End: 2020-05-19
Payer: MEDICAID

## 2020-05-19 VITALS
SYSTOLIC BLOOD PRESSURE: 130 MMHG | HEART RATE: 58 BPM | HEIGHT: 59 IN | BODY MASS INDEX: 21.42 KG/M2 | WEIGHT: 106.25 LBS | DIASTOLIC BLOOD PRESSURE: 68 MMHG

## 2020-05-19 DIAGNOSIS — R10.30 LOWER ABDOMINAL PAIN: ICD-10-CM

## 2020-05-19 DIAGNOSIS — R35.0 FREQUENCY OF URINATION: ICD-10-CM

## 2020-05-19 DIAGNOSIS — R39.15 URGENCY OF URINATION: ICD-10-CM

## 2020-05-19 DIAGNOSIS — N30.10 IC (INTERSTITIAL CYSTITIS): Primary | ICD-10-CM

## 2020-05-19 PROCEDURE — 51701 INSERT BLADDER CATHETER: CPT | Mod: S$PBB,,, | Performed by: PHYSICIAN ASSISTANT

## 2020-05-19 PROCEDURE — 99213 OFFICE O/P EST LOW 20 MIN: CPT | Mod: PBBFAC | Performed by: PHYSICIAN ASSISTANT

## 2020-05-19 PROCEDURE — 99214 PR OFFICE/OUTPT VISIT, EST, LEVL IV, 30-39 MIN: ICD-10-PCS | Mod: S$PBB,25,, | Performed by: PHYSICIAN ASSISTANT

## 2020-05-19 PROCEDURE — 51701 PR INSERTION OF NON-INDWELLING BLADDER CATHETERIZATION FOR RESIDUAL UR: ICD-10-PCS | Mod: S$PBB,,, | Performed by: PHYSICIAN ASSISTANT

## 2020-05-19 PROCEDURE — 99999 PR PBB SHADOW E&M-EST. PATIENT-LVL III: ICD-10-PCS | Mod: PBBFAC,,, | Performed by: PHYSICIAN ASSISTANT

## 2020-05-19 PROCEDURE — 99999 PR PBB SHADOW E&M-EST. PATIENT-LVL III: CPT | Mod: PBBFAC,,, | Performed by: PHYSICIAN ASSISTANT

## 2020-05-19 PROCEDURE — 51701 INSERT BLADDER CATHETER: CPT | Mod: PBBFAC | Performed by: PHYSICIAN ASSISTANT

## 2020-05-19 PROCEDURE — 87086 URINE CULTURE/COLONY COUNT: CPT

## 2020-05-19 PROCEDURE — 99214 OFFICE O/P EST MOD 30 MIN: CPT | Mod: S$PBB,25,, | Performed by: PHYSICIAN ASSISTANT

## 2020-05-19 RX ORDER — BUPIVACAINE HYDROCHLORIDE 5 MG/ML
20 INJECTION, SOLUTION PERINEURAL
Status: COMPLETED | OUTPATIENT
Start: 2020-05-19 | End: 2020-05-19

## 2020-05-19 RX ORDER — HEPARIN SODIUM 10000 [USP'U]/ML
10000 INJECTION, SOLUTION INTRAVENOUS; SUBCUTANEOUS
Status: COMPLETED | OUTPATIENT
Start: 2020-05-19 | End: 2020-05-19

## 2020-05-19 RX ORDER — LIDOCAINE HYDROCHLORIDE 10 MG/ML
20 INJECTION INFILTRATION; PERINEURAL
Status: COMPLETED | OUTPATIENT
Start: 2020-05-19 | End: 2020-05-19

## 2020-05-19 RX ADMIN — BUPIVACAINE HYDROCHLORIDE 100 MG: 5 INJECTION, SOLUTION PERINEURAL at 01:05

## 2020-05-19 RX ADMIN — HEPARIN SODIUM 10000 UNITS: 10000 INJECTION, SOLUTION INTRAVENOUS; SUBCUTANEOUS at 01:05

## 2020-05-19 RX ADMIN — LIDOCAINE HYDROCHLORIDE 20 ML: 10 INJECTION, SOLUTION INFILTRATION; PERINEURAL at 01:05

## 2020-05-19 RX ADMIN — HYDROCORTISONE SODIUM SUCCINATE 100 MG: 100 INJECTION, POWDER, FOR SOLUTION INTRAMUSCULAR; INTRAVENOUS at 01:05

## 2020-05-19 NOTE — PROGRESS NOTES
CHIEF COMPLAINT:    Mrs. Cervantes is a 64 y.o. female presenting for Ic flare up.  PRESENTING ILLNESS:    Penny Cervantes is a 64 y.o. female with a PMH of HTN, IC who presents for IC flare up.     She reports lower abdominal cramping, frequency, urgency, nocturia, lower back pain (across the lower back).  No urinary incontinence and dysuria.  Heating pad has not helped.    She has been taking Urimar T and it is not helping.  She is having to take it  2-3 times a day and usually doesn't have to take it that often.    Her symptoms started a few months ago.  She has been under stress due to covid 19 pandemic.  She is not working but supposed to go back to work June 1.      Last flare up was in 10/2019.  Her last instillation worked well but took a few days to take effect. She reports urinary incontinence after the instillation.  Just one episode, her first void after the medication was instilled.  She had no prior warning.   Other than that she tolerated it well.   She thinks she may have passed a little stone.  She reports having kidney pain at that time.  She never saw a stone.      She was last seen in clinic in 10/2019 by Dr. Whatley for a bladder instillation due to IC flare up. She does not like the DMSO as it causes bladder irritation.      REVIEW OF SYSTEMS:  Constitutional: Negative for fever and chills.   HENT: Negative for hearing loss.   Eyes: Negative for visual disturbance.   Respiratory: Negative for shortness of breath.   Cardiovascular: Negative for chest pain.   Gastrointestinal: Negative for vomiting, and constipation.   Genitourinary:  See HPI  Neurological: Negative for dizziness.   Hematological: Does not bruise/bleed easily.   Psychiatric/Behavioral: Negative for confusion.     PATIENT HISTORY:    Past Medical History:   Diagnosis Date    Benign positional vertigo     Breast cancer 2009    s/p chemo XRT, left breast triple negative    Coronary artery disease, non-occlusive     cath in 2012     Hypertension     Interstitial cystitis     Osteopenia     Squamous cell carcinoma     Stroke 7/10/2018    Symptomatic posterior vitreous detachment of left eye 12/24/2018    Vitreous hemorrhage, left 1/10/2019       Past Surgical History:   Procedure Laterality Date    BONE RESECTION, RIB      right side    BREAST BIOPSY Right 2005    BREAST CYST ASPIRATION      BREAST LUMPECTOMY Left 2009    BREAST SURGERY      HERNIA REPAIR      HYSTERECTOMY         Family History   Problem Relation Age of Onset    Heart disease Mother     Heart disease Father     Heart disease Sister 55    Heart disease Brother 43    Hypertension Sister     Ovarian cancer Sister        Social History     Socioeconomic History    Marital status:      Spouse name: Not on file    Number of children: Not on file    Years of education: Not on file    Highest education level: Not on file   Occupational History    Not on file   Social Needs    Financial resource strain: Not on file    Food insecurity:     Worry: Not on file     Inability: Not on file    Transportation needs:     Medical: Not on file     Non-medical: Not on file   Tobacco Use    Smoking status: Never Smoker    Smokeless tobacco: Never Used   Substance and Sexual Activity    Alcohol use: Yes     Comment: 2-3 per month    Drug use: No    Sexual activity: Never   Lifestyle    Physical activity:     Days per week: Not on file     Minutes per session: Not on file    Stress: Not on file   Relationships    Social connections:     Talks on phone: Not on file     Gets together: Not on file     Attends Mu-ism service: Not on file     Active member of club or organization: Not on file     Attends meetings of clubs or organizations: Not on file     Relationship status: Not on file   Other Topics Concern    Not on file   Social History Narrative    Not on file       Allergies:  Demerol [meperidine] and Pyridium  "[phenazopyridine]    Medications:    Current Outpatient Medications:     amLODIPine (NORVASC) 5 MG tablet, TAKE 1 TABLET BY MOUTH EVERY DAY, Disp: 90 tablet, Rfl: 1    BD LUER-MERCY SYRINGE 3 mL 25 gauge x 1" Syrg, use as directed EVERY 2 WEEKS., Disp: 30 Syringe, Rfl: 0    biotin 1 mg tablet, Take 1,000 mcg by mouth once daily. , Disp: , Rfl:     cyanocobalamin 1,000 mcg/mL injection, INJECT 1 MILLILITER INTRAMUSCULARLY EVERY MONTH, Disp: 1 mL, Rfl: 3    ERGOCALCIFEROL, VITAMIN D2, (VITAMIN D ORAL), Take by mouth., Disp: , Rfl:     losartan (COZAAR) 25 MG tablet, Take 1 tablet (25 mg total) by mouth once daily., Disp: 90 tablet, Rfl: 3    methen-sod phos-meth blue-hyos (UROGESIC-BLUE) 81.6-40.8-0.12 mg Tab, Take 1 tablet by mouth every 6 (six) hours as needed., Disp: 30 tablet, Rfl: 6    metoprolol succinate (TOPROL-XL) 25 MG 24 hr tablet, Take 1 tablet (25 mg total) by mouth once daily., Disp: 90 tablet, Rfl: 3    albuterol (PROVENTIL/VENTOLIN HFA) 90 mcg/actuation inhaler, Inhale 2 puffs into the lungs every 6 (six) hours as needed for Wheezing. Rescue, Disp: 18 g, Rfl: 0    hydroquinone 4 % Crea, Use hs on dark spots (Patient not taking: Reported on 10/18/2019), Disp: 28.35 g, Rfl: 3    triamcinolone acetonide 0.1% (KENALOG) 0.1 % paste, Place onto teeth 2 (two) times daily. (Patient not taking: Reported on 5/19/2020), Disp: 5 g, Rfl: 5  No current facility-administered medications for this visit.     PHYSICAL EXAMINATION:    Constitutional: She appears well-developed and well-nourished.  She is in no apparent distress.    Eyes: No scleral icterus noted bilaterally. No discharge bilaterally.    Cardiovascular: Normal rate.  No pitting edema noted in lower extremities bilaterally    Pulmonary/Chest: Effort normal. No respiratory distress.     Neurological: She is alert and oriented to person, place, and time.     Psych: Cooperative with normal affect.      Physical Exam      LABS:    U/a: 1.005, pH 7, tr " blood otherwise negative.       IMPRESSION:    Encounter Diagnoses   Name Primary?    IC (interstitial cystitis) Yes    Urgency of urination     Frequency of urination     Lower abdominal pain        PLAN:    I spent 25 minutes with the patient of which more than half was spent in direct consultation with the patient in regards to our treatment and plan.    Bladder instillation (marcaine 20ml, lidocaine 20ml, solucortef 100mg, heparin 10,000 units) administered by Nurse Jennifer using a 12fr red rubber.  50ml of urine was drained from bladder and sent for urine culture.     Follow up if symptoms do not resolve.      Sherry Velazco PA-C

## 2020-05-20 LAB — BACTERIA UR CULT: NO GROWTH

## 2020-05-21 ENCOUNTER — PATIENT MESSAGE (OUTPATIENT)
Dept: UROLOGY | Facility: CLINIC | Age: 65
End: 2020-05-21

## 2020-06-23 DIAGNOSIS — I10 ESSENTIAL HYPERTENSION: ICD-10-CM

## 2020-06-23 RX ORDER — METOPROLOL SUCCINATE 25 MG/1
TABLET, EXTENDED RELEASE ORAL
Qty: 90 TABLET | Refills: 0 | Status: SHIPPED | OUTPATIENT
Start: 2020-06-23 | End: 2020-12-22 | Stop reason: SDUPTHER

## 2020-07-13 ENCOUNTER — TELEPHONE (OUTPATIENT)
Dept: FAMILY MEDICINE | Facility: CLINIC | Age: 65
End: 2020-07-13

## 2020-07-13 NOTE — TELEPHONE ENCOUNTER
----- Message from Marcos [place] sent at 7/13/2020  1:33 PM CDT -----  Type:  Needs Medical Advice    Who Called: self  Reason:Patient daughter tested positive for covid-19. She is having no symptoms but she isn't sure if you would still want to see her  Would the patient rather a call back or a response via [facility]? callback  Best Call Back Number:[phone]  Additional Information: none

## 2020-07-28 ENCOUNTER — OFFICE VISIT (OUTPATIENT)
Dept: FAMILY MEDICINE | Facility: CLINIC | Age: 65
End: 2020-07-28
Payer: MEDICAID

## 2020-07-28 VITALS
SYSTOLIC BLOOD PRESSURE: 124 MMHG | DIASTOLIC BLOOD PRESSURE: 80 MMHG | OXYGEN SATURATION: 99 % | TEMPERATURE: 99 F | BODY MASS INDEX: 21.96 KG/M2 | WEIGHT: 108.94 LBS | HEIGHT: 59 IN | RESPIRATION RATE: 18 BRPM | HEART RATE: 66 BPM

## 2020-07-28 DIAGNOSIS — Z85.3 HISTORY OF BREAST CANCER: ICD-10-CM

## 2020-07-28 DIAGNOSIS — I10 ESSENTIAL HYPERTENSION: Primary | ICD-10-CM

## 2020-07-28 DIAGNOSIS — Z12.31 SCREENING MAMMOGRAM, ENCOUNTER FOR: ICD-10-CM

## 2020-07-28 DIAGNOSIS — E53.8 B12 DEFICIENCY: ICD-10-CM

## 2020-07-28 DIAGNOSIS — K12.0 ULCER APHTHOUS ORAL: ICD-10-CM

## 2020-07-28 PROCEDURE — 99999 PR PBB SHADOW E&M-EST. PATIENT-LVL IV: CPT | Mod: PBBFAC,,, | Performed by: INTERNAL MEDICINE

## 2020-07-28 PROCEDURE — 99214 PR OFFICE/OUTPT VISIT, EST, LEVL IV, 30-39 MIN: ICD-10-PCS | Mod: S$PBB,,, | Performed by: INTERNAL MEDICINE

## 2020-07-28 PROCEDURE — 99214 OFFICE O/P EST MOD 30 MIN: CPT | Mod: PBBFAC,PN | Performed by: INTERNAL MEDICINE

## 2020-07-28 PROCEDURE — 99999 PR PBB SHADOW E&M-EST. PATIENT-LVL IV: ICD-10-PCS | Mod: PBBFAC,,, | Performed by: INTERNAL MEDICINE

## 2020-07-28 PROCEDURE — 99214 OFFICE O/P EST MOD 30 MIN: CPT | Mod: S$PBB,,, | Performed by: INTERNAL MEDICINE

## 2020-07-28 RX ORDER — TRIAMCINOLONE ACETONIDE 1 MG/G
PASTE DENTAL 2 TIMES DAILY
Qty: 5 G | Refills: 5 | Status: SHIPPED | OUTPATIENT
Start: 2020-07-28 | End: 2021-08-06

## 2020-07-28 RX ORDER — CYANOCOBALAMIN 1000 UG/ML
INJECTION, SOLUTION INTRAMUSCULAR; SUBCUTANEOUS
Qty: 1 ML | Refills: 1 | Status: SHIPPED | OUTPATIENT
Start: 2020-07-28 | End: 2020-12-29

## 2020-07-28 NOTE — PROGRESS NOTES
[facility] Note    Chief Complaint      Chief Complaint   Patient presents with    [facility]     check up        History of Present Illness      Penny Cervantes is a 64 y.o. female who presents today for   Chief Complaint   Patient presents with    [facility]     check up    .  Patient comes to appointment here fro establish viisit with me . She was last seen by dr marrufo , had full labs done in 9/19 as well as mammogram . She will be due for both again this September . She is due for td booster but wishes to defer until next visit . She is compliant with all meds . Is eating healthy diet and is getting walking for exercise 20 minutes per day 5 days/ week    Problem List Items Addressed This Visit        ENT    Ulcer aphthous oral    Overview     Refill kenalog ion [facility]             Cardiac/Vascular    Essential hypertension - Primary    Overview     bp well contriolled cont current             Renal/    Screening mammogram, encounter for    Overview     Mammogram ordered for 9/2020            Oncology    History of breast cancer    Overview     She is currently not seeing oncology , she needs to restablish with gyn is  due for mammogram and due for pelvic/pap .            Endocrine    B12 deficiency    Overview     Needs repeat cbc and b12 level                 Past Medical History:  Past Medical History:   Diagnosis Date    Benign positional vertigo     Breast cancer 2009    s/p chemo XRT, left breast triple negative    Coronary artery disease, non-occlusive     cath in 2012    Hypertension     Interstitial cystitis     Osteopenia     Squamous cell carcinoma     Stroke 7/10/2018    Symptomatic posterior vitreous detachment of left eye 12/24/2018    Vitreous hemorrhage, left 1/10/2019       Past Surgical History:  Past Surgical History:   Procedure Laterality Date    BONE RESECTION, RIB      right side    BREAST BIOPSY Right 2005    BREAST CYST ASPIRATION       BREAST LUMPECTOMY Left 2009    BREAST SURGERY      HERNIA REPAIR      HYSTERECTOMY         Family History:  family history includes Heart disease in her father and mother; Heart disease (age of onset: 43) in her brother; Heart disease (age of onset: 55) in her sister; Hypertension in her sister; Ovarian cancer in her sister.    Social History:  Social History     Socioeconomic History    Marital status:      Spouse name: Not on file    Number of children: Not on file    Years of education: Not on file    Highest education level: Not on file   Occupational History    Not on file   Social Needs    Financial resource strain: Not on file    Food insecurity     Worry: Not on file     Inability: Not on file    Transportation needs     Medical: Not on file     Non-medical: Not on file   Tobacco Use    Smoking status: Never Smoker    Smokeless tobacco: Never Used   Substance and Sexual Activity    Alcohol use: Yes     Comment: 2-3 per month    Drug use: No    Sexual activity: Never   Lifestyle    Physical activity     Days per week: Not on file     Minutes per session: Not on file    Stress: Not on file   Relationships    Social connections     Talks on phone: Not on file     Gets together: Not on file     Attends Jehovah's witness service: Not on file     Active member of club or organization: Not on file     Attends meetings of clubs or organizations: Not on file     Relationship status: Not on file   Other Topics Concern    Not on file   Social History Narrative    Not on file       Review of Systems:   Review of Systems   Constitutional: Negative for fever and weight loss.   HENT: Negative for congestion, hearing loss and sore throat.    Eyes: Negative for blurred vision.   Respiratory: Negative for cough and shortness of breath.    Cardiovascular: Negative for chest pain, palpitations, claudication and leg swelling.   Gastrointestinal: Negative for abdominal pain, constipation, diarrhea and  "heartburn.   Genitourinary: Negative for dysuria.   Musculoskeletal: Negative for back pain and myalgias.   Skin: Negative for rash.   Neurological: Negative for focal weakness and headaches.   Psychiatric/Behavioral: Negative for depression and suicidal ideas. The patient is not nervous/anxious.          Medications:  Outpatient Encounter Medications as of 7/28/2020   Medication Sig Dispense Refill    amLODIPine (NORVASC) 5 MG tablet TAKE 1 TABLET BY MOUTH EVERY DAY 90 tablet 1    BD LUER-MERCY SYRINGE 3 mL 25 gauge x 1" Syrg use as directed EVERY 2 WEEKS. 30 Syringe 0    biotin 1 mg tablet Take 1,000 mcg by mouth once daily.       cyanocobalamin 1,000 mcg/mL injection ADMINISTER 1 ML IN THE MUSCLE EVERY MONTH 1 mL 1    ERGOCALCIFEROL, VITAMIN D2, (VITAMIN D ORAL) Take by mouth.      hydroquinone 4 % Crea Use hs on dark spots 28.35 g 3    losartan (COZAAR) 25 MG tablet Take 1 tablet (25 mg total) by mouth once daily. 90 tablet 3    methen-sod phos-meth blue-hyos (UROGESIC-BLUE) 81.6-40.8-0.12 mg Tab Take 1 tablet by mouth every 6 (six) hours as needed. 30 tablet 6    metoprolol succinate (TOPROL-XL) 25 MG 24 hr tablet TAKE 1 TABLET(25 MG) BY MOUTH EVERY DAY 90 tablet 0    triamcinolone acetonide 0.1% (KENALOG) 0.1 % paste Place onto teeth 2 (two) times daily. 5 g 5    [DISCONTINUED] cyanocobalamin 1,000 mcg/mL injection ADMINISTER 1 ML IN THE MUSCLE EVERY MONTH 1 mL 1    [DISCONTINUED] triamcinolone acetonide 0.1% (KENALOG) 0.1 % paste Place onto teeth 2 (two) times daily. 5 g 5    albuterol (PROVENTIL/VENTOLIN HFA) 90 mcg/actuation inhaler Inhale 2 puffs into the lungs every 6 (six) hours as needed for Wheezing. Rescue (Patient not taking: Reported on 7/28/2020) 18 g 0    [DISCONTINUED] cyanocobalamin 1,000 mcg/mL injection INJECT 1 MILLILITER INTRAMUSCULARLY EVERY MONTH 1 mL 3     No facility-administered encounter medications on file as of 7/28/2020.         Allergies:  Review of patient's allergies " indicates:   Allergen Reactions    Demerol [meperidine] Other (See Comments)     headache    Pyridium [phenazopyridine] Nausea Only         Physical Exam      Vitals:    07/28/20 1503   BP: 124/80   Pulse: 66   Resp: 18   Temp: 98.5 °F (36.9 °C)      Body mass index is 22 kg/m².    Physical Exam  Constitutional:       Appearance: She is well-developed.   Eyes:      Pupils: Pupils are equal, round, and reactive to light.   Neck:      Musculoskeletal: Normal range of motion.      Thyroid: No thyromegaly.   Cardiovascular:      Rate and Rhythm: Normal rate.      Heart sounds: Normal heart sounds. No murmur. No friction rub. No gallop.    Pulmonary:      Breath sounds: Normal breath sounds.   Abdominal:      General: Bowel sounds are normal.      Palpations: Abdomen is soft.   Musculoskeletal: Normal range of motion.   Lymphadenopathy:      Cervical: No cervical adenopathy.   Skin:     General: Skin is warm.      Findings: No rash.   Neurological:      Mental Status: She is alert and oriented to person, place, and time.      Cranial Nerves: No cranial nerve deficit.   Psychiatric:         Behavior: Behavior normal.          Laboratory:  CBC:  No results for input(s): WBC, RBC, HGB, HCT, PLT, MCV, MCH, MCHC in the last 2160 hours.  CMP:  No results for input(s): GLU, CALCIUM, ALBUMIN, PROT, NA, K, CO2, CL, BUN, ALKPHOS, ALT, AST, BILITOT in the last 2160 hours.    Invalid input(s): CREATININ  URINALYSIS:  No results for input(s): COLORU, CLARITYU, SPECGRAV, PHUR, PROTEINUA, GLUCOSEU, BILIRUBINCON, BLOODU, WBCU, RBCU, BACTERIA, MUCUS, NITRITE, LEUKOCYTESUR, UROBILINOGEN, HYALINECASTS in the last 2160 hours.   LIPIDS:  No results for input(s): TSH, HDL, CHOL, TRIG, LDLCALC, CHOLHDL, NONHDLCHOL, TOTALCHOLEST in the last 2160 hours.  TSH:  No results for input(s): TSH in the last 2160 hours.  A1C:  No results for input(s): HGBA1C in the last 2160 hours.    Radiology:        Assessment:     Penny Cervantes is a 64  y.o.female with:    Essential hypertension    B12 deficiency  -     cyanocobalamin 1,000 mcg/mL injection; ADMINISTER 1 ML IN THE MUSCLE EVERY MONTH  Dispense: 1 mL; Refill: 1    History of breast cancer    Ulcer aphthous oral  -     triamcinolone acetonide 0.1% (KENALOG) 0.1 % paste; Place onto teeth 2 (two) times daily.  Dispense: 5 g; Refill: 5    Screening mammogram, encounter for  -     Mammo Digital Screening [facility] w/ [facility]; Future; Expected date: 09/28/2020          Plan:     Problem List Items Addressed This Visit        ENT    Ulcer aphthous oral    Overview     Refill kenalog ion [facility]             Cardiac/Vascular    Essential hypertension - Primary    Overview     bp well contriolled cont current             Renal/    Screening mammogram, encounter for    Overview     Mammogram ordered for 9/2020            Oncology    History of breast cancer    Overview     She is currently not seeing oncology , she needs to restablish with gyn is  due for mammogram and due for pelvic/pap .            Endocrine    B12 deficiency    Overview     Needs repeat cbc and b12 level               As above, continue current medications and maintain follow up with specialists.  Return to clinic in 6 months.      Frederick W Dantagnan [facility] [place]

## 2020-08-18 ENCOUNTER — PATIENT MESSAGE (OUTPATIENT)
Dept: FAMILY MEDICINE | Facility: CLINIC | Age: 65
End: 2020-08-18

## 2020-08-18 DIAGNOSIS — E78.5 BORDERLINE HYPERLIPIDEMIA: Primary | ICD-10-CM

## 2020-08-18 DIAGNOSIS — E53.8 B12 DEFICIENCY: ICD-10-CM

## 2020-08-18 DIAGNOSIS — Z01.419 ENCNTR FOR GYN EXAM (GENERAL) (ROUTINE) W/O ABN FINDINGS: ICD-10-CM

## 2020-08-28 ENCOUNTER — PATIENT OUTREACH (OUTPATIENT)
Dept: ADMINISTRATIVE | Facility: OTHER | Age: 65
End: 2020-08-28

## 2020-08-28 DIAGNOSIS — Z12.11 ENCOUNTER FOR FIT (FECAL IMMUNOCHEMICAL TEST) SCREENING: Primary | ICD-10-CM

## 2020-09-01 ENCOUNTER — OFFICE VISIT (OUTPATIENT)
Dept: OBSTETRICS AND GYNECOLOGY | Facility: CLINIC | Age: 65
End: 2020-09-01
Payer: MEDICAID

## 2020-09-01 VITALS
BODY MASS INDEX: 22.14 KG/M2 | HEIGHT: 59 IN | SYSTOLIC BLOOD PRESSURE: 122 MMHG | DIASTOLIC BLOOD PRESSURE: 60 MMHG | WEIGHT: 109.81 LBS

## 2020-09-01 DIAGNOSIS — E53.8 B12 DEFICIENCY: ICD-10-CM

## 2020-09-01 DIAGNOSIS — Z78.0 ASYMPTOMATIC MENOPAUSE: ICD-10-CM

## 2020-09-01 DIAGNOSIS — Z01.419 ENCNTR FOR GYN EXAM (GENERAL) (ROUTINE) W/O ABN FINDINGS: Primary | ICD-10-CM

## 2020-09-01 DIAGNOSIS — Z85.3 HISTORY OF BREAST CANCER: ICD-10-CM

## 2020-09-01 PROCEDURE — 99396 PR PREVENTIVE VISIT,EST,40-64: ICD-10-PCS | Mod: S$PBB,,, | Performed by: OBSTETRICS & GYNECOLOGY

## 2020-09-01 PROCEDURE — 99999 PR PBB SHADOW E&M-EST. PATIENT-LVL IV: CPT | Mod: PBBFAC,,, | Performed by: OBSTETRICS & GYNECOLOGY

## 2020-09-01 PROCEDURE — 99999 PR PBB SHADOW E&M-EST. PATIENT-LVL IV: ICD-10-PCS | Mod: PBBFAC,,, | Performed by: OBSTETRICS & GYNECOLOGY

## 2020-09-01 PROCEDURE — 99214 OFFICE O/P EST MOD 30 MIN: CPT | Mod: PBBFAC,PO | Performed by: OBSTETRICS & GYNECOLOGY

## 2020-09-01 PROCEDURE — 99396 PREV VISIT EST AGE 40-64: CPT | Mod: S$PBB,,, | Performed by: OBSTETRICS & GYNECOLOGY

## 2020-09-01 NOTE — LETTER
September 3, 2020      Jitendra Samaniego MD  [id number] [facility]  [place] [address]           [place]  [facility]  [address] [place] [id number]  Phone: [phone]          Patient: Penny Cervantes   MR Number: [id number]   YOB: 1955   Date of Visit: 9/1/2020       Dear Dr. Jitendra Samaniego:    Thank you for referring Penny Cervantes to me for evaluation. Attached you will find relevant portions of my assessment and plan of care.    If you have questions, please do not hesitate to call me. I look forward to following Penny Cervantes along with you.    Sincerely,    Sofy Oliveros MD    Enclosure  CC:  No Recipients    If you would like to receive this communication electronically, please contact [email or web address] or [phone] to request more information on [employer] Link access.    For providers and/or their staff who would like to refer a patient to [facility], please contact us through our one-stop-shop provider referral line, [facility], at [phone].    If you feel you have received this communication in error or would no longer like to receive these types of communications, please e-mail [email or web address]

## 2020-09-03 NOTE — PROGRESS NOTES
"Chief Complaint: Well Woman Exam   Patient known to me.  HPI:      Penny Cervantes is a 64 y.o.  who presents today for well woman exam.  LMP: No LMP recorded (lmp unknown). Patient has had a hysterectomy.  No issues, problems, or complaints. Specifically, patient denies abnormal vaginal bleeding, discharge, pelvic pain, urinary problems, or changes in appetite. Ms. Cervantes is not currently sexually active. She is currently using no method for contraception. She declines STD screening today. No menopausal symptoms.    Previous Pap:  no abnormalities (No result found) Last done at .  Previous Mammogram: 2019 done at [facility]. No records for review today. Normal per patient.  Most Recent Dexa: unsure   Colonoscopy: Cologuard 2019    Gardasil:has never had     OB History        3    Para   2    Term   2            AB   1    Living   2       SAB        TAB        Ectopic        Multiple        Live Births   2           Obstetric Comments   Menarche 15/Hysterectomy , HRT about 5 years  No abnormal pap or STDs               ROS:     GENERAL: Denies unintentional weight gain or weight loss. Feeling well overall.   SKIN: Denies rash or lesions.   HEENT: Denies headaches, or vision changes.   CARDIOVASCULAR: Denies palpitations or chest pain.   RESPIRATORY: Denies shortness of breath or dyspnea on exertion.  BREASTS: Denies pain, lumps, or nipple discharge.   ABDOMEN: Denies abdominal pain, constipation, diarrhea, nausea, vomiting, change in appetite.  URINARY: Denies frequency, dysuria, hematuria.  NEUROLOGIC: Denies syncope or weakness.   PSYCHIATRIC: Denies depression, anxiety or mood swings.    Physical Exam:      PHYSICAL EXAM:  /60   Ht 4' 11" (1.499 m)   Wt 49.8 kg (109 lb 12.8 oz)   LMP  (LMP Unknown)   BMI 22.18 kg/m²   Body mass index is 22.18 kg/m².     APPEARANCE: Well nourished, well developed, in no acute distress.  PSYCH: Appropriate mood and affect.  SKIN: No acne or " hirsutism  NECK: Neck symmetric without masses or thyromegaly  NODES: No inguinal, axillary, or supraclavicular lymph node enlargement  ABDOMEN: Soft.  No tenderness or masses.    CARDIOVASCULAR: No edema of peripheral extremities  BREASTS: Symmetrical, no skin changes or visible lesions.  No palpable masses or nipple discharge bilaterally.  PELVIC: Normal external genitalia without lesions.  Normal hair distribution.  Adequate perineal body, normal urethral meatus.  Vagina moist and well rugated without lesions or discharge.  No significant cystocele or rectocele.  Adnexa without masses or tenderness.      Assessment/Plan:     Encntr for gyn exam (general) (routine) w/o abn findings  Normal exam today. Pap smear not indicated. Records requested from [facility]. MMG ordered per PCP. DEXA discussed and ordered. Osteoporosis prevention reviewed. Patient requested Vitamin B12 level that was not done by PCP. Will send results to PCP for evaluation if treatment needed.  -     Ambulatory referral/consult to Obstetrics / Gynecology    Asymptomatic menopause  -     DXA Bone Density Spine And Hip; Future; Expected date: 09/01/2020    B12 deficiency  -     Vitamin B12; Future; Expected date: 09/01/2020    RTC 1 year    Counseling:     Patient was counseled today on current ASCCP pap guidelines, the recommendation for yearly pelvic exams, healthy diet and exercise routines, breast self awareness and annual mammograms.She is to see her PCP for other health maintenance.     Use of the [facility] Patient Portal discussed and encouraged during today's visit.       Sofy Oliveros MD

## 2020-09-15 ENCOUNTER — PATIENT OUTREACH (OUTPATIENT)
Dept: ADMINISTRATIVE | Facility: HOSPITAL | Age: 65
End: 2020-09-15

## 2020-09-15 NOTE — PROGRESS NOTES
Jeramie reviewed. Care Everywhere reviewed.   Chart scrubbed for Colon Cancer Screening.     ZAID

## 2020-09-21 DIAGNOSIS — I10 ESSENTIAL HYPERTENSION: ICD-10-CM

## 2020-09-21 RX ORDER — AMLODIPINE BESYLATE 5 MG/1
TABLET ORAL
Qty: 90 TABLET | Refills: 3 | Status: SHIPPED | OUTPATIENT
Start: 2020-09-21 | End: 2021-10-01

## 2020-09-22 ENCOUNTER — TELEPHONE (OUTPATIENT)
Dept: FAMILY MEDICINE | Facility: CLINIC | Age: 65
End: 2020-09-22

## 2020-09-22 NOTE — TELEPHONE ENCOUNTER
Spoke with patient. Informed patient of normal b12 levels. Pt complains that she continues to feel tired most of the day, so what she do? Pt would also like to know if she should continue the monthly b12 injections?  Informed patient that I would forward this message to Dr Samaniego and once he responds someone would give her a call back. Pt verbalizes understanding.

## 2020-09-22 NOTE — TELEPHONE ENCOUNTER
----- Message from Jitendra Samaniego MD sent at 9/22/2020  8:34 AM CDT -----  b12 level loooks good

## 2020-09-26 DIAGNOSIS — E53.8 B12 DEFICIENCY: ICD-10-CM

## 2020-09-29 ENCOUNTER — TELEPHONE (OUTPATIENT)
Dept: FAMILY MEDICINE | Facility: CLINIC | Age: 65
End: 2020-09-29

## 2020-09-29 NOTE — TELEPHONE ENCOUNTER
----- Message from Edelmira Velez sent at 9/29/2020  3:34 PM CDT -----  Type:  Patient Returning Call    Who Called: Penny  Who Left Message for Patient: unknown  Does the patient know what this is regarding?: unknown  Would the patient rather a call back or a response via [facility]? Call back  Best Call Back Number: [phone]  Additional Information: none

## 2020-10-05 ENCOUNTER — PATIENT MESSAGE (OUTPATIENT)
Dept: INTERNAL MEDICINE | Facility: CLINIC | Age: 65
End: 2020-10-05

## 2020-10-15 ENCOUNTER — PATIENT MESSAGE (OUTPATIENT)
Dept: FAMILY MEDICINE | Facility: CLINIC | Age: 65
End: 2020-10-15

## 2020-10-30 ENCOUNTER — PATIENT MESSAGE (OUTPATIENT)
Dept: ADMINISTRATIVE | Facility: HOSPITAL | Age: 65
End: 2020-10-30

## 2020-10-30 ENCOUNTER — PATIENT OUTREACH (OUTPATIENT)
Dept: ADMINISTRATIVE | Facility: HOSPITAL | Age: 65
End: 2020-10-30

## 2020-11-02 NOTE — TELEPHONE ENCOUNTER
The radiologist read as no new suspicious areas in the left breast and neg right breast . Ok for routine screen in 1 year

## 2020-11-03 ENCOUNTER — PATIENT MESSAGE (OUTPATIENT)
Dept: FAMILY MEDICINE | Facility: CLINIC | Age: 65
End: 2020-11-03

## 2020-11-12 ENCOUNTER — PATIENT OUTREACH (OUTPATIENT)
Dept: ADMINISTRATIVE | Facility: OTHER | Age: 65
End: 2020-11-12

## 2020-11-12 NOTE — PROGRESS NOTES
LINKS immunization registry not responding  Health Maintenance updated  Chart reviewed for overdue Proactive [facility] Encounters (CHRISTOPH) health maintenance testing (CRS, Breast Ca, Diabetic Eye Exam)   Orders entered:N/A

## 2020-11-13 ENCOUNTER — OFFICE VISIT (OUTPATIENT)
Dept: UROLOGY | Facility: CLINIC | Age: 65
End: 2020-11-13
Payer: MEDICAID

## 2020-11-13 VITALS
HEART RATE: 64 BPM | BODY MASS INDEX: 21.42 KG/M2 | SYSTOLIC BLOOD PRESSURE: 127 MMHG | HEIGHT: 59 IN | WEIGHT: 106.25 LBS | DIASTOLIC BLOOD PRESSURE: 79 MMHG

## 2020-11-13 DIAGNOSIS — R39.15 URGENCY OF URINATION: ICD-10-CM

## 2020-11-13 DIAGNOSIS — R35.0 FREQUENCY OF URINATION: ICD-10-CM

## 2020-11-13 DIAGNOSIS — R35.1 NOCTURIA: ICD-10-CM

## 2020-11-13 DIAGNOSIS — N30.10 IC (INTERSTITIAL CYSTITIS): Primary | ICD-10-CM

## 2020-11-13 PROCEDURE — 51700 IRRIGATION OF BLADDER: CPT | Mod: S$PBB,,, | Performed by: PHYSICIAN ASSISTANT

## 2020-11-13 PROCEDURE — 99999 PR PBB SHADOW E&M-EST. PATIENT-LVL III: ICD-10-PCS | Mod: PBBFAC,,, | Performed by: PHYSICIAN ASSISTANT

## 2020-11-13 PROCEDURE — 99214 OFFICE O/P EST MOD 30 MIN: CPT | Mod: S$PBB,25,, | Performed by: PHYSICIAN ASSISTANT

## 2020-11-13 PROCEDURE — 99999 PR PBB SHADOW E&M-EST. PATIENT-LVL III: CPT | Mod: PBBFAC,,, | Performed by: PHYSICIAN ASSISTANT

## 2020-11-13 PROCEDURE — 51700 PR IRRIGATION, BLADDER: ICD-10-PCS | Mod: S$PBB,,, | Performed by: PHYSICIAN ASSISTANT

## 2020-11-13 PROCEDURE — 51701 INSERT BLADDER CATHETER: CPT | Mod: PBBFAC | Performed by: PHYSICIAN ASSISTANT

## 2020-11-13 PROCEDURE — 99213 OFFICE O/P EST LOW 20 MIN: CPT | Mod: PBBFAC | Performed by: PHYSICIAN ASSISTANT

## 2020-11-13 PROCEDURE — 99214 PR OFFICE/OUTPT VISIT, EST, LEVL IV, 30-39 MIN: ICD-10-PCS | Mod: S$PBB,25,, | Performed by: PHYSICIAN ASSISTANT

## 2020-11-13 RX ORDER — BUPIVACAINE HYDROCHLORIDE 5 MG/ML
20 INJECTION, SOLUTION PERINEURAL
Status: COMPLETED | OUTPATIENT
Start: 2020-11-13 | End: 2020-11-13

## 2020-11-13 RX ORDER — HEPARIN SODIUM 10000 [USP'U]/ML
10000 INJECTION, SOLUTION INTRAVENOUS; SUBCUTANEOUS
Status: COMPLETED | OUTPATIENT
Start: 2020-11-13 | End: 2020-11-13

## 2020-11-13 RX ORDER — MIRABEGRON 25 MG/1
25 TABLET, FILM COATED, EXTENDED RELEASE ORAL DAILY
Qty: 30 TABLET | Refills: 11 | Status: SHIPPED | OUTPATIENT
Start: 2020-11-13 | End: 2021-05-27

## 2020-11-13 RX ORDER — LIDOCAINE HYDROCHLORIDE 10 MG/ML
20 INJECTION INFILTRATION; PERINEURAL
Status: COMPLETED | OUTPATIENT
Start: 2020-11-13 | End: 2020-11-13

## 2020-11-13 RX ADMIN — HEPARIN SODIUM 10000 UNITS: 10000 INJECTION, SOLUTION INTRAVENOUS; SUBCUTANEOUS at 02:11

## 2020-11-13 RX ADMIN — LIDOCAINE HYDROCHLORIDE 20 ML: 10 INJECTION, SOLUTION INFILTRATION; PERINEURAL at 02:11

## 2020-11-13 RX ADMIN — BUPIVACAINE HYDROCHLORIDE 100 MG: 5 INJECTION, SOLUTION PERINEURAL at 02:11

## 2020-11-13 RX ADMIN — HYDROCORTISONE SODIUM SUCCINATE 100 MG: 100 INJECTION, POWDER, FOR SOLUTION INTRAMUSCULAR; INTRAVENOUS at 02:11

## 2020-11-13 NOTE — PROGRESS NOTES
CHIEF COMPLAINT:    Mrs. Cervantes is a 65 y.o. female presenting for IC flare up.  PRESENTING ILLNESS:    Penny Cervantes is a 65 y.o. female with a PMH of HTN, IC who presents for IC flare up.     She reports her typical IC flare up symptoms, lower abdominal cramping, lower back pain, frequency, urgency, nocturia.  She does not have dysuria.    She reports frequency, urgency, nocturia x 5-12 even when she does not have flare up.  Her urinary symptoms are worse during her flare up.    Urimar T is not helping her urinary symptoms.   She has tried detrol LA and Myrbetriq in the past.  Myrbetriq caused constipation but she will like to try it again.      Her last flare up was in May 2020.  She responded well to the instillation at that time.   She does not like the DMSO as it causes bladder irritation.    She reports dry mouth at night and frequently sips water throughout the night.  She lives alone and does not know if she snores but states she drinks a lot during the day as well.     REVIEW OF SYSTEMS:  Constitutional: Negative for fever and chills.   HENT: Negative for hearing loss.   Eyes: Negative for visual disturbance.   Respiratory: Negative for shortness of breath.   Cardiovascular: Negative for chest pain.   Gastrointestinal: Negative for vomiting, and constipation.   Genitourinary:  See HPI  Neurological: Negative for dizziness.   Hematological: Does not bruise/bleed easily.   Psychiatric/Behavioral: Negative for confusion.     PATIENT HISTORY:    Past Medical History:   Diagnosis Date    Benign positional vertigo     Breast cancer 2009    s/p chemo XRT, left breast triple negative    Coronary artery disease, non-occlusive     cath in 2012    Hypertension     Interstitial cystitis     Osteopenia     Squamous cell carcinoma     Stroke 7/10/2018    Symptomatic posterior vitreous detachment of left eye 12/24/2018    Vitreous hemorrhage, left 1/10/2019       Past Surgical History:   Procedure  Laterality Date    BILATERAL SALPINGOOPHORECTOMY      with Hysterectomy    BONE RESECTION, RIB      right side    BREAST BIOPSY Right 2005    BREAST CYST ASPIRATION      BREAST LUMPECTOMY Left 2009    BREAST SURGERY      HERNIA REPAIR      HYSTERECTOMY      TOTAL VAGINAL HYSTERECTOMY      Indication: Endometriosis       Family History   Problem Relation Age of Onset    Heart disease Mother     Heart disease Father     Heart disease Sister 55    Heart disease Brother 43    Hypertension Sister     Ovarian cancer Sister        Social History     Socioeconomic History    Marital status:      Spouse name: Not on file    Number of children: Not on file    Years of education: Not on file    Highest education level: Not on file   Occupational History    Not on file   Social Needs    Financial resource strain: Not on file    Food insecurity     Worry: Not on file     Inability: Not on file    Transportation needs     Medical: Not on file     Non-medical: Not on file   Tobacco Use    Smoking status: Never Smoker    Smokeless tobacco: Never Used   Substance and Sexual Activity    Alcohol use: Yes     Comment: 2-3 per month    Drug use: No    Sexual activity: Not Currently     Partners: Male   Lifestyle    Physical activity     Days per week: Not on file     Minutes per session: Not on file    Stress: Not on file   Relationships    Social connections     Talks on phone: Not on file     Gets together: Not on file     Attends [employer] service: Not on file     Active member of club or organization: Not on file     Attends meetings of clubs or organizations: Not on file     Relationship status: Not on file   Other Topics Concern    Not on file   Social History Narrative    Not on file       Allergies:  Demerol [meperidine] and Pyridium [phenazopyridine]    Medications:    Current Outpatient Medications:     amLODIPine (NORVASC) 5 MG tablet, TAKE 1 TABLET BY MOUTH EVERY DAY, Disp: 90 tablet,  "Rfl: 3    BD LUER-MERCY SYRINGE 3 mL 25 gauge x 1" Syrg, use as directed EVERY 2 WEEKS., Disp: 30 Syringe, Rfl: 0    biotin 1 mg tablet, Take 1,000 mcg by mouth once daily. , Disp: , Rfl:     cyanocobalamin 1,000 mcg/mL injection, ADMINISTER 1 ML IN THE MUSCLE EVERY MONTH, Disp: 1 mL, Rfl: 1    ERGOCALCIFEROL, VITAMIN D2, (VITAMIN D ORAL), Take by mouth., Disp: , Rfl:     hydroquinone 4 % Crea, Use hs on dark spots, Disp: 28.35 g, Rfl: 3    losartan (COZAAR) 25 MG tablet, Take 1 tablet (25 mg total) by mouth once daily., Disp: 90 tablet, Rfl: 3    methen-sod phos-meth blue-hyos (UROGESIC-BLUE) 81.6-40.8-0.12 mg Tab, Take 1 tablet by mouth every 6 (six) hours as needed., Disp: 30 tablet, Rfl: 6    metoprolol succinate (TOPROL-XL) 25 MG 24 hr tablet, TAKE 1 TABLET(25 MG) BY MOUTH EVERY DAY, Disp: 90 tablet, Rfl: 0    triamcinolone acetonide 0.1% (KENALOG) 0.1 % paste, Place onto teeth 2 (two) times daily., Disp: 5 g, Rfl: 5    albuterol (PROVENTIL/VENTOLIN HFA) 90 mcg/actuation inhaler, Inhale 2 puffs into the lungs every 6 (six) hours as needed for Wheezing. Rescue (Patient not taking: Reported on 7/28/2020), Disp: 18 g, Rfl: 0    mirabegron (MYRBETRIQ) 25 mg Tb24 ER tablet, Take 1 tablet (25 mg total) by mouth once daily., Disp: 30 tablet, Rfl: 11    Current Facility-Administered Medications:     bupivacaine injection 100 mg, 20 mL, Bladder Instillation, 1 time in Clinic/HOD, Sherry Velazco PA-C    heparin (porcine) injection 10,000 Units, 10,000 Units, Intravesical, 1 time in Clinic/HOD, Sherry Velazco PA-C    hydrocortisone sodium succinate injection 100 mg, 100 mg, Intravenous, Once, Sherry Velazco PA-C    lidocaine HCL 10 mg/ml (1%) injection 20 mL, 20 mL, Other, 1 time in Clinic/HOD, Sherry Velazco PA-C    PHYSICAL EXAMINATION:    Constitutional: She appears well-developed and well-nourished.  She is in no apparent distress.    Eyes: No scleral icterus noted bilaterally. No " discharge bilaterally.    Nose: No rhinorrhea    Cardiovascular: Normal rate.      Pulmonary/Chest: Effort normal. No respiratory distress.     Abdominal:  She exhibits no distension.  There is no CVA tenderness.     Neurological: She is alert and oriented to person, place, and time.     Skin: Skin is warm and dry.     Psych: Cooperative with normal affect.    Physical Exam      LABS:    U/a: 1.015, pH 5, tr blood otherwise negative.     IMPRESSION:    Encounter Diagnoses   Name Primary?    IC (interstitial cystitis) Yes    Frequency of urination     Nocturia     Urgency of urination        PLAN:   Bladder instillation (marcaine 20ml, lidocaine 20ml, solucortef 100mg, heparin 10,000 units) administered by Nurse Jennifer using a 12fr red rubber.  30ml of urine was drained from bladder and sent for urine culture.      Rx given for Myrbetriq for frequency, nocturia, urgency.   Recommend miralax daily if develops constipation.   Discussed that myrbetriq can increase one's BP. Recommend monitoring BP daily for the first 2 weeks while on medication.  If BP becomes elevated discontinue the medication.        Follow up in 6 weeks to reassess symptoms with myrbetriq.     Sherry Velazco PA-C

## 2020-11-17 ENCOUNTER — PATIENT MESSAGE (OUTPATIENT)
Dept: UROLOGY | Facility: CLINIC | Age: 65
End: 2020-11-17

## 2020-11-17 DIAGNOSIS — R35.0 FREQUENCY OF URINATION: Primary | ICD-10-CM

## 2020-11-17 RX ORDER — SOLIFENACIN SUCCINATE 5 MG/1
5 TABLET, FILM COATED ORAL DAILY
Qty: 30 TABLET | Refills: 11 | Status: SHIPPED | OUTPATIENT
Start: 2020-11-17 | End: 2021-10-13

## 2020-12-14 ENCOUNTER — PATIENT OUTREACH (OUTPATIENT)
Dept: ADMINISTRATIVE | Facility: HOSPITAL | Age: 65
End: 2020-12-14

## 2020-12-14 NOTE — PROGRESS NOTES
Outreach regarding [employer] - patient states she will get to it, stated someone called her last week, she has not forgotten. Reminded patient to date specimen and mail within 24hrs of collection.

## 2020-12-16 ENCOUNTER — LAB VISIT (OUTPATIENT)
Dept: LAB | Facility: HOSPITAL | Age: 65
End: 2020-12-16
Attending: INTERNAL MEDICINE
Payer: MEDICAID

## 2020-12-16 DIAGNOSIS — Z12.11 ENCOUNTER FOR FIT (FECAL IMMUNOCHEMICAL TEST) SCREENING: ICD-10-CM

## 2020-12-16 PROCEDURE — 82274 ASSAY TEST FOR BLOOD FECAL: CPT

## 2020-12-17 ENCOUNTER — PATIENT OUTREACH (OUTPATIENT)
Dept: ADMINISTRATIVE | Facility: HOSPITAL | Age: 65
End: 2020-12-17

## 2020-12-20 DIAGNOSIS — I10 ESSENTIAL HYPERTENSION: ICD-10-CM

## 2020-12-21 RX ORDER — LOSARTAN POTASSIUM 25 MG/1
TABLET ORAL
Qty: 90 TABLET | Refills: 3 | Status: SHIPPED | OUTPATIENT
Start: 2020-12-21 | End: 2021-12-27

## 2020-12-22 ENCOUNTER — PATIENT OUTREACH (OUTPATIENT)
Dept: ADMINISTRATIVE | Facility: OTHER | Age: 65
End: 2020-12-22

## 2020-12-22 DIAGNOSIS — I10 ESSENTIAL HYPERTENSION: ICD-10-CM

## 2020-12-22 RX ORDER — METOPROLOL SUCCINATE 25 MG/1
25 TABLET, EXTENDED RELEASE ORAL DAILY
Qty: 90 TABLET | Refills: 3 | Status: SHIPPED | OUTPATIENT
Start: 2020-12-22 | End: 2021-12-27

## 2020-12-29 RX ORDER — CYANOCOBALAMIN 1000 UG/ML
INJECTION, SOLUTION INTRAMUSCULAR; SUBCUTANEOUS
Qty: 1 ML | Refills: 1 | Status: SHIPPED | OUTPATIENT
Start: 2020-12-29 | End: 2021-10-18 | Stop reason: SDUPTHER

## 2020-12-30 LAB — HEMOCCULT STL QL IA: NEGATIVE

## 2021-05-20 ENCOUNTER — PATIENT MESSAGE (OUTPATIENT)
Dept: UROLOGY | Facility: CLINIC | Age: 66
End: 2021-05-20

## 2021-05-20 RX ORDER — URINARY ANTISEPTIC ANTISPASMODIC 81.6; 40.8; 10.8; .12 MG/1; MG/1; MG/1; MG/1
1 TABLET ORAL EVERY 6 HOURS PRN
Qty: 60 TABLET | Refills: 6 | Status: SHIPPED | OUTPATIENT
Start: 2021-05-20 | End: 2021-10-18

## 2021-05-25 ENCOUNTER — PATIENT MESSAGE (OUTPATIENT)
Dept: UROLOGY | Facility: CLINIC | Age: 66
End: 2021-05-25

## 2021-05-27 ENCOUNTER — OFFICE VISIT (OUTPATIENT)
Dept: UROLOGY | Facility: CLINIC | Age: 66
End: 2021-05-27
Payer: MEDICAID

## 2021-05-27 VITALS
HEIGHT: 59 IN | DIASTOLIC BLOOD PRESSURE: 72 MMHG | BODY MASS INDEX: 21.46 KG/M2 | HEART RATE: 70 BPM | SYSTOLIC BLOOD PRESSURE: 130 MMHG

## 2021-05-27 DIAGNOSIS — R35.0 FREQUENCY OF URINATION: ICD-10-CM

## 2021-05-27 DIAGNOSIS — N32.89 BLADDER SPASM: ICD-10-CM

## 2021-05-27 DIAGNOSIS — R39.15 URGENCY OF URINATION: ICD-10-CM

## 2021-05-27 DIAGNOSIS — N30.10 IC (INTERSTITIAL CYSTITIS): Primary | ICD-10-CM

## 2021-05-27 LAB
BILIRUB SERPL-MCNC: NORMAL MG/DL
BLOOD URINE, POC: NORMAL
CLARITY, POC UA: CLEAR
COLOR, POC UA: YELLOW
GLUCOSE UR QL STRIP: NORMAL
KETONES UR QL STRIP: NORMAL
LEUKOCYTE ESTERASE URINE, POC: NORMAL
NITRITE, POC UA: NORMAL
PH, POC UA: 5
PROTEIN, POC: NORMAL
SPECIFIC GRAVITY, POC UA: 1
UROBILINOGEN, POC UA: NORMAL

## 2021-05-27 PROCEDURE — 51700 PR IRRIGATION, BLADDER: ICD-10-PCS | Mod: S$PBB,,, | Performed by: PHYSICIAN ASSISTANT

## 2021-05-27 PROCEDURE — 99213 OFFICE O/P EST LOW 20 MIN: CPT | Mod: PBBFAC,25 | Performed by: PHYSICIAN ASSISTANT

## 2021-05-27 PROCEDURE — 99999 PR PBB SHADOW E&M-EST. PATIENT-LVL III: CPT | Mod: PBBFAC,,, | Performed by: PHYSICIAN ASSISTANT

## 2021-05-27 PROCEDURE — 99499 UNLISTED E&M SERVICE: CPT | Mod: S$PBB,,, | Performed by: PHYSICIAN ASSISTANT

## 2021-05-27 PROCEDURE — 51700 IRRIGATION OF BLADDER: CPT | Mod: PBBFAC | Performed by: PHYSICIAN ASSISTANT

## 2021-05-27 PROCEDURE — 99499 NO LOS: ICD-10-PCS | Mod: S$PBB,,, | Performed by: PHYSICIAN ASSISTANT

## 2021-05-27 PROCEDURE — 81002 URINALYSIS NONAUTO W/O SCOPE: CPT | Mod: PBBFAC | Performed by: PHYSICIAN ASSISTANT

## 2021-05-27 PROCEDURE — 96372 THER/PROPH/DIAG INJ SC/IM: CPT | Mod: PBBFAC

## 2021-05-27 PROCEDURE — 87086 URINE CULTURE/COLONY COUNT: CPT | Performed by: PHYSICIAN ASSISTANT

## 2021-05-27 PROCEDURE — 99999 PR PBB SHADOW E&M-EST. PATIENT-LVL III: ICD-10-PCS | Mod: PBBFAC,,, | Performed by: PHYSICIAN ASSISTANT

## 2021-05-27 PROCEDURE — 51700 IRRIGATION OF BLADDER: CPT | Mod: S$PBB,,, | Performed by: PHYSICIAN ASSISTANT

## 2021-05-27 RX ORDER — LIDOCAINE HYDROCHLORIDE 10 MG/ML
20 INJECTION INFILTRATION; PERINEURAL
Status: COMPLETED | OUTPATIENT
Start: 2021-05-27 | End: 2021-05-27

## 2021-05-27 RX ORDER — HEPARIN SODIUM 10000 [USP'U]/ML
10000 INJECTION, SOLUTION INTRAVENOUS; SUBCUTANEOUS
Status: COMPLETED | OUTPATIENT
Start: 2021-05-27 | End: 2021-05-27

## 2021-05-27 RX ORDER — BUPIVACAINE HYDROCHLORIDE 5 MG/ML
20 INJECTION, SOLUTION PERINEURAL
Status: COMPLETED | OUTPATIENT
Start: 2021-05-27 | End: 2021-05-27

## 2021-05-27 RX ADMIN — HYDROCORTISONE SODIUM SUCCINATE 100 MG: 100 INJECTION, POWDER, FOR SOLUTION INTRAMUSCULAR; INTRAVENOUS at 03:05

## 2021-05-27 RX ADMIN — BUPIVACAINE HYDROCHLORIDE 100 MG: 5 INJECTION, SOLUTION PERINEURAL at 03:05

## 2021-05-27 RX ADMIN — HEPARIN SODIUM 10000 UNITS: 10000 INJECTION, SOLUTION INTRAVENOUS; SUBCUTANEOUS at 03:05

## 2021-05-27 RX ADMIN — LIDOCAINE HYDROCHLORIDE 20 ML: 10 INJECTION, SOLUTION INFILTRATION; PERINEURAL at 03:05

## 2021-05-28 LAB — BACTERIA UR CULT: NORMAL

## 2021-06-01 ENCOUNTER — PATIENT MESSAGE (OUTPATIENT)
Dept: UROLOGY | Facility: CLINIC | Age: 66
End: 2021-06-01

## 2021-06-07 ENCOUNTER — TELEPHONE (OUTPATIENT)
Dept: UROLOGY | Facility: CLINIC | Age: 66
End: 2021-06-07

## 2021-06-08 ENCOUNTER — OFFICE VISIT (OUTPATIENT)
Dept: UROLOGY | Facility: CLINIC | Age: 66
End: 2021-06-08
Payer: MEDICAID

## 2021-06-08 VITALS
HEART RATE: 62 BPM | BODY MASS INDEX: 21.42 KG/M2 | HEIGHT: 59 IN | WEIGHT: 106.25 LBS | DIASTOLIC BLOOD PRESSURE: 66 MMHG | SYSTOLIC BLOOD PRESSURE: 128 MMHG

## 2021-06-08 DIAGNOSIS — N32.89 BLADDER SPASM: ICD-10-CM

## 2021-06-08 DIAGNOSIS — N30.10 IC (INTERSTITIAL CYSTITIS): Primary | ICD-10-CM

## 2021-06-08 PROCEDURE — 99999 PR PBB SHADOW E&M-EST. PATIENT-LVL III: CPT | Mod: PBBFAC,,, | Performed by: PHYSICIAN ASSISTANT

## 2021-06-08 PROCEDURE — 81002 URINALYSIS NONAUTO W/O SCOPE: CPT | Mod: PBBFAC | Performed by: PHYSICIAN ASSISTANT

## 2021-06-08 PROCEDURE — 99213 OFFICE O/P EST LOW 20 MIN: CPT | Mod: S$PBB,,, | Performed by: PHYSICIAN ASSISTANT

## 2021-06-08 PROCEDURE — 96372 THER/PROPH/DIAG INJ SC/IM: CPT | Mod: PBBFAC

## 2021-06-08 PROCEDURE — 99213 OFFICE O/P EST LOW 20 MIN: CPT | Mod: PBBFAC | Performed by: PHYSICIAN ASSISTANT

## 2021-06-08 PROCEDURE — 99213 PR OFFICE/OUTPT VISIT, EST, LEVL III, 20-29 MIN: ICD-10-PCS | Mod: S$PBB,,, | Performed by: PHYSICIAN ASSISTANT

## 2021-06-08 PROCEDURE — 99999 PR PBB SHADOW E&M-EST. PATIENT-LVL III: ICD-10-PCS | Mod: PBBFAC,,, | Performed by: PHYSICIAN ASSISTANT

## 2021-06-08 RX ORDER — HEPARIN SODIUM 10000 [USP'U]/ML
10000 INJECTION, SOLUTION INTRAVENOUS; SUBCUTANEOUS
Status: COMPLETED | OUTPATIENT
Start: 2021-06-08 | End: 2021-06-09

## 2021-06-08 RX ORDER — BUPIVACAINE HYDROCHLORIDE 5 MG/ML
20 INJECTION, SOLUTION PERINEURAL
Status: COMPLETED | OUTPATIENT
Start: 2021-06-08 | End: 2021-06-09

## 2021-06-08 RX ORDER — LIDOCAINE HYDROCHLORIDE 10 MG/ML
20 INJECTION INFILTRATION; PERINEURAL
Status: COMPLETED | OUTPATIENT
Start: 2021-06-08 | End: 2021-06-09

## 2021-06-09 RX ADMIN — LIDOCAINE HYDROCHLORIDE 20 ML: 10 INJECTION, SOLUTION INFILTRATION; PERINEURAL at 10:06

## 2021-06-09 RX ADMIN — HYDROCORTISONE SODIUM SUCCINATE 100 MG: 100 INJECTION, POWDER, FOR SOLUTION INTRAMUSCULAR; INTRAVENOUS at 10:06

## 2021-06-09 RX ADMIN — BUPIVACAINE HYDROCHLORIDE 100 MG: 5 INJECTION, SOLUTION PERINEURAL at 10:06

## 2021-06-09 RX ADMIN — HEPARIN SODIUM 10000 UNITS: 10000 INJECTION INTRAVENOUS; SUBCUTANEOUS at 10:06

## 2021-06-11 ENCOUNTER — TELEPHONE (OUTPATIENT)
Dept: OBSTETRICS AND GYNECOLOGY | Facility: CLINIC | Age: 66
End: 2021-06-11

## 2021-06-14 ENCOUNTER — OFFICE VISIT (OUTPATIENT)
Dept: OBSTETRICS AND GYNECOLOGY | Facility: CLINIC | Age: 66
End: 2021-06-14
Payer: MEDICAID

## 2021-06-14 ENCOUNTER — PATIENT OUTREACH (OUTPATIENT)
Dept: ADMINISTRATIVE | Facility: OTHER | Age: 66
End: 2021-06-14

## 2021-06-14 VITALS
SYSTOLIC BLOOD PRESSURE: 115 MMHG | BODY MASS INDEX: 21.62 KG/M2 | DIASTOLIC BLOOD PRESSURE: 70 MMHG | HEIGHT: 59 IN | WEIGHT: 107.25 LBS

## 2021-06-14 DIAGNOSIS — N32.89 BLADDER SPASMS: Primary | ICD-10-CM

## 2021-06-14 DIAGNOSIS — R30.0 BURNING WITH URINATION: ICD-10-CM

## 2021-06-14 DIAGNOSIS — R31.29 OTHER MICROSCOPIC HEMATURIA: ICD-10-CM

## 2021-06-14 LAB
BILIRUB SERPL-MCNC: ABNORMAL MG/DL
BLOOD URINE, POC: ABNORMAL
CLARITY, POC UA: CLEAR
COLOR, POC UA: ABNORMAL
GLUCOSE UR QL STRIP: ABNORMAL
KETONES UR QL STRIP: ABNORMAL
LEUKOCYTE ESTERASE URINE, POC: ABNORMAL
NITRITE, POC UA: ABNORMAL
PH, POC UA: 5
PROTEIN, POC: ABNORMAL
SPECIFIC GRAVITY, POC UA: 1
UROBILINOGEN, POC UA: ABNORMAL

## 2021-06-14 PROCEDURE — 87086 URINE CULTURE/COLONY COUNT: CPT | Performed by: OBSTETRICS & GYNECOLOGY

## 2021-06-14 PROCEDURE — 99213 OFFICE O/P EST LOW 20 MIN: CPT | Mod: PBBFAC | Performed by: OBSTETRICS & GYNECOLOGY

## 2021-06-14 PROCEDURE — 87077 CULTURE AEROBIC IDENTIFY: CPT | Performed by: OBSTETRICS & GYNECOLOGY

## 2021-06-14 PROCEDURE — 99999 PR PBB SHADOW E&M-EST. PATIENT-LVL III: ICD-10-PCS | Mod: PBBFAC,,, | Performed by: OBSTETRICS & GYNECOLOGY

## 2021-06-14 PROCEDURE — 99213 PR OFFICE/OUTPT VISIT, EST, LEVL III, 20-29 MIN: ICD-10-PCS | Mod: S$PBB,,, | Performed by: OBSTETRICS & GYNECOLOGY

## 2021-06-14 PROCEDURE — 81002 URINALYSIS NONAUTO W/O SCOPE: CPT | Mod: PBBFAC | Performed by: OBSTETRICS & GYNECOLOGY

## 2021-06-14 PROCEDURE — 99999 PR PBB SHADOW E&M-EST. PATIENT-LVL III: CPT | Mod: PBBFAC,,, | Performed by: OBSTETRICS & GYNECOLOGY

## 2021-06-14 PROCEDURE — 99213 OFFICE O/P EST LOW 20 MIN: CPT | Mod: S$PBB,,, | Performed by: OBSTETRICS & GYNECOLOGY

## 2021-06-14 PROCEDURE — 87186 SC STD MICRODIL/AGAR DIL: CPT | Performed by: OBSTETRICS & GYNECOLOGY

## 2021-06-14 PROCEDURE — 87088 URINE BACTERIA CULTURE: CPT | Performed by: OBSTETRICS & GYNECOLOGY

## 2021-06-14 RX ORDER — TAMSULOSIN HYDROCHLORIDE 0.4 MG/1
0.4 CAPSULE ORAL DAILY
Qty: 7 CAPSULE | Refills: 0 | Status: SHIPPED | OUTPATIENT
Start: 2021-06-14 | End: 2021-07-28

## 2021-06-16 ENCOUNTER — TELEPHONE (OUTPATIENT)
Dept: OBSTETRICS AND GYNECOLOGY | Facility: CLINIC | Age: 66
End: 2021-06-16

## 2021-06-16 LAB — BACTERIA UR CULT: ABNORMAL

## 2021-06-17 ENCOUNTER — OFFICE VISIT (OUTPATIENT)
Dept: UROLOGY | Facility: CLINIC | Age: 66
End: 2021-06-17
Payer: MEDICAID

## 2021-06-17 VITALS
WEIGHT: 105.38 LBS | HEIGHT: 59 IN | DIASTOLIC BLOOD PRESSURE: 74 MMHG | BODY MASS INDEX: 21.24 KG/M2 | HEART RATE: 68 BPM | SYSTOLIC BLOOD PRESSURE: 152 MMHG

## 2021-06-17 DIAGNOSIS — N20.0 NEPHROLITHIASIS: Primary | ICD-10-CM

## 2021-06-17 DIAGNOSIS — R10.2 PELVIC PAIN IN FEMALE: ICD-10-CM

## 2021-06-17 DIAGNOSIS — N30.10 INTERSTITIAL CYSTITIS: ICD-10-CM

## 2021-06-17 DIAGNOSIS — M79.10 MYALGIA: ICD-10-CM

## 2021-06-17 LAB — POC RESIDUAL URINE VOLUME: 78 ML (ref 0–100)

## 2021-06-17 PROCEDURE — 99215 PR OFFICE/OUTPT VISIT, EST, LEVL V, 40-54 MIN: ICD-10-PCS | Mod: S$PBB,,, | Performed by: UROLOGY

## 2021-06-17 PROCEDURE — 99215 OFFICE O/P EST HI 40 MIN: CPT | Mod: S$PBB,,, | Performed by: UROLOGY

## 2021-06-17 PROCEDURE — 99999 PR PBB SHADOW E&M-EST. PATIENT-LVL III: ICD-10-PCS | Mod: PBBFAC,,, | Performed by: UROLOGY

## 2021-06-17 PROCEDURE — 51798 US URINE CAPACITY MEASURE: CPT | Mod: PBBFAC | Performed by: UROLOGY

## 2021-06-17 PROCEDURE — 99213 OFFICE O/P EST LOW 20 MIN: CPT | Mod: PBBFAC,25 | Performed by: UROLOGY

## 2021-06-17 PROCEDURE — 99999 PR PBB SHADOW E&M-EST. PATIENT-LVL III: CPT | Mod: PBBFAC,,, | Performed by: UROLOGY

## 2021-06-17 RX ORDER — SULFAMETHOXAZOLE AND TRIMETHOPRIM 800; 160 MG/1; MG/1
1 TABLET ORAL 2 TIMES DAILY
Qty: 14 TABLET | Refills: 0 | Status: SHIPPED | OUTPATIENT
Start: 2021-06-17 | End: 2021-06-24

## 2021-06-18 ENCOUNTER — TELEPHONE (OUTPATIENT)
Dept: UROLOGY | Facility: CLINIC | Age: 66
End: 2021-06-18

## 2021-06-18 DIAGNOSIS — N30.10 INTERSTITIAL CYSTITIS: Primary | ICD-10-CM

## 2021-06-18 DIAGNOSIS — N20.0 NEPHROLITHIASIS: ICD-10-CM

## 2021-06-18 DIAGNOSIS — M79.10 MYALGIA: ICD-10-CM

## 2021-06-22 ENCOUNTER — PATIENT MESSAGE (OUTPATIENT)
Dept: SURGERY | Facility: HOSPITAL | Age: 66
End: 2021-06-22

## 2021-06-24 ENCOUNTER — PATIENT MESSAGE (OUTPATIENT)
Dept: SURGERY | Facility: HOSPITAL | Age: 66
End: 2021-06-24

## 2021-06-24 DIAGNOSIS — N32.89 BLADDER SPASM: Primary | ICD-10-CM

## 2021-06-24 RX ORDER — OXYBUTYNIN CHLORIDE 15 MG/1
15 TABLET, EXTENDED RELEASE ORAL DAILY
Qty: 30 TABLET | Refills: 1 | Status: SHIPPED | OUTPATIENT
Start: 2021-06-24 | End: 2021-10-13

## 2021-06-25 ENCOUNTER — TELEPHONE (OUTPATIENT)
Dept: UROLOGY | Facility: CLINIC | Age: 66
End: 2021-06-25

## 2021-06-28 ENCOUNTER — TELEPHONE (OUTPATIENT)
Dept: UROLOGY | Facility: CLINIC | Age: 66
End: 2021-06-28

## 2021-06-30 ENCOUNTER — PATIENT MESSAGE (OUTPATIENT)
Dept: UROLOGY | Facility: CLINIC | Age: 66
End: 2021-06-30

## 2021-07-13 ENCOUNTER — PATIENT MESSAGE (OUTPATIENT)
Dept: UROLOGY | Facility: CLINIC | Age: 66
End: 2021-07-13

## 2021-07-14 ENCOUNTER — TELEPHONE (OUTPATIENT)
Dept: UROLOGY | Facility: CLINIC | Age: 66
End: 2021-07-14

## 2021-07-14 DIAGNOSIS — N32.89 BLADDER SPASM: ICD-10-CM

## 2021-07-14 DIAGNOSIS — R10.2 PELVIC PAIN IN FEMALE: ICD-10-CM

## 2021-07-14 DIAGNOSIS — N30.10 INTERSTITIAL CYSTITIS: Primary | ICD-10-CM

## 2021-07-15 ENCOUNTER — TELEPHONE (OUTPATIENT)
Dept: UROLOGY | Facility: CLINIC | Age: 66
End: 2021-07-15

## 2021-07-15 DIAGNOSIS — R10.9 BILATERAL FLANK PAIN: Primary | ICD-10-CM

## 2021-07-19 ENCOUNTER — TELEPHONE (OUTPATIENT)
Dept: OBSTETRICS AND GYNECOLOGY | Facility: CLINIC | Age: 66
End: 2021-07-19

## 2021-07-20 ENCOUNTER — ANESTHESIA (OUTPATIENT)
Dept: SURGERY | Facility: HOSPITAL | Age: 66
End: 2021-07-20
Payer: MEDICAID

## 2021-07-20 ENCOUNTER — HOSPITAL ENCOUNTER (OUTPATIENT)
Facility: HOSPITAL | Age: 66
Discharge: HOME | End: 2021-07-20
Attending: UROLOGY | Admitting: UROLOGY
Payer: MEDICAID

## 2021-07-20 ENCOUNTER — ANESTHESIA EVENT (OUTPATIENT)
Dept: SURGERY | Facility: HOSPITAL | Age: 66
End: 2021-07-20
Payer: MEDICAID

## 2021-07-20 VITALS
SYSTOLIC BLOOD PRESSURE: 148 MMHG | RESPIRATION RATE: 20 BRPM | OXYGEN SATURATION: 99 % | HEART RATE: 73 BPM | DIASTOLIC BLOOD PRESSURE: 72 MMHG | TEMPERATURE: 98 F

## 2021-07-20 DIAGNOSIS — N30.10 INTERSTITIAL CYSTITIS: Primary | ICD-10-CM

## 2021-07-20 PROCEDURE — 20552 NJX 1/MLT TRIGGER POINT 1/2: CPT | Mod: ,,, | Performed by: UROLOGY

## 2021-07-20 PROCEDURE — 00910 ANES TRANSURETHRAL PX NOS: CPT | Performed by: UROLOGY

## 2021-07-20 PROCEDURE — 20552 PR INJECT TRIGGER POINT, 1 OR 2: ICD-10-PCS | Mod: ,,, | Performed by: UROLOGY

## 2021-07-20 PROCEDURE — D9220A PRA ANESTHESIA: Mod: ANES,,, | Performed by: ANESTHESIOLOGY

## 2021-07-20 PROCEDURE — D9220A PRA ANESTHESIA: ICD-10-PCS | Mod: ANES,,, | Performed by: ANESTHESIOLOGY

## 2021-07-20 PROCEDURE — D9220A PRA ANESTHESIA: ICD-10-PCS | Mod: CRNA,,, | Performed by: NURSE ANESTHETIST, CERTIFIED REGISTERED

## 2021-07-20 PROCEDURE — 63600175 PHARM REV CODE 636 W HCPCS: Performed by: UROLOGY

## 2021-07-20 PROCEDURE — 71000044 HC DOSC ROUTINE RECOVERY FIRST HOUR: Performed by: UROLOGY

## 2021-07-20 PROCEDURE — 37000009 HC ANESTHESIA EA ADD 15 MINS: Performed by: UROLOGY

## 2021-07-20 PROCEDURE — 52000 PR CYSTOURETHROSCOPY: ICD-10-PCS | Mod: 59,,, | Performed by: UROLOGY

## 2021-07-20 PROCEDURE — 25000003 PHARM REV CODE 250: Performed by: UROLOGY

## 2021-07-20 PROCEDURE — 25000003 PHARM REV CODE 250: Performed by: NURSE ANESTHETIST, CERTIFIED REGISTERED

## 2021-07-20 PROCEDURE — 36000706: Performed by: UROLOGY

## 2021-07-20 PROCEDURE — 36000707: Performed by: UROLOGY

## 2021-07-20 PROCEDURE — D9220A PRA ANESTHESIA: Mod: CRNA,,, | Performed by: NURSE ANESTHETIST, CERTIFIED REGISTERED

## 2021-07-20 PROCEDURE — 63600175 PHARM REV CODE 636 W HCPCS: Performed by: NURSE ANESTHETIST, CERTIFIED REGISTERED

## 2021-07-20 PROCEDURE — 37000008 HC ANESTHESIA 1ST 15 MINUTES: Performed by: UROLOGY

## 2021-07-20 PROCEDURE — 71000015 HC POSTOP RECOV 1ST HR: Performed by: UROLOGY

## 2021-07-20 PROCEDURE — 52000 CYSTOURETHROSCOPY: CPT | Mod: 59,,, | Performed by: UROLOGY

## 2021-07-20 RX ORDER — SODIUM CHLORIDE 9 MG/ML
INJECTION, SOLUTION INTRAVENOUS CONTINUOUS PRN
Status: DISCONTINUED | OUTPATIENT
Start: 2021-07-20 | End: 2021-07-20

## 2021-07-20 RX ORDER — MIDAZOLAM HYDROCHLORIDE 5 MG/ML
INJECTION INTRAMUSCULAR; INTRAVENOUS
Status: DISCONTINUED | OUTPATIENT
Start: 2021-07-20 | End: 2021-07-20

## 2021-07-20 RX ORDER — CEFAZOLIN SODIUM 1 G/3ML
2 INJECTION, POWDER, FOR SOLUTION INTRAMUSCULAR; INTRAVENOUS
Status: DISCONTINUED | OUTPATIENT
Start: 2021-07-20 | End: 2021-07-20 | Stop reason: HOSPADM

## 2021-07-20 RX ORDER — BUPIVACAINE HYDROCHLORIDE 2.5 MG/ML
INJECTION, SOLUTION EPIDURAL; INFILTRATION; INTRACAUDAL
Status: DISCONTINUED | OUTPATIENT
Start: 2021-07-20 | End: 2021-07-20 | Stop reason: HOSPADM

## 2021-07-20 RX ORDER — BUPIVACAINE HYDROCHLORIDE 5 MG/ML
INJECTION, SOLUTION EPIDURAL; INTRACAUDAL
Status: DISCONTINUED
Start: 2021-07-20 | End: 2021-07-20 | Stop reason: HOSPADM

## 2021-07-20 RX ORDER — TRAMADOL HYDROCHLORIDE 50 MG/1
50 TABLET ORAL EVERY 6 HOURS
Qty: 5 TABLET | Refills: 0 | Status: SHIPPED | OUTPATIENT
Start: 2021-07-20 | End: 2021-07-28 | Stop reason: ALTCHOICE

## 2021-07-20 RX ORDER — PROPOFOL 10 MG/ML
VIAL (ML) INTRAVENOUS CONTINUOUS PRN
Status: DISCONTINUED | OUTPATIENT
Start: 2021-07-20 | End: 2021-07-20

## 2021-07-20 RX ORDER — OXYCODONE HYDROCHLORIDE 5 MG/1
5 TABLET ORAL
Status: DISCONTINUED | OUTPATIENT
Start: 2021-07-20 | End: 2021-07-20 | Stop reason: HOSPADM

## 2021-07-20 RX ORDER — LIDOCAINE HYDROCHLORIDE 10 MG/ML
INJECTION, SOLUTION EPIDURAL; INFILTRATION; INTRACAUDAL; PERINEURAL
Status: DISCONTINUED
Start: 2021-07-20 | End: 2021-07-20 | Stop reason: HOSPADM

## 2021-07-20 RX ORDER — PROPOFOL 10 MG/ML
VIAL (ML) INTRAVENOUS
Status: DISCONTINUED | OUTPATIENT
Start: 2021-07-20 | End: 2021-07-20

## 2021-07-20 RX ORDER — FENTANYL CITRATE 50 UG/ML
25 INJECTION, SOLUTION INTRAMUSCULAR; INTRAVENOUS EVERY 5 MIN PRN
Status: DISCONTINUED | OUTPATIENT
Start: 2021-07-20 | End: 2021-07-20 | Stop reason: HOSPADM

## 2021-07-20 RX ORDER — LIDOCAINE HYDROCHLORIDE 20 MG/ML
INJECTION INTRAVENOUS
Status: DISCONTINUED | OUTPATIENT
Start: 2021-07-20 | End: 2021-07-20

## 2021-07-20 RX ORDER — FENTANYL CITRATE 50 UG/ML
INJECTION, SOLUTION INTRAMUSCULAR; INTRAVENOUS
Status: DISCONTINUED
Start: 2021-07-20 | End: 2021-07-20 | Stop reason: WASHOUT

## 2021-07-20 RX ORDER — PROCHLORPERAZINE EDISYLATE 5 MG/ML
5 INJECTION INTRAMUSCULAR; INTRAVENOUS EVERY 30 MIN PRN
Status: DISCONTINUED | OUTPATIENT
Start: 2021-07-20 | End: 2021-07-20 | Stop reason: HOSPADM

## 2021-07-20 RX ORDER — LIDOCAINE HYDROCHLORIDE 10 MG/ML
INJECTION, SOLUTION EPIDURAL; INFILTRATION; INTRACAUDAL; PERINEURAL
Status: DISCONTINUED | OUTPATIENT
Start: 2021-07-20 | End: 2021-07-20 | Stop reason: HOSPADM

## 2021-07-20 RX ORDER — HYDROMORPHONE HYDROCHLORIDE 1 MG/ML
0.2 INJECTION, SOLUTION INTRAMUSCULAR; INTRAVENOUS; SUBCUTANEOUS EVERY 5 MIN PRN
Status: DISCONTINUED | OUTPATIENT
Start: 2021-07-20 | End: 2021-07-20 | Stop reason: HOSPADM

## 2021-07-20 RX ADMIN — SODIUM CHLORIDE: 0.9 INJECTION, SOLUTION INTRAVENOUS at 02:07

## 2021-07-20 RX ADMIN — MIDAZOLAM 2 MG: 5 INJECTION INTRAMUSCULAR; INTRAVENOUS at 02:07

## 2021-07-20 RX ADMIN — PROPOFOL 50 MG: 10 INJECTION, EMULSION INTRAVENOUS at 02:07

## 2021-07-20 RX ADMIN — PROPOFOL 100 MCG/KG/MIN: 10 INJECTION, EMULSION INTRAVENOUS at 02:07

## 2021-07-20 RX ADMIN — DEXTROSE 1 G: 50 INJECTION, SOLUTION INTRAVENOUS at 02:07

## 2021-07-20 RX ADMIN — LIDOCAINE HYDROCHLORIDE 40 MG: 20 INJECTION, SOLUTION INTRAVENOUS at 02:07

## 2021-07-26 ENCOUNTER — TELEPHONE (OUTPATIENT)
Dept: FAMILY MEDICINE | Facility: CLINIC | Age: 66
End: 2021-07-26

## 2021-07-27 ENCOUNTER — TELEPHONE (OUTPATIENT)
Dept: FAMILY MEDICINE | Facility: CLINIC | Age: 66
End: 2021-07-27

## 2021-07-27 DIAGNOSIS — R10.2 PELVIC PAIN: Primary | ICD-10-CM

## 2021-07-28 ENCOUNTER — TELEPHONE (OUTPATIENT)
Dept: SURGERY | Facility: CLINIC | Age: 66
End: 2021-07-28

## 2021-07-28 ENCOUNTER — OFFICE VISIT (OUTPATIENT)
Dept: UROLOGY | Facility: CLINIC | Age: 66
End: 2021-07-28
Payer: MEDICAID

## 2021-07-28 VITALS
WEIGHT: 107.81 LBS | BODY MASS INDEX: 21.73 KG/M2 | HEIGHT: 59 IN | HEART RATE: 68 BPM | SYSTOLIC BLOOD PRESSURE: 148 MMHG | DIASTOLIC BLOOD PRESSURE: 67 MMHG

## 2021-07-28 DIAGNOSIS — Z98.890 POST-OPERATIVE STATE: Primary | ICD-10-CM

## 2021-07-28 PROCEDURE — 81002 URINALYSIS NONAUTO W/O SCOPE: CPT | Mod: PBBFAC | Performed by: UROLOGY

## 2021-07-28 PROCEDURE — 99999 PR PBB SHADOW E&M-EST. PATIENT-LVL III: CPT | Mod: PBBFAC,,, | Performed by: UROLOGY

## 2021-07-28 PROCEDURE — 99024 PR POST-OP FOLLOW-UP VISIT: ICD-10-PCS | Mod: ,,, | Performed by: UROLOGY

## 2021-07-28 PROCEDURE — 99213 OFFICE O/P EST LOW 20 MIN: CPT | Mod: PBBFAC | Performed by: UROLOGY

## 2021-07-28 PROCEDURE — 99024 POSTOP FOLLOW-UP VISIT: CPT | Mod: ,,, | Performed by: UROLOGY

## 2021-07-28 PROCEDURE — 99999 PR PBB SHADOW E&M-EST. PATIENT-LVL III: ICD-10-PCS | Mod: PBBFAC,,, | Performed by: UROLOGY

## 2021-07-29 ENCOUNTER — TELEPHONE (OUTPATIENT)
Dept: UROLOGY | Facility: CLINIC | Age: 66
End: 2021-07-29

## 2021-07-29 ENCOUNTER — OFFICE VISIT (OUTPATIENT)
Dept: SURGERY | Facility: CLINIC | Age: 66
End: 2021-07-29
Payer: MEDICAID

## 2021-07-29 ENCOUNTER — LAB VISIT (OUTPATIENT)
Dept: LAB | Facility: HOSPITAL | Age: 66
End: 2021-07-29
Payer: MEDICAID

## 2021-07-29 VITALS
DIASTOLIC BLOOD PRESSURE: 65 MMHG | SYSTOLIC BLOOD PRESSURE: 144 MMHG | BODY MASS INDEX: 21.55 KG/M2 | HEART RATE: 64 BPM | WEIGHT: 106.88 LBS | HEIGHT: 59 IN

## 2021-07-29 DIAGNOSIS — R10.9 ABDOMINAL PAIN, UNSPECIFIED ABDOMINAL LOCATION: ICD-10-CM

## 2021-07-29 LAB
BASOPHILS # BLD AUTO: 0.07 K/UL (ref 0–0.2)
BASOPHILS NFR BLD: 1.3 % (ref 0–1.9)
CREAT SERPL-MCNC: 0.6 MG/DL (ref 0.5–1.4)
CRP SERPL-MCNC: 0.9 MG/L (ref 0–8.2)
DIFFERENTIAL METHOD: NORMAL
EOSINOPHIL # BLD AUTO: 0.1 K/UL (ref 0–0.5)
EOSINOPHIL NFR BLD: 2.2 % (ref 0–8)
ERYTHROCYTE [DISTWIDTH] IN BLOOD BY AUTOMATED COUNT: 12.4 % (ref 11.5–14.5)
EST. GFR  (AFRICAN AMERICAN): >60 ML/MIN/1.73 M^2
EST. GFR  (NON AFRICAN AMERICAN): >60 ML/MIN/1.73 M^2
HCT VFR BLD AUTO: 42.8 % (ref 37–48.5)
HGB BLD-MCNC: 13.7 G/DL (ref 12–16)
IMM GRANULOCYTES # BLD AUTO: 0.01 K/UL (ref 0–0.04)
IMM GRANULOCYTES NFR BLD AUTO: 0.2 % (ref 0–0.5)
LYMPHOCYTES # BLD AUTO: 1.9 K/UL (ref 1–4.8)
LYMPHOCYTES NFR BLD: 34.6 % (ref 18–48)
MCH RBC QN AUTO: 28.6 PG (ref 27–31)
MCHC RBC AUTO-ENTMCNC: 32 G/DL (ref 32–36)
MCV RBC AUTO: 89 FL (ref 82–98)
MONOCYTES # BLD AUTO: 0.7 K/UL (ref 0.3–1)
MONOCYTES NFR BLD: 12 % (ref 4–15)
NEUTROPHILS # BLD AUTO: 2.7 K/UL (ref 1.8–7.7)
NEUTROPHILS NFR BLD: 49.7 % (ref 38–73)
NRBC BLD-RTO: 0 /100 WBC
PLATELET # BLD AUTO: 335 K/UL (ref 150–450)
PMV BLD AUTO: 9.8 FL (ref 9.2–12.9)
RBC # BLD AUTO: 4.79 M/UL (ref 4–5.4)
WBC # BLD AUTO: 5.43 K/UL (ref 3.9–12.7)

## 2021-07-29 PROCEDURE — 82565 ASSAY OF CREATININE: CPT

## 2021-07-29 PROCEDURE — 99999 PR PBB SHADOW E&M-EST. PATIENT-LVL III: CPT | Mod: PBBFAC,,, | Performed by: COLON & RECTAL SURGERY

## 2021-07-29 PROCEDURE — 36415 COLL VENOUS BLD VENIPUNCTURE: CPT

## 2021-07-29 PROCEDURE — 99204 PR OFFICE/OUTPT VISIT, NEW, LEVL IV, 45-59 MIN: ICD-10-PCS | Mod: S$PBB,,, | Performed by: COLON & RECTAL SURGERY

## 2021-07-29 PROCEDURE — 85025 COMPLETE CBC W/AUTO DIFF WBC: CPT

## 2021-07-29 PROCEDURE — 99999 PR PBB SHADOW E&M-EST. PATIENT-LVL III: ICD-10-PCS | Mod: PBBFAC,,, | Performed by: COLON & RECTAL SURGERY

## 2021-07-29 PROCEDURE — 99204 OFFICE O/P NEW MOD 45 MIN: CPT | Mod: S$PBB,,, | Performed by: COLON & RECTAL SURGERY

## 2021-07-29 PROCEDURE — 99213 OFFICE O/P EST LOW 20 MIN: CPT | Mod: PBBFAC | Performed by: COLON & RECTAL SURGERY

## 2021-07-29 PROCEDURE — 86140 C-REACTIVE PROTEIN: CPT

## 2021-08-03 ENCOUNTER — TELEPHONE (OUTPATIENT)
Dept: SURGERY | Facility: CLINIC | Age: 66
End: 2021-08-03

## 2021-08-09 ENCOUNTER — TELEPHONE (OUTPATIENT)
Dept: SURGERY | Facility: CLINIC | Age: 66
End: 2021-08-09

## 2021-08-09 ENCOUNTER — PATIENT MESSAGE (OUTPATIENT)
Dept: SURGERY | Facility: CLINIC | Age: 66
End: 2021-08-09

## 2021-08-10 ENCOUNTER — TELEPHONE (OUTPATIENT)
Dept: SURGERY | Facility: CLINIC | Age: 66
End: 2021-08-10

## 2021-08-10 ENCOUNTER — PATIENT MESSAGE (OUTPATIENT)
Dept: SURGERY | Facility: CLINIC | Age: 66
End: 2021-08-10

## 2021-08-10 DIAGNOSIS — R10.9 ABDOMINAL PAIN, UNSPECIFIED ABDOMINAL LOCATION: ICD-10-CM

## 2021-08-16 ENCOUNTER — TELEPHONE (OUTPATIENT)
Dept: SURGERY | Facility: HOSPITAL | Age: 66
End: 2021-08-16

## 2021-08-18 ENCOUNTER — PATIENT MESSAGE (OUTPATIENT)
Dept: INTERNAL MEDICINE | Facility: CLINIC | Age: 66
End: 2021-08-18

## 2021-08-19 ENCOUNTER — PATIENT MESSAGE (OUTPATIENT)
Dept: INTERNAL MEDICINE | Facility: CLINIC | Age: 66
End: 2021-08-19

## 2021-08-19 DIAGNOSIS — Z00.00 ANNUAL PHYSICAL EXAM: Primary | ICD-10-CM

## 2021-08-19 DIAGNOSIS — E53.8 INADEQUATE VITAMIN B12 INTAKE: ICD-10-CM

## 2021-08-19 DIAGNOSIS — I10 ESSENTIAL HYPERTENSION: ICD-10-CM

## 2021-10-01 DIAGNOSIS — I10 ESSENTIAL HYPERTENSION: ICD-10-CM

## 2021-10-01 RX ORDER — AMLODIPINE BESYLATE 5 MG/1
5 TABLET ORAL NIGHTLY
Qty: 90 TABLET | Refills: 0 | Status: SHIPPED | OUTPATIENT
Start: 2021-10-01 | End: 2022-01-13

## 2021-10-13 ENCOUNTER — OFFICE VISIT (OUTPATIENT)
Dept: OBSTETRICS AND GYNECOLOGY | Facility: CLINIC | Age: 66
End: 2021-10-13
Payer: MEDICARE

## 2021-10-13 VITALS
DIASTOLIC BLOOD PRESSURE: 70 MMHG | WEIGHT: 104.5 LBS | HEIGHT: 59 IN | BODY MASS INDEX: 21.07 KG/M2 | SYSTOLIC BLOOD PRESSURE: 118 MMHG

## 2021-10-13 DIAGNOSIS — M85.80 OSTEOPENIA AFTER MENOPAUSE: ICD-10-CM

## 2021-10-13 DIAGNOSIS — Z78.0 ASYMPTOMATIC MENOPAUSE: ICD-10-CM

## 2021-10-13 DIAGNOSIS — Z78.0 OSTEOPENIA AFTER MENOPAUSE: ICD-10-CM

## 2021-10-13 DIAGNOSIS — Z12.31 BREAST CANCER SCREENING BY MAMMOGRAM: ICD-10-CM

## 2021-10-13 DIAGNOSIS — Z01.419 ENCOUNTER FOR ANNUAL ROUTINE GYNECOLOGICAL EXAMINATION: Primary | ICD-10-CM

## 2021-10-13 PROCEDURE — 99213 OFFICE O/P EST LOW 20 MIN: CPT | Mod: PBBFAC,PN | Performed by: OBSTETRICS & GYNECOLOGY

## 2021-10-13 PROCEDURE — G0101 CA SCREEN;PELVIC/BREAST EXAM: HCPCS | Mod: S$GLB,,, | Performed by: OBSTETRICS & GYNECOLOGY

## 2021-10-13 PROCEDURE — 99999 PR PBB SHADOW E&M-EST. PATIENT-LVL III: CPT | Mod: PBBFAC,,, | Performed by: OBSTETRICS & GYNECOLOGY

## 2021-10-13 PROCEDURE — 99999 PR PBB SHADOW E&M-EST. PATIENT-LVL III: ICD-10-PCS | Mod: PBBFAC,,, | Performed by: OBSTETRICS & GYNECOLOGY

## 2021-10-13 PROCEDURE — G0101 PR CA SCREEN;PELVIC/BREAST EXAM: ICD-10-PCS | Mod: S$GLB,,, | Performed by: OBSTETRICS & GYNECOLOGY

## 2021-10-18 ENCOUNTER — OFFICE VISIT (OUTPATIENT)
Dept: INTERNAL MEDICINE | Facility: CLINIC | Age: 66
End: 2021-10-18
Payer: MEDICARE

## 2021-10-18 VITALS
HEIGHT: 60 IN | TEMPERATURE: 98 F | RESPIRATION RATE: 18 BRPM | OXYGEN SATURATION: 98 % | SYSTOLIC BLOOD PRESSURE: 130 MMHG | DIASTOLIC BLOOD PRESSURE: 68 MMHG | WEIGHT: 104.94 LBS | HEART RATE: 66 BPM | BODY MASS INDEX: 20.6 KG/M2

## 2021-10-18 DIAGNOSIS — M85.89 OSTEOPENIA OF MULTIPLE SITES: ICD-10-CM

## 2021-10-18 DIAGNOSIS — Z00.00 ANNUAL PHYSICAL EXAM: ICD-10-CM

## 2021-10-18 DIAGNOSIS — E53.8 INADEQUATE VITAMIN B12 INTAKE: ICD-10-CM

## 2021-10-18 DIAGNOSIS — Z87.442 HISTORY OF NEPHROLITHIASIS: ICD-10-CM

## 2021-10-18 DIAGNOSIS — K13.79 RECURRENT MOUTH ULCERATION: ICD-10-CM

## 2021-10-18 DIAGNOSIS — I10 ESSENTIAL HYPERTENSION: Primary | ICD-10-CM

## 2021-10-18 DIAGNOSIS — N30.10 INTERSTITIAL CYSTITIS: ICD-10-CM

## 2021-10-18 PROCEDURE — 99999 PR PBB SHADOW E&M-EST. PATIENT-LVL III: ICD-10-PCS | Mod: PBBFAC,,, | Performed by: INTERNAL MEDICINE

## 2021-10-18 PROCEDURE — 90471 IMMUNIZATION ADMIN: CPT | Mod: PBBFAC,PO

## 2021-10-18 PROCEDURE — 99999 PR PBB SHADOW E&M-EST. PATIENT-LVL III: CPT | Mod: PBBFAC,,, | Performed by: INTERNAL MEDICINE

## 2021-10-18 PROCEDURE — 99213 OFFICE O/P EST LOW 20 MIN: CPT | Mod: PBBFAC,PO | Performed by: INTERNAL MEDICINE

## 2021-10-18 PROCEDURE — 99214 PR OFFICE/OUTPT VISIT, EST, LEVL IV, 30-39 MIN: ICD-10-PCS | Mod: S$GLB,,, | Performed by: INTERNAL MEDICINE

## 2021-10-18 PROCEDURE — 99214 OFFICE O/P EST MOD 30 MIN: CPT | Mod: S$GLB,,, | Performed by: INTERNAL MEDICINE

## 2021-10-18 RX ORDER — CYANOCOBALAMIN 1000 UG/ML
1000 INJECTION, SOLUTION INTRAMUSCULAR; SUBCUTANEOUS
Qty: 1 ML | Refills: 11 | Status: SHIPPED | OUTPATIENT
Start: 2021-10-18 | End: 2022-10-24 | Stop reason: SDUPTHER

## 2021-10-18 RX ORDER — TRIAMCINOLONE ACETONIDE 1 MG/G
PASTE DENTAL 2 TIMES DAILY
Qty: 5 G | Refills: 5 | Status: SHIPPED | OUTPATIENT
Start: 2021-10-18 | End: 2022-10-24 | Stop reason: SDUPTHER

## 2021-10-19 ENCOUNTER — TELEPHONE (OUTPATIENT)
Dept: INTERNAL MEDICINE | Facility: CLINIC | Age: 66
End: 2021-10-19

## 2021-10-29 ENCOUNTER — PATIENT OUTREACH (OUTPATIENT)
Dept: ADMINISTRATIVE | Facility: HOSPITAL | Age: 66
End: 2021-10-29
Payer: MEDICAID

## 2022-01-09 ENCOUNTER — PATIENT MESSAGE (OUTPATIENT)
Dept: INTERNAL MEDICINE | Facility: CLINIC | Age: 67
End: 2022-01-09
Payer: MEDICARE

## 2022-01-09 DIAGNOSIS — R10.9 FLANK PAIN: ICD-10-CM

## 2022-01-09 DIAGNOSIS — R35.0 FREQUENT URINATION: Primary | ICD-10-CM

## 2022-01-09 DIAGNOSIS — I10 ESSENTIAL HYPERTENSION: ICD-10-CM

## 2022-01-09 NOTE — TELEPHONE ENCOUNTER
No new care gaps identified.  Powered by [email or web address]. Reference number: [id number].   1/09/2022 12:16:05 PM CST

## 2022-01-10 ENCOUNTER — PATIENT MESSAGE (OUTPATIENT)
Dept: INTERNAL MEDICINE | Facility: CLINIC | Age: 67
End: 2022-01-10
Payer: MEDICARE

## 2022-01-10 ENCOUNTER — TELEPHONE (OUTPATIENT)
Dept: INTERNAL MEDICINE | Facility: CLINIC | Age: 67
End: 2022-01-10
Payer: MEDICARE

## 2022-01-10 DIAGNOSIS — R35.0 FREQUENT URINATION: Primary | ICD-10-CM

## 2022-01-10 NOTE — TELEPHONE ENCOUNTER
----- Message from Emani Sweeney sent at 1/10/2022  4:49 PM CST -----  Regarding: [facility] orders  Good afternoon,    Mrs Francis is scheduled to have labs done here tomorrow but her insurance is out of network with [facility]. Can you please fax me [facility] orders to [phone]?    Thank you,  Emani

## 2022-01-10 NOTE — TELEPHONE ENCOUNTER
Moved all the labs to [facility] [facility].    Except the UA.  It won't move    Please reenter for [facility]  Pt going to the lab in the AM    Pt notified and verbalized understanding

## 2022-01-12 LAB
APPEARANCE UR: CLEAR
BASOPHILS # BLD AUTO: 70 CELLS/UL (ref 0–200)
BASOPHILS NFR BLD AUTO: 1.2 %
BILIRUB UR QL STRIP: NEGATIVE
BUN SERPL-MCNC: 12 MG/DL (ref 7–25)
BUN/CREAT SERPL: ABNORMAL (CALC) (ref 6–22)
CALCIUM SERPL-MCNC: 9.6 MG/DL (ref 8.6–10.4)
CHLORIDE SERPL-SCNC: 101 MMOL/L (ref 98–110)
CO2 SERPL-SCNC: 28 MMOL/L (ref 20–32)
COLOR UR: YELLOW
CREAT SERPL-MCNC: 0.61 MG/DL (ref 0.5–0.99)
EOSINOPHIL # BLD AUTO: 133 CELLS/UL (ref 15–500)
EOSINOPHIL NFR BLD AUTO: 2.3 %
ERYTHROCYTE [DISTWIDTH] IN BLOOD BY AUTOMATED COUNT: 12.2 % (ref 11–15)
GLUCOSE SERPL-MCNC: 112 MG/DL (ref 65–99)
GLUCOSE UR QL STRIP: NEGATIVE
HCT VFR BLD AUTO: 39 % (ref 35–45)
HGB BLD-MCNC: 12.9 G/DL (ref 11.7–15.5)
HGB UR QL STRIP: NEGATIVE
KETONES UR QL STRIP: NEGATIVE
LEUKOCYTE ESTERASE UR QL STRIP: NEGATIVE
LYMPHOCYTES # BLD AUTO: 1815 CELLS/UL (ref 850–3900)
LYMPHOCYTES NFR BLD AUTO: 31.3 %
MCH RBC QN AUTO: 29.3 PG (ref 27–33)
MCHC RBC AUTO-ENTMCNC: 33.1 G/DL (ref 32–36)
MCV RBC AUTO: 88.4 FL (ref 80–100)
MONOCYTES # BLD AUTO: 615 CELLS/UL (ref 200–950)
MONOCYTES NFR BLD AUTO: 10.6 %
NEUTROPHILS # BLD AUTO: 3167 CELLS/UL (ref 1500–7800)
NEUTROPHILS NFR BLD AUTO: 54.6 %
NITRITE UR QL STRIP: NEGATIVE
PH UR STRIP: 7 [PH] (ref 5–8)
PLATELET # BLD AUTO: 325 THOUSAND/UL (ref 140–400)
PMV BLD REES-ECKER: 9.7 FL (ref 7.5–12.5)
POTASSIUM SERPL-SCNC: 4.1 MMOL/L (ref 3.5–5.3)
PROT UR QL STRIP: NEGATIVE
RBC # BLD AUTO: 4.41 MILLION/UL (ref 3.8–5.1)
SODIUM SERPL-SCNC: 135 MMOL/L (ref 135–146)
SP GR UR STRIP: 1.01 (ref 1–1.03)
WBC # BLD AUTO: 5.8 THOUSAND/UL (ref 3.8–10.8)

## 2022-01-13 RX ORDER — AMLODIPINE BESYLATE 5 MG/1
TABLET ORAL
Qty: 90 TABLET | Refills: 3 | Status: SHIPPED | OUTPATIENT
Start: 2022-01-13 | End: 2023-01-24 | Stop reason: SDUPTHER

## 2022-01-13 NOTE — TELEPHONE ENCOUNTER
Refill Authorization Note   Penny Cervantes  is requesting a refill authorization.  Brief Assessment and Rationale for Refill:  Approve     Medication Therapy Plan:       Medication Reconciliation Completed: No   Comments:   --->Care Gap information included below if applicable.       Requested Prescriptions   Pending Prescriptions Disp Refills    amLODIPine (NORVASC) 5 MG tablet [Pharmacy Med Name: AMLODIPINE BESYLATE 5MG TABLETS] 90 tablet 3     Sig: TAKE 1 TABLET(5 MG) BY MOUTH EVERY EVENING       Cardiovascular:  Calcium Channel Blockers Passed - 1/9/2022 12:15 PM        Passed - Patient is at least 18 years old        Passed - Last BP in normal range within 360 days     BP Readings from Last 1 Encounters:   10/18/21 130/68               Passed - Valid encounter within last 15 months     Recent Visits  Date Type Provider Dept   10/18/21 Office Visit Kate Gil MD [facility] Internal Medicine   07/28/20 Office Visit Jitendra Samaniego MD [facility]   Showing recent visits within past 720 days and meeting all other requirements  Future Appointments  No visits were found meeting these conditions.  Showing future appointments within next 150 days and meeting all other requirements                    Appointments  past 12m or future 3m with PCP    Date Provider   Last Visit   10/18/2021 Kate Gil MD   Next Visit   1/10/2022 Kate Gil MD   ED visits in past 90 days: 0     Note composed:10:00 AM 01/13/2022

## 2022-02-22 ENCOUNTER — PATIENT MESSAGE (OUTPATIENT)
Dept: INTERNAL MEDICINE | Facility: CLINIC | Age: 67
End: 2022-02-22
Payer: MEDICARE

## 2022-02-22 RX ORDER — AMOXICILLIN AND CLAVULANATE POTASSIUM 875; 125 MG/1; MG/1
1 TABLET, FILM COATED ORAL 2 TIMES DAILY
Qty: 20 TABLET | Refills: 0 | Status: SHIPPED | OUTPATIENT
Start: 2022-02-22 | End: 2022-02-22 | Stop reason: SDUPTHER

## 2022-02-22 RX ORDER — AMOXICILLIN AND CLAVULANATE POTASSIUM 875; 125 MG/1; MG/1
1 TABLET, FILM COATED ORAL 2 TIMES DAILY
Qty: 20 TABLET | Refills: 0 | Status: SHIPPED | OUTPATIENT
Start: 2022-02-22 | End: 2022-03-04

## 2022-02-22 NOTE — TELEPHONE ENCOUNTER
Repeat Covid testing.    Due to sinus symptoms and lack of availability with scheduling, will give her Augmentin 875mg bid for 10 days.    Let us know of the Covid results.

## 2022-07-12 ENCOUNTER — PATIENT MESSAGE (OUTPATIENT)
Dept: INTERNAL MEDICINE | Facility: CLINIC | Age: 67
End: 2022-07-12
Payer: MEDICARE

## 2022-07-12 DIAGNOSIS — R10.2 SUPRAPUBIC PAIN: Primary | ICD-10-CM

## 2022-07-12 NOTE — TELEPHONE ENCOUNTER
Please make sure that she does not have fever or chills and has no nausea.    Ordered UA, culture.

## 2022-07-13 ENCOUNTER — PATIENT MESSAGE (OUTPATIENT)
Dept: INTERNAL MEDICINE | Facility: CLINIC | Age: 67
End: 2022-07-13
Payer: MEDICARE

## 2022-07-14 ENCOUNTER — PATIENT MESSAGE (OUTPATIENT)
Dept: INTERNAL MEDICINE | Facility: CLINIC | Age: 67
End: 2022-07-14
Payer: MEDICARE

## 2022-10-24 ENCOUNTER — OFFICE VISIT (OUTPATIENT)
Dept: INTERNAL MEDICINE | Facility: CLINIC | Age: 67
End: 2022-10-24
Payer: MEDICARE

## 2022-10-24 ENCOUNTER — HOSPITAL ENCOUNTER (OUTPATIENT)
Dept: RADIOLOGY | Facility: HOSPITAL | Age: 67
Discharge: HOME | End: 2022-10-24
Attending: INTERNAL MEDICINE
Payer: MEDICARE

## 2022-10-24 VITALS
RESPIRATION RATE: 18 BRPM | WEIGHT: 99 LBS | OXYGEN SATURATION: 98 % | TEMPERATURE: 98 F | HEART RATE: 62 BPM | HEIGHT: 60 IN | DIASTOLIC BLOOD PRESSURE: 58 MMHG | BODY MASS INDEX: 19.44 KG/M2 | SYSTOLIC BLOOD PRESSURE: 106 MMHG

## 2022-10-24 DIAGNOSIS — R10.13 EPIGASTRIC PAIN: ICD-10-CM

## 2022-10-24 DIAGNOSIS — Z12.11 SCREEN FOR COLON CANCER: ICD-10-CM

## 2022-10-24 DIAGNOSIS — I10 ESSENTIAL HYPERTENSION: Primary | ICD-10-CM

## 2022-10-24 DIAGNOSIS — Z87.442 HISTORY OF NEPHROLITHIASIS: ICD-10-CM

## 2022-10-24 DIAGNOSIS — R10.11 RIGHT UPPER QUADRANT ABDOMINAL PAIN: ICD-10-CM

## 2022-10-24 DIAGNOSIS — K13.79 RECURRENT MOUTH ULCERATION: ICD-10-CM

## 2022-10-24 DIAGNOSIS — R73.01 ELEVATED FASTING GLUCOSE: ICD-10-CM

## 2022-10-24 DIAGNOSIS — Z12.31 ENCOUNTER FOR SCREENING MAMMOGRAM FOR HIGH-RISK PATIENT: ICD-10-CM

## 2022-10-24 DIAGNOSIS — Z00.00 ANNUAL PHYSICAL EXAM: ICD-10-CM

## 2022-10-24 DIAGNOSIS — E53.8 INADEQUATE VITAMIN B12 INTAKE: ICD-10-CM

## 2022-10-24 PROCEDURE — 1159F PR MEDICATION LIST DOCUMENTED IN MEDICAL RECORD: ICD-10-PCS | Mod: CPTII,S$GLB,, | Performed by: INTERNAL MEDICINE

## 2022-10-24 PROCEDURE — 74018 RADEX ABDOMEN 1 VIEW: CPT | Mod: 26,,, | Performed by: INTERNAL MEDICINE

## 2022-10-24 PROCEDURE — 90694 FLU VACCINE - QUADRIVALENT - ADJUVANTED: ICD-10-PCS | Mod: S$GLB,,, | Performed by: INTERNAL MEDICINE

## 2022-10-24 PROCEDURE — 99214 OFFICE O/P EST MOD 30 MIN: CPT | Mod: 25,S$GLB,, | Performed by: INTERNAL MEDICINE

## 2022-10-24 PROCEDURE — 3288F FALL RISK ASSESSMENT DOCD: CPT | Mod: CPTII,S$GLB,, | Performed by: INTERNAL MEDICINE

## 2022-10-24 PROCEDURE — 1159F MED LIST DOCD IN RCRD: CPT | Mod: CPTII,S$GLB,, | Performed by: INTERNAL MEDICINE

## 2022-10-24 PROCEDURE — 3074F SYST BP LT 130 MM HG: CPT | Mod: CPTII,S$GLB,, | Performed by: INTERNAL MEDICINE

## 2022-10-24 PROCEDURE — 90694 VACC AIIV4 NO PRSRV 0.5ML IM: CPT | Mod: S$GLB,,, | Performed by: INTERNAL MEDICINE

## 2022-10-24 PROCEDURE — 74018 RADEX ABDOMEN 1 VIEW: CPT | Mod: TC,PO

## 2022-10-24 PROCEDURE — 3078F PR MOST RECENT DIASTOLIC BLOOD PRESSURE < 80 MM HG: ICD-10-PCS | Mod: CPTII,S$GLB,, | Performed by: INTERNAL MEDICINE

## 2022-10-24 PROCEDURE — 74018 XR ABDOMEN AP 1 VIEW: ICD-10-PCS | Mod: 26,,, | Performed by: INTERNAL MEDICINE

## 2022-10-24 PROCEDURE — 1126F AMNT PAIN NOTED NONE PRSNT: CPT | Mod: CPTII,S$GLB,, | Performed by: INTERNAL MEDICINE

## 2022-10-24 PROCEDURE — G0008 FLU VACCINE - QUADRIVALENT - ADJUVANTED: ICD-10-PCS | Mod: S$GLB,,, | Performed by: INTERNAL MEDICINE

## 2022-10-24 PROCEDURE — 3074F PR MOST RECENT SYSTOLIC BLOOD PRESSURE < 130 MM HG: ICD-10-PCS | Mod: CPTII,S$GLB,, | Performed by: INTERNAL MEDICINE

## 2022-10-24 PROCEDURE — 1126F PR PAIN SEVERITY QUANTIFIED, NO PAIN PRESENT: ICD-10-PCS | Mod: CPTII,S$GLB,, | Performed by: INTERNAL MEDICINE

## 2022-10-24 PROCEDURE — 3078F DIAST BP <80 MM HG: CPT | Mod: CPTII,S$GLB,, | Performed by: INTERNAL MEDICINE

## 2022-10-24 PROCEDURE — 1101F PR PT FALLS ASSESS DOC 0-1 FALLS W/OUT INJ PAST YR: ICD-10-PCS | Mod: CPTII,S$GLB,, | Performed by: INTERNAL MEDICINE

## 2022-10-24 PROCEDURE — 1101F PT FALLS ASSESS-DOCD LE1/YR: CPT | Mod: CPTII,S$GLB,, | Performed by: INTERNAL MEDICINE

## 2022-10-24 PROCEDURE — 99999 PR PBB SHADOW E&M-EST. PATIENT-LVL V: ICD-10-PCS | Mod: PBBFAC,,, | Performed by: INTERNAL MEDICINE

## 2022-10-24 PROCEDURE — 1160F RVW MEDS BY RX/DR IN RCRD: CPT | Mod: CPTII,S$GLB,, | Performed by: INTERNAL MEDICINE

## 2022-10-24 PROCEDURE — 1160F PR REVIEW ALL MEDS BY PRESCRIBER/CLIN PHARMACIST DOCUMENTED: ICD-10-PCS | Mod: CPTII,S$GLB,, | Performed by: INTERNAL MEDICINE

## 2022-10-24 PROCEDURE — 4010F ACE/ARB THERAPY RXD/TAKEN: CPT | Mod: CPTII,S$GLB,, | Performed by: INTERNAL MEDICINE

## 2022-10-24 PROCEDURE — 4010F PR ACE/ARB THEARPY RXD/TAKEN: ICD-10-PCS | Mod: CPTII,S$GLB,, | Performed by: INTERNAL MEDICINE

## 2022-10-24 PROCEDURE — G0008 ADMIN INFLUENZA VIRUS VAC: HCPCS | Mod: S$GLB,,, | Performed by: INTERNAL MEDICINE

## 2022-10-24 PROCEDURE — 99214 PR OFFICE/OUTPT VISIT, EST, LEVL IV, 30-39 MIN: ICD-10-PCS | Mod: 25,S$GLB,, | Performed by: INTERNAL MEDICINE

## 2022-10-24 PROCEDURE — 99999 PR PBB SHADOW E&M-EST. PATIENT-LVL V: CPT | Mod: PBBFAC,,, | Performed by: INTERNAL MEDICINE

## 2022-10-24 PROCEDURE — 3288F PR FALLS RISK ASSESSMENT DOCUMENTED: ICD-10-PCS | Mod: CPTII,S$GLB,, | Performed by: INTERNAL MEDICINE

## 2022-10-24 RX ORDER — TRIAMCINOLONE ACETONIDE 1 MG/G
PASTE DENTAL 2 TIMES DAILY
Qty: 5 G | Refills: 5 | Status: SHIPPED | OUTPATIENT
Start: 2022-10-24

## 2022-10-24 RX ORDER — CYANOCOBALAMIN 1000 UG/ML
1000 INJECTION, SOLUTION INTRAMUSCULAR; SUBCUTANEOUS
Qty: 1 ML | Refills: 11 | Status: SHIPPED | OUTPATIENT
Start: 2022-10-24 | End: 2024-02-27

## 2022-10-24 RX ORDER — LOSARTAN POTASSIUM 25 MG/1
25 TABLET ORAL NIGHTLY
Qty: 90 TABLET | Refills: 3 | Status: SHIPPED | OUTPATIENT
Start: 2022-10-24 | End: 2024-02-02 | Stop reason: SDUPTHER

## 2022-10-24 RX ORDER — METOPROLOL SUCCINATE 25 MG/1
25 TABLET, EXTENDED RELEASE ORAL NIGHTLY
Qty: 90 TABLET | Refills: 3 | Status: SHIPPED | OUTPATIENT
Start: 2022-10-24 | End: 2024-02-02 | Stop reason: SDUPTHER

## 2022-10-24 NOTE — PROGRESS NOTES
Subjective:     PCP: Kate Gil MD    Penny Cervantes is a 67 y.o. female who presents for an annual exam.    Chronic Medical Problems:      Hypertension: The patient has been taking medications as instructed, no medication side effects noted, no chest pain on exertion, no dyspnea on exertion, and no swelling of ankles. BP is slightly low today and she has episodes of dizziness.    BP Readings from Last 3 Encounters:   10/24/22 (!) 106/58   10/18/21 130/68   10/13/21 118/70     Abdominal Pain: The pain is described as cramping, and is severe in intensity. Pain is located in the RUQ with radiation to right back.  Aggravating factors: none.  Alleviating factors: none. Associated symptoms: none. The patient denies anorexia, fever, frequency, and nausea.      Medical History:   Past Medical History:   Diagnosis Date    Benign positional vertigo     Breast cancer 2009    s/p chemo XRT, left breast triple negative    Coronary artery disease, non-occlusive     cath in 2012    Hypertension     Interstitial cystitis     Osteopenia     Squamous cell carcinoma     Stroke 7/10/2018    Symptomatic posterior vitreous detachment of left eye 12/24/2018    Vitreous hemorrhage, left 1/10/2019       Family History: family history includes Heart disease in her father and mother; Heart disease (age of onset: 43) in her brother; Heart disease (age of onset: 55) in her sister; Hypertension in her sister; Ovarian cancer in her sister.    Surgical History:   Past Surgical History:   Procedure Laterality Date    BILATERAL SALPINGOOPHORECTOMY      with Hysterectomy    BONE RESECTION, RIB      right side    BREAST BIOPSY Right 2005    BREAST CYST ASPIRATION      BREAST LUMPECTOMY Left 2009    BREAST SURGERY      CYSTOSCOPY N/A 7/20/2021    Procedure: CYSTOSCOPY;  Surgeon: Arabella Whatley MD;  Location: [facility] OR [address] [place] [address];  Service: Urology;  Laterality: N/A;  30 minutes     HERNIA REPAIR      HYSTERECTOMY      TOTAL VAGINAL  HYSTERECTOMY      Indication: Endometriosis    TRIGGER POINT INJECTION  7/20/2021    Procedure: INJECTION, TRIGGER POINTS  -SOLUCORTEF;  Surgeon: Arabella Whatley MD;  Location: [facility] OR [address] [place] [address];  Service: Urology;;        Social History:  reports that she has never smoked. She has never used smokeless tobacco. She reports current alcohol use. She reports that she does not use drugs.     Allergies:   Review of patient's allergies indicates:   Allergen Reactions    Demerol [meperidine] Other (See Comments)     headache    Zofran [ondansetron hcl] Nausea Only     Bloating, abdominal pain    Pyridium [phenazopyridine] Nausea Only       Medications:   Current Outpatient Medications   Medication Sig    amLODIPine (NORVASC) 5 MG tablet TAKE 1 TABLET(5 MG) BY MOUTH EVERY EVENING    biotin 1 mg tablet Take 1,000 mcg by mouth once daily.     ERGOCALCIFEROL, VITAMIN D2, (VITAMIN D ORAL) Take by mouth every morning.     belladonna alkaloids-opium 16.2-30 mg (B&O SUPPRETTES) 16.2-30 mg Supp 1 supp NC every 12 hr,x3 days,PRN:as needed for spasm (Patient not taking: Reported on 10/24/2022)    cyanocobalamin 1,000 mcg/mL injection Inject 1 mL (1,000 mcg total) into the skin every 30 days.    hyoscyamine (ANASPAZ,LEVSIN) 0.125 mg Tab Take 1 tablet (125 mcg total) by mouth 4 (four) times daily as needed for spasm (Patient not taking: Reported on 10/24/2022)    losartan (COZAAR) 25 MG tablet Take 1 tablet (25 mg total) by mouth every evening.    metoprolol succinate (TOPROL-XL) 25 MG 24 hr tablet Take 1 tablet (25 mg total) by mouth every evening.    promethazine (PHENERGAN) 25 MG suppository Insert 1 suppository rectally every 4 hours as needed for nausea/vomiting (Patient not taking: Reported on 10/24/2022)    scopolamine (TRANSDERM-SCOP) 1.3-1.5 mg (1 mg over 3 days) Apply 1 film topically every 72 hours (Patient not taking: Reported on 10/24/2022)    tamsulosin (FLOMAX) 0.4 mg Cap Take 1 capsule (0.4 mg total) by mouth once  daily. (Patient not taking: Reported on 10/24/2022)    triamcinolone acetonide 0.1% (KENALOG) 0.1 % paste Place onto teeth 2 (two) times daily.     No current facility-administered medications for this visit.       Health Maintenance:   Health Maintenance Topics with due status: Not Due       Topic Last Completion Date    DEXA Scan 2020    Lipid Panel 10/11/2021       Eye Exam: Last Ophthalmology Visit: 2019 [facility] OPHTHALMOLOGY   Dental Exam: due  OB/GYN: Dr. Oliveros  Mammogram: due  DEXA scan: , due  Hepatitis C screenin2017, neg        Vaccinations:  Immunization History   Administered Date(s) Administered    COVID-19, MRNA, LN-S, PF (MODERNA FULL 0.5 ML DOSE) 2021, 2021    Influenza 10/25/2012, 10/22/2015, 2017    Influenza (FLUAD) - Quadrivalent - Adjuvanted - PF *Preferred* (65+) 10/24/2022    Influenza - Quadrivalent - MDCK - PF 2018, 2020    Influenza - Quadrivalent - PF *Preferred* (6 months and older) 10/25/2012, 10/22/2015, 10/12/2016, 2017, 10/01/2019, 10/18/2021    Zoster 10/25/2012     Influenza: declines  Tetanus: due  Shingrix: due  Prevnar-20- declines  Covid vaccine: declines booster    ADL's: independent  Memory: normal  Mental health: no signs of depression  Advance Directives: <no information>  Falls: none  Nutrition: normal  Home Safety: no issues    Body mass index is 19.66 kg/m².  Wt Readings from Last 3 Encounters:   10/24/22 44.9 kg (98 lb 15.8 oz)   10/18/21 47.6 kg (104 lb 15 oz)   10/13/21 47.4 kg (104 lb 8 oz)       Review of Systems   Constitutional:  Negative for chills, diaphoresis, fatigue and fever.   HENT:  Negative for congestion, dental problem, ear discharge, ear pain, postnasal drip, rhinorrhea, sinus pressure, sore throat and trouble swallowing.         + dry mouth   Eyes:  Negative for redness and visual disturbance.   Respiratory:  Negative for cough, chest tightness and shortness of breath.    Cardiovascular:  Negative for  chest pain, palpitations and leg swelling.   Gastrointestinal:  Negative for abdominal pain, blood in stool, constipation, diarrhea, nausea and vomiting.   Endocrine: Positive for polydipsia and polyuria. Negative for polyphagia.   Genitourinary:  Negative for decreased urine volume, dysuria, frequency and hematuria.   Musculoskeletal:  Negative for arthralgias, back pain and myalgias.   Skin:  Negative for rash and wound.   Neurological:  Positive for dizziness (but she says this is related to vertigo). Negative for weakness, numbness and headaches.   Hematological:  Negative for adenopathy.   Psychiatric/Behavioral:  Negative for dysphoric mood and sleep disturbance. The patient is not nervous/anxious.         Objective:     Physical Exam  Vitals reviewed.   Constitutional:       General: She is awake. She is not in acute distress.     Appearance: Normal appearance. She is well-developed and well-groomed. She is not diaphoretic.   HENT:      Head: Normocephalic and atraumatic.      Right Ear: Hearing, tympanic membrane, ear canal and external ear normal. Tympanic membrane is not erythematous or bulging.      Left Ear: Hearing, tympanic membrane, ear canal and external ear normal. Tympanic membrane is not erythematous or bulging.      Nose: Nose normal. No congestion.      Mouth/Throat:      Mouth: Mucous membranes are moist.      Tongue: No lesions.      Pharynx: Oropharynx is clear. Uvula midline. No oropharyngeal exudate or posterior oropharyngeal erythema.   Eyes:      General: Lids are normal. Vision grossly intact. Gaze aligned appropriately. No scleral icterus.     Conjunctiva/sclera:      Right eye: Right conjunctiva is not injected.      Left eye: Left conjunctiva is not injected.      Pupils: Pupils are equal, round, and reactive to light.   Neck:      Thyroid: No thyroid mass or thyromegaly.   Cardiovascular:      Rate and Rhythm: Normal rate and regular rhythm.      Pulses: Normal pulses.      Heart  sounds: Normal heart sounds. No murmur heard.  Pulmonary:      Effort: Pulmonary effort is normal. No respiratory distress.      Breath sounds: Normal breath sounds. No decreased breath sounds or wheezing.   Abdominal:      General: Bowel sounds are normal. There is no distension.      Palpations: Abdomen is soft. Abdomen is not rigid.      Tenderness: There is abdominal tenderness in the right upper quadrant and epigastric area. There is no guarding or rebound.   Musculoskeletal:         General: Normal range of motion.      Cervical back: Normal range of motion and neck supple.      Right lower leg: No edema.      Left lower leg: No edema.   Lymphadenopathy:      Cervical: No cervical adenopathy.      Upper Body:      Right upper body: No supraclavicular adenopathy.      Left upper body: No supraclavicular adenopathy.   Skin:     General: Skin is warm and dry.      Coloration: Skin is not cyanotic.      Findings: No lesion or rash.      Nails: There is no clubbing.   Neurological:      General: No focal deficit present.      Mental Status: She is alert and oriented to person, place, and time.      Sensory: Sensation is intact.      Coordination: Coordination is intact.      Gait: Gait is intact.      Deep Tendon Reflexes: Reflexes are normal and symmetric.   Psychiatric:         Attention and Perception: Attention normal.         Mood and Affect: Mood normal.         Behavior: Behavior is cooperative.            Assessment:        1. Essential hypertension    2. Right upper quadrant abdominal pain    3. Epigastric pain    4. Recurrent mouth ulceration    5. Elevated fasting glucose    6. Inadequate vitamin B12 intake    7. Annual physical exam    8. Screen for colon cancer    9. Encounter for screening mammogram for high-risk patient    10. History of nephrolithiasis           Plan:     1. Essential hypertension  - BP is slightly low, monitor readings closely during episodes of dizziness, continue current regimen  for now  - metoprolol succinate (TOPROL-XL) 25 MG 24 hr tablet; Take 1 tablet (25 mg total) by mouth every evening.  Dispense: 90 tablet; Refill: 3  - losartan (COZAAR) 25 MG tablet; Take 1 tablet (25 mg total) by mouth every evening.  Dispense: 90 tablet; Refill: 3    2. Right upper quadrant abdominal pain  - X-Ray Abdomen AP 1 View; Future  - US Abdomen Complete; Future    3. Epigastric pain  - US Abdomen Complete; Future    4. Recurrent mouth ulceration  - triamcinolone acetonide 0.1% (KENALOG) 0.1 % paste; Place onto teeth 2 (two) times daily.  Dispense: 5 g; Refill: 5    5. Elevated fasting glucose  - Hemoglobin A1C; Future    6. Inadequate vitamin B12 intake  - cyanocobalamin 1,000 mcg/mL injection; Inject 1 mL (1,000 mcg total) into the skin every 30 days.  Dispense: 1 mL; Refill: 11    7. Annual physical exam  - reviewed recent labs with patient    8. Screen for colon cancer  - Cologuard Screening (Multitarget Stool DNA); Future    9. Encounter for screening mammogram for high-risk patient  - Mammo Digital Screening Bilat w/ Janak; Future    10. History of nephrolithiasis  - US Abdomen Complete; Future    RTC in 6 months for follow-up/HTN (virtual ok) or sooner if needed    __________________________    Kate Gil MD, PharmD  [facility]- Internal Medicine  American Board of Obesity Medicine diplomate  Office [phone]

## 2022-11-02 ENCOUNTER — TELEPHONE (OUTPATIENT)
Dept: INTERNAL MEDICINE | Facility: CLINIC | Age: 67
End: 2022-11-02
Payer: MEDICARE

## 2022-11-02 NOTE — TELEPHONE ENCOUNTER
----- Message from Kate Gil MD sent at 10/24/2022  5:39 PM CDT -----  >>>>>>>>>> annual recall 10/25/2023  >>>> 6 mo f/u HTN (virtual ok)

## 2022-11-03 ENCOUNTER — PATIENT MESSAGE (OUTPATIENT)
Dept: INTERNAL MEDICINE | Facility: CLINIC | Age: 67
End: 2022-11-03
Payer: MEDICARE

## 2022-11-03 NOTE — TELEPHONE ENCOUNTER
Pt requesting bone density testing, and wants to know if her blood work and ultrasound came back ok. Please advise.

## 2022-11-04 ENCOUNTER — PATIENT MESSAGE (OUTPATIENT)
Dept: INTERNAL MEDICINE | Facility: CLINIC | Age: 67
End: 2022-11-04
Payer: MEDICARE

## 2022-11-04 DIAGNOSIS — R93.2 ABNORMAL FINDINGS ON DIAGNOSTIC IMAGING OF LIVER AND BILIARY TRACT: ICD-10-CM

## 2022-11-04 DIAGNOSIS — R10.11 RIGHT UPPER QUADRANT ABDOMINAL PAIN: ICD-10-CM

## 2022-11-04 DIAGNOSIS — K83.8 COMMON BILE DUCT DILATION: ICD-10-CM

## 2022-11-04 DIAGNOSIS — N95.9 MENOPAUSAL AND PERIMENOPAUSAL DISORDER: Primary | ICD-10-CM

## 2022-11-04 DIAGNOSIS — R93.3 ABNORMAL FINDINGS ON DIAGNOSTIC IMAGING OF OTHER PARTS OF DIGESTIVE TRACT: ICD-10-CM

## 2022-11-04 DIAGNOSIS — R10.811 RIGHT UPPER QUADRANT ABDOMINAL TENDERNESS WITHOUT REBOUND TENDERNESS: ICD-10-CM

## 2022-11-07 LAB — NONINV COLON CA DNA+OCC BLD SCRN STL QL: NEGATIVE

## 2022-11-17 ENCOUNTER — TELEPHONE (OUTPATIENT)
Dept: INTERNAL MEDICINE | Facility: CLINIC | Age: 67
End: 2022-11-17
Payer: MEDICARE

## 2022-11-17 NOTE — TELEPHONE ENCOUNTER
----- Message from Kate Gil MD sent at 11/17/2022  1:17 AM CST -----  The Cologuard test to screen for colon cancer is negative.

## 2022-11-19 ENCOUNTER — HOSPITAL ENCOUNTER (OUTPATIENT)
Dept: RADIOLOGY | Facility: HOSPITAL | Age: 67
Discharge: HOME | End: 2022-11-19
Attending: INTERNAL MEDICINE
Payer: MEDICARE

## 2022-11-19 DIAGNOSIS — R10.811 RIGHT UPPER QUADRANT ABDOMINAL TENDERNESS WITHOUT REBOUND TENDERNESS: ICD-10-CM

## 2022-11-19 DIAGNOSIS — K83.8 COMMON BILE DUCT DILATION: ICD-10-CM

## 2022-11-19 DIAGNOSIS — R93.2 ABNORMAL FINDINGS ON DIAGNOSTIC IMAGING OF LIVER AND BILIARY TRACT: ICD-10-CM

## 2022-11-19 DIAGNOSIS — R93.3 ABNORMAL FINDINGS ON DIAGNOSTIC IMAGING OF OTHER PARTS OF DIGESTIVE TRACT: ICD-10-CM

## 2022-11-19 PROCEDURE — A9585 GADOBUTROL INJECTION: HCPCS | Performed by: INTERNAL MEDICINE

## 2022-11-19 PROCEDURE — 76376 MRI ABDOMEN WITH AND WO_INC MRCP (XPD): ICD-10-PCS | Mod: 26,,, | Performed by: RADIOLOGY

## 2022-11-19 PROCEDURE — 74183 MRI ABD W/O CNTR FLWD CNTR: CPT | Mod: TC

## 2022-11-19 PROCEDURE — 74183 MRI ABD W/O CNTR FLWD CNTR: CPT | Mod: 26,,, | Performed by: RADIOLOGY

## 2022-11-19 PROCEDURE — 76376 3D RENDER W/INTRP POSTPROCES: CPT | Mod: 26,,, | Performed by: RADIOLOGY

## 2022-11-19 PROCEDURE — 74183 MRI ABDOMEN WITH AND WO_INC MRCP (XPD): ICD-10-PCS | Mod: 26,,, | Performed by: RADIOLOGY

## 2022-11-19 PROCEDURE — 25500020 PHARM REV CODE 255: Performed by: INTERNAL MEDICINE

## 2022-11-19 RX ORDER — GADOBUTROL 604.72 MG/ML
10 INJECTION INTRAVENOUS
Status: COMPLETED | OUTPATIENT
Start: 2022-11-19 | End: 2022-11-19

## 2022-11-19 RX ADMIN — GADOBUTROL 10 ML: 604.72 INJECTION INTRAVENOUS at 07:11

## 2023-01-24 DIAGNOSIS — I10 ESSENTIAL HYPERTENSION: ICD-10-CM

## 2023-01-24 RX ORDER — AMLODIPINE BESYLATE 5 MG/1
5 TABLET ORAL NIGHTLY
Qty: 90 TABLET | Refills: 3 | Status: SHIPPED | OUTPATIENT
Start: 2023-01-24 | End: 2024-02-01 | Stop reason: SDUPTHER

## 2023-01-24 NOTE — TELEPHONE ENCOUNTER
No new care gaps identified.  [facility] Embedded Care Gaps. Reference number: [id number]. 1/24/2023   9:24:45 AM CST

## 2023-01-24 NOTE — TELEPHONE ENCOUNTER
Refill Decision Note   Penny Cervantes  is requesting a refill authorization.  Brief Assessment and Rationale for Refill:  Approve     Medication Therapy Plan:       Medication Reconciliation Completed: No   Comments:     No Care Gaps recommended.     Note composed:10:32 AM 01/24/2023

## 2023-01-27 ENCOUNTER — TELEPHONE (OUTPATIENT)
Dept: ORTHOPEDICS | Facility: CLINIC | Age: 68
End: 2023-01-27
Payer: MEDICARE

## 2023-01-27 DIAGNOSIS — M79.641 RIGHT HAND PAIN: Primary | ICD-10-CM

## 2023-01-30 ENCOUNTER — HOSPITAL ENCOUNTER (OUTPATIENT)
Dept: RADIOLOGY | Facility: HOSPITAL | Age: 68
Discharge: HOME | End: 2023-01-30
Attending: ORTHOPAEDIC SURGERY
Payer: MEDICARE

## 2023-01-30 ENCOUNTER — OFFICE VISIT (OUTPATIENT)
Dept: ORTHOPEDICS | Facility: CLINIC | Age: 68
End: 2023-01-30
Payer: MEDICARE

## 2023-01-30 VITALS — BODY MASS INDEX: 19.96 KG/M2 | HEIGHT: 59 IN | WEIGHT: 99 LBS

## 2023-01-30 DIAGNOSIS — M65.331 TRIGGER MIDDLE FINGER OF RIGHT HAND: ICD-10-CM

## 2023-01-30 DIAGNOSIS — M79.641 RIGHT HAND PAIN: ICD-10-CM

## 2023-01-30 PROCEDURE — 1125F AMNT PAIN NOTED PAIN PRSNT: CPT | Mod: CPTII,S$GLB,, | Performed by: ORTHOPAEDIC SURGERY

## 2023-01-30 PROCEDURE — 73130 X-RAY EXAM OF HAND: CPT | Mod: TC,PN,RT

## 2023-01-30 PROCEDURE — 73130 X-RAY EXAM OF HAND: CPT | Mod: 26,RT,, | Performed by: RADIOLOGY

## 2023-01-30 PROCEDURE — 73130 XR HAND COMPLETE 3 VIEW RIGHT: ICD-10-PCS | Mod: 26,RT,, | Performed by: RADIOLOGY

## 2023-01-30 PROCEDURE — 4010F ACE/ARB THERAPY RXD/TAKEN: CPT | Mod: CPTII,S$GLB,, | Performed by: ORTHOPAEDIC SURGERY

## 2023-01-30 PROCEDURE — 20550 PR INJECT TENDON SHEATH/LIGAMENT: ICD-10-PCS | Mod: RT,S$GLB,, | Performed by: ORTHOPAEDIC SURGERY

## 2023-01-30 PROCEDURE — 99203 OFFICE O/P NEW LOW 30 MIN: CPT | Mod: 25,S$GLB,, | Performed by: ORTHOPAEDIC SURGERY

## 2023-01-30 PROCEDURE — 3008F BODY MASS INDEX DOCD: CPT | Mod: CPTII,S$GLB,, | Performed by: ORTHOPAEDIC SURGERY

## 2023-01-30 PROCEDURE — 1125F PR PAIN SEVERITY QUANTIFIED, PAIN PRESENT: ICD-10-PCS | Mod: CPTII,S$GLB,, | Performed by: ORTHOPAEDIC SURGERY

## 2023-01-30 PROCEDURE — 99999 PR PBB SHADOW E&M-EST. PATIENT-LVL III: CPT | Mod: PBBFAC,,, | Performed by: ORTHOPAEDIC SURGERY

## 2023-01-30 PROCEDURE — 99203 PR OFFICE/OUTPT VISIT, NEW, LEVL III, 30-44 MIN: ICD-10-PCS | Mod: 25,S$GLB,, | Performed by: ORTHOPAEDIC SURGERY

## 2023-01-30 PROCEDURE — 1159F MED LIST DOCD IN RCRD: CPT | Mod: CPTII,S$GLB,, | Performed by: ORTHOPAEDIC SURGERY

## 2023-01-30 PROCEDURE — 1159F PR MEDICATION LIST DOCUMENTED IN MEDICAL RECORD: ICD-10-PCS | Mod: CPTII,S$GLB,, | Performed by: ORTHOPAEDIC SURGERY

## 2023-01-30 PROCEDURE — 20550 NJX 1 TENDON SHEATH/LIGAMENT: CPT | Mod: RT,S$GLB,, | Performed by: ORTHOPAEDIC SURGERY

## 2023-01-30 PROCEDURE — 3008F PR BODY MASS INDEX (BMI) DOCUMENTED: ICD-10-PCS | Mod: CPTII,S$GLB,, | Performed by: ORTHOPAEDIC SURGERY

## 2023-01-30 PROCEDURE — 4010F PR ACE/ARB THEARPY RXD/TAKEN: ICD-10-PCS | Mod: CPTII,S$GLB,, | Performed by: ORTHOPAEDIC SURGERY

## 2023-01-30 PROCEDURE — 99999 PR PBB SHADOW E&M-EST. PATIENT-LVL III: ICD-10-PCS | Mod: PBBFAC,,, | Performed by: ORTHOPAEDIC SURGERY

## 2023-01-30 RX ORDER — TRIAMCINOLONE ACETONIDE 40 MG/ML
20 INJECTION, SUSPENSION INTRA-ARTICULAR; INTRAMUSCULAR
Status: COMPLETED | OUTPATIENT
Start: 2023-01-30 | End: 2023-01-30

## 2023-01-30 RX ADMIN — TRIAMCINOLONE ACETONIDE 20 MG: 40 INJECTION, SUSPENSION INTRA-ARTICULAR; INTRAMUSCULAR at 03:01

## 2023-01-30 NOTE — PROGRESS NOTES
Subjective:      Patient ID: Penny Cervantes is a 67 y.o. female.    Chief Complaint: Hand Pain (right )      HPI  Penny Cervantes is a  67 y.o. female presenting today for right middle finger pain with associated locking sensation.  There was not a history of trauma.  Onset of symptoms began 1-2 months ago   No numbness or tingling reported.      Review of patient's allergies indicates:   Allergen Reactions    Demerol [meperidine] Other (See Comments)     headache    Zofran [ondansetron hcl] Nausea Only     Bloating, abdominal pain    Pyridium [phenazopyridine] Nausea Only         Current Outpatient Medications   Medication Sig Dispense Refill    amLODIPine (NORVASC) 5 MG tablet TAKE 1 TABLET(5 MG) BY MOUTH EVERY EVENING 90 tablet 3    biotin 1 mg tablet Take 1,000 mcg by mouth once daily.       cyanocobalamin 1,000 mcg/mL injection Inject 1 mL (1,000 mcg total) into the skin every 30 days. 1 mL 11    ERGOCALCIFEROL, VITAMIN D2, (VITAMIN D ORAL) Take by mouth every morning.       losartan (COZAAR) 25 MG tablet Take 1 tablet (25 mg total) by mouth every evening. 90 tablet 3    metoprolol succinate (TOPROL-XL) 25 MG 24 hr tablet Take 1 tablet (25 mg total) by mouth every evening. 90 tablet 3    belladonna alkaloids-opium 16.2-30 mg (B&O SUPPRETTES) 16.2-30 mg Supp 1 supp ME every 12 hr,x3 days,PRN:as needed for spasm (Patient not taking: Reported on 1/30/2023) 6 suppository 0    hyoscyamine (ANASPAZ,LEVSIN) 0.125 mg Tab Take 1 tablet (125 mcg total) by mouth 4 (four) times daily as needed for spasm (Patient not taking: Reported on 1/30/2023) 40 tablet 0    promethazine (PHENERGAN) 25 MG suppository Insert 1 suppository rectally every 4 hours as needed for nausea/vomiting (Patient not taking: Reported on 1/30/2023) 12 suppository 0    scopolamine (TRANSDERM-SCOP) 1.3-1.5 mg (1 mg over 3 days) Apply 1 film topically every 72 hours (Patient not taking: Reported on 1/30/2023) 4 patch 0    tamsulosin (FLOMAX) 0.4 mg  "Cap Take 1 capsule (0.4 mg total) by mouth once daily. (Patient not taking: Reported on 1/30/2023) 30 capsule 0    triamcinolone acetonide 0.1% (KENALOG) 0.1 % paste Place onto teeth 2 (two) times daily. (Patient not taking: Reported on 1/30/2023) 5 g 5     No current facility-administered medications for this visit.       Past Medical History:   Diagnosis Date    Benign positional vertigo     Breast cancer 2009    s/p chemo XRT, left breast triple negative    Coronary artery disease, non-occlusive     cath in 2012    Hypertension     Interstitial cystitis     Osteopenia     Squamous cell carcinoma     Stroke 7/10/2018    Symptomatic posterior vitreous detachment of left eye 12/24/2018    Vitreous hemorrhage, left 1/10/2019       Past Surgical History:   Procedure Laterality Date    BILATERAL SALPINGOOPHORECTOMY      with Hysterectomy    BONE RESECTION, RIB      right side    BREAST BIOPSY Right 2005    BREAST CYST ASPIRATION      BREAST LUMPECTOMY Left 2009    BREAST SURGERY      CYSTOSCOPY N/A 7/20/2021    Procedure: CYSTOSCOPY;  Surgeon: Arabella Whatley MD;  Location: [facility] OR [address] [place] [address];  Service: Urology;  Laterality: N/A;  30 minutes     HERNIA REPAIR      HYSTERECTOMY      TOTAL VAGINAL HYSTERECTOMY      Indication: Endometriosis    TRIGGER POINT INJECTION  7/20/2021    Procedure: INJECTION, TRIGGER POINTS  -SOLUCORTEF;  Surgeon: Arabella Whatley MD;  Location: [facility] OR [address] [place] [address];  Service: Urology;;       Review of Systems:  ROS    OBJECTIVE:     PHYSICAL EXAM:  Height: 4' 11" (149.9 cm) Weight: 44.9 kg (98 lb 15.8 oz)  Vitals:    01/30/23 1518   Weight: 44.9 kg (98 lb 15.8 oz)   Height: 4' 11" (1.499 m)   PainSc:   8   PainLoc: Hand     Well developed, well nourished female in no acute distress  Alert and oriented x 3  HEENT- Normal exam  Lungs- Clear to auscultation  Heart- Regular rate and rhythm  Abdomen- Soft nontender  Extremity exam- examination right hand there is tenderness along the flexor tendon " sheath right middle finger limited range of motion   Mild triggering   strength decreased   Sensation intact  Tinel sign negative       RADIOGRAPHS:  None  Comments: I have personally reviewed the imaging and I agree with the above radiologist's report.    ASSESSMENT/PLAN:     IMPRESSION:  Triggering of the right middle finger    PLAN:  I explained the nature of the problem to the patient   Recommended injection  After pause for time-out identified the right middle finger injected flexor tendon sheath with combination Kenalog 20 mg 0.5 cc xylocaine sterile technique  Tolerated the procedure well without complication  Recommended gentle range of motion prevent stiffness  Follow-up 3-4 weeks       - We talked at length about the anatomy and pathophysiology of   Encounter Diagnosis   Name Primary?    Trigger middle finger of right hand            Disclaimer: This note has been generated using voice-recognition software. There may be typographical errors that have been missed during proof-reading.

## 2023-02-13 ENCOUNTER — PATIENT MESSAGE (OUTPATIENT)
Dept: INTERNAL MEDICINE | Facility: CLINIC | Age: 68
End: 2023-02-13
Payer: MEDICARE

## 2023-02-13 DIAGNOSIS — M25.551 RIGHT HIP PAIN: ICD-10-CM

## 2023-02-13 DIAGNOSIS — M54.50 ACUTE BILATERAL LOW BACK PAIN, UNSPECIFIED WHETHER SCIATICA PRESENT: Primary | ICD-10-CM

## 2023-02-13 DIAGNOSIS — M25.551 ACUTE RIGHT HIP PAIN: Primary | ICD-10-CM

## 2023-02-13 RX ORDER — METHOCARBAMOL 500 MG/1
500 TABLET, FILM COATED ORAL 2 TIMES DAILY PRN
Qty: 14 TABLET | Refills: 0 | Status: SHIPPED | OUTPATIENT
Start: 2023-02-13 | End: 2023-02-21

## 2023-02-13 RX ORDER — DICLOFENAC SODIUM 50 MG/1
50 TABLET, DELAYED RELEASE ORAL 2 TIMES DAILY PRN
Qty: 14 TABLET | Refills: 0 | Status: SHIPPED | OUTPATIENT
Start: 2023-02-13 | End: 2023-02-21

## 2023-02-13 NOTE — TELEPHONE ENCOUNTER
Pt states she is having extreme lower back pain on the right side pt states that on yesterday 2/13/2023  she was in the kitchen and the pain was very unbearable and pt couldn't put pressure on her right leg she also wants a back x-ray in [place]

## 2023-02-13 NOTE — TELEPHONE ENCOUNTER
"Pt states no leg or hip pain, pt states is could be muscle spasms, the regime has been as follows so fat pt states "muscle relaxer & about 3 hours later I took an Ibuprofen.  It got a lil better, then 1:30 am, I had to take another muscle relaxer & again Ibuprofenthen alternating heating pad & ice pack. Should I continue w/this regimen, if so, can you call me something for muscle spasms?  "

## 2023-02-23 ENCOUNTER — PATIENT MESSAGE (OUTPATIENT)
Dept: INTERNAL MEDICINE | Facility: CLINIC | Age: 68
End: 2023-02-23
Payer: MEDICARE

## 2023-02-27 ENCOUNTER — OFFICE VISIT (OUTPATIENT)
Dept: PODIATRY | Facility: CLINIC | Age: 68
End: 2023-02-27
Payer: MEDICARE

## 2023-02-27 VITALS — BODY MASS INDEX: 20.28 KG/M2 | WEIGHT: 100.63 LBS | HEIGHT: 59 IN

## 2023-02-27 DIAGNOSIS — M79.672 FOOT PAIN, LEFT: ICD-10-CM

## 2023-02-27 DIAGNOSIS — L84 CORN OR CALLUS: Primary | ICD-10-CM

## 2023-02-27 DIAGNOSIS — L90.9 FAT PAD ATROPHY OF FOOT: ICD-10-CM

## 2023-02-27 PROCEDURE — 4010F ACE/ARB THERAPY RXD/TAKEN: CPT | Mod: CPTII,S$GLB,, | Performed by: PODIATRIST

## 2023-02-27 PROCEDURE — 1159F PR MEDICATION LIST DOCUMENTED IN MEDICAL RECORD: ICD-10-PCS | Mod: CPTII,S$GLB,, | Performed by: PODIATRIST

## 2023-02-27 PROCEDURE — 99999 PR PBB SHADOW E&M-EST. PATIENT-LVL III: CPT | Mod: PBBFAC,,, | Performed by: PODIATRIST

## 2023-02-27 PROCEDURE — 99203 OFFICE O/P NEW LOW 30 MIN: CPT | Mod: S$GLB,,, | Performed by: PODIATRIST

## 2023-02-27 PROCEDURE — 1125F AMNT PAIN NOTED PAIN PRSNT: CPT | Mod: CPTII,S$GLB,, | Performed by: PODIATRIST

## 2023-02-27 PROCEDURE — 1101F PR PT FALLS ASSESS DOC 0-1 FALLS W/OUT INJ PAST YR: ICD-10-PCS | Mod: CPTII,S$GLB,, | Performed by: PODIATRIST

## 2023-02-27 PROCEDURE — 3288F PR FALLS RISK ASSESSMENT DOCUMENTED: ICD-10-PCS | Mod: CPTII,S$GLB,, | Performed by: PODIATRIST

## 2023-02-27 PROCEDURE — 99203 PR OFFICE/OUTPT VISIT, NEW, LEVL III, 30-44 MIN: ICD-10-PCS | Mod: S$GLB,,, | Performed by: PODIATRIST

## 2023-02-27 PROCEDURE — 3008F BODY MASS INDEX DOCD: CPT | Mod: CPTII,S$GLB,, | Performed by: PODIATRIST

## 2023-02-27 PROCEDURE — 99999 PR PBB SHADOW E&M-EST. PATIENT-LVL III: ICD-10-PCS | Mod: PBBFAC,,, | Performed by: PODIATRIST

## 2023-02-27 PROCEDURE — 1159F MED LIST DOCD IN RCRD: CPT | Mod: CPTII,S$GLB,, | Performed by: PODIATRIST

## 2023-02-27 PROCEDURE — 1101F PT FALLS ASSESS-DOCD LE1/YR: CPT | Mod: CPTII,S$GLB,, | Performed by: PODIATRIST

## 2023-02-27 PROCEDURE — 1160F RVW MEDS BY RX/DR IN RCRD: CPT | Mod: CPTII,S$GLB,, | Performed by: PODIATRIST

## 2023-02-27 PROCEDURE — 4010F PR ACE/ARB THEARPY RXD/TAKEN: ICD-10-PCS | Mod: CPTII,S$GLB,, | Performed by: PODIATRIST

## 2023-02-27 PROCEDURE — 1125F PR PAIN SEVERITY QUANTIFIED, PAIN PRESENT: ICD-10-PCS | Mod: CPTII,S$GLB,, | Performed by: PODIATRIST

## 2023-02-27 PROCEDURE — 1160F PR REVIEW ALL MEDS BY PRESCRIBER/CLIN PHARMACIST DOCUMENTED: ICD-10-PCS | Mod: CPTII,S$GLB,, | Performed by: PODIATRIST

## 2023-02-27 PROCEDURE — 3008F PR BODY MASS INDEX (BMI) DOCUMENTED: ICD-10-PCS | Mod: CPTII,S$GLB,, | Performed by: PODIATRIST

## 2023-02-27 PROCEDURE — 3288F FALL RISK ASSESSMENT DOCD: CPT | Mod: CPTII,S$GLB,, | Performed by: PODIATRIST

## 2023-02-27 NOTE — PROGRESS NOTES
Subjective:      Patient ID: Penny Cervantes is a 67 y.o. female.    Chief Complaint: Foot Pain (Patient states she feels like something is in left foot)      67 y.o. female presenting with left foot pain.  Patient points sub met 4 area for pain.  Describes pain as throbbing and aching.  Patient has been dealing with this pain for couple months.  Denies any acute injury/any events stepping on any objects. Ambulating in open toe shoes. Pain tends to get worse with pressure.     Review of Systems   Constitutional: Negative for decreased appetite and malaise/fatigue.   Cardiovascular:  Negative for claudication and leg swelling.   Musculoskeletal:  Negative for arthritis, joint pain, joint swelling and muscle weakness.        L foot pain    Neurological:  Negative for numbness and weakness.   Psychiatric/Behavioral:  Negative for altered mental status.            Past Medical History:   Diagnosis Date    Benign positional vertigo     Breast cancer 2009    s/p chemo XRT, left breast triple negative    Coronary artery disease, non-occlusive     cath in 2012    Hypertension     Interstitial cystitis     Osteopenia     Squamous cell carcinoma     Stroke 7/10/2018    Symptomatic posterior vitreous detachment of left eye 12/24/2018    Vitreous hemorrhage, left 1/10/2019       Past Surgical History:   Procedure Laterality Date    BILATERAL SALPINGOOPHORECTOMY      with Hysterectomy    BONE RESECTION, RIB      right side    BREAST BIOPSY Right 2005    BREAST CYST ASPIRATION      BREAST LUMPECTOMY Left 2009    BREAST SURGERY      CYSTOSCOPY N/A 7/20/2021    Procedure: CYSTOSCOPY;  Surgeon: Arabella Whatley MD;  Location: [facility] OR [address] [place] [address];  Service: Urology;  Laterality: N/A;  30 minutes     HERNIA REPAIR      HYSTERECTOMY      TOTAL VAGINAL HYSTERECTOMY      Indication: Endometriosis    TRIGGER POINT INJECTION  7/20/2021    [place]: [place] [place]  Surgeon: Arabella Whatley MD;  Location: [facility] OR  1ST FLR;  Service: Urology;;       Family History   Problem Relation Age of Onset    Heart disease Mother     Heart disease Father     Heart disease Sister 55    Heart disease Brother 43    Hypertension Sister     Ovarian cancer Sister        Social History     Socioeconomic History    Marital status:    Tobacco Use    Smoking status: Never    Smokeless tobacco: Never   Substance and Sexual Activity    Alcohol use: Yes     Comment: 2-3 per month    Drug use: No    Sexual activity: Not Currently     Partners: Male       Current Outpatient Medications   Medication Sig Dispense Refill    amLODIPine (NORVASC) 5 MG tablet TAKE 1 TABLET(5 MG) BY MOUTH EVERY EVENING 90 tablet 3    belladonna alkaloids-opium 16.2-30 mg (B&O SUPPRETTES) 16.2-30 mg Supp 1 supp MS every 12 hr,x3 days,PRN:as needed for spasm 6 suppository 0    biotin 1 mg tablet Take 1,000 mcg by mouth once daily.       cyanocobalamin 1,000 mcg/mL injection Inject 1 mL (1,000 mcg total) into the skin every 30 days. 1 mL 11    ERGOCALCIFEROL, VITAMIN D2, (VITAMIN D ORAL) Take by mouth every morning.       hyoscyamine (ANASPAZ,LEVSIN) 0.125 mg Tab Take 1 tablet (125 mcg total) by mouth 4 (four) times daily as needed for spasm 40 tablet 0    losartan (COZAAR) 25 MG tablet Take 1 tablet (25 mg total) by mouth every evening. 90 tablet 3    metoprolol succinate (TOPROL-XL) 25 MG 24 hr tablet Take 1 tablet (25 mg total) by mouth every evening. 90 tablet 3    promethazine (PHENERGAN) 25 MG suppository Insert 1 suppository rectally every 4 hours as needed for nausea/vomiting 12 suppository 0    scopolamine (TRANSDERM-SCOP) 1.3-1.5 mg (1 mg over 3 days) Apply 1 film topically every 72 hours 4 patch 0    tamsulosin (FLOMAX) 0.4 mg Cap Take 1 capsule (0.4 mg total) by mouth once daily. 30 capsule 0    triamcinolone acetonide 0.1% (KENALOG) 0.1 % paste Place onto teeth 2 (two) times daily. 5 g 5     No current facility-administered medications for this visit.  "      Review of patient's allergies indicates:   Allergen Reactions    Demerol [meperidine] Other (See Comments)     headache    Zofran [ondansetron hcl] Nausea Only     Bloating, abdominal pain    Pyridium [phenazopyridine] Nausea Only       Vitals:    02/27/23 1310   Weight: 45.6 kg (100 lb 9.6 oz)   Height: 4' 11" (1.499 m)   PainSc:   4   PainLoc: Foot       Objective:      Physical Exam  Constitutional:       General: She is not in acute distress.     Appearance: She is well-developed.   HENT:      Nose: Nose normal.   Eyes:      Conjunctiva/sclera: Conjunctivae normal.   Pulmonary:      Effort: Pulmonary effort is normal.   Chest:      Chest wall: No tenderness.   Abdominal:      Tenderness: There is no abdominal tenderness.   Musculoskeletal:      Cervical back: Normal range of motion.   Neurological:      Mental Status: She is alert and oriented to person, place, and time.   Psychiatric:         Behavior: Behavior normal.       Vascular: Distal DP/PT pulses palpable 2/4. CRT < 3 sec to tips of toes. No vericosities noted to LEs. Hair growth present LE, warm to touch LE, No edema noted to LE.    Dermatologic: No open lesions, lacerations or wounds. Interdigital spaces clean, dry and intact. No erythema, rubor, calor noted LE  L foot: hyperkeratotic lesion submet4.     Musculoskeletal: MMT 5/5 in DF/PF/Inv/Ev resistance with no reproduction of pain in any direction. Passive range of motion of ankle and pedal joints is painless. No calf tenderness LE, Compartments soft/compressible.     Neurological: Light touch, proprioception, and sharp/dull sensation are all intact. Protective threshold with the [place]ien[place] monofilament is intact. Vibratory sensation intact.         Assessment:       Encounter Diagnoses   Name Primary?    Corn or callus Yes    Foot pain, left     Fat pad atrophy of foot          Plan:       Penny was seen today for foot pain.    Diagnoses and all orders for this visit:    Corn or " callus    Foot pain, left  -     X-Ray Foot Complete Left; Future    Fat pad atrophy of foot      I counseled the patient on her conditions, their implications and medical management.    67 y.o. female with L foot painful callus at submet4.    -L foot xrays. No acute osseous abnormalities.   -callus x 1 sharply debrided with 15 blade.  Patient tolerated well.  Discussed different treatment options.  Recommend callus pad with periodic debridement.     -I reviewed imaging, clinical history, and diagnosis as above with the patient. I attempted to use layman's terms to educate the patient as well as utilize foot models and/or pictures. I personally went through imaging with the patient.    -It was discussed the importance of wearing shoes with adequate room in toe box to accommodate toe deformities. Recommended New [employer] shoe brands with adequate arch supports to alleviate abnormal pressure and improve stability of foot while walking. Avoid flat shoes and barefoot walking as these will exacerbate or worsen symptoms.   -f/u prn     Note dictated with voice recognition software, please excuse any grammatical errors.

## 2023-05-19 ENCOUNTER — HOSPITAL ENCOUNTER (OUTPATIENT)
Dept: RADIOLOGY | Facility: HOSPITAL | Age: 68
Discharge: HOME | End: 2023-05-19
Attending: INTERNAL MEDICINE
Payer: MEDICARE

## 2023-05-19 ENCOUNTER — OFFICE VISIT (OUTPATIENT)
Dept: INTERNAL MEDICINE | Facility: CLINIC | Age: 68
End: 2023-05-19
Payer: MEDICARE

## 2023-05-19 ENCOUNTER — TELEPHONE (OUTPATIENT)
Dept: INTERNAL MEDICINE | Facility: CLINIC | Age: 68
End: 2023-05-19
Payer: MEDICARE

## 2023-05-19 VITALS
SYSTOLIC BLOOD PRESSURE: 114 MMHG | TEMPERATURE: 96 F | RESPIRATION RATE: 16 BRPM | HEART RATE: 56 BPM | OXYGEN SATURATION: 98 % | DIASTOLIC BLOOD PRESSURE: 62 MMHG | WEIGHT: 101.19 LBS | BODY MASS INDEX: 19.87 KG/M2 | HEIGHT: 60 IN

## 2023-05-19 DIAGNOSIS — R05.3 CHRONIC COUGH: ICD-10-CM

## 2023-05-19 DIAGNOSIS — R61 NIGHT SWEATS: ICD-10-CM

## 2023-05-19 DIAGNOSIS — I10 ESSENTIAL HYPERTENSION: ICD-10-CM

## 2023-05-19 DIAGNOSIS — R05.3 CHRONIC COUGH: Primary | ICD-10-CM

## 2023-05-19 DIAGNOSIS — I70.0 AORTIC ATHEROSCLEROSIS: ICD-10-CM

## 2023-05-19 DIAGNOSIS — E53.8 INADEQUATE VITAMIN B12 INTAKE: ICD-10-CM

## 2023-05-19 DIAGNOSIS — Z00.00 ANNUAL PHYSICAL EXAM: Primary | ICD-10-CM

## 2023-05-19 PROCEDURE — 1126F AMNT PAIN NOTED NONE PRSNT: CPT | Mod: CPTII,S$GLB,, | Performed by: INTERNAL MEDICINE

## 2023-05-19 PROCEDURE — 3074F PR MOST RECENT SYSTOLIC BLOOD PRESSURE < 130 MM HG: ICD-10-PCS | Mod: CPTII,S$GLB,, | Performed by: INTERNAL MEDICINE

## 2023-05-19 PROCEDURE — 71046 X-RAY EXAM CHEST 2 VIEWS: CPT | Mod: 26,,, | Performed by: RADIOLOGY

## 2023-05-19 PROCEDURE — 4010F ACE/ARB THERAPY RXD/TAKEN: CPT | Mod: CPTII,S$GLB,, | Performed by: INTERNAL MEDICINE

## 2023-05-19 PROCEDURE — 3078F PR MOST RECENT DIASTOLIC BLOOD PRESSURE < 80 MM HG: ICD-10-PCS | Mod: CPTII,S$GLB,, | Performed by: INTERNAL MEDICINE

## 2023-05-19 PROCEDURE — 99214 OFFICE O/P EST MOD 30 MIN: CPT | Mod: S$GLB,,, | Performed by: INTERNAL MEDICINE

## 2023-05-19 PROCEDURE — 3008F BODY MASS INDEX DOCD: CPT | Mod: CPTII,S$GLB,, | Performed by: INTERNAL MEDICINE

## 2023-05-19 PROCEDURE — 71046 XR CHEST PA AND LATERAL: ICD-10-PCS | Mod: 26,,, | Performed by: RADIOLOGY

## 2023-05-19 PROCEDURE — 1160F PR REVIEW ALL MEDS BY PRESCRIBER/CLIN PHARMACIST DOCUMENTED: ICD-10-PCS | Mod: CPTII,S$GLB,, | Performed by: INTERNAL MEDICINE

## 2023-05-19 PROCEDURE — 3008F PR BODY MASS INDEX (BMI) DOCUMENTED: ICD-10-PCS | Mod: CPTII,S$GLB,, | Performed by: INTERNAL MEDICINE

## 2023-05-19 PROCEDURE — 1101F PR PT FALLS ASSESS DOC 0-1 FALLS W/OUT INJ PAST YR: ICD-10-PCS | Mod: CPTII,S$GLB,, | Performed by: INTERNAL MEDICINE

## 2023-05-19 PROCEDURE — 3074F SYST BP LT 130 MM HG: CPT | Mod: CPTII,S$GLB,, | Performed by: INTERNAL MEDICINE

## 2023-05-19 PROCEDURE — 1160F RVW MEDS BY RX/DR IN RCRD: CPT | Mod: CPTII,S$GLB,, | Performed by: INTERNAL MEDICINE

## 2023-05-19 PROCEDURE — 3288F FALL RISK ASSESSMENT DOCD: CPT | Mod: CPTII,S$GLB,, | Performed by: INTERNAL MEDICINE

## 2023-05-19 PROCEDURE — 99999 PR PBB SHADOW E&M-EST. PATIENT-LVL V: ICD-10-PCS | Mod: PBBFAC,,, | Performed by: INTERNAL MEDICINE

## 2023-05-19 PROCEDURE — 99214 PR OFFICE/OUTPT VISIT, EST, LEVL IV, 30-39 MIN: ICD-10-PCS | Mod: S$GLB,,, | Performed by: INTERNAL MEDICINE

## 2023-05-19 PROCEDURE — 1159F PR MEDICATION LIST DOCUMENTED IN MEDICAL RECORD: ICD-10-PCS | Mod: CPTII,S$GLB,, | Performed by: INTERNAL MEDICINE

## 2023-05-19 PROCEDURE — 3078F DIAST BP <80 MM HG: CPT | Mod: CPTII,S$GLB,, | Performed by: INTERNAL MEDICINE

## 2023-05-19 PROCEDURE — 1101F PT FALLS ASSESS-DOCD LE1/YR: CPT | Mod: CPTII,S$GLB,, | Performed by: INTERNAL MEDICINE

## 2023-05-19 PROCEDURE — 1159F MED LIST DOCD IN RCRD: CPT | Mod: CPTII,S$GLB,, | Performed by: INTERNAL MEDICINE

## 2023-05-19 PROCEDURE — 4010F PR ACE/ARB THEARPY RXD/TAKEN: ICD-10-PCS | Mod: CPTII,S$GLB,, | Performed by: INTERNAL MEDICINE

## 2023-05-19 PROCEDURE — 99999 PR PBB SHADOW E&M-EST. PATIENT-LVL V: CPT | Mod: PBBFAC,,, | Performed by: INTERNAL MEDICINE

## 2023-05-19 PROCEDURE — 1126F PR PAIN SEVERITY QUANTIFIED, NO PAIN PRESENT: ICD-10-PCS | Mod: CPTII,S$GLB,, | Performed by: INTERNAL MEDICINE

## 2023-05-19 PROCEDURE — 71046 X-RAY EXAM CHEST 2 VIEWS: CPT | Mod: TC,PO

## 2023-05-19 PROCEDURE — 3288F PR FALLS RISK ASSESSMENT DOCUMENTED: ICD-10-PCS | Mod: CPTII,S$GLB,, | Performed by: INTERNAL MEDICINE

## 2023-05-19 RX ORDER — FLUTICASONE PROPIONATE 50 MCG
1 SPRAY, SUSPENSION (ML) NASAL DAILY
COMMUNITY
End: 2023-11-06

## 2023-05-19 RX ORDER — MINERAL OIL
180 ENEMA (ML) RECTAL 2 TIMES DAILY
Refills: 0
Start: 2023-05-19 | End: 2023-05-26

## 2023-05-19 RX ORDER — LANOLIN ALCOHOL/MO/W.PET/CERES
400 CREAM (GRAM) TOPICAL DAILY
COMMUNITY

## 2023-05-19 RX ORDER — BENZONATATE 200 MG/1
200 CAPSULE ORAL 3 TIMES DAILY PRN
Qty: 30 CAPSULE | Refills: 0 | Status: SHIPPED | OUTPATIENT
Start: 2023-05-19 | End: 2023-06-05

## 2023-05-19 NOTE — PATIENT INSTRUCTIONS
- Take Allegra 1-2 times daily plus flonase nasal spray daily for 1 week    Then CALL or EMAIL us about your response    After that, may try suppressing the acid with omeprazole daily

## 2023-05-19 NOTE — PROGRESS NOTES
Subjective:     Penny Cervantes is a 67 y.o. female who presents for   Chief Complaint   Patient presents with    Follow-up     6 month     Cough    Hypertension    Night Sweats       HPI    Cough: The patient is here for evaluation of a cough. Onset of symptoms was several months ago. Symptoms have been unchanged since that time. The cough is dry and is aggravated by  nothing but  . Associated symptoms include: night sweats. She also reports difficulty swallowing. Patient does not have a history of asthma. Patient does not have a history of environmental allergens. Patient has not traveled recently. Patient does not have a history of smoking. Patient has not had a previous Covid test.     She reports an illness about 5 months ago with sore throat and cough. She tested for Covid and it was negative      Hypertension: The patient has been taking medications as instructed, no medication side effects noted, no chest pain on exertion, no dyspnea on exertion, and no swelling of ankles    BP Readings from Last 2 Encounters:   05/19/23 114/62   10/24/22 (!) 106/58     Omeprazole daily    Body mass index is 20.1 kg/m².  Wt Readings from Last 3 Encounters:   06/28/23 45.8 kg (100 lb 15.5 oz)   05/19/23 45.9 kg (101 lb 3.1 oz)   02/27/23 45.6 kg (100 lb 9.6 oz)   Walks 3 mi/d      Review of Systems   Constitutional:  Positive for diaphoresis. Negative for chills and fever.   HENT:  Negative for congestion, ear pain, postnasal drip, rhinorrhea, sinus pressure and sore throat.    Eyes:  Negative for discharge and redness.   Respiratory:  Positive for cough. Negative for chest tightness, shortness of breath and wheezing.    Cardiovascular:  Negative for chest pain, palpitations and leg swelling.   Gastrointestinal:  Negative for abdominal pain, constipation, diarrhea, nausea and vomiting.        Has difficulty swallowing pills   Endocrine: Positive for heat intolerance. Negative for polydipsia and polyuria.    Genitourinary:  Negative for dysuria.   Musculoskeletal:  Negative for arthralgias and myalgias.   Skin:  Negative for rash and wound.   Neurological:  Negative for dizziness, tremors, numbness and headaches.        Objective:     Physical Exam  Vitals reviewed.   Constitutional:       General: She is awake. She is not in acute distress.     Appearance: Normal appearance. She is well-developed and well-groomed.   HENT:      Head: Normocephalic and atraumatic.      Right Ear: Hearing and external ear normal.      Left Ear: Hearing and external ear normal.      Nose: Nose normal. No congestion.      Mouth/Throat:      Mouth: Mucous membranes are moist.   Eyes:      General: Lids are normal. Vision grossly intact. Gaze aligned appropriately.   Cardiovascular:      Rate and Rhythm: Normal rate and regular rhythm.      Heart sounds: Normal heart sounds. No murmur heard.  Pulmonary:      Effort: Pulmonary effort is normal.      Breath sounds: Normal breath sounds. No decreased breath sounds or wheezing.   Abdominal:      General: Bowel sounds are normal. There is no distension.   Musculoskeletal:         General: Normal range of motion.      Cervical back: Normal range of motion. No rigidity. No pain with movement. Normal range of motion.      Right lower leg: No edema.      Left lower leg: No edema.   Skin:     General: Skin is warm and dry.      Findings: No lesion or rash.   Neurological:      Mental Status: She is alert and oriented to person, place, and time.   Psychiatric:         Attention and Perception: Attention normal.         Mood and Affect: Mood normal.         Behavior: Behavior is cooperative.          Assessment:      1. Chronic cough    2. Night sweats    3. Essential hypertension    4. Inadequate vitamin B12 intake    5. Aortic atherosclerosis           Plan:     1. Chronic cough  - fluticasone propionate (FLONASE) 50 mcg/actuation nasal spray; 1 spray by Each Nostril route once daily.  - X-Ray Chest  PA And Lateral; Future  - CBC Auto Differential; Future  - Complete PFT w/ bronchodilator; Future  - fexofenadine (ALLEGRA) 180 MG tablet; Take 1 tablet (180 mg total) by mouth 2 (two) times daily. for 7 days; Refill: 0  - benzonatate (TESSALON) 200 MG capsule; Take 1 capsule (200 mg total) by mouth 3 (three) times daily as needed for Cough.  Dispense: 30 capsule; Refill: 0    2. Night sweats  - CBC Auto Differential; Future  - TSH; Future    3. Essential hypertension  - controlled, continue current regimen  - Comprehensive Metabolic Panel; Future    4. Inadequate vitamin B12 intake  - Vitamin B12; Future    5. Aortic atherosclerosis  - seen on past imaging, HTN is controlled, Lipitor discontinued due to muscle pain, monitor closely    RTC in October 2023 for annual exam or sooner if needed    __________________________    Kate Gil MD, PharmD  [facility]- Internal Medicine  American Board of Obesity Medicine diplomate  Office [phone]

## 2023-05-24 ENCOUNTER — HOSPITAL ENCOUNTER (OUTPATIENT)
Dept: PULMONOLOGY | Facility: CLINIC | Age: 68
Discharge: HOME | End: 2023-05-24
Payer: MEDICARE

## 2023-05-24 DIAGNOSIS — R05.3 CHRONIC COUGH: ICD-10-CM

## 2023-05-24 LAB
DLCO ADJ PRE: 15.53 ML/(MIN*MMHG) (ref 13.03–24.5)
DLCO SINGLE BREATH LLN: 13.03
DLCO SINGLE BREATH PRE REF: 82 %
DLCO SINGLE BREATH REF: 18.77
DLCOC SBVA LLN: 2.79
DLCOC SBVA PRE REF: 84.8 %
DLCOC SBVA REF: 4.58
DLCOC SINGLE BREATH LLN: 13.03
DLCOC SINGLE BREATH PRE REF: 82.8 %
DLCOC SINGLE BREATH REF: 18.77
DLCOCSBVAULN: 6.36
DLCOCSINGLEBREATHULN: 24.5
DLCOSINGLEBREATHULN: 24.5
DLCOVA LLN: 2.79
DLCOVA PRE REF: 84 %
DLCOVA PRE: 3.84 ML/(MIN*MMHG*L) (ref 2.79–6.36)
DLCOVA REF: 4.58
DLCOVAULN: 6.36
DLVAADJ PRE: 3.88 ML/(MIN*MMHG*L) (ref 2.79–6.36)
ERV LLN: -16449.35
ERV PRE REF: 184.9 %
ERV REF: 0.65
ERVULN: ABNORMAL
FEF 25 75 LLN: 0.86
FEF 25 75 PRE REF: 103.9 %
FEF 25 75 REF: 1.78
FET100 CHG: 0.5 %
FEV05 LLN: 0.66
FEV05 REF: 1.52
FEV1 CHG: 1.2 %
FEV1 FVC LLN: 66
FEV1 FVC PRE REF: 98.9 %
FEV1 FVC REF: 79
FEV1 LLN: 1.43
FEV1 PRE REF: 108.9 %
FEV1 REF: 1.94
FEV1 VOL CHG: 0.03
FRCPLETH LLN: 1.6
FRCPLETH PREREF: 123.2 %
FRCPLETH REF: 2.42
FRCPLETHULN: 3.25
FVC CHG: -0.6 %
FVC LLN: 1.82
FVC PRE REF: 109.6 %
FVC REF: 2.46
FVC VOL CHG: -0.02
IVC PRE: 2.51 L (ref 1.82–3.13)
IVC SINGLE BREATH LLN: 1.82
IVC SINGLE BREATH PRE REF: 102.2 %
IVC SINGLE BREATH REF: 2.46
IVCSINGLEBREATHULN: 3.13
LLN IC: -16448.53
PEF LLN: 3.74
PEF PRE REF: 101.1 %
PEF REF: 5.19
PHYSICIAN COMMENT: ABNORMAL
POST FEF 25 75: 2.04 L/S (ref 0.86–3.08)
POST FET 100: 6.72 SEC
POST FEV1 FVC: 79.87 % (ref 66.31–90.33)
POST FEV1: 2.14 L (ref 1.43–2.43)
POST FEV5: 1.74 L (ref 0.66–2.37)
POST FVC: 2.68 L (ref 1.82–3.13)
POST PEF: 6.07 L/S (ref 3.74–6.65)
PRE DLCO: 15.39 ML/(MIN*MMHG) (ref 13.03–24.5)
PRE ERV: 1.21 L (ref -16449.35–16450.65)
PRE FEF 25 75: 1.85 L/S (ref 0.86–3.08)
PRE FET 100: 6.68 SEC
PRE FEV05 REF: 111.5 %
PRE FEV1 FVC: 78.4 % (ref 66.31–90.33)
PRE FEV1: 2.12 L (ref 1.43–2.43)
PRE FEV5: 1.69 L (ref 0.66–2.37)
PRE FRC PL: 2.99 L (ref 1.6–3.25)
PRE FVC: 2.7 L (ref 1.82–3.13)
PRE IC: 1.49 L (ref -16448.53–16451.47)
PRE PEF: 5.25 L/S (ref 3.74–6.65)
PRE REF IC: 101.6 %
PRE RV: 1.78 L (ref 1.19–2.34)
PRE TLC: 4.47 L (ref 3.11–5.09)
PRE VTG: 3.08 L
RAW PRE REF: 101.2 %
RAW PRE: 3.1 CMH2O*S/L (ref 3.06–3.06)
RAW REF: 3.06
REF IC: 1.47
RV LLN: 1.19
RV PRE REF: 100.4 %
RV REF: 1.77
RVTLC LLN: 32
RVTLC PRE REF: 95.9 %
RVTLC PRE: 39.7 % (ref 31.81–50.99)
RVTLC REF: 41
RVTLCULN: 51
RVULN: 2.34
SGAW PRE REF: 98.4 %
SGAW PRE: 0.1 1/(CMH2O*S) (ref 0.1–0.1)
SGAW REF: 0.1
TLC LLN: 3.11
TLC PRE REF: 109.1 %
TLC REF: 4.1
TLC ULN: 5.09
ULN IC: ABNORMAL
VA PRE: 4 L (ref 3.95–3.95)
VA SINGLE BREATH LLN: 3.95
VA SINGLE BREATH PRE REF: 101.3 %
VA SINGLE BREATH REF: 3.95
VASINGLEBREATHULN: 3.95
VC LLN: 1.82
VC PRE REF: 109.6 %
VC PRE: 2.7 L (ref 1.82–3.13)
VC REF: 2.46
VC ULN: 3.13

## 2023-05-24 PROCEDURE — 94729 PR C02/MEMBANE DIFFUSE CAPACITY: ICD-10-PCS | Mod: S$GLB,,, | Performed by: INTERNAL MEDICINE

## 2023-05-24 PROCEDURE — 94726 PULM FUNCT TST PLETHYSMOGRAP: ICD-10-PCS | Mod: S$GLB,,, | Performed by: INTERNAL MEDICINE

## 2023-05-24 PROCEDURE — 94729 DIFFUSING CAPACITY: CPT | Mod: S$GLB,,, | Performed by: INTERNAL MEDICINE

## 2023-05-24 PROCEDURE — 94060 PR EVAL OF BRONCHOSPASM: ICD-10-PCS | Mod: S$GLB,,, | Performed by: INTERNAL MEDICINE

## 2023-05-24 PROCEDURE — 94726 PLETHYSMOGRAPHY LUNG VOLUMES: CPT | Mod: S$GLB,,, | Performed by: INTERNAL MEDICINE

## 2023-05-24 PROCEDURE — 94060 EVALUATION OF WHEEZING: CPT | Mod: S$GLB,,, | Performed by: INTERNAL MEDICINE

## 2023-05-28 ENCOUNTER — PATIENT MESSAGE (OUTPATIENT)
Dept: INTERNAL MEDICINE | Facility: CLINIC | Age: 68
End: 2023-05-28
Payer: MEDICARE

## 2023-05-28 DIAGNOSIS — R05.3 CHRONIC COUGH: Primary | ICD-10-CM

## 2023-05-28 DIAGNOSIS — K21.9 GASTROESOPHAGEAL REFLUX DISEASE, UNSPECIFIED WHETHER ESOPHAGITIS PRESENT: ICD-10-CM

## 2023-05-29 ENCOUNTER — PATIENT MESSAGE (OUTPATIENT)
Dept: INTERNAL MEDICINE | Facility: CLINIC | Age: 68
End: 2023-05-29
Payer: MEDICARE

## 2023-05-29 RX ORDER — ESOMEPRAZOLE MAGNESIUM 40 MG/1
40 CAPSULE, DELAYED RELEASE ORAL
Qty: 14 CAPSULE | Refills: 0 | Status: SHIPPED | OUTPATIENT
Start: 2023-05-29 | End: 2023-06-16 | Stop reason: SDUPTHER

## 2023-05-29 NOTE — TELEPHONE ENCOUNTER
Pt states very little change with antihistamine and nasal spray   She says that you mentioned supressing the acid   Whatever you think is best for her do do

## 2023-05-29 NOTE — TELEPHONE ENCOUNTER
Ordered Nexium 40mg/d for 14 days. If at any point it improves, let me know. May still proceed with CT and Pulmonology referral.

## 2023-05-29 NOTE — TELEPHONE ENCOUNTER
PFT's did not show any significant changes.     Was there any improvement in symptoms after trial of the antihistamine Allegra?    If there was no effect, then will order CT chest. Will also order a Pulmonology referral. Please arrange.

## 2023-06-13 ENCOUNTER — PATIENT MESSAGE (OUTPATIENT)
Dept: INTERNAL MEDICINE | Facility: CLINIC | Age: 68
End: 2023-06-13
Payer: MEDICARE

## 2023-06-13 DIAGNOSIS — K21.9 GASTROESOPHAGEAL REFLUX DISEASE, UNSPECIFIED WHETHER ESOPHAGITIS PRESENT: ICD-10-CM

## 2023-06-13 RX ORDER — ESOMEPRAZOLE MAGNESIUM 40 MG/1
40 CAPSULE, DELAYED RELEASE ORAL
Qty: 14 CAPSULE | Refills: 0 | Status: CANCELLED | OUTPATIENT
Start: 2023-06-13 | End: 2023-06-27

## 2023-06-13 NOTE — TELEPHONE ENCOUNTER
No care due was identified.  [facility] Embedded Care Due Messages. Reference number: [id number].   6/13/2023 8:24:30 AM CDT

## 2023-06-15 ENCOUNTER — PATIENT MESSAGE (OUTPATIENT)
Dept: INTERNAL MEDICINE | Facility: CLINIC | Age: 68
End: 2023-06-15
Payer: MEDICARE

## 2023-06-15 DIAGNOSIS — K21.9 GASTROESOPHAGEAL REFLUX DISEASE, UNSPECIFIED WHETHER ESOPHAGITIS PRESENT: ICD-10-CM

## 2023-06-15 NOTE — TELEPHONE ENCOUNTER
Pt states that nexium seemed to help, she has noticed more coughing since stopping the medication, the night sweats are better for now, she states she never received a call about a CT scan, she hopes this is from reflux,     Please advise  I do see ct chest in active requests

## 2023-06-16 RX ORDER — ESOMEPRAZOLE MAGNESIUM 40 MG/1
40 CAPSULE, DELAYED RELEASE ORAL
Qty: 30 CAPSULE | Refills: 0 | Status: SHIPPED | OUTPATIENT
Start: 2023-06-16

## 2023-06-16 NOTE — TELEPHONE ENCOUNTER
Will continue Nexium 40mg daily for the next month. Cough is likely GI-related.    But she should still arrange chest CT to make sure that there are no underlying lung issues.

## 2023-06-26 ENCOUNTER — PATIENT MESSAGE (OUTPATIENT)
Dept: INTERNAL MEDICINE | Facility: CLINIC | Age: 68
End: 2023-06-26
Payer: MEDICARE

## 2023-06-26 DIAGNOSIS — R93.89 ABNORMAL CT OF THE CHEST: Primary | ICD-10-CM

## 2023-06-26 DIAGNOSIS — R91.8 RETICULONODULAR INFILTRATE PRESENT ON IMAGING OF CHEST: ICD-10-CM

## 2023-06-28 ENCOUNTER — OFFICE VISIT (OUTPATIENT)
Dept: PULMONOLOGY | Facility: CLINIC | Age: 68
End: 2023-06-28
Payer: MEDICARE

## 2023-06-28 VITALS
HEART RATE: 74 BPM | OXYGEN SATURATION: 98 % | WEIGHT: 101 LBS | SYSTOLIC BLOOD PRESSURE: 119 MMHG | DIASTOLIC BLOOD PRESSURE: 68 MMHG | HEIGHT: 60 IN | BODY MASS INDEX: 19.83 KG/M2

## 2023-06-28 DIAGNOSIS — R93.2 ABNORMAL CT OF LIVER: ICD-10-CM

## 2023-06-28 DIAGNOSIS — R93.89 ABNORMAL CT OF THE CHEST: ICD-10-CM

## 2023-06-28 DIAGNOSIS — R91.8 RETICULONODULAR INFILTRATE PRESENT ON IMAGING OF CHEST: ICD-10-CM

## 2023-06-28 DIAGNOSIS — R05.3 CHRONIC COUGH: Primary | ICD-10-CM

## 2023-06-28 PROBLEM — I70.0 AORTIC ATHEROSCLEROSIS: Status: ACTIVE | Noted: 2023-06-28

## 2023-06-28 PROCEDURE — 3288F FALL RISK ASSESSMENT DOCD: CPT | Mod: CPTII,S$GLB,, | Performed by: INTERNAL MEDICINE

## 2023-06-28 PROCEDURE — 3074F SYST BP LT 130 MM HG: CPT | Mod: CPTII,S$GLB,, | Performed by: INTERNAL MEDICINE

## 2023-06-28 PROCEDURE — 1160F PR REVIEW ALL MEDS BY PRESCRIBER/CLIN PHARMACIST DOCUMENTED: ICD-10-PCS | Mod: CPTII,S$GLB,, | Performed by: INTERNAL MEDICINE

## 2023-06-28 PROCEDURE — 3008F PR BODY MASS INDEX (BMI) DOCUMENTED: ICD-10-PCS | Mod: CPTII,S$GLB,, | Performed by: INTERNAL MEDICINE

## 2023-06-28 PROCEDURE — 3078F DIAST BP <80 MM HG: CPT | Mod: CPTII,S$GLB,, | Performed by: INTERNAL MEDICINE

## 2023-06-28 PROCEDURE — 3008F BODY MASS INDEX DOCD: CPT | Mod: CPTII,S$GLB,, | Performed by: INTERNAL MEDICINE

## 2023-06-28 PROCEDURE — 1159F PR MEDICATION LIST DOCUMENTED IN MEDICAL RECORD: ICD-10-PCS | Mod: CPTII,S$GLB,, | Performed by: INTERNAL MEDICINE

## 2023-06-28 PROCEDURE — 1159F MED LIST DOCD IN RCRD: CPT | Mod: CPTII,S$GLB,, | Performed by: INTERNAL MEDICINE

## 2023-06-28 PROCEDURE — 4010F ACE/ARB THERAPY RXD/TAKEN: CPT | Mod: CPTII,S$GLB,, | Performed by: INTERNAL MEDICINE

## 2023-06-28 PROCEDURE — 3078F PR MOST RECENT DIASTOLIC BLOOD PRESSURE < 80 MM HG: ICD-10-PCS | Mod: CPTII,S$GLB,, | Performed by: INTERNAL MEDICINE

## 2023-06-28 PROCEDURE — 4010F PR ACE/ARB THEARPY RXD/TAKEN: ICD-10-PCS | Mod: CPTII,S$GLB,, | Performed by: INTERNAL MEDICINE

## 2023-06-28 PROCEDURE — 1126F PR PAIN SEVERITY QUANTIFIED, NO PAIN PRESENT: ICD-10-PCS | Mod: CPTII,S$GLB,, | Performed by: INTERNAL MEDICINE

## 2023-06-28 PROCEDURE — 3074F PR MOST RECENT SYSTOLIC BLOOD PRESSURE < 130 MM HG: ICD-10-PCS | Mod: CPTII,S$GLB,, | Performed by: INTERNAL MEDICINE

## 2023-06-28 PROCEDURE — 99999 PR PBB SHADOW E&M-EST. PATIENT-LVL V: CPT | Mod: PBBFAC,,, | Performed by: INTERNAL MEDICINE

## 2023-06-28 PROCEDURE — 99204 PR OFFICE/OUTPT VISIT, NEW, LEVL IV, 45-59 MIN: ICD-10-PCS | Mod: S$GLB,,, | Performed by: INTERNAL MEDICINE

## 2023-06-28 PROCEDURE — 1101F PT FALLS ASSESS-DOCD LE1/YR: CPT | Mod: CPTII,S$GLB,, | Performed by: INTERNAL MEDICINE

## 2023-06-28 PROCEDURE — 99999 PR PBB SHADOW E&M-EST. PATIENT-LVL V: ICD-10-PCS | Mod: PBBFAC,,, | Performed by: INTERNAL MEDICINE

## 2023-06-28 PROCEDURE — 3288F PR FALLS RISK ASSESSMENT DOCUMENTED: ICD-10-PCS | Mod: CPTII,S$GLB,, | Performed by: INTERNAL MEDICINE

## 2023-06-28 PROCEDURE — 1101F PR PT FALLS ASSESS DOC 0-1 FALLS W/OUT INJ PAST YR: ICD-10-PCS | Mod: CPTII,S$GLB,, | Performed by: INTERNAL MEDICINE

## 2023-06-28 PROCEDURE — 1126F AMNT PAIN NOTED NONE PRSNT: CPT | Mod: CPTII,S$GLB,, | Performed by: INTERNAL MEDICINE

## 2023-06-28 PROCEDURE — 1160F RVW MEDS BY RX/DR IN RCRD: CPT | Mod: CPTII,S$GLB,, | Performed by: INTERNAL MEDICINE

## 2023-06-28 PROCEDURE — 99204 OFFICE O/P NEW MOD 45 MIN: CPT | Mod: S$GLB,,, | Performed by: INTERNAL MEDICINE

## 2023-06-28 RX ORDER — SODIUM CHLORIDE FOR INHALATION 3 %
4 VIAL, NEBULIZER (ML) INHALATION 2 TIMES DAILY PRN
Qty: 240 ML | Refills: 11 | Status: SHIPPED | OUTPATIENT
Start: 2023-06-28

## 2023-06-28 RX ORDER — ALBUTEROL SULFATE 90 UG/1
2 AEROSOL, METERED RESPIRATORY (INHALATION) EVERY 6 HOURS PRN
Qty: 8.5 G | Refills: 4 | Status: SHIPPED | OUTPATIENT
Start: 2023-06-28

## 2023-06-28 RX ORDER — SODIUM CHLORIDE FOR INHALATION 3 %
4 VIAL, NEBULIZER (ML) INHALATION 2 TIMES DAILY PRN
Qty: 4 ML | Refills: 11 | Status: SHIPPED | OUTPATIENT
Start: 2023-06-28 | End: 2023-06-28

## 2023-06-28 NOTE — PROGRESS NOTES
"Subjective:      Patient ID: Penny Cervantes is a 67 y.o. female.    Chief Complaint: Abnormal Ct Scan    Penny Cervantes has a past medical history of ataxia, recurrent mouth ulcerations, chronic cough, HTN, nephrolithiasis, breast cancer, osteopenia, who presents as a new referral for reticulonodular changes on a CT thorax also chronic cough.     Tobacco/vape: never  Occupation: secretarial, does live out by the [place]  Exertional capacity: gets winded when cutting grass  Prior lung disease: none  Sputum production: none  Allergies/sinusitis: none, tried allegra and flonase due to issues with swallowing.  This didn't do much.  GERD/Aspiration: never had symptoms, but did have a cough.  She was tried on nexium, and her cough actually improved.   Pets: 4 cats and 2 dogs.   Travel/TB: not that she is aware of.   Respiratory regimen: none  Prior cancer: breast cancer (s/p lumpectomy x 2, radiation and chemo, now in remission).   Prior hospitalization/intubation: never  Snoring/apnea: none     Today she is doing well but still having cough.    Review of Systems   Constitutional:  Positive for weight loss and night sweats.   HENT:  Negative for postnasal drip, rhinorrhea, sinus pressure and congestion.    Respiratory:  Positive for cough and wheezing. Negative for snoring, sputum production, shortness of breath, dyspnea on extertion and use of rescue inhaler.    Cardiovascular:  Negative for chest pain, palpitations and leg swelling.   Gastrointestinal:  Negative for nausea, vomiting, abdominal pain, abdominal distention and acid reflux.   Objective:     Vitals:    06/28/23 1503   BP: 119/68   BP Location: Right arm   Patient Position: Sitting   Pulse: 74   SpO2: 98%   Weight: 45.8 kg (100 lb 15.5 oz)   Height: 4' 11.5" (1.511 m)       Physical Exam   Constitutional: She is oriented to person, place, and time. She appears well-developed and well-nourished. She appears not cachectic. No distress. She is " not obese.   HENT:   Head: Normocephalic.   Neck: No JVD present.   Cardiovascular: Normal rate, regular rhythm and normal heart sounds.   Pulmonary/Chest: Normal expansion, symmetric chest wall expansion, effort normal and breath sounds normal.   Abdominal: Soft. Bowel sounds are normal. She exhibits no distension.   Musculoskeletal:         General: No edema. Normal range of motion.      Cervical back: Normal range of motion and neck supple.   Neurological: She is alert and oriented to person, place, and time.   Skin: Skin is dry. She is not diaphoretic.   Psychiatric: She has a normal mood and affect. Her behavior is normal. Judgment and thought content normal.     Personal Diagnostic Review     PFTs 5/24/23  FEV1/FVC - 78%  FEV1 - 2.12L (108%)  FVC - 2.7L (109%)  TLC - 4.47L (109%)  DLCO - 82%  Bronchodilators: not significant.    CT thorax 6/21/23  Reticulonodular infiltrate is seen within the right middle lobe with some mild mucous plugging and mild bronchial thickening which can be seen in the setting of atypical infection including ERMELINDA.    No flowsheet data found.     Assessment:     1. Chronic cough    2. Abnormal CT of the chest    3. Reticulonodular infiltrate present on imaging of chest    4. Abnormal CT of liver         Outpatient Encounter Medications as of 6/28/2023   Medication Sig Dispense Refill    amLODIPine (NORVASC) 5 MG tablet TAKE 1 TABLET(5 MG) BY MOUTH EVERY EVENING 90 tablet 3    biotin 1 mg tablet Take 1,000 mcg by mouth once daily.       cyanocobalamin 1,000 mcg/mL injection Inject 1 mL (1,000 mcg total) into the skin every 30 days. 1 mL 11    ERGOCALCIFEROL, VITAMIN D2, (VITAMIN D ORAL) Take by mouth every morning.       esomeprazole (NEXIUM) 40 MG capsule Take 1 capsule (40 mg total) by mouth before breakfast. 30 capsule 0    fluticasone propionate (FLONASE) 50 mcg/actuation nasal spray 1 spray by Each Nostril route once daily.      losartan (COZAAR) 25 MG tablet Take 1 tablet (25 mg  total) by mouth every evening. 90 tablet 3    magnesium oxide (MAG-OX) 400 mg (241.3 mg magnesium) tablet Take 400 mg by mouth once daily.      metoprolol succinate (TOPROL-XL) 25 MG 24 hr tablet Take 1 tablet (25 mg total) by mouth every evening. 90 tablet 3    tamsulosin (FLOMAX) 0.4 mg Cap Take 1 capsule (0.4 mg total) by mouth once daily. 30 capsule 0    triamcinolone acetonide 0.1% (KENALOG) 0.1 % paste Place onto teeth 2 (two) times daily. 5 g 5    albuterol (VENTOLIN HFA) 90 mcg/actuation inhaler Inhale 2 puffs into the lungs every 6 (six) hours as needed for Wheezing or Shortness of Breath (cough). Rescue 18 g 4    belladonna alkaloids-opium 16.2-30 mg (B&O SUPPRETTES) 16.2-30 mg Supp 1 supp NY every 12 hr,x3 days,PRN:as needed for spasm (Patient not taking: Reported on 5/19/2023) 6 suppository 0    fexofenadine (ALLEGRA) 180 MG tablet Take 1 tablet (180 mg total) by mouth 2 (two) times daily. for 7 days  0    hyoscyamine (ANASPAZ,LEVSIN) 0.125 mg Tab Take 1 tablet (125 mcg total) by mouth 4 (four) times daily as needed for spasm (Patient not taking: Reported on 5/19/2023) 40 tablet 0    promethazine (PHENERGAN) 25 MG suppository Insert 1 suppository rectally every 4 hours as needed for nausea/vomiting (Patient not taking: Reported on 5/19/2023) 12 suppository 0    scopolamine (TRANSDERM-SCOP) 1.3-1.5 mg (1 mg over 3 days) Apply 1 film topically every 72 hours (Patient not taking: Reported on 5/19/2023) 4 patch 0    sodium chloride 3% 3 % nebulizer solution Take 4 mLs by nebulization 2 (two) times daily as needed for Other or Cough (mucous clearance). Please dispense 30 day supply with BID PRN dosing. 4 mL 11    [DISCONTINUED] sodium chloride 3% 3 % nebulizer solution Take 4 mLs by nebulization 2 (two) times daily as needed for Other or Cough (mucous clearance). 4 mL 11     No facility-administered encounter medications on file as of 6/28/2023.     Orders Placed This Encounter   Procedures    NEBULIZER FOR  "HOME USE     Order Specific Question:   Height:     Answer:   4' 11.5" (1.511 m)     Order Specific Question:   Weight:     Answer:   45.8 kg (100 lb 15.5 oz)     Order Specific Question:   Length of need (1-99 months):     Answer:   99    NEBULIZER KIT (SUPPLIES) FOR HOME USE     Order Specific Question:   Height:     Answer:   4' 11.5" (1.511 m)     Order Specific Question:   Weight:     Answer:   45.8 kg (100 lb 15.5 oz)     Order Specific Question:   Length of need (1-99 months):     Answer:   99     Order Specific Question:   Mask or Mouthpiece?     Answer:   Mouthpiece    Culture, Respiratory with Gram Stain     Standing Status:   Future     Standing Expiration Date:   8/26/2024    AFB Culture & Smear     Standing Status:   Future     Standing Expiration Date:   8/26/2024    CULTURE, FUNGUS     Standing Status:   Future     Standing Expiration Date:   8/26/2024    CT Chest Without Contrast     Standing Status:   Future     Standing Expiration Date:   6/28/2024     Order Specific Question:   May the Radiologist modify the order per protocol to meet the clinical needs of the patient?     Answer:   Yes    Ambulatory referral/consult to Hepatology     Standing Status:   Future     Standing Expiration Date:   7/28/2024     Referral Priority:   Routine     Referral Type:   Consultation     Referral Reason:   Specialty Services Required     Requested Specialty:   Hepatology     Number of Visits Requested:   1       Plan:     Problem List Items Addressed This Visit          Pulmonary    Chronic cough - Primary    Overview     Albuterol trial  Nebulizer with 3% saline  Sputum cultures if cough becomes productive.  Patient notified to contact me should this occur.             GI    Abnormal CT of liver    Overview     Given hepatic cyst findings, will refer to hepatology.              Other    Abnormal CT of the chest    Overview     Tree-in-bud opacities noted on CT thorax.  I suspect chronic infection vs mucous " impaction.  Evidence of this process may be seen on prior CT abd/pelvis as far back as 8/2021.    - trial of albuterol  - trial of nebulizer therapy with 3% saline nebs for clearance  - repeat CT thorax in 3 months.  If abnormalities persist, may consider bronchoscopy.           Other Visit Diagnoses       Reticulonodular infiltrate present on imaging of chest              RTC 3 months.    Catracho Clemente MD  [facility]

## 2023-06-30 ENCOUNTER — TELEPHONE (OUTPATIENT)
Dept: HEPATOLOGY | Facility: CLINIC | Age: 68
End: 2023-06-30
Payer: MEDICARE

## 2023-06-30 NOTE — TELEPHONE ENCOUNTER
Referral to Hepatology for abnormal liver CT- cysts vs lesions    Scheduling attempt # 1    Please call patient to schedule appointment with any MD, thanks

## 2023-07-03 ENCOUNTER — TELEPHONE (OUTPATIENT)
Dept: HEPATOLOGY | Facility: CLINIC | Age: 68
End: 2023-07-03
Payer: MEDICARE

## 2023-07-03 ENCOUNTER — PATIENT MESSAGE (OUTPATIENT)
Dept: PULMONOLOGY | Facility: CLINIC | Age: 68
End: 2023-07-03
Payer: MEDICARE

## 2023-07-03 NOTE — TELEPHONE ENCOUNTER
Scheduling attempt #2    Patient called to schedule appointment.  No answer, an voicemail left with call back .

## 2023-07-03 NOTE — TELEPHONE ENCOUNTER
Patient hepatology consult is scheduled with Dr. Mccracken 10/16/2023.   I have reviewed and confirmed nurses' notes... Not eating x 4 days with upper abd pain

## 2023-07-03 NOTE — TELEPHONE ENCOUNTER
Called patient to schedule a hepatology consult from referral.  Scheduled on the next available with Dr. Mccracken 10/16/2023. Put her in the waiting list.  Patient confirmed and agreed with the schedule.  Reminder letter mailed.

## 2023-07-05 ENCOUNTER — TELEPHONE (OUTPATIENT)
Dept: HEPATOLOGY | Facility: CLINIC | Age: 68
End: 2023-07-05
Payer: MEDICARE

## 2023-07-05 NOTE — TELEPHONE ENCOUNTER
----- Message from Twyla Rojas sent at 7/5/2023 10:19 AM CDT -----  Regarding: Return Call  Contact: Penny De Oliveira is returning call to, may be for an earlier appt. Please advise. Requesting a call back.        [phone] (home)

## 2023-07-05 NOTE — TELEPHONE ENCOUNTER
Returned call to the patient.  No answer, left voicemail with call back # [phone].   1% lidocaine

## 2023-07-26 ENCOUNTER — TELEPHONE (OUTPATIENT)
Dept: ORTHOPEDICS | Facility: CLINIC | Age: 68
End: 2023-07-26
Payer: MEDICARE

## 2023-07-26 DIAGNOSIS — M51.36 DDD (DEGENERATIVE DISC DISEASE), LUMBAR: Primary | ICD-10-CM

## 2023-07-26 NOTE — TELEPHONE ENCOUNTER
Left message informing patient of appointment change and xray due to Dr. Hughes being out of the office in the afternoon.

## 2023-07-27 ENCOUNTER — OFFICE VISIT (OUTPATIENT)
Dept: ORTHOPEDICS | Facility: CLINIC | Age: 68
End: 2023-07-27
Payer: MEDICARE

## 2023-07-27 ENCOUNTER — HOSPITAL ENCOUNTER (OUTPATIENT)
Dept: RADIOLOGY | Facility: HOSPITAL | Age: 68
Discharge: HOME | End: 2023-07-27
Attending: ORTHOPAEDIC SURGERY
Payer: MEDICARE

## 2023-07-27 VITALS — WEIGHT: 97 LBS | HEIGHT: 60 IN | BODY MASS INDEX: 19.04 KG/M2

## 2023-07-27 DIAGNOSIS — M51.36 DDD (DEGENERATIVE DISC DISEASE), LUMBAR: ICD-10-CM

## 2023-07-27 DIAGNOSIS — M47.816 LUMBAR SPONDYLOSIS: Primary | ICD-10-CM

## 2023-07-27 PROCEDURE — 99999 PR PBB SHADOW E&M-EST. PATIENT-LVL IV: ICD-10-PCS | Mod: PBBFAC,,, | Performed by: ORTHOPAEDIC SURGERY

## 2023-07-27 PROCEDURE — 1101F PR PT FALLS ASSESS DOC 0-1 FALLS W/OUT INJ PAST YR: ICD-10-PCS | Mod: CPTII,S$GLB,, | Performed by: ORTHOPAEDIC SURGERY

## 2023-07-27 PROCEDURE — 1125F PR PAIN SEVERITY QUANTIFIED, PAIN PRESENT: ICD-10-PCS | Mod: CPTII,S$GLB,, | Performed by: ORTHOPAEDIC SURGERY

## 2023-07-27 PROCEDURE — 3008F PR BODY MASS INDEX (BMI) DOCUMENTED: ICD-10-PCS | Mod: CPTII,S$GLB,, | Performed by: ORTHOPAEDIC SURGERY

## 2023-07-27 PROCEDURE — 4010F ACE/ARB THERAPY RXD/TAKEN: CPT | Mod: CPTII,S$GLB,, | Performed by: ORTHOPAEDIC SURGERY

## 2023-07-27 PROCEDURE — 1160F RVW MEDS BY RX/DR IN RCRD: CPT | Mod: CPTII,S$GLB,, | Performed by: ORTHOPAEDIC SURGERY

## 2023-07-27 PROCEDURE — 72110 X-RAY EXAM L-2 SPINE 4/>VWS: CPT | Mod: 26,,, | Performed by: RADIOLOGY

## 2023-07-27 PROCEDURE — 72110 X-RAY EXAM L-2 SPINE 4/>VWS: CPT | Mod: TC

## 2023-07-27 PROCEDURE — 99999 PR PBB SHADOW E&M-EST. PATIENT-LVL IV: CPT | Mod: PBBFAC,,, | Performed by: ORTHOPAEDIC SURGERY

## 2023-07-27 PROCEDURE — 99204 OFFICE O/P NEW MOD 45 MIN: CPT | Mod: GC,S$GLB,, | Performed by: ORTHOPAEDIC SURGERY

## 2023-07-27 PROCEDURE — 1125F AMNT PAIN NOTED PAIN PRSNT: CPT | Mod: CPTII,S$GLB,, | Performed by: ORTHOPAEDIC SURGERY

## 2023-07-27 PROCEDURE — 1160F PR REVIEW ALL MEDS BY PRESCRIBER/CLIN PHARMACIST DOCUMENTED: ICD-10-PCS | Mod: CPTII,S$GLB,, | Performed by: ORTHOPAEDIC SURGERY

## 2023-07-27 PROCEDURE — 1101F PT FALLS ASSESS-DOCD LE1/YR: CPT | Mod: CPTII,S$GLB,, | Performed by: ORTHOPAEDIC SURGERY

## 2023-07-27 PROCEDURE — 4010F PR ACE/ARB THEARPY RXD/TAKEN: ICD-10-PCS | Mod: CPTII,S$GLB,, | Performed by: ORTHOPAEDIC SURGERY

## 2023-07-27 PROCEDURE — 99204 PR OFFICE/OUTPT VISIT, NEW, LEVL IV, 45-59 MIN: ICD-10-PCS | Mod: GC,S$GLB,, | Performed by: ORTHOPAEDIC SURGERY

## 2023-07-27 PROCEDURE — 3288F FALL RISK ASSESSMENT DOCD: CPT | Mod: CPTII,S$GLB,, | Performed by: ORTHOPAEDIC SURGERY

## 2023-07-27 PROCEDURE — 1159F PR MEDICATION LIST DOCUMENTED IN MEDICAL RECORD: ICD-10-PCS | Mod: CPTII,S$GLB,, | Performed by: ORTHOPAEDIC SURGERY

## 2023-07-27 PROCEDURE — 1159F MED LIST DOCD IN RCRD: CPT | Mod: CPTII,S$GLB,, | Performed by: ORTHOPAEDIC SURGERY

## 2023-07-27 PROCEDURE — 3288F PR FALLS RISK ASSESSMENT DOCUMENTED: ICD-10-PCS | Mod: CPTII,S$GLB,, | Performed by: ORTHOPAEDIC SURGERY

## 2023-07-27 PROCEDURE — 3008F BODY MASS INDEX DOCD: CPT | Mod: CPTII,S$GLB,, | Performed by: ORTHOPAEDIC SURGERY

## 2023-07-27 PROCEDURE — 72110 XR LUMBAR SPINE AP AND LAT WITH FLEX/EXT: ICD-10-PCS | Mod: 26,,, | Performed by: RADIOLOGY

## 2023-07-27 RX ORDER — METHOCARBAMOL 500 MG/1
500 TABLET, FILM COATED ORAL 3 TIMES DAILY
Qty: 60 TABLET | Refills: 1 | Status: SHIPPED | OUTPATIENT
Start: 2023-07-27

## 2023-07-27 NOTE — PROGRESS NOTES
DATE: 7/27/2023  PATIENT: Penny Cervantes    Attending Physician: Shiva Hughes M.D.    CHIEF COMPLAINT:  Low back pain    HISTORY:  Penny Cervantes is a 67 y.o. female who presents for initial evaluation of low back pain. The pain has been present for the past 5 months.  However, she originally had back pain 20 years ago which resolved with injections in his been well controlled since that time. The patient describes the pain as constant in the low back.  The pain is worse with walking and standing and improved by sitting and medications, most notably Robaxin. There is no associated numbness and tingling. There is no subjective weakness. Prior treatments have included injections and medication, but no physical therapy or surgery.    The Patient denies myelopathic symptoms such as handwriting changes or difficulty with buttons/coins/keys. Denies perineal paresthesias, bowel/bladder dysfunction.    The patient does not smoke, have DM or endorse IVDU. The patient is not on any blood thinners and does not take chronic narcotics. She is retired; she used to sit on a desk.    PAST MEDICAL/SURGICAL HISTORY:  Past Medical History:   Diagnosis Date    Benign positional vertigo     Breast cancer 2009    s/p chemo XRT, left breast triple negative    Coronary artery disease, non-occlusive     cath in 2012    Hypertension     Interstitial cystitis     Osteopenia     Squamous cell carcinoma     Stroke 7/10/2018    Symptomatic posterior vitreous detachment of left eye 12/24/2018    Vitreous hemorrhage, left 1/10/2019     Past Surgical History:   Procedure Laterality Date    BILATERAL SALPINGOOPHORECTOMY      with Hysterectomy    BONE RESECTION, RIB      right side    BREAST BIOPSY Right 2005    BREAST CYST ASPIRATION      BREAST LUMPECTOMY Left 2009    BREAST SURGERY      CYSTOSCOPY N/A 7/20/2021    Procedure: CYSTOSCOPY;  Surgeon: Arabella Whatley MD;  Location: [facility] OR [address] [place] [address];  Service: Urology;  Laterality: N/A;  30  minutes     HERNIA REPAIR      HYSTERECTOMY      TOTAL VAGINAL HYSTERECTOMY      Indication: Endometriosis    TRIGGER POINT INJECTION  7/20/2021    Procedure: INJECTION, TRIGGER POINTS  -SOLUCORTEF;  Surgeon: Arabella Whatley MD;  Location: [facility] OR [address] [place] [address];  Service: Urology;;       Current Medications:   Current Outpatient Medications:     albuterol (VENTOLIN HFA) 90 mcg/actuation inhaler, Inhale 2 puffs into the lungs every 6 (six) hours as needed for Wheezing or Shortness of Breath (cough). Rescue, Disp: 8.5 g, Rfl: 4    amLODIPine (NORVASC) 5 MG tablet, TAKE 1 TABLET(5 MG) BY MOUTH EVERY EVENING, Disp: 90 tablet, Rfl: 3    belladonna alkaloids-opium 16.2-30 mg (B&O SUPPRETTES) 16.2-30 mg Supp, 1 supp NY every 12 hr,x3 days,PRN:as needed for spasm, Disp: 6 suppository, Rfl: 0    biotin 1 mg tablet, Take 1,000 mcg by mouth once daily. , Disp: , Rfl:     cyanocobalamin 1,000 mcg/mL injection, Inject 1 mL (1,000 mcg total) into the skin every 30 days., Disp: 1 mL, Rfl: 11    ERGOCALCIFEROL, VITAMIN D2, (VITAMIN D ORAL), Take by mouth every morning. , Disp: , Rfl:     esomeprazole (NEXIUM) 40 MG capsule, Take 1 capsule (40 mg total) by mouth before breakfast., Disp: 30 capsule, Rfl: 0    fluticasone propionate (FLONASE) 50 mcg/actuation nasal spray, 1 spray by Each Nostril route once daily., Disp: , Rfl:     hyoscyamine (ANASPAZ,LEVSIN) 0.125 mg Tab, Take 1 tablet (125 mcg total) by mouth 4 (four) times daily as needed for spasm, Disp: 40 tablet, Rfl: 0    losartan (COZAAR) 25 MG tablet, Take 1 tablet (25 mg total) by mouth every evening., Disp: 90 tablet, Rfl: 3    magnesium oxide (MAG-OX) 400 mg (241.3 mg magnesium) tablet, Take 400 mg by mouth once daily., Disp: , Rfl:     metoprolol succinate (TOPROL-XL) 25 MG 24 hr tablet, Take 1 tablet (25 mg total) by mouth every evening., Disp: 90 tablet, Rfl: 3    promethazine (PHENERGAN) 25 MG suppository, Insert 1 suppository rectally every 4 hours as needed for  "nausea/vomiting, Disp: 12 suppository, Rfl: 0    scopolamine (TRANSDERM-SCOP) 1.3-1.5 mg (1 mg over 3 days), Apply 1 film topically every 72 hours, Disp: 4 patch, Rfl: 0    sodium chloride 3% 3 % nebulizer solution, Use one vial (4 mL) by nebulization 2 (two) times daily as needed for other or Cough (mucous clearance)., Disp: 240 mL, Rfl: 11    tamsulosin (FLOMAX) 0.4 mg Cap, Take 1 capsule (0.4 mg total) by mouth once daily., Disp: 30 capsule, Rfl: 0    triamcinolone acetonide 0.1% (KENALOG) 0.1 % paste, Place onto teeth 2 (two) times daily., Disp: 5 g, Rfl: 5    fexofenadine (ALLEGRA) 180 MG tablet, Take 1 tablet (180 mg total) by mouth 2 (two) times daily. for 7 days, Disp: , Rfl: 0    methocarbamoL (ROBAXIN) 500 MG Tab, Take 1 tablet (500 mg total) by mouth 3 (three) times daily., Disp: 60 tablet, Rfl: 1    Social History:   Social History     Socioeconomic History    Marital status:    Tobacco Use    Smoking status: Never     Passive exposure: Never    Smokeless tobacco: Never   Substance and Sexual Activity    Alcohol use: Yes     Comment: 2-3 per month    Drug use: No    Sexual activity: Not Currently     Partners: Male       REVIEW OF SYSTEMS:  Constitution: Negative. Negative for chills, fever and night sweats.   Cardiovascular: Negative for chest pain and syncope.   Respiratory: Negative for cough and shortness of breath.   Gastrointestinal: See HPI. Negative for nausea/vomiting. Negative for abdominal pain.  Genitourinary: See HPI. Negative for discoloration or dysuria.  Skin: Negative for dry skin, itching and rash.   Hematologic/Lymphatic:  Negative for bleeding/clotting disorders.   Musculoskeletal: Negative for falls and muscle weakness.   Neurological: See HPI.   Endocrine: Negative for polydipsia, polyphagia and polyuria.   Allergic/Immunologic: Negative for hives and persistent infections.    PHYSICAL EXAMINATION:    Ht 4' 11.5" (1.511 m)   Wt 44 kg (97 lb)   LMP  (LMP Unknown)   BMI " 19.26 kg/m²     General: The patient is a well-developed, well-nourished 67 y.o. female in no apparent distress, the patient is orientatied to person, place and time.   Psych: Normal mood and affect  HEENT: Vision grossly intact, hearing intact to the spoken word.  Lungs: Respirations unlabored.  Gait: Normal station and gait, no difficulty with toe or heel walk.   Skin: Dorsal lumbar skin negative for rashes, lesions, hairy patches and surgical scars.  Range of motion: Lumbar range of motion is acceptable. There is mild lumbar tenderness to palpation.  Spinal Balance: Global saggital and coronal spinal balance acceptable, no significant for scoliosis and kyphosis.  Musculoskeletal: No pain with the range of motion of the bilateral hips. No trochanteric tenderness to palpation.  Vascular: Bilateral lower extremities warm and well perfused, Dorsalis pedis pulses 2+ bilaterally.  Neurological: Normal strength and tone in all major motor groups in the bilateral lower extremities. Normal sensation to light touch in the L2-S1 dermatomes bilaterally.  Deep tendon reflexes symmetric in the bilateral lower extremities.  Negative Babinski bilaterally.    IMAGING:   Today I independently reviewed the following images and my interpretations are as follows:    AP, Lat and Flex/Ex upright L-spine films demonstrate some degenerative changes without associated spondylolisthesis.  There is mild narrowing of the disc spaces at the L4-5, L5-S1 vertebral levels.    MRI lumbar from 2018 showed no significant stenosis.    Body mass index is 19.26 kg/m².  Hemoglobin A1C   Date Value Ref Range Status   10/24/2022 5.3 4.0 - 5.6 % Final     Comment:     ADA Screening Guidelines:  5.7-6.4%  Consistent with prediabetes  >or=6.5%  Consistent with diabetes    High levels of fetal hemoglobin interfere with the HbA1C  assay. Heterozygous hemoglobin variants (HbS, HgC, etc)do  not significantly interfere with this assay.   However, presence of  multiple variants may affect accuracy.     10/04/2019 5.4 4.0 - 5.6 % Final     Comment:     ADA Screening Guidelines:  5.7-6.4%  Consistent with prediabetes  >or=6.5%  Consistent with diabetes  High levels of fetal hemoglobin interfere with the HbA1C  assay. Heterozygous hemoglobin variants (HbS, HgC, etc)do  not significantly interfere with this assay.   However, presence of multiple variants may affect accuracy.     07/10/2018 5.3 4.0 - 5.6 % Final     Comment:     ADA Screening Guidelines:  5.7-6.4%  Consistent with prediabetes  >or=6.5%  Consistent with diabetes  High levels of fetal hemoglobin interfere with the HbA1C  assay. Heterozygous hemoglobin variants (HbS, HgC, etc)do  not significantly interfere with this assay.   However, presence of multiple variants may affect accuracy.         ASSESSMENT/PLAN:    Penny was seen today for low-back pain.    Diagnoses and all orders for this visit:    Lumbar spondylosis  -     Ambulatory referral/consult to Pain Clinic; Future  -     Ambulatory referral/consult to Physical/Occupational Therapy; Future  -     methocarbamoL (ROBAXIN) 500 MG Tab; Take 1 tablet (500 mg total) by mouth 3 (three) times daily.    DDD (degenerative disc disease), lumbar      Follow up if symptoms worsen or fail to improve.    Patient has degenerative disc disease of the lumbar spine with no associated lumbar radiculopathy. I discussed the natural history of their diagnoses as well as surgical and nonsurgical treatment options. I educated the patient on the importance of core/back strengthening, correct posture, bending/lifting ergonomics, and low-impact aerobic exercises (walking, elliptical, and aquatherapy). I prescribed Robaxin. Continue medications such as Tylenol and ibuprofen for symptomatic pain relief. I will refer the patient to pain management for injections to her lumbar spine.  I will also get her set up with physical therapy. Patient will follow up as needed.    I have personally  examined the patient and agree with the above plan.    Shiva Hughes MD  Orthopaedic Spine Surgeon  Department of Orthopaedic Surgery  [phone]

## 2023-08-03 PROBLEM — R29.898 WEAKNESS OF BOTH LOWER EXTREMITIES: Status: ACTIVE | Noted: 2023-08-03

## 2023-08-03 PROBLEM — M53.3 SACROILIAC JOINT DYSFUNCTION: Status: ACTIVE | Noted: 2023-08-03

## 2023-08-03 PROBLEM — M54.50 CHRONIC RIGHT-SIDED LOW BACK PAIN WITHOUT SCIATICA: Status: ACTIVE | Noted: 2023-08-03

## 2023-08-03 PROBLEM — G89.29 CHRONIC RIGHT-SIDED LOW BACK PAIN WITHOUT SCIATICA: Status: ACTIVE | Noted: 2023-08-03

## 2023-09-06 ENCOUNTER — TELEPHONE (OUTPATIENT)
Dept: PAIN MEDICINE | Facility: CLINIC | Age: 68
End: 2023-09-06
Payer: MEDICARE

## 2023-09-06 NOTE — TELEPHONE ENCOUNTER
Called pt to inform her that her appt tomorrow with Dr. Andrade needs to be rescheduled due to a family emergency. Left a voicemail due to pt not answering. Moved appt to 9/29 at 1:0pm at [facility] and placed on a wait list. Informed pt to call us back if this appt does not work for her.

## 2023-09-13 ENCOUNTER — OFFICE VISIT (OUTPATIENT)
Dept: PAIN MEDICINE | Facility: CLINIC | Age: 68
End: 2023-09-13
Payer: MEDICARE

## 2023-09-13 VITALS
SYSTOLIC BLOOD PRESSURE: 118 MMHG | HEART RATE: 64 BPM | WEIGHT: 97 LBS | HEIGHT: 60 IN | DIASTOLIC BLOOD PRESSURE: 66 MMHG | BODY MASS INDEX: 19.04 KG/M2

## 2023-09-13 DIAGNOSIS — M54.9 DORSALGIA, UNSPECIFIED: ICD-10-CM

## 2023-09-13 DIAGNOSIS — M54.51 VERTEBROGENIC LOW BACK PAIN: ICD-10-CM

## 2023-09-13 DIAGNOSIS — M47.816 LUMBAR SPONDYLOSIS: Primary | ICD-10-CM

## 2023-09-13 PROCEDURE — 1125F PR PAIN SEVERITY QUANTIFIED, PAIN PRESENT: ICD-10-PCS | Mod: CPTII,S$GLB,, | Performed by: STUDENT IN AN ORGANIZED HEALTH CARE EDUCATION/TRAINING PROGRAM

## 2023-09-13 PROCEDURE — 3008F PR BODY MASS INDEX (BMI) DOCUMENTED: ICD-10-PCS | Mod: CPTII,S$GLB,, | Performed by: STUDENT IN AN ORGANIZED HEALTH CARE EDUCATION/TRAINING PROGRAM

## 2023-09-13 PROCEDURE — 99204 PR OFFICE/OUTPT VISIT, NEW, LEVL IV, 45-59 MIN: ICD-10-PCS | Mod: S$GLB,,, | Performed by: STUDENT IN AN ORGANIZED HEALTH CARE EDUCATION/TRAINING PROGRAM

## 2023-09-13 PROCEDURE — 3078F PR MOST RECENT DIASTOLIC BLOOD PRESSURE < 80 MM HG: ICD-10-PCS | Mod: CPTII,S$GLB,, | Performed by: STUDENT IN AN ORGANIZED HEALTH CARE EDUCATION/TRAINING PROGRAM

## 2023-09-13 PROCEDURE — 1125F AMNT PAIN NOTED PAIN PRSNT: CPT | Mod: CPTII,S$GLB,, | Performed by: STUDENT IN AN ORGANIZED HEALTH CARE EDUCATION/TRAINING PROGRAM

## 2023-09-13 PROCEDURE — 3288F PR FALLS RISK ASSESSMENT DOCUMENTED: ICD-10-PCS | Mod: CPTII,S$GLB,, | Performed by: STUDENT IN AN ORGANIZED HEALTH CARE EDUCATION/TRAINING PROGRAM

## 2023-09-13 PROCEDURE — 1159F PR MEDICATION LIST DOCUMENTED IN MEDICAL RECORD: ICD-10-PCS | Mod: CPTII,S$GLB,, | Performed by: STUDENT IN AN ORGANIZED HEALTH CARE EDUCATION/TRAINING PROGRAM

## 2023-09-13 PROCEDURE — 3074F SYST BP LT 130 MM HG: CPT | Mod: CPTII,S$GLB,, | Performed by: STUDENT IN AN ORGANIZED HEALTH CARE EDUCATION/TRAINING PROGRAM

## 2023-09-13 PROCEDURE — 1101F PT FALLS ASSESS-DOCD LE1/YR: CPT | Mod: CPTII,S$GLB,, | Performed by: STUDENT IN AN ORGANIZED HEALTH CARE EDUCATION/TRAINING PROGRAM

## 2023-09-13 PROCEDURE — 4010F ACE/ARB THERAPY RXD/TAKEN: CPT | Mod: CPTII,S$GLB,, | Performed by: STUDENT IN AN ORGANIZED HEALTH CARE EDUCATION/TRAINING PROGRAM

## 2023-09-13 PROCEDURE — 1159F MED LIST DOCD IN RCRD: CPT | Mod: CPTII,S$GLB,, | Performed by: STUDENT IN AN ORGANIZED HEALTH CARE EDUCATION/TRAINING PROGRAM

## 2023-09-13 PROCEDURE — 4010F PR ACE/ARB THEARPY RXD/TAKEN: ICD-10-PCS | Mod: CPTII,S$GLB,, | Performed by: STUDENT IN AN ORGANIZED HEALTH CARE EDUCATION/TRAINING PROGRAM

## 2023-09-13 PROCEDURE — 3074F PR MOST RECENT SYSTOLIC BLOOD PRESSURE < 130 MM HG: ICD-10-PCS | Mod: CPTII,S$GLB,, | Performed by: STUDENT IN AN ORGANIZED HEALTH CARE EDUCATION/TRAINING PROGRAM

## 2023-09-13 PROCEDURE — 99999 PR PBB SHADOW E&M-EST. PATIENT-LVL III: CPT | Mod: PBBFAC,,, | Performed by: STUDENT IN AN ORGANIZED HEALTH CARE EDUCATION/TRAINING PROGRAM

## 2023-09-13 PROCEDURE — 3078F DIAST BP <80 MM HG: CPT | Mod: CPTII,S$GLB,, | Performed by: STUDENT IN AN ORGANIZED HEALTH CARE EDUCATION/TRAINING PROGRAM

## 2023-09-13 PROCEDURE — 3288F FALL RISK ASSESSMENT DOCD: CPT | Mod: CPTII,S$GLB,, | Performed by: STUDENT IN AN ORGANIZED HEALTH CARE EDUCATION/TRAINING PROGRAM

## 2023-09-13 PROCEDURE — 99999 PR PBB SHADOW E&M-EST. PATIENT-LVL III: ICD-10-PCS | Mod: PBBFAC,,, | Performed by: STUDENT IN AN ORGANIZED HEALTH CARE EDUCATION/TRAINING PROGRAM

## 2023-09-13 PROCEDURE — 3008F BODY MASS INDEX DOCD: CPT | Mod: CPTII,S$GLB,, | Performed by: STUDENT IN AN ORGANIZED HEALTH CARE EDUCATION/TRAINING PROGRAM

## 2023-09-13 PROCEDURE — 99204 OFFICE O/P NEW MOD 45 MIN: CPT | Mod: S$GLB,,, | Performed by: STUDENT IN AN ORGANIZED HEALTH CARE EDUCATION/TRAINING PROGRAM

## 2023-09-13 PROCEDURE — 1101F PR PT FALLS ASSESS DOC 0-1 FALLS W/OUT INJ PAST YR: ICD-10-PCS | Mod: CPTII,S$GLB,, | Performed by: STUDENT IN AN ORGANIZED HEALTH CARE EDUCATION/TRAINING PROGRAM

## 2023-09-13 RX ORDER — CELECOXIB 200 MG/1
200 CAPSULE ORAL DAILY PRN
Qty: 30 CAPSULE | Refills: 0 | Status: SHIPPED | OUTPATIENT
Start: 2023-09-13 | End: 2023-11-01 | Stop reason: SDUPTHER

## 2023-09-13 NOTE — PROGRESS NOTES
Chronic Pain - New Consult    Referring Physician:     Date: 09/13/2023     Re: Penny Cervantes  MR#: [id number]  YOB: 1955  Age: 67 y.o.    Chief Complaint: back pain  No chief complaint on file.    **This note is dictated using the M*Modal Fluency Direct word recognition program. There are word recognition mistakes that are occasionally missed on review.**    ASSESSMENT: 67 y.o. year old female with right sided low back pain, consistent with     1. Lumbar spondylosis  MRI Lumbar Spine Without Contrast    celecoxib (CELEBREX) 200 MG capsule      2. Vertebrogenic low back pain  MRI Lumbar Spine Without Contrast    celecoxib (CELEBREX) 200 MG capsule      3. Dorsalgia, unspecified  MRI Lumbar Spine Without Contrast        PLAN:     Lumbar spondylosis  -discussed right L3,4,5 MBB/RFA. Defer for now until completing imaging  -celebrex trial    Vertebrogenic low back pain  -consider intracept  -new imaging first    - RTC 2-3 weeks after MRI  - Counseled patient regarding the importance of  activity modification and physical therapy.    The above plan and management options were discussed at length with patient. Patient is in agreement with the above and verbalized understanding. It will be communicated with the referring physician via electronic record, fax, or mail.  Lab/study reports reviewed were important and necessary because subsequent medical and treatment recommendations required review of the above lab/study reports. Images viewed/reviewed above were important and necessary because subsequent medical and treatment recommendations required review of the reviewed image(s).     Electronically signed by:  Jermaine Tamez DO  09/13/2023    =========================================================================================================    SUBJECTIVE:    Penny Cervantes is a 67 y.o. female presents to the clinic for the evaluation of right lower back pain. The pain started 4-5 months  "ago following no inciting event and symptoms have been worsening. Right sided low back pain that seems to be getting worse while she is walking.  Shes been trying to walk more but that has jorge luis making it worse.  She is in PT and sometimes it helps and sometimes it makes it worse.  Several years ago she saw a doctor in [place] and she got 3 injections and after the 3rd one she was doing better and never had to go back again cause she was doing well.  The patient states that about 4-5 months ago she was in the kitchen and got this severe pain in the right side low back.  It does not radiate down the leg.  That severe pain has eased up after 3-4 days.  Ever since then, she has not been walking as much as the pain has been somewhat better.  Bending forward makes it worse.  Working int he garden is tough. Standing for too long is worse.      Pain Description:    The pain is located in the right lower back area and does not radiate.    At BEST  5/10   At WORST  10/10 on the WORST day.    On average pain is rated as 6/10.   Today the pain is rated as 5/10  The pain is continuous.  The pain is described as  "like bone is rubbing on bone."     Symptoms interfere with daily activity and sleeping.   Exacerbating factors: Standing, Bending, Extension, Flexing, Lifting, and Getting out of bed/chair.    Mitigating factors heat, ice, and medications.   She reports 6-8 hours of sleep per night.    Physical Therapy/Home Exercise: Yes, currently in Physical therapy and Yes, currently has a home exercise program    Current Pain Medications:    - robaxin 500mg rarely (helps when she takes it), tylenol    Failed Pain Medications:    - NSAIDs irritate the stomach    Pain Treatment Therapies:    Pain procedures:   Back injections in [place] but does not remember what they were  Physical Therapy: yes  Chiropractor: none  Acupuncture: none  TENS unit: none  Spinal decompression: none  Joint replacement: none    Patient denies urinary " incontinence, bowel incontinence, significant motor weakness, and loss of sensations.  Patient denies any suicidal or homicidal ideations     report:  Reviewed and consistent with medication use as prescribed.    Imaging:   MRI 2018:  There is a mild levoscoliosis.  The vertebral body heights are well maintained.  The disc spaces are relatively well maintained.  The patient has less degenerative changes than often seen at this age.  The conus medullaris terminates in good position.     There is no disc herniation at any level.  There is no central canal stenosis or foraminal narrowing at any level.  Mild disc bulge at L3-L4 noted.  Small annular tear at L4-5 left lateral region.  There is mild facet joint degenerative change.  Post-contrast images demonstrate no areas of abnormal enhancement to suggest an inflammatory, infectious or tumor process.     XR lumbar  Mild DJD.  The disc spaces are narrowed between L4 and S1 vertebral segment.  No fracture, spondylolisthesis or bone destruction identified.  Mild lumbar scoliosis.  Sclerotic density overlying the right sacrum identified as before      9/13/2023     1:27 PM   Pain Disability Index (PDI)   Family/Home Responsibilities: 6   Recreation: 6   Occupation: 6   Sexual Behavior: 6   Self Care: 6   Life-Support Activities: 6   Pain Disability Index (PDI) 42        Past Medical History:   Diagnosis Date    Benign positional vertigo     Breast cancer 2009    s/p chemo XRT, left breast triple negative    Coronary artery disease, non-occlusive     cath in 2012    Hypertension     Interstitial cystitis     Osteopenia     Squamous cell carcinoma     Stroke 7/10/2018    Symptomatic posterior vitreous detachment of left eye 12/24/2018    Vitreous hemorrhage, left 1/10/2019     Past Surgical History:   Procedure Laterality Date    BILATERAL SALPINGOOPHORECTOMY      with Hysterectomy    BONE RESECTION, RIB      right side    BREAST BIOPSY Right 2005    BREAST CYST  ASPIRATION      BREAST LUMPECTOMY Left 2009    BREAST SURGERY      CYSTOSCOPY N/A 7/20/2021    Procedure: CYSTOSCOPY;  Surgeon: Arabella Whatley MD;  Location: [facility] OR [address] [place] [address];  Service: Urology;  Laterality: N/A;  30 minutes     HERNIA REPAIR      HYSTERECTOMY      TOTAL VAGINAL HYSTERECTOMY      Indication: Endometriosis    TRIGGER POINT INJECTION  7/20/2021    Procedure: INJECTION, TRIGGER POINTS  -SOLUCORTEF;  Surgeon: Arabella Whatley MD;  Location: [facility] OR [address] [place] [address];  Service: Urology;;     Social History     Socioeconomic History    Marital status:    Tobacco Use    Smoking status: Never     Passive exposure: Never    Smokeless tobacco: Never   Substance and Sexual Activity    Alcohol use: Yes     Comment: 2-3 per month    Drug use: No    Sexual activity: Not Currently     Partners: Male     Family History   Problem Relation Age of Onset    Heart disease Mother     Heart disease Father     Heart disease Sister 55    Heart disease Brother 43    Hypertension Sister     Ovarian cancer Sister        Review of patient's allergies indicates:   Allergen Reactions    Demerol [meperidine] Other (See Comments)     headache    Zofran [ondansetron hcl] Nausea Only     Bloating, abdominal pain    Pyridium [phenazopyridine] Nausea Only       Current Outpatient Medications   Medication Sig    albuterol (VENTOLIN HFA) 90 mcg/actuation inhaler Inhale 2 puffs into the lungs every 6 (six) hours as needed for Wheezing or Shortness of Breath (cough). Rescue    amLODIPine (NORVASC) 5 MG tablet TAKE 1 TABLET(5 MG) BY MOUTH EVERY EVENING    belladonna alkaloids-opium 16.2-30 mg (B&O SUPPRETTES) 16.2-30 mg Supp 1 supp AL every 12 hr,x3 days,PRN:as needed for spasm    biotin 1 mg tablet Take 1,000 mcg by mouth once daily.     cyanocobalamin 1,000 mcg/mL injection Inject 1 mL (1,000 mcg total) into the skin every 30 days.    ERGOCALCIFEROL, VITAMIN D2, (VITAMIN D ORAL) Take by mouth every morning.     esomeprazole (NEXIUM) 40 MG  capsule Take 1 capsule (40 mg total) by mouth before breakfast.    fluticasone propionate (FLONASE) 50 mcg/actuation nasal spray 1 spray by Each Nostril route once daily.    hyoscyamine (ANASPAZ,LEVSIN) 0.125 mg Tab Take 1 tablet (125 mcg total) by mouth 4 (four) times daily as needed for spasm    losartan (COZAAR) 25 MG tablet Take 1 tablet (25 mg total) by mouth every evening.    magnesium oxide (MAG-OX) 400 mg (241.3 mg magnesium) tablet Take 400 mg by mouth once daily.    methocarbamoL (ROBAXIN) 500 MG Tab Take 1 tablet (500 mg total) by mouth 3 (three) times daily.    metoprolol succinate (TOPROL-XL) 25 MG 24 hr tablet Take 1 tablet (25 mg total) by mouth every evening.    promethazine (PHENERGAN) 25 MG suppository Insert 1 suppository rectally every 4 hours as needed for nausea/vomiting    scopolamine (TRANSDERM-SCOP) 1.3-1.5 mg (1 mg over 3 days) Apply 1 film topically every 72 hours    sodium chloride 3% 3 % nebulizer solution Use one vial (4 mL) by nebulization 2 (two) times daily as needed for other or Cough (mucous clearance).    tamsulosin (FLOMAX) 0.4 mg Cap Take 1 capsule (0.4 mg total) by mouth once daily.    triamcinolone acetonide 0.1% (KENALOG) 0.1 % paste Place onto teeth 2 (two) times daily.    celecoxib (CELEBREX) 200 MG capsule Take 1 capsule (200 mg total) by mouth daily as needed for Pain. Take with food    fexofenadine (ALLEGRA) 180 MG tablet Take 1 tablet (180 mg total) by mouth 2 (two) times daily. for 7 days     No current facility-administered medications for this visit.       REVIEW OF SYSTEMS:    GENERAL:  No weight loss, malaise or fevers.  HEENT:   No recent changes in vision or hearing  NECK:  Negative for lumps, no difficulty with swallowing.  RESPIRATORY:  Negative for cough, wheezing or shortness of breath, patient denies any recent URI.  CARDIOVASCULAR:  Negative for chest pain, leg swelling or palpitations.  GI:  Negative for abdominal discomfort, blood in stools or black  "stools or change in bowel habits.  MUSCULOSKELETAL:  See HPI.  SKIN:  Negative for lesions, rash, and itching.  PSYCH:  No mood disorder or recent psychosocial stressors.  Patients sleep is not disturbed secondary to pain.  HEMATOLOGY/LYMPHOLOGY:  Negative for prolonged bleeding, bruising easily or swollen nodes.  Patient is not currently taking any anti-coagulants  NEURO:   No history of headaches, syncope, paralysis, seizures or tremors.  All other reviewed and negative other than HPI.    OBJECTIVE:    /66 (BP Location: Right arm, Patient Position: Sitting)   Pulse 64   Ht 4' 11.5" (1.511 m)   Wt 44 kg (97 lb)   LMP  (LMP Unknown)   BMI 19.26 kg/m²     PHYSICAL EXAMINATION:    GENERAL: Well appearing, in no acute distress, alert and oriented x3.  PSYCH:  Mood and affect appropriate.  SKIN: Skin color, texture, turgor normal, no rashes or lesions.  HEAD/FACE:  Normocephalic, atraumatic. Cranial nerves grossly intact.  CV: RRR with palpation of the radial artery.  PULM: CTAB. No evidence of respiratory difficulty, symmetric chest rise.  GI:  Soft and non-tender.    BACK:   - No obvious deformity or signs of trauma, Normal lumbar lordotic curve  - Negative spinous process tenderness  - Positive paravertebral tenderness  - Positive pain to palpation over the facet joints of the lumbar spine.   - Negative QL / Iliac crest / Glut tenderness  - Slump test is Negative for radicular pain  - Slump test is Negative for back pain  - Supine Straight leg raising is Negative for radicular pain  - Supine Straight leg raising is Negative for back pain  - Lumbar ROM is normal in Flexion with pain  - Lumbar ROM is normal in Extension with pain  - Lumbar ROM is normal in Lateral Flexion with pain  - Positive Sustained Hip Flexion test (for discogenic pain)  - Negative Altered Gait, Posture  - Axial facet loading test Positive on the right side(s)    SI Joint exam:  - Negative SI joint tenderness to palpation  - Ervin's " sign Negative  - Yeoman's Test: Did not perform for SI joint pain indicating anterior SI ligament involvement. Did not perform for anterior thigh pain/paresthesia which indicates femoral nerve stretch.  - Gaenslen's Test:Negative  - Finger Veronique's Sign:Positive  - SI compression test:Did not perform  - SI distraction test:Negative  - Thigh Thrust: Negative  - SI Thrust: Did not perform    MUSKULOSKELETAL:    EXTREMITIES:   Hip Exam:  - Log Roll Negative  - FADIR Negative  - Stinchfield Negative  - Hip Scour Did not perform  - GTB Tenderness Negative      NEUROLOGICAL EXAM:  MENTAL STATUS: A x O x 3, good concentration, speech is fluent and goal directed  MEMORY: recent and remote are intact  CN: CN2-12 grossly intact  MOTOR: 5/5 in all muscle groups of the LE  DTRs: 2+ intact symmetric  Sensation:    -no Loss of sensation in a left lower and right lower  leg  distribution.  Babinski: absent on the bilateral side(s)  Clonus: absent on the bilateral side(s)    GAIT: normal.

## 2023-09-14 ENCOUNTER — PATIENT MESSAGE (OUTPATIENT)
Dept: INTERNAL MEDICINE | Facility: CLINIC | Age: 68
End: 2023-09-14
Payer: MEDICARE

## 2023-09-14 ENCOUNTER — TELEPHONE (OUTPATIENT)
Dept: PULMONOLOGY | Facility: CLINIC | Age: 68
End: 2023-09-14
Payer: MEDICARE

## 2023-09-14 ENCOUNTER — PATIENT MESSAGE (OUTPATIENT)
Dept: PULMONOLOGY | Facility: CLINIC | Age: 68
End: 2023-09-14
Payer: MEDICARE

## 2023-09-14 NOTE — TELEPHONE ENCOUNTER
I spoke with Ms Cervantes in regard to scheduling her ct scan. I was able to schedule her ct on 10/9/23 and her follow up visit on 10/26/23 with Dr Clemente. Patient confirmed and verbalized understanding.

## 2023-09-22 ENCOUNTER — HOSPITAL ENCOUNTER (OUTPATIENT)
Dept: RADIOLOGY | Facility: HOSPITAL | Age: 68
Discharge: HOME | End: 2023-09-22
Attending: STUDENT IN AN ORGANIZED HEALTH CARE EDUCATION/TRAINING PROGRAM
Payer: MEDICARE

## 2023-09-22 DIAGNOSIS — M54.9 DORSALGIA, UNSPECIFIED: ICD-10-CM

## 2023-09-22 DIAGNOSIS — M54.51 VERTEBROGENIC LOW BACK PAIN: ICD-10-CM

## 2023-09-22 DIAGNOSIS — M47.816 LUMBAR SPONDYLOSIS: ICD-10-CM

## 2023-09-22 PROCEDURE — 72148 MRI LUMBAR SPINE WITHOUT CONTRAST: ICD-10-PCS | Mod: 26,,, | Performed by: RADIOLOGY

## 2023-09-22 PROCEDURE — 72148 MRI LUMBAR SPINE W/O DYE: CPT | Mod: 26,,, | Performed by: RADIOLOGY

## 2023-09-22 PROCEDURE — 72148 MRI LUMBAR SPINE W/O DYE: CPT | Mod: TC

## 2023-09-25 ENCOUNTER — OFFICE VISIT (OUTPATIENT)
Dept: ORTHOPEDICS | Facility: CLINIC | Age: 68
End: 2023-09-25
Payer: MEDICARE

## 2023-09-25 VITALS — WEIGHT: 101.44 LBS | BODY MASS INDEX: 20.45 KG/M2 | HEIGHT: 59 IN

## 2023-09-25 DIAGNOSIS — M47.816 LUMBAR SPONDYLOSIS: Primary | ICD-10-CM

## 2023-09-25 DIAGNOSIS — M51.36 DDD (DEGENERATIVE DISC DISEASE), LUMBAR: ICD-10-CM

## 2023-09-25 PROCEDURE — 1125F PR PAIN SEVERITY QUANTIFIED, PAIN PRESENT: ICD-10-PCS | Mod: CPTII,S$GLB,, | Performed by: ORTHOPAEDIC SURGERY

## 2023-09-25 PROCEDURE — 1159F PR MEDICATION LIST DOCUMENTED IN MEDICAL RECORD: ICD-10-PCS | Mod: CPTII,S$GLB,, | Performed by: ORTHOPAEDIC SURGERY

## 2023-09-25 PROCEDURE — 1125F AMNT PAIN NOTED PAIN PRSNT: CPT | Mod: CPTII,S$GLB,, | Performed by: ORTHOPAEDIC SURGERY

## 2023-09-25 PROCEDURE — 1101F PR PT FALLS ASSESS DOC 0-1 FALLS W/OUT INJ PAST YR: ICD-10-PCS | Mod: CPTII,S$GLB,, | Performed by: ORTHOPAEDIC SURGERY

## 2023-09-25 PROCEDURE — 99999 PR PBB SHADOW E&M-EST. PATIENT-LVL III: ICD-10-PCS | Mod: PBBFAC,,, | Performed by: ORTHOPAEDIC SURGERY

## 2023-09-25 PROCEDURE — 99214 PR OFFICE/OUTPT VISIT, EST, LEVL IV, 30-39 MIN: ICD-10-PCS | Mod: S$GLB,,, | Performed by: ORTHOPAEDIC SURGERY

## 2023-09-25 PROCEDURE — 1101F PT FALLS ASSESS-DOCD LE1/YR: CPT | Mod: CPTII,S$GLB,, | Performed by: ORTHOPAEDIC SURGERY

## 2023-09-25 PROCEDURE — 4010F ACE/ARB THERAPY RXD/TAKEN: CPT | Mod: CPTII,S$GLB,, | Performed by: ORTHOPAEDIC SURGERY

## 2023-09-25 PROCEDURE — 3008F PR BODY MASS INDEX (BMI) DOCUMENTED: ICD-10-PCS | Mod: CPTII,S$GLB,, | Performed by: ORTHOPAEDIC SURGERY

## 2023-09-25 PROCEDURE — 1160F RVW MEDS BY RX/DR IN RCRD: CPT | Mod: CPTII,S$GLB,, | Performed by: ORTHOPAEDIC SURGERY

## 2023-09-25 PROCEDURE — 4010F PR ACE/ARB THEARPY RXD/TAKEN: ICD-10-PCS | Mod: CPTII,S$GLB,, | Performed by: ORTHOPAEDIC SURGERY

## 2023-09-25 PROCEDURE — 99999 PR PBB SHADOW E&M-EST. PATIENT-LVL III: CPT | Mod: PBBFAC,,, | Performed by: ORTHOPAEDIC SURGERY

## 2023-09-25 PROCEDURE — 99214 OFFICE O/P EST MOD 30 MIN: CPT | Mod: S$GLB,,, | Performed by: ORTHOPAEDIC SURGERY

## 2023-09-25 PROCEDURE — 1160F PR REVIEW ALL MEDS BY PRESCRIBER/CLIN PHARMACIST DOCUMENTED: ICD-10-PCS | Mod: CPTII,S$GLB,, | Performed by: ORTHOPAEDIC SURGERY

## 2023-09-25 PROCEDURE — 3288F PR FALLS RISK ASSESSMENT DOCUMENTED: ICD-10-PCS | Mod: CPTII,S$GLB,, | Performed by: ORTHOPAEDIC SURGERY

## 2023-09-25 PROCEDURE — 1159F MED LIST DOCD IN RCRD: CPT | Mod: CPTII,S$GLB,, | Performed by: ORTHOPAEDIC SURGERY

## 2023-09-25 PROCEDURE — 3008F BODY MASS INDEX DOCD: CPT | Mod: CPTII,S$GLB,, | Performed by: ORTHOPAEDIC SURGERY

## 2023-09-25 PROCEDURE — 3288F FALL RISK ASSESSMENT DOCD: CPT | Mod: CPTII,S$GLB,, | Performed by: ORTHOPAEDIC SURGERY

## 2023-09-26 ENCOUNTER — PATIENT MESSAGE (OUTPATIENT)
Dept: PAIN MEDICINE | Facility: CLINIC | Age: 68
End: 2023-09-26
Payer: MEDICARE

## 2023-09-26 NOTE — PROGRESS NOTES
"DATE: 9/26/2023  PATIENT: Penny Cervantes    Attending Physician: Shiva Hughes M.D.    HISTORY:  Penny Cervantes is a 67 y.o. female who returns to me today for FU. Patient continues to have LBP without leg pain. Patient has been taking celebrex and doing PT with some relief. She would like to continue conservative management.    The patient does not smoke, have DM or endorse IVDU. The patient is not on any blood thinners and does not take chronic narcotics. She is retired; she used to sit on a desk.    PMH/PSH/FamHx/SocHx:  Unchanged from prior visit    ROS:  Positive for LBP  Denies perineal paresthesias, bowel or bladder incontinence    EXAM:  Ht 4' 11.45" (1.51 m)   Wt 46 kg (101 lb 6.6 oz)   LMP  (LMP Unknown)   BMI 20.17 kg/m²     My physical examination was notable for the following findings: motor intact BLE; SILT    IMAGING:  Today I independently reviewed the following images and my interpretations are as follows:    Previous L-spine XRs showed some degenerative changes without associated spondylolisthesis.  There is mild narrowing of the disc spaces at the L4-5, L5-S1 vertebral levels.    MRI lumbar showed no significant stenosis.    ASSESSMENT/PLAN:  Patient has degenerative disc disease of the lumbar spine with no associated lumbar radiculopathy. I discussed the natural history of their diagnoses as well as surgical and nonsurgical treatment options. I educated the patient on the importance of core/back strengthening, correct posture, bending/lifting ergonomics, and low-impact aerobic exercises (walking, elliptical, and aquatherapy). Continue meds and PT. No ortho spine surgical intervention warranted. Patient will follow up as needed.    Shiva Hughes MD  Orthopaedic Spine Surgeon  Department of Orthopaedic Surgery  [phone]  "

## 2023-09-28 ENCOUNTER — PATIENT MESSAGE (OUTPATIENT)
Dept: PAIN MEDICINE | Facility: CLINIC | Age: 68
End: 2023-09-28
Payer: MEDICARE

## 2023-10-03 ENCOUNTER — TELEPHONE (OUTPATIENT)
Dept: PAIN MEDICINE | Facility: CLINIC | Age: 68
End: 2023-10-03
Payer: MEDICARE

## 2023-10-03 DIAGNOSIS — M54.16 LUMBAR RADICULOPATHY: Primary | ICD-10-CM

## 2023-10-03 NOTE — TELEPHONE ENCOUNTER
Spoke with patient on the phone about MRI results.  She is having more radicular pain down the leg in an L3/4 distribution now.  We discussed treatment options including an epidural for the radicular pain vs an ablation for the axial back pain.  The radicular pain is a bigger issue and she would like to have that taken care of first.  She has a (+) seated straight leg raise on the right.  The MRI shows mild/moderate foraminal stenosis on the right at L3-4, and L4-5.     We will schedule a right L3-4, L4-5 TFESI with RN sedation on 10/17/23.  After that, we will reassess the lumbar spondylosis.    Jermaine Martinez

## 2023-10-11 ENCOUNTER — TELEPHONE (OUTPATIENT)
Dept: PAIN MEDICINE | Facility: CLINIC | Age: 68
End: 2023-10-11
Payer: MEDICARE

## 2023-10-12 ENCOUNTER — PATIENT MESSAGE (OUTPATIENT)
Dept: INTERNAL MEDICINE | Facility: CLINIC | Age: 68
End: 2023-10-12
Payer: MEDICARE

## 2023-10-12 ENCOUNTER — TELEPHONE (OUTPATIENT)
Dept: PAIN MEDICINE | Facility: CLINIC | Age: 68
End: 2023-10-12
Payer: MEDICARE

## 2023-10-12 NOTE — TELEPHONE ENCOUNTER
----- Message from Devorah Mendez sent at 10/12/2023  8:27 AM CDT -----  Regarding: pt advice  Contact: [phone]  Pt requesting to have procedure type appt cancelled on 10/17. Asking to speak to Deb Finnegan call to discuss.

## 2023-10-13 ENCOUNTER — OFFICE VISIT (OUTPATIENT)
Dept: HEPATOLOGY | Facility: CLINIC | Age: 68
End: 2023-10-13
Payer: MEDICARE

## 2023-10-13 VITALS
TEMPERATURE: 97 F | HEIGHT: 59 IN | BODY MASS INDEX: 20.08 KG/M2 | SYSTOLIC BLOOD PRESSURE: 161 MMHG | OXYGEN SATURATION: 99 % | WEIGHT: 99.63 LBS | HEART RATE: 83 BPM | DIASTOLIC BLOOD PRESSURE: 73 MMHG

## 2023-10-13 DIAGNOSIS — R93.2 ABNORMAL CT OF LIVER: ICD-10-CM

## 2023-10-13 PROCEDURE — 3077F SYST BP >= 140 MM HG: CPT | Mod: CPTII,S$GLB,, | Performed by: INTERNAL MEDICINE

## 2023-10-13 PROCEDURE — 99999 PR PBB SHADOW E&M-EST. PATIENT-LVL V: ICD-10-PCS | Mod: PBBFAC,,, | Performed by: INTERNAL MEDICINE

## 2023-10-13 PROCEDURE — 1101F PT FALLS ASSESS-DOCD LE1/YR: CPT | Mod: CPTII,S$GLB,, | Performed by: INTERNAL MEDICINE

## 2023-10-13 PROCEDURE — 3008F BODY MASS INDEX DOCD: CPT | Mod: CPTII,S$GLB,, | Performed by: INTERNAL MEDICINE

## 2023-10-13 PROCEDURE — 3288F PR FALLS RISK ASSESSMENT DOCUMENTED: ICD-10-PCS | Mod: CPTII,S$GLB,, | Performed by: INTERNAL MEDICINE

## 2023-10-13 PROCEDURE — 1101F PR PT FALLS ASSESS DOC 0-1 FALLS W/OUT INJ PAST YR: ICD-10-PCS | Mod: CPTII,S$GLB,, | Performed by: INTERNAL MEDICINE

## 2023-10-13 PROCEDURE — 1159F PR MEDICATION LIST DOCUMENTED IN MEDICAL RECORD: ICD-10-PCS | Mod: CPTII,S$GLB,, | Performed by: INTERNAL MEDICINE

## 2023-10-13 PROCEDURE — 3078F DIAST BP <80 MM HG: CPT | Mod: CPTII,S$GLB,, | Performed by: INTERNAL MEDICINE

## 2023-10-13 PROCEDURE — 3077F PR MOST RECENT SYSTOLIC BLOOD PRESSURE >= 140 MM HG: ICD-10-PCS | Mod: CPTII,S$GLB,, | Performed by: INTERNAL MEDICINE

## 2023-10-13 PROCEDURE — 4010F ACE/ARB THERAPY RXD/TAKEN: CPT | Mod: CPTII,S$GLB,, | Performed by: INTERNAL MEDICINE

## 2023-10-13 PROCEDURE — 4010F PR ACE/ARB THEARPY RXD/TAKEN: ICD-10-PCS | Mod: CPTII,S$GLB,, | Performed by: INTERNAL MEDICINE

## 2023-10-13 PROCEDURE — 99999 PR PBB SHADOW E&M-EST. PATIENT-LVL V: CPT | Mod: PBBFAC,,, | Performed by: INTERNAL MEDICINE

## 2023-10-13 PROCEDURE — 1126F PR PAIN SEVERITY QUANTIFIED, NO PAIN PRESENT: ICD-10-PCS | Mod: CPTII,S$GLB,, | Performed by: INTERNAL MEDICINE

## 2023-10-13 PROCEDURE — 99205 OFFICE O/P NEW HI 60 MIN: CPT | Mod: S$GLB,,, | Performed by: INTERNAL MEDICINE

## 2023-10-13 PROCEDURE — 1159F MED LIST DOCD IN RCRD: CPT | Mod: CPTII,S$GLB,, | Performed by: INTERNAL MEDICINE

## 2023-10-13 PROCEDURE — 3008F PR BODY MASS INDEX (BMI) DOCUMENTED: ICD-10-PCS | Mod: CPTII,S$GLB,, | Performed by: INTERNAL MEDICINE

## 2023-10-13 PROCEDURE — 3078F PR MOST RECENT DIASTOLIC BLOOD PRESSURE < 80 MM HG: ICD-10-PCS | Mod: CPTII,S$GLB,, | Performed by: INTERNAL MEDICINE

## 2023-10-13 PROCEDURE — 99205 PR OFFICE/OUTPT VISIT, NEW, LEVL V, 60-74 MIN: ICD-10-PCS | Mod: S$GLB,,, | Performed by: INTERNAL MEDICINE

## 2023-10-13 PROCEDURE — 3288F FALL RISK ASSESSMENT DOCD: CPT | Mod: CPTII,S$GLB,, | Performed by: INTERNAL MEDICINE

## 2023-10-13 PROCEDURE — 1126F AMNT PAIN NOTED NONE PRSNT: CPT | Mod: CPTII,S$GLB,, | Performed by: INTERNAL MEDICINE

## 2023-10-13 PROCEDURE — 1160F RVW MEDS BY RX/DR IN RCRD: CPT | Mod: CPTII,S$GLB,, | Performed by: INTERNAL MEDICINE

## 2023-10-13 PROCEDURE — 1160F PR REVIEW ALL MEDS BY PRESCRIBER/CLIN PHARMACIST DOCUMENTED: ICD-10-PCS | Mod: CPTII,S$GLB,, | Performed by: INTERNAL MEDICINE

## 2023-10-13 NOTE — PROGRESS NOTES
Subjective:       Patient ID: Penny Cervantes is a 67 y.o. female.    Chief Complaint: No chief complaint on file.    HPI  I saw this 67 y.o. lady who has had some unintentional weight loss over the last year- 10 lb. This has now stabilized and she feels well.    Her abdominal imaging was done last year.  Her LFTs are normal and there is nothing to suggest that she has chronic liver disease.      Abdo US: 11/1/22  Liver: Normal in size, measuring 12.8 cm. There is a complex cyst in the left hepatic lobe measuring up to 2.2 cm which does not appear significantly changed in size from previous CT.  Other smaller simple to minimally complex cyst noted measuring 8 mm in the left lobe and 9 mm in the right lobe also appear grossly similar to prior CT.  Hepatic parenchymal echotexture and echogenicity are otherwise within normal limits    CT abdo: 7/2022  Liver: Multiple unchanged hepatic hypodensities, mostly subcentimeter in size and too small to characterize.  2.0 cm cyst in the lateral left hepatic lobe not significantly changed    PMH:  Chronic cough  Breast cancer- 2009    Hypertension    SH:  Never smoked  Alcohol rarely  Ex     2 kids- healthy    FH:  Heart disease  Cancer- father had metastatic ca + ovarian cancer      Review of Systems   Constitutional:  Negative for activity change, appetite change, chills, fatigue, fever and unexpected weight change.   HENT:  Negative for ear pain, hearing loss, nosebleeds, sore throat and trouble swallowing.    Eyes:  Negative for redness and visual disturbance.   Respiratory:  Negative for cough, chest tightness, shortness of breath and wheezing.    Cardiovascular:  Negative for chest pain and palpitations.   Gastrointestinal:  Negative for abdominal distention, abdominal pain, blood in stool, constipation, diarrhea, nausea and vomiting.   Genitourinary:  Negative for difficulty urinating, dysuria, frequency, hematuria and urgency.   Musculoskeletal:  Negative for  arthralgias, back pain, gait problem, joint swelling and myalgias.   Skin:  Negative for rash.   Neurological:  Negative for tremors, seizures, speech difficulty, weakness and headaches.   Hematological:  Negative for adenopathy.   Psychiatric/Behavioral:  Negative for confusion, decreased concentration and sleep disturbance. The patient is not nervous/anxious.          Lab Results   Component Value Date    ALT 15 10/12/2023    AST 23 10/12/2023    ALKPHOS 60 10/12/2023    BILITOT 0.5 10/12/2023     Past Medical History:   Diagnosis Date    Benign positional vertigo     Breast cancer 2009    s/p chemo XRT, left breast triple negative    Coronary artery disease, non-occlusive     cath in 2012    Hypertension     Interstitial cystitis     Osteopenia     Squamous cell carcinoma     Stroke 7/10/2018    Symptomatic posterior vitreous detachment of left eye 12/24/2018    Vitreous hemorrhage, left 1/10/2019     Past Surgical History:   Procedure Laterality Date    BILATERAL SALPINGOOPHORECTOMY      with Hysterectomy    BONE RESECTION, RIB      right side    BREAST BIOPSY Right 2005    BREAST CYST ASPIRATION      BREAST LUMPECTOMY Left 2009    BREAST SURGERY      CYSTOSCOPY N/A 7/20/2021    Procedure: CYSTOSCOPY;  Surgeon: Arabella Whatley MD;  Location: [facility] OR [address] [place] [address];  Service: Urology;  Laterality: N/A;  30 minutes     HERNIA REPAIR      HYSTERECTOMY      TOTAL VAGINAL HYSTERECTOMY      Indication: Endometriosis    TRIGGER POINT INJECTION  7/20/2021    Procedure: INJECTION, TRIGGER POINTS  -SOLUCORTEF;  Surgeon: Arabella Whatley MD;  Location: [facility] OR [address] [place] [address];  Service: Urology;;     Current Outpatient Medications   Medication Sig    albuterol (VENTOLIN HFA) 90 mcg/actuation inhaler Inhale 2 puffs into the lungs every 6 (six) hours as needed for Wheezing or Shortness of Breath (cough). Rescue    amLODIPine (NORVASC) 5 MG tablet TAKE 1 TABLET(5 MG) BY MOUTH EVERY EVENING    belladonna alkaloids-opium 16.2-30 mg (B&O  SUPPRETTES) 16.2-30 mg Supp 1 supp SC every 12 hr,x3 days,PRN:as needed for spasm    biotin 1 mg tablet Take 1,000 mcg by mouth once daily.     celecoxib (CELEBREX) 200 MG capsule Take 1 capsule (200 mg total) by mouth daily as needed for Pain. Take with food    cyanocobalamin 1,000 mcg/mL injection Inject 1 mL (1,000 mcg total) into the skin every 30 days.    ERGOCALCIFEROL, VITAMIN D2, (VITAMIN D ORAL) Take by mouth every morning.     esomeprazole (NEXIUM) 40 MG capsule Take 1 capsule (40 mg total) by mouth before breakfast.    fexofenadine (ALLEGRA) 180 MG tablet Take 1 tablet (180 mg total) by mouth 2 (two) times daily. for 7 days    fluticasone propionate (FLONASE) 50 mcg/actuation nasal spray 1 spray by Each Nostril route once daily.    hyoscyamine (ANASPAZ,LEVSIN) 0.125 mg Tab Take 1 tablet (125 mcg total) by mouth 4 (four) times daily as needed for spasm    losartan (COZAAR) 25 MG tablet Take 1 tablet (25 mg total) by mouth every evening.    magnesium oxide (MAG-OX) 400 mg (241.3 mg magnesium) tablet Take 400 mg by mouth once daily.    methocarbamoL (ROBAXIN) 500 MG Tab Take 1 tablet (500 mg total) by mouth 3 (three) times daily.    metoprolol succinate (TOPROL-XL) 25 MG 24 hr tablet Take 1 tablet (25 mg total) by mouth every evening.    promethazine (PHENERGAN) 25 MG suppository Insert 1 suppository rectally every 4 hours as needed for nausea/vomiting    scopolamine (TRANSDERM-SCOP) 1.3-1.5 mg (1 mg over 3 days) Apply 1 film topically every 72 hours    sodium chloride 3% 3 % nebulizer solution Use one vial (4 mL) by nebulization 2 (two) times daily as needed for other or Cough (mucous clearance).    tamsulosin (FLOMAX) 0.4 mg Cap Take 1 capsule (0.4 mg total) by mouth once daily.    triamcinolone acetonide 0.1% (KENALOG) 0.1 % paste Place onto teeth 2 (two) times daily.     No current facility-administered medications for this visit.       Objective:      Physical Exam  Constitutional:       General: She is  not in acute distress.  HENT:      Head: Normocephalic.   Eyes:      Pupils: Pupils are equal, round, and reactive to light.   Neck:      Thyroid: No thyromegaly.      Vascular: No JVD.      Trachea: No tracheal deviation.   Cardiovascular:      Rate and Rhythm: Normal rate and regular rhythm.      Heart sounds: Normal heart sounds. No murmur heard.  Pulmonary:      Effort: Pulmonary effort is normal.      Breath sounds: Normal breath sounds. No stridor.   Abdominal:      Palpations: Abdomen is soft.   Lymphadenopathy:      Head:      Right side of head: No submental, submandibular, tonsillar, preauricular, posterior auricular or occipital adenopathy.      Left side of head: No submental, submandibular, tonsillar, preauricular, posterior auricular or occipital adenopathy.      Cervical: No cervical adenopathy.   Neurological:      Mental Status: She is alert. She is not disoriented.      Cranial Nerves: No cranial nerve deficit.      Sensory: No sensory deficit.           Assessment:       1. Abnormal CT of liver        Plan:   I reassured her that last year's imaging findings appear benign. Given her history of breast cancer and her weight loss this year, I think it would be prudent to ensure stabiity of these liver lesions with an MRI.    If this is unremakable, I will le the rknow and will not need to see her back in clinic.

## 2023-10-19 ENCOUNTER — OFFICE VISIT (OUTPATIENT)
Dept: INTERNAL MEDICINE | Facility: CLINIC | Age: 68
End: 2023-10-19
Payer: MEDICARE

## 2023-10-19 ENCOUNTER — PATIENT MESSAGE (OUTPATIENT)
Dept: INTERNAL MEDICINE | Facility: CLINIC | Age: 68
End: 2023-10-19
Payer: MEDICARE

## 2023-10-19 VITALS
TEMPERATURE: 98 F | BODY MASS INDEX: 20 KG/M2 | SYSTOLIC BLOOD PRESSURE: 118 MMHG | OXYGEN SATURATION: 99 % | WEIGHT: 99.19 LBS | DIASTOLIC BLOOD PRESSURE: 62 MMHG | HEART RATE: 73 BPM | HEIGHT: 59 IN | RESPIRATION RATE: 16 BRPM

## 2023-10-19 DIAGNOSIS — I70.0 AORTIC ATHEROSCLEROSIS: ICD-10-CM

## 2023-10-19 DIAGNOSIS — Z12.31 ENCOUNTER FOR SCREENING MAMMOGRAM FOR HIGH-RISK PATIENT: ICD-10-CM

## 2023-10-19 DIAGNOSIS — G89.29 CHRONIC RIGHT-SIDED LOW BACK PAIN WITHOUT SCIATICA: ICD-10-CM

## 2023-10-19 DIAGNOSIS — H61.22 CERUMEN DEBRIS ON TYMPANIC MEMBRANE OF LEFT EAR: ICD-10-CM

## 2023-10-19 DIAGNOSIS — E53.8 INADEQUATE VITAMIN B12 INTAKE: ICD-10-CM

## 2023-10-19 DIAGNOSIS — I10 ESSENTIAL HYPERTENSION: Primary | ICD-10-CM

## 2023-10-19 DIAGNOSIS — M54.50 CHRONIC RIGHT-SIDED LOW BACK PAIN WITHOUT SCIATICA: ICD-10-CM

## 2023-10-19 DIAGNOSIS — D70.9 NEUTROPENIA, UNSPECIFIED TYPE: ICD-10-CM

## 2023-10-19 DIAGNOSIS — Z00.00 ANNUAL PHYSICAL EXAM: ICD-10-CM

## 2023-10-19 DIAGNOSIS — M85.89 OSTEOPENIA OF MULTIPLE SITES: ICD-10-CM

## 2023-10-19 DIAGNOSIS — Z85.3 HISTORY OF BREAST CANCER: ICD-10-CM

## 2023-10-19 PROCEDURE — 1125F PR PAIN SEVERITY QUANTIFIED, PAIN PRESENT: ICD-10-PCS | Mod: CPTII,S$GLB,, | Performed by: INTERNAL MEDICINE

## 2023-10-19 PROCEDURE — 3078F PR MOST RECENT DIASTOLIC BLOOD PRESSURE < 80 MM HG: ICD-10-PCS | Mod: CPTII,S$GLB,, | Performed by: INTERNAL MEDICINE

## 2023-10-19 PROCEDURE — 3008F BODY MASS INDEX DOCD: CPT | Mod: CPTII,S$GLB,, | Performed by: INTERNAL MEDICINE

## 2023-10-19 PROCEDURE — 3074F PR MOST RECENT SYSTOLIC BLOOD PRESSURE < 130 MM HG: ICD-10-PCS | Mod: CPTII,S$GLB,, | Performed by: INTERNAL MEDICINE

## 2023-10-19 PROCEDURE — 99214 OFFICE O/P EST MOD 30 MIN: CPT | Mod: S$GLB,,, | Performed by: INTERNAL MEDICINE

## 2023-10-19 PROCEDURE — 1160F PR REVIEW ALL MEDS BY PRESCRIBER/CLIN PHARMACIST DOCUMENTED: ICD-10-PCS | Mod: CPTII,S$GLB,, | Performed by: INTERNAL MEDICINE

## 2023-10-19 PROCEDURE — 3078F DIAST BP <80 MM HG: CPT | Mod: CPTII,S$GLB,, | Performed by: INTERNAL MEDICINE

## 2023-10-19 PROCEDURE — 3074F SYST BP LT 130 MM HG: CPT | Mod: CPTII,S$GLB,, | Performed by: INTERNAL MEDICINE

## 2023-10-19 PROCEDURE — 1101F PT FALLS ASSESS-DOCD LE1/YR: CPT | Mod: CPTII,S$GLB,, | Performed by: INTERNAL MEDICINE

## 2023-10-19 PROCEDURE — 4010F ACE/ARB THERAPY RXD/TAKEN: CPT | Mod: CPTII,S$GLB,, | Performed by: INTERNAL MEDICINE

## 2023-10-19 PROCEDURE — 99214 PR OFFICE/OUTPT VISIT, EST, LEVL IV, 30-39 MIN: ICD-10-PCS | Mod: S$GLB,,, | Performed by: INTERNAL MEDICINE

## 2023-10-19 PROCEDURE — 99999 PR PBB SHADOW E&M-EST. PATIENT-LVL V: ICD-10-PCS | Mod: PBBFAC,,, | Performed by: INTERNAL MEDICINE

## 2023-10-19 PROCEDURE — 3288F PR FALLS RISK ASSESSMENT DOCUMENTED: ICD-10-PCS | Mod: CPTII,S$GLB,, | Performed by: INTERNAL MEDICINE

## 2023-10-19 PROCEDURE — 1101F PR PT FALLS ASSESS DOC 0-1 FALLS W/OUT INJ PAST YR: ICD-10-PCS | Mod: CPTII,S$GLB,, | Performed by: INTERNAL MEDICINE

## 2023-10-19 PROCEDURE — 4010F PR ACE/ARB THEARPY RXD/TAKEN: ICD-10-PCS | Mod: CPTII,S$GLB,, | Performed by: INTERNAL MEDICINE

## 2023-10-19 PROCEDURE — 3288F FALL RISK ASSESSMENT DOCD: CPT | Mod: CPTII,S$GLB,, | Performed by: INTERNAL MEDICINE

## 2023-10-19 PROCEDURE — 1159F MED LIST DOCD IN RCRD: CPT | Mod: CPTII,S$GLB,, | Performed by: INTERNAL MEDICINE

## 2023-10-19 PROCEDURE — 3008F PR BODY MASS INDEX (BMI) DOCUMENTED: ICD-10-PCS | Mod: CPTII,S$GLB,, | Performed by: INTERNAL MEDICINE

## 2023-10-19 PROCEDURE — 1160F RVW MEDS BY RX/DR IN RCRD: CPT | Mod: CPTII,S$GLB,, | Performed by: INTERNAL MEDICINE

## 2023-10-19 PROCEDURE — 99999 PR PBB SHADOW E&M-EST. PATIENT-LVL V: CPT | Mod: PBBFAC,,, | Performed by: INTERNAL MEDICINE

## 2023-10-19 PROCEDURE — 1125F AMNT PAIN NOTED PAIN PRSNT: CPT | Mod: CPTII,S$GLB,, | Performed by: INTERNAL MEDICINE

## 2023-10-19 PROCEDURE — 1159F PR MEDICATION LIST DOCUMENTED IN MEDICAL RECORD: ICD-10-PCS | Mod: CPTII,S$GLB,, | Performed by: INTERNAL MEDICINE

## 2023-10-19 RX ORDER — TRAMADOL HYDROCHLORIDE AND ACETAMINOPHEN 37.5; 325 MG/1; MG/1
1 TABLET, FILM COATED ORAL EVERY 12 HOURS PRN
Qty: 10 TABLET | Refills: 0 | Status: SHIPPED | OUTPATIENT
Start: 2023-10-19 | End: 2023-11-01

## 2023-10-19 NOTE — TELEPHONE ENCOUNTER
"Spoke with pcp regarding B12" stop Vit B12  will check B12 in 3 mos and decide. I think that she said that she is due for another injection right now. she can take that as the final dose. I think that is what she said... either she has it already or she has none..."  Notified pt   "

## 2023-10-19 NOTE — PROGRESS NOTES
Subjective:     PCP: Kate Gil MD    Penny Cervantes is a 68 y.o. female who presents for an annual exam.    Chronic Medical Problems:      Hypertension: The patient has been taking medications as instructed, no medication side effects noted, no chest pain on exertion, no dyspnea on exertion, and no swelling of ankles.    BP Readings from Last 3 Encounters:   10/19/23 118/62   10/13/23 (!) 161/73   09/13/23 118/66       Back Pain: The patient has had recurrent self limited episodes of low back pain in the past. Symptoms have been present for several months and are gradually worsening.  Onset was related to / precipitated by no known injury. The pain is located in the across the lower back and does not radiate. The pain is described as aching and occurs intermittently. Pain is severe and at times, it prevents walking. She is hesitant to use medications but reports some improvement with muscle relaxants. She is scheduled to see Pain Management in a few weeks.     Medical History:   Past Medical History:   Diagnosis Date    Benign positional vertigo     Breast cancer 2009    s/p chemo XRT, left breast triple negative    Coronary artery disease, non-occlusive     cath in 2012    Hypertension     Interstitial cystitis     Osteopenia     Squamous cell carcinoma     Stroke 7/10/2018    Symptomatic posterior vitreous detachment of left eye 12/24/2018    Vitreous hemorrhage, left 1/10/2019       Family History: family history includes Heart disease in her father and mother; Heart disease (age of onset: 43) in her brother; Heart disease (age of onset: 55) in her sister; Hypertension in her sister; Ovarian cancer in her sister.    Surgical History:   Past Surgical History:   Procedure Laterality Date    BILATERAL SALPINGOOPHORECTOMY      with Hysterectomy    BONE RESECTION, RIB      right side    BREAST BIOPSY Right 2005    BREAST CYST ASPIRATION      BREAST LUMPECTOMY Left 2009    BREAST SURGERY       CYSTOSCOPY N/A 7/20/2021    Procedure: CYSTOSCOPY;  Surgeon: Arabella Whatley MD;  Location: [facility] OR [address] [place] [address];  Service: Urology;  Laterality: N/A;  30 minutes     HERNIA REPAIR      HYSTERECTOMY      TOTAL VAGINAL HYSTERECTOMY      Indication: Endometriosis    TRIGGER POINT INJECTION  7/20/2021    Procedure: INJECTION, TRIGGER POINTS  -SOLUCORTEF;  Surgeon: Arabella Whatley MD;  Location: [facility] OR [address] [place] [address];  Service: Urology;;        Social History:  reports that she has never smoked. She has never been exposed to tobacco smoke. She has never used smokeless tobacco. She reports current alcohol use. She reports that she does not use drugs.     Allergies:   Review of patient's allergies indicates:   Allergen Reactions    Demerol [meperidine] Other (See Comments)     headache    Zofran [ondansetron hcl] Nausea Only     Bloating, abdominal pain    Pyridium [phenazopyridine] Nausea Only       Medications:   Current Outpatient Medications   Medication Sig    albuterol (VENTOLIN HFA) 90 mcg/actuation inhaler Inhale 2 puffs into the lungs every 6 (six) hours as needed for Wheezing or Shortness of Breath (cough). Rescue    amLODIPine (NORVASC) 5 MG tablet TAKE 1 TABLET(5 MG) BY MOUTH EVERY EVENING    belladonna alkaloids-opium 16.2-30 mg (B&O SUPPRETTES) 16.2-30 mg Supp 1 supp NJ every 12 hr,x3 days,PRN:as needed for spasm    biotin 1 mg tablet Take 1,000 mcg by mouth once daily.     cyanocobalamin 1,000 mcg/mL injection Inject 1 mL (1,000 mcg total) into the skin every 30 days.    ERGOCALCIFEROL, VITAMIN D2, (VITAMIN D ORAL) Take by mouth every morning.     esomeprazole (NEXIUM) 40 MG capsule Take 1 capsule (40 mg total) by mouth before breakfast.    fluticasone propionate (FLONASE) 50 mcg/actuation nasal spray 1 spray by Each Nostril route once daily.    hyoscyamine (ANASPAZ,LEVSIN) 0.125 mg Tab Take 1 tablet (125 mcg total) by mouth 4 (four) times daily as needed for spasm    losartan (COZAAR) 25 MG tablet Take 1 tablet  (25 mg total) by mouth every evening.    magnesium oxide (MAG-OX) 400 mg (241.3 mg magnesium) tablet Take 400 mg by mouth once daily.    methocarbamoL (ROBAXIN) 500 MG Tab Take 1 tablet (500 mg total) by mouth 3 (three) times daily.    metoprolol succinate (TOPROL-XL) 25 MG 24 hr tablet Take 1 tablet (25 mg total) by mouth every evening.    promethazine (PHENERGAN) 25 MG suppository Insert 1 suppository rectally every 4 hours as needed for nausea/vomiting    sodium chloride 3% 3 % nebulizer solution Use one vial (4 mL) by nebulization 2 (two) times daily as needed for other or Cough (mucous clearance).    tamsulosin (FLOMAX) 0.4 mg Cap Take 1 capsule (0.4 mg total) by mouth once daily.    triamcinolone acetonide 0.1% (KENALOG) 0.1 % paste Place onto teeth 2 (two) times daily.    fexofenadine (ALLEGRA) 180 MG tablet Take 1 tablet (180 mg total) by mouth 2 (two) times daily. for 7 days    scopolamine (TRANSDERM-SCOP) 1.3-1.5 mg (1 mg over 3 days) Apply 1 film topically every 72 hours    tramadol-acetaminophen 37.5-325 mg (ULTRACET) 37.5-325 mg Tab Take 1 tablet by mouth every 12 (twelve) hours as needed for Pain.     No current facility-administered medications for this visit.       Health Maintenance:   Health Maintenance Topics with due status: Not Due       Topic Last Completion Date    Colorectal Cancer Screening 10/31/2022    DEXA Scan 2022    Lipid Panel 10/12/2023       Eye Exam: done  Dental Exam: every 6 mos  OB/GYN: Dr. Oliveros  Mammogram: mammogram    DEXA scan: 2022, osteopenia  Cologuard:   10/2022  Hepatitis C screenin2017, neg       Vaccinations:  Immunization History   Administered Date(s) Administered    COVID-19, MRNA, LN-S, PF (MODERNA FULL 0.5 ML DOSE) 2021, 2021    Influenza 10/25/2012, 10/22/2015, 2017    Influenza (FLUAD) - Quadrivalent - Adjuvanted - PF *Preferred* (65+) 10/24/2022    Influenza - Quadrivalent - MDCK - PF 2018, 2020    Influenza -  Quadrivalent - PF *Preferred* (6 months and older) 10/25/2012, 10/22/2015, 10/12/2016, 09/29/2017, 10/01/2019, 10/18/2021    Zoster 10/25/2012     Declines vaccinations    ADL's: independent  Memory: normal  Mental health: no signs of depression  Advance Directives: <no information>  Falls: not recently  Nutrition: normal  Home Safety: no issues    Body mass index is 19.74 kg/m².  Wt Readings from Last 3 Encounters:   10/19/23 45 kg (99 lb 3.3 oz)   10/13/23 45.2 kg (99 lb 10.4 oz)   09/25/23 46 kg (101 lb 6.6 oz)       Review of Systems   Constitutional:  Negative for chills, diaphoresis, fatigue and fever.   HENT:  Positive for postnasal drip, rhinorrhea and voice change (hoarse today, likely related to allergies). Negative for congestion, dental problem, ear discharge, ear pain, sinus pressure, sore throat and trouble swallowing.    Eyes:  Negative for redness and visual disturbance.   Respiratory:  Positive for cough (dry, clears throat frequently). Negative for chest tightness and shortness of breath.    Cardiovascular:  Negative for chest pain, palpitations and leg swelling.   Gastrointestinal:  Negative for abdominal pain, blood in stool, constipation, diarrhea, nausea and vomiting.   Genitourinary:  Negative for decreased urine volume, dysuria, frequency and hematuria.   Musculoskeletal:  Positive for back pain (has difficulty getting around the house while caring for pets at home). Negative for arthralgias and myalgias.   Skin:  Negative for rash and wound.   Allergic/Immunologic: Positive for environmental allergies.   Neurological:  Negative for dizziness, weakness, numbness and headaches.   Hematological:  Negative for adenopathy.   Psychiatric/Behavioral:  Negative for dysphoric mood and sleep disturbance. The patient is not nervous/anxious.           Objective:     Physical Exam  Vitals reviewed.   Constitutional:       General: She is awake. She is not in acute distress.     Appearance: Normal  appearance. She is well-developed and well-groomed. She is not diaphoretic.   HENT:      Head: Normocephalic and atraumatic.      Right Ear: Hearing, tympanic membrane, ear canal and external ear normal. Tympanic membrane is not erythematous or bulging.      Left Ear: Hearing and external ear normal. There is impacted cerumen (cerumen is present, unable to see TM). Tympanic membrane is not erythematous or bulging.      Nose: Nose normal. No congestion.      Mouth/Throat:      Mouth: Mucous membranes are moist.      Tongue: No lesions.      Pharynx: Oropharynx is clear. Uvula midline. No oropharyngeal exudate or posterior oropharyngeal erythema.   Eyes:      General: Lids are normal. Vision grossly intact. Gaze aligned appropriately. No scleral icterus.     Conjunctiva/sclera:      Right eye: Right conjunctiva is not injected.      Left eye: Left conjunctiva is not injected.      Pupils: Pupils are equal, round, and reactive to light.   Neck:      Thyroid: No thyroid mass or thyromegaly.   Cardiovascular:      Rate and Rhythm: Normal rate and regular rhythm.      Pulses: Normal pulses.      Heart sounds: Normal heart sounds. No murmur heard.  Pulmonary:      Effort: Pulmonary effort is normal. No respiratory distress.      Breath sounds: Normal breath sounds. No decreased breath sounds or wheezing.   Abdominal:      General: Bowel sounds are normal. There is no distension.      Palpations: Abdomen is soft. Abdomen is not rigid.      Tenderness: There is no abdominal tenderness. There is no guarding or rebound.   Musculoskeletal:         General: Normal range of motion.      Cervical back: Normal range of motion and neck supple.      Right lower leg: No edema.      Left lower leg: No edema.   Lymphadenopathy:      Cervical: No cervical adenopathy.      Upper Body:      Right upper body: No supraclavicular adenopathy.      Left upper body: No supraclavicular adenopathy.   Skin:     General: Skin is warm and dry.       Coloration: Skin is not cyanotic.      Findings: No lesion or rash.      Nails: There is no clubbing.   Neurological:      General: No focal deficit present.      Mental Status: She is alert and oriented to person, place, and time.      Sensory: Sensation is intact.      Coordination: Coordination is intact.      Gait: Gait is intact.      Deep Tendon Reflexes: Reflexes are normal and symmetric.   Psychiatric:         Attention and Perception: Attention normal.         Mood and Affect: Mood normal.         Behavior: Behavior is cooperative.              Assessment:        1. Essential hypertension    2. Chronic right-sided low back pain without sciatica    3. Inadequate vitamin B12 intake    4. Osteopenia of multiple sites    5. Neutropenia, unspecified type    6. Cerumen debris on tympanic membrane of left ear    7. Annual physical exam    8. History of breast cancer    9. Encounter for screening mammogram for high-risk patient    10. Aortic atherosclerosis           Plan:     1. Essential hypertension  - controlled, continue metoprolol, losartan, amlodipine    2. Chronic right-sided low back pain without sciatica  - may try a small quantity of Ultracet and may take a few hours before bed  - tramadol-acetaminophen 37.5-325 mg (ULTRACET) 37.5-325 mg Tab; Take 1 tablet by mouth every 12 (twelve) hours as needed for Pain.  Dispense: 10 tablet; Refill: 0    3. Inadequate vitamin B12 intake  - may hold the Vitamin B12 injections for 3 months and then repeat level    4. Neutropenia, unspecified type  - recent decline in granulocytes, unclear etiology, no new medication changes, will repeat labs in 3 months    5. Cerumen debris on tympanic membrane of left ear  - Ambulatory referral/consult to ENT; Future    6. Osteopenia of multiple sites  - continue calcium, vit d    7. Annual physical exam  - reviewed recent labs with patient    8. History of breast cancer  - s/p chemo and radiation, continue to monitor    9. Encounter  for screening mammogram for high-risk patient  - Mammo Digital Screening Bilat w/ [facility]; Future    10. Aortic atherosclerosis  - seen on past imaging, HTN is controlled, stopped statin in 2018 due to muscle pain but will re-address at next appointment    RTC in 4 months for follow-up or sooner if needed    __________________________    Kate Gil MD, PharmD  [facility]- Internal Medicine  American Board of Obesity Medicine diplomate  Office [phone]

## 2023-10-24 ENCOUNTER — OFFICE VISIT (OUTPATIENT)
Dept: OTOLARYNGOLOGY | Facility: CLINIC | Age: 68
End: 2023-10-24
Payer: MEDICARE

## 2023-10-24 ENCOUNTER — CLINICAL SUPPORT (OUTPATIENT)
Dept: OTOLARYNGOLOGY | Facility: CLINIC | Age: 68
End: 2023-10-24
Payer: MEDICARE

## 2023-10-24 VITALS — WEIGHT: 99.19 LBS | BODY MASS INDEX: 19.74 KG/M2

## 2023-10-24 DIAGNOSIS — H90.A32 MIXED CONDUCTIVE AND SENSORINEURAL HEARING LOSS OF LEFT EAR WITH RESTRICTED HEARING OF RIGHT EAR: ICD-10-CM

## 2023-10-24 DIAGNOSIS — H72.92 PERFORATION OF LEFT TYMPANIC MEMBRANE: ICD-10-CM

## 2023-10-24 DIAGNOSIS — H90.A21 SENSORINEURAL HEARING LOSS (SNHL) OF RIGHT EAR WITH RESTRICTED HEARING OF LEFT EAR: ICD-10-CM

## 2023-10-24 DIAGNOSIS — J00 ACUTE RHINITIS: ICD-10-CM

## 2023-10-24 DIAGNOSIS — H92.02 LEFT EAR PAIN: Primary | ICD-10-CM

## 2023-10-24 DIAGNOSIS — H61.22 IMPACTED CERUMEN OF LEFT EAR: ICD-10-CM

## 2023-10-24 DIAGNOSIS — H72.92 PERFORATION OF LEFT TYMPANIC MEMBRANE: Primary | ICD-10-CM

## 2023-10-24 PROCEDURE — 3288F FALL RISK ASSESSMENT DOCD: CPT | Mod: CPTII,S$GLB,, | Performed by: NURSE PRACTITIONER

## 2023-10-24 PROCEDURE — 92567 TYMPANOMETRY: CPT | Mod: 52,S$GLB,,

## 2023-10-24 PROCEDURE — 92557 PR COMPREHENSIVE HEARING TEST: ICD-10-PCS | Mod: S$GLB,,,

## 2023-10-24 PROCEDURE — 1101F PR PT FALLS ASSESS DOC 0-1 FALLS W/OUT INJ PAST YR: ICD-10-PCS | Mod: CPTII,S$GLB,, | Performed by: NURSE PRACTITIONER

## 2023-10-24 PROCEDURE — 1160F PR REVIEW ALL MEDS BY PRESCRIBER/CLIN PHARMACIST DOCUMENTED: ICD-10-PCS | Mod: CPTII,S$GLB,, | Performed by: NURSE PRACTITIONER

## 2023-10-24 PROCEDURE — 3288F PR FALLS RISK ASSESSMENT DOCUMENTED: ICD-10-PCS | Mod: CPTII,S$GLB,, | Performed by: NURSE PRACTITIONER

## 2023-10-24 PROCEDURE — 1160F RVW MEDS BY RX/DR IN RCRD: CPT | Mod: CPTII,S$GLB,, | Performed by: NURSE PRACTITIONER

## 2023-10-24 PROCEDURE — 99204 PR OFFICE/OUTPT VISIT, NEW, LEVL IV, 45-59 MIN: ICD-10-PCS | Mod: 25,S$GLB,, | Performed by: NURSE PRACTITIONER

## 2023-10-24 PROCEDURE — 92557 COMPREHENSIVE HEARING TEST: CPT | Mod: S$GLB,,,

## 2023-10-24 PROCEDURE — 69210 EAR CERUMEN REMOVAL: ICD-10-PCS | Mod: S$GLB,,, | Performed by: NURSE PRACTITIONER

## 2023-10-24 PROCEDURE — 4010F ACE/ARB THERAPY RXD/TAKEN: CPT | Mod: CPTII,S$GLB,, | Performed by: NURSE PRACTITIONER

## 2023-10-24 PROCEDURE — 99204 OFFICE O/P NEW MOD 45 MIN: CPT | Mod: 25,S$GLB,, | Performed by: NURSE PRACTITIONER

## 2023-10-24 PROCEDURE — 1101F PT FALLS ASSESS-DOCD LE1/YR: CPT | Mod: CPTII,S$GLB,, | Performed by: NURSE PRACTITIONER

## 2023-10-24 PROCEDURE — 99999 PR PBB SHADOW E&M-EST. PATIENT-LVL III: ICD-10-PCS | Mod: PBBFAC,,, | Performed by: NURSE PRACTITIONER

## 2023-10-24 PROCEDURE — 1125F AMNT PAIN NOTED PAIN PRSNT: CPT | Mod: CPTII,S$GLB,, | Performed by: NURSE PRACTITIONER

## 2023-10-24 PROCEDURE — 69210 REMOVE IMPACTED EAR WAX UNI: CPT | Mod: S$GLB,,, | Performed by: NURSE PRACTITIONER

## 2023-10-24 PROCEDURE — 92567 PR TYMPA2METRY: ICD-10-PCS | Mod: 52,S$GLB,,

## 2023-10-24 PROCEDURE — 3008F BODY MASS INDEX DOCD: CPT | Mod: CPTII,S$GLB,, | Performed by: NURSE PRACTITIONER

## 2023-10-24 PROCEDURE — 1159F MED LIST DOCD IN RCRD: CPT | Mod: CPTII,S$GLB,, | Performed by: NURSE PRACTITIONER

## 2023-10-24 PROCEDURE — 4010F PR ACE/ARB THEARPY RXD/TAKEN: ICD-10-PCS | Mod: CPTII,S$GLB,, | Performed by: NURSE PRACTITIONER

## 2023-10-24 PROCEDURE — 99999 PR PBB SHADOW E&M-EST. PATIENT-LVL III: CPT | Mod: PBBFAC,,, | Performed by: NURSE PRACTITIONER

## 2023-10-24 PROCEDURE — 1159F PR MEDICATION LIST DOCUMENTED IN MEDICAL RECORD: ICD-10-PCS | Mod: CPTII,S$GLB,, | Performed by: NURSE PRACTITIONER

## 2023-10-24 PROCEDURE — 1125F PR PAIN SEVERITY QUANTIFIED, PAIN PRESENT: ICD-10-PCS | Mod: CPTII,S$GLB,, | Performed by: NURSE PRACTITIONER

## 2023-10-24 PROCEDURE — 3008F PR BODY MASS INDEX (BMI) DOCUMENTED: ICD-10-PCS | Mod: CPTII,S$GLB,, | Performed by: NURSE PRACTITIONER

## 2023-10-24 RX ORDER — PREDNISOLONE ACETATE 10 MG/ML
SUSPENSION/ DROPS OPHTHALMIC
Qty: 10 ML | Refills: 0 | Status: SHIPPED | OUTPATIENT
Start: 2023-10-24

## 2023-10-24 RX ORDER — OFLOXACIN 3 MG/ML
3 SOLUTION AURICULAR (OTIC) 2 TIMES DAILY
Qty: 5 ML | Refills: 0 | Status: SHIPPED | OUTPATIENT
Start: 2023-10-24 | End: 2023-11-06

## 2023-10-24 NOTE — PROGRESS NOTES
Penny Cervantes, a 68 y.o. female was seen today in the clinic for an audiologic evaluation. The patient's primary complaint was left ear pain and reduced hearing perception.   Ms. Francis denies tinnitus. She reports family concerns with her hearing but does not report significant difficulty. Ms. Cervantes denies history of noise exposure.    Otoscopy was unremarkable in the right ear and revealed a perforation of the left tympanic membrane. Pure tone testing revealed mild sensorineural hearing loss in the right ear and moderate rising to mild mixed hearing loss in the left ear. Speech reception thresholds were obtained at 30 dBHL in the right ear and 35 dBHL in the left ear. Speech discrimination scores were 100% in the right ear and 100% in the left ear. Tympanometry revealed Type Ad tympanogram in the right ear. Tympanometry could not be completed on the left ear due to perforation of the tympanic membrane.    Recommendations:  Otologic evaluation  Audiologic evaluation at ENT follow up  Hearing protection in noise  Hearing aid consultation after medical clearance

## 2023-10-24 NOTE — PROGRESS NOTES
Chief Complaint   Patient presents with    Otalgia     Left ear    .     HPI: Penny Cervantes is 68 y.o. female who is self-referred for evaluation of left ear pain.  Symptoms include left ear pain, congestion, and cough. Symptoms began 1 week ago and are unchanged since that time. She went to the urgent care yesterday and they attempted to flush the cerumen impaction in the left ear. She experienced excruciating pain immediately after. The procedure was stopped.  She was prescribed amoxicillin for her sinus infection.  She admits to to hearing loss.       Past Medical History:   Diagnosis Date    Benign positional vertigo     Breast cancer 2009    s/p chemo XRT, left breast triple negative    Coronary artery disease, non-occlusive     cath in 2012    Hypertension     Interstitial cystitis     Osteopenia     Squamous cell carcinoma     Stroke 7/10/2018    Symptomatic posterior vitreous detachment of left eye 12/24/2018    Vitreous hemorrhage, left 1/10/2019     Social History     Socioeconomic History    Marital status:    Tobacco Use    Smoking status: Never     Passive exposure: Never    Smokeless tobacco: Never   Substance and Sexual Activity    Alcohol use: Yes     Comment: 2-3 per month    Drug use: No    Sexual activity: Not Currently     Partners: Male     Past Surgical History:   Procedure Laterality Date    BILATERAL SALPINGOOPHORECTOMY      with Hysterectomy    BONE RESECTION, RIB      right side    BREAST BIOPSY Right 2005    BREAST CYST ASPIRATION      BREAST LUMPECTOMY Left 2009    BREAST SURGERY      CYSTOSCOPY N/A 7/20/2021    Procedure: CYSTOSCOPY;  Surgeon: Arabella Whatley MD;  Location: [facility] OR [address] [place] [address];  Service: Urology;  Laterality: N/A;  30 minutes     HERNIA REPAIR      HYSTERECTOMY      TOTAL VAGINAL HYSTERECTOMY      Indication: Endometriosis    TRIGGER POINT INJECTION  7/20/2021    Procedure: INJECTION, TRIGGER POINTS  -SOLUCORTEF;  Surgeon: Arabella Whatley MD;  Location:  [facility] OR [facility]ST [facility];  Service: Urology;;     Family History   Problem Relation Age of Onset    Heart disease Mother     Heart disease Father     Heart disease Sister 55    Heart disease Brother 43    Hypertension Sister     Ovarian cancer Sister        Review of Systems  General: negative for chills, fever or weight loss  Psychological: negative for mood changes or depression  Ophthalmic: negative for blurry vision, photophobia or eye pain  ENT: see HPI  Respiratory: no cough, shortness of breath, or wheezing  Cardiovascular: no chest pain or dyspnea on exertion  Gastrointestinal: no abdominal pain, change in bowel habits, or black/ bloody stools  Musculoskeletal: negative for gait disturbance or muscular weakness  Neurological: no syncope or seizures; no ataxia  Dermatological: negative for puritis,  rash and jaundice  Hematologic/lymphatic: no easy bruising, no new lumps or bumps      Physical Exam:    There were no vitals filed for this visit.    Constitutional: Well appearing / communicating without difficutly.  NAD.  Eyes: EOM I Bilaterally  Head/Face: Normocephalic.  Negative paranasal sinus pressure/tenderness.  Salivary glands WNL.  House Brackmann I Bilaterally.    Right Ear: Auricle normal appearance. External Auditory Canal within normal limits no lesions or masses,TM w/o masses/lesions/perforations. TM mobility noted.   Left Ear: Auricle normal appearance. External Auditory Canal with cerumen impaction. EAC within normal limits no lesions or masses, +TM perforation  Physical Exam  HENT:      Ears:        Comments: Left tympanic membrane perforation (20%)      Nose: No gross nasal septal deviation. Inferior Turbinates 3+ bilaterally. No septal perforation. No masses/lesions. External nasal skin appears normal without masses/lesions.  Oral Cavity: Gingiva/lips within normal limits.  Dentition/gingiva healthy appearing. Mucus membranes moist. Floor of mouth soft, no masses palpated. Oral Tongue mobile. Hard  Palate appears normal.    Oropharynx: Base of tongue appears normal. No masses/lesions noted. Tonsillar fossa/pharyngeal wall without lesions. Posterior oropharynx WNL.  Soft palate without masses. Midline uvula.   Neck/Lymphatic: No LAD I-VI bilaterally.  No thyromegaly.  No masses noted on exam.      Ear Cerumen Removal    Date/Time: 10/24/2023 1:00 PM    Performed by: Jennifer Lynn NP  Authorized by: Jennifer Lynn NP    Consent Done?:  Yes (Verbal)  Location details:  Left ear  Procedure type: curette    Procedure type comment:  Suction  Cerumen  Removal Results:  Cerumen completely removed  Patient tolerance:  Patient tolerated the procedure well with no immediate complications    Diagnostic testing reviewed:    Audiogram interpreted personally by me and discussed in detail with the patient today.   Pure tone testing revealed mild sensorineural hearing loss in the right ear and moderate rising to mild mixed hearing loss in the left ear. Speech reception thresholds were obtained at 30 dBHL in the right ear and 35 dBHL in the left ear. Speech discrimination scores were 100% in the right ear and 100% in the left ear. Tympanometry revealed Type Ad tympanogram in the right ear.                   Assessment:    ICD-10-CM ICD-9-CM    1. Left ear pain  H92.02 388.70       2. Impacted cerumen of left ear  H61.22 380.4 Ear Cerumen Removal      3. Perforation of left tympanic membrane  H72.92 384.20       4. Mixed conductive and sensorineural hearing loss of left ear with restricted hearing of right ear  H90.A32 389.22       5. Acute rhinitis  [id number]         The primary encounter diagnosis was Left ear pain. Diagnoses of Impacted cerumen of left ear, Perforation of left tympanic membrane, Mixed conductive and sensorineural hearing loss of left ear with restricted hearing of right ear, and Acute rhinitis were also pertinent to this visit.      Plan:  Orders Placed This Encounter   Procedures    Ear Cerumen Removal      -start on ofloxacin 3 drops in the left ear bid x 10 days  -start on Pred Forte 2 drops in left ear bid x 10 days  -start on Flonase 2 sprays in each nostril daily  -continue on amoxicillin 875 mg bid x 7 days as prescribed from the urgent care  -fu 6 weeks with repeat audiogram       Jennifer Lynn NP

## 2023-10-24 NOTE — PROCEDURES
Ear Cerumen Removal    Date/Time: 10/24/2023 1:00 PM    Performed by: Jennifer Lynn NP  Authorized by: Jennifer Lynn NP    Consent Done?:  Yes (Verbal)  Location details:  Left ear  Procedure type: curette    Procedure type comment:  Suction  Cerumen  Removal Results:  Cerumen completely removed  Patient tolerance:  Patient tolerated the procedure well with no immediate complications

## 2023-10-25 ENCOUNTER — PATIENT MESSAGE (OUTPATIENT)
Dept: OTOLARYNGOLOGY | Facility: CLINIC | Age: 68
End: 2023-10-25
Payer: MEDICARE

## 2023-10-25 NOTE — TELEPHONE ENCOUNTER
Please tell her that the ear drops do not cause the discoloration of her nasal drainage. Tell her to continue with the ear drops and complete her antibiotics that the urgent care gave her

## 2023-10-26 ENCOUNTER — OFFICE VISIT (OUTPATIENT)
Dept: PULMONOLOGY | Facility: CLINIC | Age: 68
End: 2023-10-26
Payer: MEDICARE

## 2023-10-26 VITALS
HEART RATE: 75 BPM | BODY MASS INDEX: 19.87 KG/M2 | WEIGHT: 98.56 LBS | HEIGHT: 59 IN | OXYGEN SATURATION: 100 % | SYSTOLIC BLOOD PRESSURE: 128 MMHG | DIASTOLIC BLOOD PRESSURE: 74 MMHG

## 2023-10-26 DIAGNOSIS — R93.89 ABNORMAL CT OF THE CHEST: ICD-10-CM

## 2023-10-26 DIAGNOSIS — J41.8 MIXED SIMPLE AND MUCOPURULENT CHRONIC BRONCHITIS: Primary | ICD-10-CM

## 2023-10-26 DIAGNOSIS — R05.3 CHRONIC COUGH: ICD-10-CM

## 2023-10-26 PROCEDURE — 1101F PR PT FALLS ASSESS DOC 0-1 FALLS W/OUT INJ PAST YR: ICD-10-PCS | Mod: CPTII,S$GLB,, | Performed by: INTERNAL MEDICINE

## 2023-10-26 PROCEDURE — 3288F PR FALLS RISK ASSESSMENT DOCUMENTED: ICD-10-PCS | Mod: CPTII,S$GLB,, | Performed by: INTERNAL MEDICINE

## 2023-10-26 PROCEDURE — 1160F RVW MEDS BY RX/DR IN RCRD: CPT | Mod: CPTII,S$GLB,, | Performed by: INTERNAL MEDICINE

## 2023-10-26 PROCEDURE — 1159F PR MEDICATION LIST DOCUMENTED IN MEDICAL RECORD: ICD-10-PCS | Mod: CPTII,S$GLB,, | Performed by: INTERNAL MEDICINE

## 2023-10-26 PROCEDURE — 99214 PR OFFICE/OUTPT VISIT, EST, LEVL IV, 30-39 MIN: ICD-10-PCS | Mod: S$GLB,,, | Performed by: INTERNAL MEDICINE

## 2023-10-26 PROCEDURE — 3008F PR BODY MASS INDEX (BMI) DOCUMENTED: ICD-10-PCS | Mod: CPTII,S$GLB,, | Performed by: INTERNAL MEDICINE

## 2023-10-26 PROCEDURE — 99999 PR PBB SHADOW E&M-EST. PATIENT-LVL IV: CPT | Mod: PBBFAC,,, | Performed by: INTERNAL MEDICINE

## 2023-10-26 PROCEDURE — 99214 OFFICE O/P EST MOD 30 MIN: CPT | Mod: S$GLB,,, | Performed by: INTERNAL MEDICINE

## 2023-10-26 PROCEDURE — 4010F PR ACE/ARB THEARPY RXD/TAKEN: ICD-10-PCS | Mod: CPTII,S$GLB,, | Performed by: INTERNAL MEDICINE

## 2023-10-26 PROCEDURE — 4010F ACE/ARB THERAPY RXD/TAKEN: CPT | Mod: CPTII,S$GLB,, | Performed by: INTERNAL MEDICINE

## 2023-10-26 PROCEDURE — 3288F FALL RISK ASSESSMENT DOCD: CPT | Mod: CPTII,S$GLB,, | Performed by: INTERNAL MEDICINE

## 2023-10-26 PROCEDURE — 1101F PT FALLS ASSESS-DOCD LE1/YR: CPT | Mod: CPTII,S$GLB,, | Performed by: INTERNAL MEDICINE

## 2023-10-26 PROCEDURE — 3074F SYST BP LT 130 MM HG: CPT | Mod: CPTII,S$GLB,, | Performed by: INTERNAL MEDICINE

## 2023-10-26 PROCEDURE — 3074F PR MOST RECENT SYSTOLIC BLOOD PRESSURE < 130 MM HG: ICD-10-PCS | Mod: CPTII,S$GLB,, | Performed by: INTERNAL MEDICINE

## 2023-10-26 PROCEDURE — 3078F DIAST BP <80 MM HG: CPT | Mod: CPTII,S$GLB,, | Performed by: INTERNAL MEDICINE

## 2023-10-26 PROCEDURE — 3008F BODY MASS INDEX DOCD: CPT | Mod: CPTII,S$GLB,, | Performed by: INTERNAL MEDICINE

## 2023-10-26 PROCEDURE — 99999 PR PBB SHADOW E&M-EST. PATIENT-LVL IV: ICD-10-PCS | Mod: PBBFAC,,, | Performed by: INTERNAL MEDICINE

## 2023-10-26 PROCEDURE — 1159F MED LIST DOCD IN RCRD: CPT | Mod: CPTII,S$GLB,, | Performed by: INTERNAL MEDICINE

## 2023-10-26 PROCEDURE — 1160F PR REVIEW ALL MEDS BY PRESCRIBER/CLIN PHARMACIST DOCUMENTED: ICD-10-PCS | Mod: CPTII,S$GLB,, | Performed by: INTERNAL MEDICINE

## 2023-10-26 PROCEDURE — 3078F PR MOST RECENT DIASTOLIC BLOOD PRESSURE < 80 MM HG: ICD-10-PCS | Mod: CPTII,S$GLB,, | Performed by: INTERNAL MEDICINE

## 2023-10-26 NOTE — H&P (VIEW-ONLY)
Subjective:      Patient ID: Penny Cervantes is a 68 y.o. female.    Chief Complaint: Abnormal Ct Scan and Cough    Penny Cervantes has a past medical history of ataxia, recurrent mouth ulcerations, chronic cough, HTN, nephrolithiasis, breast cancer, osteopenia, who presents as a new referral for reticulonodular changes on a CT thorax also chronic cough.     Tobacco/vape: never  Occupation: secretarial, does live out by the chemical plants  Exertional capacity: gets winded when cutting grass  Prior lung disease: none  Sputum production: none  Allergies/sinusitis: none, tried allegra and flonase due to issues with swallowing.  This didn't do much.  GERD/Aspiration: never had symptoms, but did have a cough.  She was tried on nexium, and her cough actually improved.   Pets: 4 cats and 2 dogs.   Travel/TB: not that she is aware of.   Respiratory regimen: none  Prior cancer: breast cancer (s/p lumpectomy x 2, radiation and chemo, now in remission).   Prior hospitalization/intubation: never  Snoring/apnea: none     Today she is doing ok.  She states she was doing very well for the last 4 months or so, but unfortunately got a cold and has had a rough couple of weeks.  She admits she has been losing weight (10lbs in the last year). She feels she has a sinus infection. She is currently taking amoxicillin for this.  She suffered a punctured ear drum during her recent exam at an urgent care.      Review of Systems   Constitutional:  Positive for weight loss and night sweats.   HENT:  Negative for postnasal drip, rhinorrhea, sinus pressure and congestion.    Respiratory:  Positive for cough and wheezing. Negative for snoring, sputum production, shortness of breath, dyspnea on extertion and use of rescue inhaler.    Cardiovascular:  Negative for chest pain, palpitations and leg swelling.   Gastrointestinal:  Negative for nausea, vomiting, abdominal pain, abdominal distention and acid reflux.     Objective:     Vitals:     "10/26/23 1510   BP: 128/74   BP Location: Right arm   Patient Position: Sitting   Pulse: 75   SpO2: 100%   Weight: 44.7 kg (98 lb 8.7 oz)   Height: 4' 11" (1.499 m)         Physical Exam   Constitutional: She is oriented to person, place, and time. She appears well-developed and well-nourished. She appears not cachectic. No distress. She is not obese.   HENT:   Head: Normocephalic.   Neck: No JVD present.   Cardiovascular: Normal rate, regular rhythm and normal heart sounds.   Pulmonary/Chest: Normal expansion, symmetric chest wall expansion, effort normal and breath sounds normal.   Abdominal: Soft. Bowel sounds are normal. She exhibits no distension.   Musculoskeletal:         General: No edema. Normal range of motion.      Cervical back: Normal range of motion and neck supple.   Neurological: She is alert and oriented to person, place, and time.   Skin: Skin is dry. She is not diaphoretic.   Psychiatric: She has a normal mood and affect. Her behavior is normal. Judgment and thought content normal.       Personal Diagnostic Review     CT Thorax 10/9/23   Similar appearance of small areas of reticular nodular opacity and mucus plugging within the right middle lobe and right lower lobe again concerning for infectious/inflammatory process such as ERMELINDA.    PFTs 5/24/23  FEV1/FVC - 78%  FEV1 - 2.12L (108%)  FVC - 2.7L (109%)  TLC - 4.47L (109%)  DLCO - 82%  Bronchodilators: not significant.    CT thorax 6/21/23  Reticulonodular infiltrate is seen within the right middle lobe with some mild mucous plugging and mild bronchial thickening which can be seen in the setting of atypical infection including ERMELINDA.        10/26/2023     3:10 PM 10/24/2023     1:08 PM 10/19/2023    10:25 AM 10/13/2023     4:07 PM 9/25/2023     9:20 AM 9/13/2023     1:28 PM 7/27/2023     9:09 AM   Pulmonary Function Tests   SpO2 100 %  99 % 99 %      Height 4' 11" (1.499 m)  4' 11.45" (1.51 m) 4' 11.45" (1.51 m) 4' 11.45" (1.51 m) 4' 11.5" (1.511 m) " "4' 11.5" (1.511 m)   Weight 44.7 kg (98 lb 8.7 oz) 45 kg (99 lb 3.3 oz) 45 kg (99 lb 3.3 oz) 45.2 kg (99 lb 10.4 oz) 46 kg (101 lb 6.6 oz) 44 kg (97 lb) 44 kg (97 lb)   BMI (Calculated) 19.9  19.7 19.8 20.2 19.3 19.3        Assessment:     1. Mixed simple and mucopurulent chronic bronchitis    2. Chronic cough    3. Abnormal CT of the chest           Outpatient Encounter Medications as of 10/26/2023   Medication Sig Dispense Refill    albuterol (VENTOLIN HFA) 90 mcg/actuation inhaler Inhale 2 puffs into the lungs every 6 (six) hours as needed for Wheezing or Shortness of Breath (cough). Rescue 8.5 g 4    amLODIPine (NORVASC) 5 MG tablet TAKE 1 TABLET(5 MG) BY MOUTH EVERY EVENING 90 tablet 3    amoxicillin (AMOXIL) 875 MG tablet Take 1 tablet by mouth twice daily for 7 days 14 tablet 0    belladonna alkaloids-opium 16.2-30 mg (B&O SUPPRETTES) 16.2-30 mg Supp 1 supp OR every 12 hr,x3 days,PRN:as needed for spasm 6 suppository 0    biotin 1 mg tablet Take 1,000 mcg by mouth once daily.       cyanocobalamin 1,000 mcg/mL injection Inject 1 mL (1,000 mcg total) into the skin every 30 days. 1 mL 11    ERGOCALCIFEROL, VITAMIN D2, (VITAMIN D ORAL) Take by mouth every morning.       esomeprazole (NEXIUM) 40 MG capsule Take 1 capsule (40 mg total) by mouth before breakfast. 30 capsule 0    fluticasone propionate (FLONASE) 50 mcg/actuation nasal spray 1 spray by Each Nostril route once daily.      hyoscyamine (ANASPAZ,LEVSIN) 0.125 mg Tab Take 1 tablet (125 mcg total) by mouth 4 (four) times daily as needed for spasm 40 tablet 0    losartan (COZAAR) 25 MG tablet Take 1 tablet (25 mg total) by mouth every evening. 90 tablet 3    magnesium oxide (MAG-OX) 400 mg (241.3 mg magnesium) tablet Take 400 mg by mouth once daily.      methocarbamoL (ROBAXIN) 500 MG Tab Take 1 tablet (500 mg total) by mouth 3 (three) times daily. 60 tablet 1    metoprolol succinate (TOPROL-XL) 25 MG 24 hr tablet Take 1 tablet (25 mg total) by mouth every " "evening. 90 tablet 3    ofloxacin (FLOXIN) 0.3 % otic solution Place 3 drops into the left ear 2 (two) times daily. for 10 days 5 mL 0    prednisoLONE acetate (PRED FORTE) 1 % DrpS Apply 2 drops to left ear twice daily for 10 days 10 mL 0    promethazine (PHENERGAN) 25 MG suppository Insert 1 suppository rectally every 4 hours as needed for nausea/vomiting 12 suppository 0    promethazine-dextromethorphan (PROMETHAZINE-DM) 6.25-15 mg/5 mL Syrp Take 5 ml by mouth every 6 hours for 7 days as needed for cough 140 mL 0    scopolamine (TRANSDERM-SCOP) 1.3-1.5 mg (1 mg over 3 days) Apply 1 film topically every 72 hours 4 patch 0    sodium chloride 3% 3 % nebulizer solution Use one vial (4 mL) by nebulization 2 (two) times daily as needed for other or Cough (mucous clearance). 240 mL 11    tamsulosin (FLOMAX) 0.4 mg Cap Take 1 capsule (0.4 mg total) by mouth once daily. 30 capsule 0    tramadol-acetaminophen 37.5-325 mg (ULTRACET) 37.5-325 mg Tab Take 1 tablet by mouth every 12 (twelve) hours as needed for Pain. 10 tablet 0    triamcinolone acetonide 0.1% (KENALOG) 0.1 % paste Place onto teeth 2 (two) times daily. 5 g 5    [] celecoxib (CELEBREX) 200 MG capsule Take 1 capsule (200 mg total) by mouth daily as needed for Pain. Take with food 30 capsule 0    fexofenadine (ALLEGRA) 180 MG tablet Take 1 tablet (180 mg total) by mouth 2 (two) times daily. for 7 days  0     No facility-administered encounter medications on file as of 10/26/2023.     Orders Placed This Encounter   Procedures    NEBULIZER KIT (SUPPLIES) FOR HOME USE     Order Specific Question:   Height:     Answer:   4' 11" (1.499 m)     Order Specific Question:   Weight:     Answer:   44.7 kg (98 lb 8.7 oz)     Order Specific Question:   Length of need (1-99 months):     Answer:   99     Order Specific Question:   Mask or Mouthpiece?     Answer:   Mouthpiece    NEBULIZER FOR HOME USE     Order Specific Question:   Height:     Answer:   4' 11" (1.499 m)     " Order Specific Question:   Weight:     Answer:   44.7 kg (98 lb 8.7 oz)     Order Specific Question:   Length of need (1-99 months):     Answer:   99       Plan:     Problem List Items Addressed This Visit          Pulmonary    Chronic cough    Overview     Albuterol trial  Nebulizer with 3% saline  Sputum cultures if cough becomes productive.  She has a cup and orders for this.            Other    Abnormal CT of the chest    Overview     Tree-in-bud opacities noted on CT thorax.  I suspect chronic infection vs mucous impaction.  Evidence of this process may be seen on prior CT abd/pelvis as far back as 8/2021.  This continues to persist on most recent imaging as of 10/23.    - continue albuterol  - continue nebulizer therapy with 3% saline nebs for clearance  - scheduled for bronchoscopy 10/31/23.  Need cultures (AFB, fungal, bacterial), cell count and cytology.          Other Visit Diagnoses       Mixed simple and mucopurulent chronic bronchitis    -  Primary          RTC 3 months.    Catracho Clemente MD  [facility]

## 2023-10-26 NOTE — PROGRESS NOTES
Subjective:      Patient ID: Penny Cervantes is a 68 y.o. female.    Chief Complaint: Abnormal Ct Scan and Cough    Penny Cervantes has a past medical history of ataxia, recurrent mouth ulcerations, chronic cough, HTN, nephrolithiasis, breast cancer, osteopenia, who presents as a new referral for reticulonodular changes on a CT thorax also chronic cough.     Tobacco/vape: never  Occupation: secretarial, does live out by the chemical plants  Exertional capacity: gets winded when cutting grass  Prior lung disease: none  Sputum production: none  Allergies/sinusitis: none, tried allegra and flonase due to issues with swallowing.  This didn't do much.  GERD/Aspiration: never had symptoms, but did have a cough.  She was tried on nexium, and her cough actually improved.   Pets: 4 cats and 2 dogs.   Travel/TB: not that she is aware of.   Respiratory regimen: none  Prior cancer: breast cancer (s/p lumpectomy x 2, radiation and chemo, now in remission).   Prior hospitalization/intubation: never  Snoring/apnea: none     Today she is doing ok.  She states she was doing very well for the last 4 months or so, but unfortunately got a cold and has had a rough couple of weeks.  She admits she has been losing weight (10lbs in the last year). She feels she has a sinus infection. She is currently taking amoxicillin for this.  She suffered a punctured ear drum during her recent exam at an urgent care.      Review of Systems   Constitutional:  Positive for weight loss and night sweats.   HENT:  Negative for postnasal drip, rhinorrhea, sinus pressure and congestion.    Respiratory:  Positive for cough and wheezing. Negative for snoring, sputum production, shortness of breath, dyspnea on extertion and use of rescue inhaler.    Cardiovascular:  Negative for chest pain, palpitations and leg swelling.   Gastrointestinal:  Negative for nausea, vomiting, abdominal pain, abdominal distention and acid reflux.     Objective:     Vitals:     "10/26/23 1510   BP: 128/74   BP Location: Right arm   Patient Position: Sitting   Pulse: 75   SpO2: 100%   Weight: 44.7 kg (98 lb 8.7 oz)   Height: 4' 11" (1.499 m)         Physical Exam   Constitutional: She is oriented to person, place, and time. She appears well-developed and well-nourished. She appears not cachectic. No distress. She is not obese.   HENT:   Head: Normocephalic.   Neck: No JVD present.   Cardiovascular: Normal rate, regular rhythm and normal heart sounds.   Pulmonary/Chest: Normal expansion, symmetric chest wall expansion, effort normal and breath sounds normal.   Abdominal: Soft. Bowel sounds are normal. She exhibits no distension.   Musculoskeletal:         General: No edema. Normal range of motion.      Cervical back: Normal range of motion and neck supple.   Neurological: She is alert and oriented to person, place, and time.   Skin: Skin is dry. She is not diaphoretic.   Psychiatric: She has a normal mood and affect. Her behavior is normal. Judgment and thought content normal.       Personal Diagnostic Review     CT Thorax 10/9/23   Similar appearance of small areas of reticular nodular opacity and mucus plugging within the right middle lobe and right lower lobe again concerning for infectious/inflammatory process such as ERMELINDA.    PFTs 5/24/23  FEV1/FVC - 78%  FEV1 - 2.12L (108%)  FVC - 2.7L (109%)  TLC - 4.47L (109%)  DLCO - 82%  Bronchodilators: not significant.    CT thorax 6/21/23  Reticulonodular infiltrate is seen within the right middle lobe with some mild mucous plugging and mild bronchial thickening which can be seen in the setting of atypical infection including ERMELINDA.        10/26/2023     3:10 PM 10/24/2023     1:08 PM 10/19/2023    10:25 AM 10/13/2023     4:07 PM 9/25/2023     9:20 AM 9/13/2023     1:28 PM 7/27/2023     9:09 AM   Pulmonary Function Tests   SpO2 100 %  99 % 99 %      Height 4' 11" (1.499 m)  4' 11.45" (1.51 m) 4' 11.45" (1.51 m) 4' 11.45" (1.51 m) 4' 11.5" (1.511 m) " "4' 11.5" (1.511 m)   Weight 44.7 kg (98 lb 8.7 oz) 45 kg (99 lb 3.3 oz) 45 kg (99 lb 3.3 oz) 45.2 kg (99 lb 10.4 oz) 46 kg (101 lb 6.6 oz) 44 kg (97 lb) 44 kg (97 lb)   BMI (Calculated) 19.9  19.7 19.8 20.2 19.3 19.3        Assessment:     1. Mixed simple and mucopurulent chronic bronchitis    2. Chronic cough    3. Abnormal CT of the chest           Outpatient Encounter Medications as of 10/26/2023   Medication Sig Dispense Refill    albuterol (VENTOLIN HFA) 90 mcg/actuation inhaler Inhale 2 puffs into the lungs every 6 (six) hours as needed for Wheezing or Shortness of Breath (cough). Rescue 8.5 g 4    amLODIPine (NORVASC) 5 MG tablet TAKE 1 TABLET(5 MG) BY MOUTH EVERY EVENING 90 tablet 3    amoxicillin (AMOXIL) 875 MG tablet Take 1 tablet by mouth twice daily for 7 days 14 tablet 0    belladonna alkaloids-opium 16.2-30 mg (B&O SUPPRETTES) 16.2-30 mg Supp 1 supp CA every 12 hr,x3 days,PRN:as needed for spasm 6 suppository 0    biotin 1 mg tablet Take 1,000 mcg by mouth once daily.       cyanocobalamin 1,000 mcg/mL injection Inject 1 mL (1,000 mcg total) into the skin every 30 days. 1 mL 11    ERGOCALCIFEROL, VITAMIN D2, (VITAMIN D ORAL) Take by mouth every morning.       esomeprazole (NEXIUM) 40 MG capsule Take 1 capsule (40 mg total) by mouth before breakfast. 30 capsule 0    fluticasone propionate (FLONASE) 50 mcg/actuation nasal spray 1 spray by Each Nostril route once daily.      hyoscyamine (ANASPAZ,LEVSIN) 0.125 mg Tab Take 1 tablet (125 mcg total) by mouth 4 (four) times daily as needed for spasm 40 tablet 0    losartan (COZAAR) 25 MG tablet Take 1 tablet (25 mg total) by mouth every evening. 90 tablet 3    magnesium oxide (MAG-OX) 400 mg (241.3 mg magnesium) tablet Take 400 mg by mouth once daily.      methocarbamoL (ROBAXIN) 500 MG Tab Take 1 tablet (500 mg total) by mouth 3 (three) times daily. 60 tablet 1    metoprolol succinate (TOPROL-XL) 25 MG 24 hr tablet Take 1 tablet (25 mg total) by mouth every " "evening. 90 tablet 3    ofloxacin (FLOXIN) 0.3 % otic solution Place 3 drops into the left ear 2 (two) times daily. for 10 days 5 mL 0    prednisoLONE acetate (PRED FORTE) 1 % DrpS Apply 2 drops to left ear twice daily for 10 days 10 mL 0    promethazine (PHENERGAN) 25 MG suppository Insert 1 suppository rectally every 4 hours as needed for nausea/vomiting 12 suppository 0    promethazine-dextromethorphan (PROMETHAZINE-DM) 6.25-15 mg/5 mL Syrp Take 5 ml by mouth every 6 hours for 7 days as needed for cough 140 mL 0    scopolamine (TRANSDERM-SCOP) 1.3-1.5 mg (1 mg over 3 days) Apply 1 film topically every 72 hours 4 patch 0    sodium chloride 3% 3 % nebulizer solution Use one vial (4 mL) by nebulization 2 (two) times daily as needed for other or Cough (mucous clearance). 240 mL 11    tamsulosin (FLOMAX) 0.4 mg Cap Take 1 capsule (0.4 mg total) by mouth once daily. 30 capsule 0    tramadol-acetaminophen 37.5-325 mg (ULTRACET) 37.5-325 mg Tab Take 1 tablet by mouth every 12 (twelve) hours as needed for Pain. 10 tablet 0    triamcinolone acetonide 0.1% (KENALOG) 0.1 % paste Place onto teeth 2 (two) times daily. 5 g 5    [] celecoxib (CELEBREX) 200 MG capsule Take 1 capsule (200 mg total) by mouth daily as needed for Pain. Take with food 30 capsule 0    fexofenadine (ALLEGRA) 180 MG tablet Take 1 tablet (180 mg total) by mouth 2 (two) times daily. for 7 days  0     No facility-administered encounter medications on file as of 10/26/2023.     Orders Placed This Encounter   Procedures    NEBULIZER KIT (SUPPLIES) FOR HOME USE     Order Specific Question:   Height:     Answer:   4' 11" (1.499 m)     Order Specific Question:   Weight:     Answer:   44.7 kg (98 lb 8.7 oz)     Order Specific Question:   Length of need (1-99 months):     Answer:   99     Order Specific Question:   Mask or Mouthpiece?     Answer:   Mouthpiece    NEBULIZER FOR HOME USE     Order Specific Question:   Height:     Answer:   4' 11" (1.499 m)     " Order Specific Question:   Weight:     Answer:   44.7 kg (98 lb 8.7 oz)     Order Specific Question:   Length of need (1-99 months):     Answer:   99       Plan:     Problem List Items Addressed This Visit          Pulmonary    Chronic cough    Overview     Albuterol trial  Nebulizer with 3% saline  Sputum cultures if cough becomes productive.  She has a cup and orders for this.            Other    Abnormal CT of the chest    Overview     Tree-in-bud opacities noted on CT thorax.  I suspect chronic infection vs mucous impaction.  Evidence of this process may be seen on prior CT abd/pelvis as far back as 8/2021.  This continues to persist on most recent imaging as of 10/23.    - continue albuterol  - continue nebulizer therapy with 3% saline nebs for clearance  - scheduled for bronchoscopy 10/31/23.  Need cultures (AFB, fungal, bacterial), cell count and cytology.          Other Visit Diagnoses       Mixed simple and mucopurulent chronic bronchitis    -  Primary          RTC 3 months.    Catracho Clemente MD  [facility]

## 2023-10-27 ENCOUNTER — TELEPHONE (OUTPATIENT)
Dept: PULMONOLOGY | Facility: CLINIC | Age: 68
End: 2023-10-27
Payer: MEDICARE

## 2023-10-27 NOTE — TELEPHONE ENCOUNTER
Medical necessity form faxed to [facility] for nebulizer and supplies. Received confirmation at 10:01am.

## 2023-10-30 ENCOUNTER — TELEPHONE (OUTPATIENT)
Dept: PULMONOLOGY | Facility: CLINIC | Age: 68
End: 2023-10-30
Payer: MEDICARE

## 2023-10-30 NOTE — TELEPHONE ENCOUNTER
Called patient to reschedule bronchoscopy scheduled for Friday at 0830, left message to return call at [phone].

## 2023-10-31 ENCOUNTER — PATIENT MESSAGE (OUTPATIENT)
Dept: OTOLARYNGOLOGY | Facility: CLINIC | Age: 68
End: 2023-10-31
Payer: MEDICARE

## 2023-10-31 ENCOUNTER — TELEPHONE (OUTPATIENT)
Dept: PULMONOLOGY | Facility: CLINIC | Age: 68
End: 2023-10-31
Payer: MEDICARE

## 2023-10-31 NOTE — TELEPHONE ENCOUNTER
Spoke with patient, gave instruction for bronchoscopy, NPO after MN the night before procedure (Thursday), may take meds with small sips of water, not currently on blood thinners, will arrive to [facility] 2nd floor at 0830 Friday 11/3/2023 with a designated , patient verbalizes understanding of instructions.

## 2023-11-01 ENCOUNTER — OFFICE VISIT (OUTPATIENT)
Dept: PAIN MEDICINE | Facility: CLINIC | Age: 68
End: 2023-11-01
Payer: MEDICARE

## 2023-11-01 VITALS
HEIGHT: 59 IN | WEIGHT: 98.56 LBS | SYSTOLIC BLOOD PRESSURE: 121 MMHG | DIASTOLIC BLOOD PRESSURE: 63 MMHG | BODY MASS INDEX: 19.87 KG/M2 | HEART RATE: 63 BPM

## 2023-11-01 DIAGNOSIS — M54.51 VERTEBROGENIC LOW BACK PAIN: ICD-10-CM

## 2023-11-01 DIAGNOSIS — M47.816 LUMBAR SPONDYLOSIS: ICD-10-CM

## 2023-11-01 PROCEDURE — 3078F DIAST BP <80 MM HG: CPT | Mod: CPTII,S$GLB,, | Performed by: STUDENT IN AN ORGANIZED HEALTH CARE EDUCATION/TRAINING PROGRAM

## 2023-11-01 PROCEDURE — 1159F PR MEDICATION LIST DOCUMENTED IN MEDICAL RECORD: ICD-10-PCS | Mod: CPTII,S$GLB,, | Performed by: STUDENT IN AN ORGANIZED HEALTH CARE EDUCATION/TRAINING PROGRAM

## 2023-11-01 PROCEDURE — 3074F PR MOST RECENT SYSTOLIC BLOOD PRESSURE < 130 MM HG: ICD-10-PCS | Mod: CPTII,S$GLB,, | Performed by: STUDENT IN AN ORGANIZED HEALTH CARE EDUCATION/TRAINING PROGRAM

## 2023-11-01 PROCEDURE — 1125F PR PAIN SEVERITY QUANTIFIED, PAIN PRESENT: ICD-10-PCS | Mod: CPTII,S$GLB,, | Performed by: STUDENT IN AN ORGANIZED HEALTH CARE EDUCATION/TRAINING PROGRAM

## 2023-11-01 PROCEDURE — 3288F FALL RISK ASSESSMENT DOCD: CPT | Mod: CPTII,S$GLB,, | Performed by: STUDENT IN AN ORGANIZED HEALTH CARE EDUCATION/TRAINING PROGRAM

## 2023-11-01 PROCEDURE — 3008F BODY MASS INDEX DOCD: CPT | Mod: CPTII,S$GLB,, | Performed by: STUDENT IN AN ORGANIZED HEALTH CARE EDUCATION/TRAINING PROGRAM

## 2023-11-01 PROCEDURE — 3008F PR BODY MASS INDEX (BMI) DOCUMENTED: ICD-10-PCS | Mod: CPTII,S$GLB,, | Performed by: STUDENT IN AN ORGANIZED HEALTH CARE EDUCATION/TRAINING PROGRAM

## 2023-11-01 PROCEDURE — 1159F MED LIST DOCD IN RCRD: CPT | Mod: CPTII,S$GLB,, | Performed by: STUDENT IN AN ORGANIZED HEALTH CARE EDUCATION/TRAINING PROGRAM

## 2023-11-01 PROCEDURE — 99999 PR PBB SHADOW E&M-EST. PATIENT-LVL IV: ICD-10-PCS | Mod: PBBFAC,,, | Performed by: STUDENT IN AN ORGANIZED HEALTH CARE EDUCATION/TRAINING PROGRAM

## 2023-11-01 PROCEDURE — 4010F PR ACE/ARB THEARPY RXD/TAKEN: ICD-10-PCS | Mod: CPTII,S$GLB,, | Performed by: STUDENT IN AN ORGANIZED HEALTH CARE EDUCATION/TRAINING PROGRAM

## 2023-11-01 PROCEDURE — 1125F AMNT PAIN NOTED PAIN PRSNT: CPT | Mod: CPTII,S$GLB,, | Performed by: STUDENT IN AN ORGANIZED HEALTH CARE EDUCATION/TRAINING PROGRAM

## 2023-11-01 PROCEDURE — 99214 OFFICE O/P EST MOD 30 MIN: CPT | Mod: S$GLB,,, | Performed by: STUDENT IN AN ORGANIZED HEALTH CARE EDUCATION/TRAINING PROGRAM

## 2023-11-01 PROCEDURE — 99214 PR OFFICE/OUTPT VISIT, EST, LEVL IV, 30-39 MIN: ICD-10-PCS | Mod: S$GLB,,, | Performed by: STUDENT IN AN ORGANIZED HEALTH CARE EDUCATION/TRAINING PROGRAM

## 2023-11-01 PROCEDURE — 4010F ACE/ARB THERAPY RXD/TAKEN: CPT | Mod: CPTII,S$GLB,, | Performed by: STUDENT IN AN ORGANIZED HEALTH CARE EDUCATION/TRAINING PROGRAM

## 2023-11-01 PROCEDURE — 3288F PR FALLS RISK ASSESSMENT DOCUMENTED: ICD-10-PCS | Mod: CPTII,S$GLB,, | Performed by: STUDENT IN AN ORGANIZED HEALTH CARE EDUCATION/TRAINING PROGRAM

## 2023-11-01 PROCEDURE — 99999 PR PBB SHADOW E&M-EST. PATIENT-LVL IV: CPT | Mod: PBBFAC,,, | Performed by: STUDENT IN AN ORGANIZED HEALTH CARE EDUCATION/TRAINING PROGRAM

## 2023-11-01 PROCEDURE — 1101F PT FALLS ASSESS-DOCD LE1/YR: CPT | Mod: CPTII,S$GLB,, | Performed by: STUDENT IN AN ORGANIZED HEALTH CARE EDUCATION/TRAINING PROGRAM

## 2023-11-01 PROCEDURE — 3078F PR MOST RECENT DIASTOLIC BLOOD PRESSURE < 80 MM HG: ICD-10-PCS | Mod: CPTII,S$GLB,, | Performed by: STUDENT IN AN ORGANIZED HEALTH CARE EDUCATION/TRAINING PROGRAM

## 2023-11-01 PROCEDURE — 3074F SYST BP LT 130 MM HG: CPT | Mod: CPTII,S$GLB,, | Performed by: STUDENT IN AN ORGANIZED HEALTH CARE EDUCATION/TRAINING PROGRAM

## 2023-11-01 PROCEDURE — 1101F PR PT FALLS ASSESS DOC 0-1 FALLS W/OUT INJ PAST YR: ICD-10-PCS | Mod: CPTII,S$GLB,, | Performed by: STUDENT IN AN ORGANIZED HEALTH CARE EDUCATION/TRAINING PROGRAM

## 2023-11-01 RX ORDER — CELECOXIB 200 MG/1
200 CAPSULE ORAL DAILY PRN
Qty: 30 CAPSULE | Refills: 2 | Status: SHIPPED | OUTPATIENT
Start: 2023-11-01 | End: 2024-02-19

## 2023-11-01 NOTE — PROGRESS NOTES
Chronic Pain - f/u    Referring Physician: Shiva Hughes MD    Date: 11/01/2023     Re: Penny Cervantes  MR#: [id number]  YOB: 1955  Age: 68 y.o.    Chief Complaint: back pain  No chief complaint on file.    **This note is dictated using the M*Modal Fluency Direct word recognition program. There are word recognition mistakes that are occasionally missed on review.**    ASSESSMENT: 68 y.o. year old female with right sided low back pain, consistent with     1. Lumbar spondylosis  Ambulatory referral/consult to Pain Clinic    celecoxib (CELEBREX) 200 MG capsule      2. Vertebrogenic low back pain          PLAN:     Lumbar radiculopathy  - pain has resolved  - did not need TFESI  - Right L3-4, L4-5 TFESI not done    Lumbar spondylosis  -discussed right L3,4,5 MBB/RFA. Defer for now since doing well  -continue HEP  -celebrex helped. refill    Vertebrogenic low back pain   -mild modic changes predominatnly in L5  -consider intracept at L5/S1 but not indicated at this time    - RTC PRN  - Counseled patient regarding the importance of  activity modification and physical therapy.    The above plan and management options were discussed at length with patient. Patient is in agreement with the above and verbalized understanding. It will be communicated with the referring physician via electronic record, fax, or mail.  Lab/study reports reviewed were important and necessary because subsequent medical and treatment recommendations required review of the above lab/study reports. Images viewed/reviewed above were important and necessary because subsequent medical and treatment recommendations required review of the reviewed image(s).     Electronically signed by:  Jermaine Tamez DO  11/01/2023    =========================================================================================================    SUBJECTIVE:    Interval History 11/1/2023:   Penny Cervantes is a 68 y.o. female presents to the  clinic for follow up.  Since last visit the pain has has improved. The patient states that the radicular pain has resolved.  She did not need the epidural.  She felt like the celebrex was helpful when she was taking it every day for a while.    The pain is located in the right lower area and does not radiate.  The pain is described as dull    At BEST  0/10   At WORST  3/10 on the WORST day.    On average pain is rated as 1/10.   Today the pain is rated as 2/10  Symptoms interfere with daily activity.   Exacerbating factors: Sitting, Standing, and Walking.    Mitigating factors medications.     Current pain medications: tylenol, celebrex  Failed Pain Medications: NSAIDs irritate the stomach, robaxin 500mg rarely (helps when she takes it),     Initial hx:  Penny Cervantes is a 68 y.o. female presents to the clinic for the evaluation of right lower back pain. The pain started 4-5 months ago following no inciting event and symptoms have been worsening. Right sided low back pain that seems to be getting worse while she is walking.  Shes been trying to walk more but that has jorge luis making it worse.  She is in PT and sometimes it helps and sometimes it makes it worse.  Several years ago she saw a doctor in [place] and she got 3 injections and after the 3rd one she was doing better and never had to go back again cause she was doing well.  The patient states that about 4-5 months ago she was in the kitchen and got this severe pain in the right side low back.  It does not radiate down the leg.  That severe pain has eased up after 3-4 days.  Ever since then, she has not been walking as much as the pain has been somewhat better.  Bending forward makes it worse.  Working int he garden is tough. Standing for too long is worse.      Pain Description:    The pain is located in the right lower back area and does not radiate.    At BEST  5/10   At WORST  10/10 on the WORST day.    On average pain is rated as 6/10.   Today the pain is  "rated as 5/10  The pain is continuous.  The pain is described as  "like bone is rubbing on bone."     Symptoms interfere with daily activity and sleeping.   Exacerbating factors: Standing, Bending, Extension, Flexing, Lifting, and Getting out of bed/chair.    Mitigating factors heat, ice, and medications.   She reports 6-8 hours of sleep per night.    Physical Therapy/Home Exercise: Yes, currently in Physical therapy and Yes, currently has a home exercise program    Current Pain Medications:    - robaxin 500mg rarely (helps when she takes it), tylenol    Failed Pain Medications:    - NSAIDs irritate the stomach    Pain Treatment Therapies:    Pain procedures:   Back injections in [place] but does not remember what they were  Physical Therapy: yes  Chiropractor: none  Acupuncture: none  TENS unit: none  Spinal decompression: none  Joint replacement: none    Patient denies urinary incontinence, bowel incontinence, significant motor weakness, and loss of sensations.  Patient denies any suicidal or homicidal ideations     report:  Reviewed and consistent with medication use as prescribed.    Imaging:   MRI 2018:  There is a mild levoscoliosis.  The vertebral body heights are well maintained.  The disc spaces are relatively well maintained.  The patient has less degenerative changes than often seen at this age.  The conus medullaris terminates in good position.     There is no disc herniation at any level.  There is no central canal stenosis or foraminal narrowing at any level.  Mild disc bulge at L3-L4 noted.  Small annular tear at L4-5 left lateral region.  There is mild facet joint degenerative change.  Post-contrast images demonstrate no areas of abnormal enhancement to suggest an inflammatory, infectious or tumor process.     XR lumbar  Mild DJD.  The disc spaces are narrowed between L4 and S1 vertebral segment.  No fracture, spondylolisthesis or bone destruction identified.  Mild lumbar scoliosis.  Sclerotic " density overlying the right sacrum identified as before      11/1/2023    12:06 PM 9/13/2023     1:27 PM   Pain Disability Index (PDI)   Family/Home Responsibilities: 1 6   Recreation: 1 6   Occupation: 1 6   Sexual Behavior: 1 6   Self Care: 1 6   Life-Support Activities: 1 6   Pain Disability Index (PDI) 7 42        Past Medical History:   Diagnosis Date    Benign positional vertigo     Breast cancer 2009    s/p chemo XRT, left breast triple negative    Coronary artery disease, non-occlusive     cath in 2012    Hypertension     Interstitial cystitis     Osteopenia     Squamous cell carcinoma     Stroke 7/10/2018    Symptomatic posterior vitreous detachment of left eye 12/24/2018    Vitreous hemorrhage, left 1/10/2019     Past Surgical History:   Procedure Laterality Date    BILATERAL SALPINGOOPHORECTOMY      with Hysterectomy    BONE RESECTION, RIB      right side    BREAST BIOPSY Right 2005    BREAST CYST ASPIRATION      BREAST LUMPECTOMY Left 2009    BREAST SURGERY      CYSTOSCOPY N/A 7/20/2021    Procedure: CYSTOSCOPY;  Surgeon: Arabella Whatley MD;  Location: [facility] OR [address] [place] [address];  Service: Urology;  Laterality: N/A;  30 minutes     HERNIA REPAIR      HYSTERECTOMY      TOTAL VAGINAL HYSTERECTOMY      Indication: Endometriosis    TRIGGER POINT INJECTION  7/20/2021    Procedure: INJECTION, TRIGGER POINTS  -SOLUCORTEF;  Surgeon: Arabella Whatley MD;  Location: [facility] OR [address] [place] [address];  Service: Urology;;     Social History     Socioeconomic History    Marital status:    Tobacco Use    Smoking status: Never     Passive exposure: Never    Smokeless tobacco: Never   Substance and Sexual Activity    Alcohol use: Yes     Comment: 2-3 per month    Drug use: No    Sexual activity: Not Currently     Partners: Male     Family History   Problem Relation Age of Onset    Heart disease Mother     Heart disease Father     Heart disease Sister 55    Heart disease Brother 43    Hypertension Sister     Ovarian cancer Sister         Review of patient's allergies indicates:   Allergen Reactions    Demerol [meperidine] Other (See Comments)     headache    Zofran [ondansetron hcl] Nausea Only     Bloating, abdominal pain    Pyridium [phenazopyridine] Nausea Only       Current Outpatient Medications   Medication Sig    albuterol (VENTOLIN HFA) 90 mcg/actuation inhaler Inhale 2 puffs into the lungs every 6 (six) hours as needed for Wheezing or Shortness of Breath (cough). Rescue    amLODIPine (NORVASC) 5 MG tablet TAKE 1 TABLET(5 MG) BY MOUTH EVERY EVENING    amoxicillin (AMOXIL) 875 MG tablet Take 1 tablet by mouth twice daily for 7 days    belladonna alkaloids-opium 16.2-30 mg (B&O SUPPRETTES) 16.2-30 mg Supp 1 supp OK every 12 hr,x3 days,PRN:as needed for spasm    biotin 1 mg tablet Take 1,000 mcg by mouth once daily.     cyanocobalamin 1,000 mcg/mL injection Inject 1 mL (1,000 mcg total) into the skin every 30 days.    ERGOCALCIFEROL, VITAMIN D2, (VITAMIN D ORAL) Take by mouth every morning.     esomeprazole (NEXIUM) 40 MG capsule Take 1 capsule (40 mg total) by mouth before breakfast.    fluticasone propionate (FLONASE) 50 mcg/actuation nasal spray 1 spray by Each Nostril route once daily.    hyoscyamine (ANASPAZ,LEVSIN) 0.125 mg Tab Take 1 tablet (125 mcg total) by mouth 4 (four) times daily as needed for spasm    losartan (COZAAR) 25 MG tablet Take 1 tablet (25 mg total) by mouth every evening.    magnesium oxide (MAG-OX) 400 mg (241.3 mg magnesium) tablet Take 400 mg by mouth once daily.    methocarbamoL (ROBAXIN) 500 MG Tab Take 1 tablet (500 mg total) by mouth 3 (three) times daily.    metoprolol succinate (TOPROL-XL) 25 MG 24 hr tablet Take 1 tablet (25 mg total) by mouth every evening.    ofloxacin (FLOXIN) 0.3 % otic solution Place 3 drops into the left ear 2 (two) times daily. for 10 days    prednisoLONE acetate (PRED FORTE) 1 % DrpS Apply 2 drops to left ear twice daily for 10 days    promethazine (PHENERGAN) 25 MG suppository  "Insert 1 suppository rectally every 4 hours as needed for nausea/vomiting    promethazine-dextromethorphan (PROMETHAZINE-DM) 6.25-15 mg/5 mL Syrp Take 5 ml by mouth every 6 hours for 7 days as needed for cough    scopolamine (TRANSDERM-SCOP) 1.3-1.5 mg (1 mg over 3 days) Apply 1 film topically every 72 hours    sodium chloride 3% 3 % nebulizer solution Use one vial (4 mL) by nebulization 2 (two) times daily as needed for other or Cough (mucous clearance).    tamsulosin (FLOMAX) 0.4 mg Cap Take 1 capsule (0.4 mg total) by mouth once daily.    triamcinolone acetonide 0.1% (KENALOG) 0.1 % paste Place onto teeth 2 (two) times daily.    celecoxib (CELEBREX) 200 MG capsule Take 1 capsule (200 mg total) by mouth daily as needed for Pain. Take with food    fexofenadine (ALLEGRA) 180 MG tablet Take 1 tablet (180 mg total) by mouth 2 (two) times daily. for 7 days     No current facility-administered medications for this visit.       REVIEW OF SYSTEMS:    GENERAL:  No weight loss, malaise or fevers.  HEENT:   No recent changes in vision or hearing  NECK:  Negative for lumps, no difficulty with swallowing.  RESPIRATORY:  Negative for cough, wheezing or shortness of breath, patient denies any recent URI.  CARDIOVASCULAR:  Negative for chest pain, leg swelling or palpitations.  GI:  Negative for abdominal discomfort, blood in stools or black stools or change in bowel habits.  MUSCULOSKELETAL:  See HPI.  SKIN:  Negative for lesions, rash, and itching.  PSYCH:  No mood disorder or recent psychosocial stressors.  Patients sleep is not disturbed secondary to pain.  HEMATOLOGY/LYMPHOLOGY:  Negative for prolonged bleeding, bruising easily or swollen nodes.  Patient is not currently taking any anti-coagulants  NEURO:   No history of headaches, syncope, paralysis, seizures or tremors.  All other reviewed and negative other than HPI.    OBJECTIVE:    /63 (BP Location: Right arm, Patient Position: Sitting)   Pulse 63   Ht 4' 11" " (1.499 m)   Wt 44.7 kg (98 lb 8.7 oz)   LMP  (LMP Unknown)   BMI 19.90 kg/m²     PHYSICAL EXAMINATION:    GENERAL: Well appearing, in no acute distress, alert and oriented x3.  PSYCH:  Mood and affect appropriate.  SKIN: Skin color, texture, turgor normal, no rashes or lesions.  HEAD/FACE:  Normocephalic, atraumatic. Cranial nerves grossly intact.  CV: RRR with palpation of the radial artery.  PULM: CTAB. No evidence of respiratory difficulty, symmetric chest rise.  GI:  Soft and non-tender.    BACK:   - No obvious deformity or signs of trauma, Normal lumbar lordotic curve  - Negative spinous process tenderness  - Negative paravertebral tenderness  - Positive pain to palpation over the facet joints of the lumbar spine.   - Negative QL / Iliac crest / Glut tenderness  - Slump test is Negative for radicular pain  - Slump test is Negative for back pain  - Supine Straight leg raising is Negative for radicular pain  - Supine Straight leg raising is Negative for back pain  - Lumbar ROM is normal in Flexion with pain  - Lumbar ROM is normal in Extension with pain  - Lumbar ROM is normal in Lateral Flexion with pain  - Positive Sustained Hip Flexion test (for discogenic pain)  - Negative Altered Gait, Posture  - Axial facet loading test Positive on the right side(s)    SI Joint exam:  - Negative SI joint tenderness to palpation  - Ervin's sign Negative  - Yeoman's Test: Did not perform for SI joint pain indicating anterior SI ligament involvement. Did not perform for anterior thigh pain/paresthesia which indicates femoral nerve stretch.  - Gaenslen's Test:Negative  - Finger Veronique's Sign:Positive  - SI compression test:Did not perform  - SI distraction test:Negative  - Thigh Thrust: Negative  - SI Thrust: Did not perform    MUSKULOSKELETAL:    EXTREMITIES:   Hip Exam:  - Log Roll Negative  - FADIR Negative  - Stinchfield Negative  - Hip Scour Did not perform  - GTB Tenderness Negative      NEUROLOGICAL EXAM:  MENTAL  STATUS: A x O x 3, good concentration, speech is fluent and goal directed  MEMORY: recent and remote are intact  CN: CN2-12 grossly intact  MOTOR: 5/5 in all muscle groups of the LE  DTRs: 2+ intact symmetric  Sensation:    -no Loss of sensation in a left lower and right lower  leg  distribution.  Babinski: absent on the bilateral side(s)  Clonus: absent on the bilateral side(s)    GAIT: normal.

## 2023-11-03 ENCOUNTER — HOSPITAL ENCOUNTER (OUTPATIENT)
Facility: HOSPITAL | Age: 68
Discharge: HOME | End: 2023-11-03
Attending: INTERNAL MEDICINE | Admitting: INTERNAL MEDICINE
Payer: MEDICARE

## 2023-11-03 VITALS
DIASTOLIC BLOOD PRESSURE: 58 MMHG | TEMPERATURE: 98 F | HEART RATE: 65 BPM | HEIGHT: 59 IN | SYSTOLIC BLOOD PRESSURE: 115 MMHG | WEIGHT: 100 LBS | RESPIRATION RATE: 18 BRPM | BODY MASS INDEX: 20.16 KG/M2 | OXYGEN SATURATION: 99 %

## 2023-11-03 DIAGNOSIS — R93.89 ABNORMAL CT OF THE CHEST: Primary | ICD-10-CM

## 2023-11-03 DIAGNOSIS — R91.8 ABNORMAL CT SCAN OF LUNG: ICD-10-CM

## 2023-11-03 LAB
ACID FAST MOD KINY STN SPEC: NORMAL
APPEARANCE FLD: CLEAR
BODY FLD TYPE: NORMAL
COLOR FLD: COLORLESS
EOSINOPHIL NFR FLD MANUAL: 1 %
KOH PREP SPEC: NORMAL
LYMPHOCYTES NFR FLD MANUAL: 15 %
MONOS+MACROS NFR FLD MANUAL: 83 %
NEUTROPHILS NFR FLD MANUAL: 1 %
WBC # FLD: 38 /CU MM

## 2023-11-03 PROCEDURE — 99152 MOD SED SAME PHYS/QHP 5/>YRS: CPT | Performed by: INTERNAL MEDICINE

## 2023-11-03 PROCEDURE — 88305 TISSUE EXAM BY PATHOLOGIST: CPT | Performed by: PATHOLOGY

## 2023-11-03 PROCEDURE — 87206 SMEAR FLUORESCENT/ACID STAI: CPT | Mod: 91 | Performed by: STUDENT IN AN ORGANIZED HEALTH CARE EDUCATION/TRAINING PROGRAM

## 2023-11-03 PROCEDURE — 87206 SMEAR FLUORESCENT/ACID STAI: CPT | Performed by: STUDENT IN AN ORGANIZED HEALTH CARE EDUCATION/TRAINING PROGRAM

## 2023-11-03 PROCEDURE — 89051 BODY FLUID CELL COUNT: CPT | Performed by: STUDENT IN AN ORGANIZED HEALTH CARE EDUCATION/TRAINING PROGRAM

## 2023-11-03 PROCEDURE — 87116 MYCOBACTERIA CULTURE: CPT | Performed by: STUDENT IN AN ORGANIZED HEALTH CARE EDUCATION/TRAINING PROGRAM

## 2023-11-03 PROCEDURE — 88112 CYTOPATH CELL ENHANCE TECH: CPT | Mod: 26,,, | Performed by: PATHOLOGY

## 2023-11-03 PROCEDURE — 99153 MOD SED SAME PHYS/QHP EA: CPT | Performed by: INTERNAL MEDICINE

## 2023-11-03 PROCEDURE — 87205 SMEAR GRAM STAIN: CPT | Performed by: STUDENT IN AN ORGANIZED HEALTH CARE EDUCATION/TRAINING PROGRAM

## 2023-11-03 PROCEDURE — 87070 CULTURE OTHR SPECIMN AEROBIC: CPT | Performed by: STUDENT IN AN ORGANIZED HEALTH CARE EDUCATION/TRAINING PROGRAM

## 2023-11-03 PROCEDURE — 88312 SPECIAL STAINS GROUP 1: CPT | Performed by: PATHOLOGY

## 2023-11-03 PROCEDURE — 87102 FUNGUS ISOLATION CULTURE: CPT | Performed by: STUDENT IN AN ORGANIZED HEALTH CARE EDUCATION/TRAINING PROGRAM

## 2023-11-03 PROCEDURE — 88305 TISSUE EXAM BY PATHOLOGIST: ICD-10-PCS | Mod: 26,,, | Performed by: PATHOLOGY

## 2023-11-03 PROCEDURE — 88112 PR  CYTOPATH, CELL ENHANCE TECH: ICD-10-PCS | Mod: 26,,, | Performed by: PATHOLOGY

## 2023-11-03 PROCEDURE — 88312 SPECIAL STAINS GROUP 1: CPT | Mod: 26,,, | Performed by: PATHOLOGY

## 2023-11-03 PROCEDURE — 87210 SMEAR WET MOUNT SALINE/INK: CPT | Performed by: STUDENT IN AN ORGANIZED HEALTH CARE EDUCATION/TRAINING PROGRAM

## 2023-11-03 PROCEDURE — 88112 CYTOPATH CELL ENHANCE TECH: CPT | Performed by: PATHOLOGY

## 2023-11-03 PROCEDURE — 25000003 PHARM REV CODE 250: Performed by: INTERNAL MEDICINE

## 2023-11-03 PROCEDURE — 88312 PR  SPECIAL STAINS,GROUP I: ICD-10-PCS | Mod: 26,,, | Performed by: PATHOLOGY

## 2023-11-03 PROCEDURE — 31624 DX BRONCHOSCOPE/LAVAGE: CPT | Performed by: INTERNAL MEDICINE

## 2023-11-03 PROCEDURE — 63600175 PHARM REV CODE 636 W HCPCS: Performed by: INTERNAL MEDICINE

## 2023-11-03 PROCEDURE — 87015 SPECIMEN INFECT AGNT CONCNTJ: CPT | Performed by: STUDENT IN AN ORGANIZED HEALTH CARE EDUCATION/TRAINING PROGRAM

## 2023-11-03 PROCEDURE — 88305 TISSUE EXAM BY PATHOLOGIST: CPT | Mod: 26,,, | Performed by: PATHOLOGY

## 2023-11-03 RX ORDER — LIDOCAINE HYDROCHLORIDE 40 MG/ML
SOLUTION TOPICAL CODE/TRAUMA/SEDATION MEDICATION
Status: COMPLETED | OUTPATIENT
Start: 2023-11-03 | End: 2023-11-03

## 2023-11-03 RX ORDER — FENTANYL CITRATE 50 UG/ML
INJECTION, SOLUTION INTRAMUSCULAR; INTRAVENOUS CODE/TRAUMA/SEDATION MEDICATION
Status: COMPLETED | OUTPATIENT
Start: 2023-11-03 | End: 2023-11-03

## 2023-11-03 RX ORDER — MIDAZOLAM HYDROCHLORIDE 5 MG/ML
INJECTION INTRAMUSCULAR; INTRAVENOUS CODE/TRAUMA/SEDATION MEDICATION
Status: COMPLETED | OUTPATIENT
Start: 2023-11-03 | End: 2023-11-03

## 2023-11-03 RX ORDER — LIDOCAINE HYDROCHLORIDE 10 MG/ML
INJECTION INFILTRATION; PERINEURAL CODE/TRAUMA/SEDATION MEDICATION
Status: COMPLETED | OUTPATIENT
Start: 2023-11-03 | End: 2023-11-03

## 2023-11-03 RX ORDER — LIDOCAINE HYDROCHLORIDE 20 MG/ML
INJECTION, SOLUTION INFILTRATION; PERINEURAL CODE/TRAUMA/SEDATION MEDICATION
Status: COMPLETED | OUTPATIENT
Start: 2023-11-03 | End: 2023-11-03

## 2023-11-03 RX ADMIN — LIDOCAINE HYDROCHLORIDE 6 ML: 10 INJECTION, SOLUTION INFILTRATION; PERINEURAL at 10:11

## 2023-11-03 RX ADMIN — LIDOCAINE HYDROCHLORIDE 6 ML: 20 INJECTION, SOLUTION INFILTRATION; PERINEURAL at 10:11

## 2023-11-03 RX ADMIN — MIDAZOLAM 2 MG: 5 INJECTION INTRAMUSCULAR; INTRAVENOUS at 10:11

## 2023-11-03 RX ADMIN — LIDOCAINE HYDROCHLORIDE 4 ML: 40 SOLUTION ORAL at 10:11

## 2023-11-03 RX ADMIN — FENTANYL CITRATE 25 MCG: 50 INJECTION, SOLUTION INTRAMUSCULAR; INTRAVENOUS at 10:11

## 2023-11-03 NOTE — PLAN OF CARE
Pt stable, tolerating po liquid, no complaint of pain.  Discharge instructions given to pt.  Ready for discharge.

## 2023-11-03 NOTE — ED NOTES
H and P updated-yes, patient placed on cardiac monitor, anesthesia Plan:  Conscious sedation, ASA verified-yes, Airway exam performed-yes, Personal or Family history of anesthesia complications-No  Consent signed and witnessed-daughter at bedside, Elsy Chu RN

## 2023-11-03 NOTE — ED NOTES
Specimens obtained during Bronchoscopy:  BAL  ml nacl in 40 ml return.  Verbal report given to DOSC RN at bedside to include documentation charted in procedural sedation documentation.  Patient to be NPO 1 hour post procedure and place in PO tolerance at 1120, no cxr needed.  Moderate concious sedation was performed and cardiorespiratory functions were monitored the entire procedure by Elsy Chu RN.  Sedation began at 1010  and concluded at 1040.  The patient tolerated the procedure well.  Elsy Chu RN

## 2023-11-03 NOTE — DISCHARGE SUMMARY
[facility] (2nd Fl)  Discharge Note  Short Stay    Procedure(s) (LRB):  Bronchoscopy (N/A)      OUTCOME: Patient tolerated treatment/procedure well without complication and is now ready for discharge.    DISPOSITION: Home or Self Care    FINAL DIAGNOSIS:  Chronic Cough, Abnormal CT    FOLLOWUP: In clinic    DISCHARGE INSTRUCTIONS:    Discharge Procedure Orders   Diet general     Call MD for:  temperature >101     Call MD for:  coughing up blood greater than 3 tablespoons in volume     Call MD for:  chest pain     Call MD for:  difficulty breathing or shortness of breath     Call MD for:  development of yellow/green sputum        TIME SPENT ON DISCHARGE: 15 minutes

## 2023-11-03 NOTE — INTERVAL H&P NOTE
The patient has been examined and the H&P has been reviewed:    I concur with the findings and no changes have occurred since H&P was written.    Procedure and anesthesia risks, benefits and alternative options discussed and understood by patient/family.    Patient is NPO from yesterday.    Patient has a ride after the procedure.    Patient agreed to proceed with the procedure.      There are no hospital problems to display for this patient.

## 2023-11-06 ENCOUNTER — CLINICAL SUPPORT (OUTPATIENT)
Dept: OTOLARYNGOLOGY | Facility: CLINIC | Age: 68
End: 2023-11-06
Payer: MEDICARE

## 2023-11-06 ENCOUNTER — OFFICE VISIT (OUTPATIENT)
Dept: OTOLARYNGOLOGY | Facility: CLINIC | Age: 68
End: 2023-11-06
Payer: MEDICARE

## 2023-11-06 VITALS
DIASTOLIC BLOOD PRESSURE: 67 MMHG | TEMPERATURE: 99 F | BODY MASS INDEX: 20.2 KG/M2 | WEIGHT: 100 LBS | HEART RATE: 62 BPM | SYSTOLIC BLOOD PRESSURE: 114 MMHG

## 2023-11-06 DIAGNOSIS — J30.89 NON-SEASONAL ALLERGIC RHINITIS, UNSPECIFIED TRIGGER: ICD-10-CM

## 2023-11-06 DIAGNOSIS — H72.92 PERFORATION OF LEFT TYMPANIC MEMBRANE: ICD-10-CM

## 2023-11-06 DIAGNOSIS — H69.92 DYSFUNCTION OF LEFT EUSTACHIAN TUBE: ICD-10-CM

## 2023-11-06 DIAGNOSIS — H92.02 LEFT EAR PAIN: ICD-10-CM

## 2023-11-06 DIAGNOSIS — Z86.69 HISTORY OF PERFORATION OF TYMPANIC MEMBRANE: ICD-10-CM

## 2023-11-06 DIAGNOSIS — H92.02 LEFT EAR PAIN: Primary | ICD-10-CM

## 2023-11-06 DIAGNOSIS — H69.92 EUSTACHIAN TUBE DYSFUNCTION, LEFT: Primary | ICD-10-CM

## 2023-11-06 LAB
BACTERIA SPEC AEROBE CULT: NO GROWTH
FINAL PATHOLOGIC DIAGNOSIS: NORMAL
GRAM STN SPEC: NORMAL
GRAM STN SPEC: NORMAL
Lab: NORMAL

## 2023-11-06 PROCEDURE — 3074F PR MOST RECENT SYSTOLIC BLOOD PRESSURE < 130 MM HG: ICD-10-PCS | Mod: CPTII,S$GLB,, | Performed by: NURSE PRACTITIONER

## 2023-11-06 PROCEDURE — 3008F PR BODY MASS INDEX (BMI) DOCUMENTED: ICD-10-PCS | Mod: CPTII,S$GLB,, | Performed by: NURSE PRACTITIONER

## 2023-11-06 PROCEDURE — 1125F PR PAIN SEVERITY QUANTIFIED, PAIN PRESENT: ICD-10-PCS | Mod: CPTII,S$GLB,, | Performed by: NURSE PRACTITIONER

## 2023-11-06 PROCEDURE — 1160F PR REVIEW ALL MEDS BY PRESCRIBER/CLIN PHARMACIST DOCUMENTED: ICD-10-PCS | Mod: CPTII,S$GLB,, | Performed by: NURSE PRACTITIONER

## 2023-11-06 PROCEDURE — 4010F ACE/ARB THERAPY RXD/TAKEN: CPT | Mod: CPTII,S$GLB,, | Performed by: NURSE PRACTITIONER

## 2023-11-06 PROCEDURE — 92567 TYMPANOMETRY: CPT | Mod: S$GLB,,,

## 2023-11-06 PROCEDURE — 1125F AMNT PAIN NOTED PAIN PRSNT: CPT | Mod: CPTII,S$GLB,, | Performed by: NURSE PRACTITIONER

## 2023-11-06 PROCEDURE — 4010F PR ACE/ARB THEARPY RXD/TAKEN: ICD-10-PCS | Mod: CPTII,S$GLB,, | Performed by: NURSE PRACTITIONER

## 2023-11-06 PROCEDURE — 1159F PR MEDICATION LIST DOCUMENTED IN MEDICAL RECORD: ICD-10-PCS | Mod: CPTII,S$GLB,, | Performed by: NURSE PRACTITIONER

## 2023-11-06 PROCEDURE — 99999 PR PBB SHADOW E&M-EST. PATIENT-LVL IV: CPT | Mod: PBBFAC,,, | Performed by: NURSE PRACTITIONER

## 2023-11-06 PROCEDURE — 99999 PR PBB SHADOW E&M-EST. PATIENT-LVL IV: ICD-10-PCS | Mod: PBBFAC,,, | Performed by: NURSE PRACTITIONER

## 2023-11-06 PROCEDURE — 3078F PR MOST RECENT DIASTOLIC BLOOD PRESSURE < 80 MM HG: ICD-10-PCS | Mod: CPTII,S$GLB,, | Performed by: NURSE PRACTITIONER

## 2023-11-06 PROCEDURE — 99214 PR OFFICE/OUTPT VISIT, EST, LEVL IV, 30-39 MIN: ICD-10-PCS | Mod: S$GLB,,, | Performed by: NURSE PRACTITIONER

## 2023-11-06 PROCEDURE — 92567 PR TYMPA2METRY: ICD-10-PCS | Mod: S$GLB,,,

## 2023-11-06 PROCEDURE — 99214 OFFICE O/P EST MOD 30 MIN: CPT | Mod: S$GLB,,, | Performed by: NURSE PRACTITIONER

## 2023-11-06 PROCEDURE — 1159F MED LIST DOCD IN RCRD: CPT | Mod: CPTII,S$GLB,, | Performed by: NURSE PRACTITIONER

## 2023-11-06 PROCEDURE — 3008F BODY MASS INDEX DOCD: CPT | Mod: CPTII,S$GLB,, | Performed by: NURSE PRACTITIONER

## 2023-11-06 PROCEDURE — 3078F DIAST BP <80 MM HG: CPT | Mod: CPTII,S$GLB,, | Performed by: NURSE PRACTITIONER

## 2023-11-06 PROCEDURE — 3074F SYST BP LT 130 MM HG: CPT | Mod: CPTII,S$GLB,, | Performed by: NURSE PRACTITIONER

## 2023-11-06 PROCEDURE — 1160F RVW MEDS BY RX/DR IN RCRD: CPT | Mod: CPTII,S$GLB,, | Performed by: NURSE PRACTITIONER

## 2023-11-06 RX ORDER — METHYLPREDNISOLONE 4 MG/1
TABLET ORAL
Qty: 21 EACH | Refills: 0 | Status: SHIPPED | OUTPATIENT
Start: 2023-11-06 | End: 2023-11-27

## 2023-11-06 RX ORDER — AZELASTINE 1 MG/ML
1 SPRAY, METERED NASAL 2 TIMES DAILY
Qty: 30 ML | Refills: 11 | Status: SHIPPED | OUTPATIENT
Start: 2023-11-06 | End: 2024-11-05

## 2023-11-06 RX ORDER — FLUTICASONE PROPIONATE 50 MCG
2 SPRAY, SUSPENSION (ML) NASAL DAILY
Qty: 16 G | Refills: 11 | Status: SHIPPED | OUTPATIENT
Start: 2023-11-06

## 2023-11-06 NOTE — PROGRESS NOTES
Chief Complaint   Patient presents with    Lt Ear pain    .     HPI 10/24/2023: Penny Cervantes is 68 y.o. female who is self-referred for evaluation of left ear pain.  Symptoms include left ear pain, congestion, and cough. Symptoms began 1 week ago and are unchanged since that time. She went to the urgent care yesterday and they attempted to flush the cerumen impaction in the left ear. She experienced excruciating pain immediately after. The procedure was stopped.  She was prescribed amoxicillin for her sinus infection.  She admits to to hearing loss.     Interval HPI 11/6/2023:  She reports of a sharp pain in the left ear that comes and goes. Denies ear pressure.  She has completed the Augmentin, ofloxacin and Pred Forte as prescribed. She recently had an upper respiratory infection. Denies nasal congestion, runny nose, sinus pressure. She uses Flonase PRN.       Past Medical History:   Diagnosis Date    Benign positional vertigo     Breast cancer 2009    s/p chemo XRT, left breast triple negative    Coronary artery disease, non-occlusive     cath in 2012    Hypertension     Interstitial cystitis     Osteopenia     Squamous cell carcinoma     Stroke 7/10/2018    Symptomatic posterior vitreous detachment of left eye 12/24/2018    Vitreous hemorrhage, left 1/10/2019     Social History     Socioeconomic History    Marital status:    Tobacco Use    Smoking status: Never     Passive exposure: Never    Smokeless tobacco: Never   Substance and Sexual Activity    Alcohol use: Yes     Comment: 2-3 per month    Drug use: No    Sexual activity: Not Currently     Partners: Male     Past Surgical History:   Procedure Laterality Date    BILATERAL SALPINGOOPHORECTOMY      with Hysterectomy    BONE RESECTION, RIB      right side    BREAST BIOPSY Right 2005    BREAST CYST ASPIRATION      BREAST LUMPECTOMY Left 2009    BREAST SURGERY      BRONCHOSCOPY N/A 11/3/2023    Procedure: Bronchoscopy;  Surgeon: Provider, Dosc  Diagnostic;  [facility] [facility] [place] [facility];  Service: Anesthesiology;  Laterality: N/A;    CYSTOSCOPY N/A 7/20/2021    Procedure: CYSTOSCOPY;  Surgeon: Arabella Whatley MD;  [facility];  Service: Urology;  Laterality: N/A;  30 minutes     HERNIA REPAIR      HYSTERECTOMY      TOTAL VAGINAL HYSTERECTOMY      Indication: Endometriosis    TRIGGER POINT INJECTION  7/20/2021    Procedure: INJECTION, TRIGGER POINTS  -SOLUCORTEF;  Surgeon: Arabella Whatley MD;  Location: [facility] [place] [address] [place] [address];  Service: Urology;;     Family History   Problem Relation Age of Onset    Heart disease Mother     Heart disease Father     Heart disease Sister 55    Heart disease Brother 43    Hypertension Sister     Ovarian cancer Sister        Review of Systems  General: negative for chills, fever or weight loss  Psychological: negative for mood changes or depression  Ophthalmic: negative for blurry vision, photophobia or eye pain  ENT: see HPI  Respiratory: no cough, shortness of breath, or wheezing  Cardiovascular: no chest pain or dyspnea on exertion  Gastrointestinal: no abdominal pain, change in bowel habits, or black/ bloody stools  Musculoskeletal: negative for gait disturbance or muscular weakness  Neurological: no syncope or seizures; no ataxia  Dermatological: negative for puritis,  rash and jaundice  Hematologic/lymphatic: no easy bruising, no new lumps or bumps      Physical Exam:    Vitals:    11/06/23 0807   BP: 114/67   Pulse: 62   Temp: 98.7 °F (37.1 °C)       Constitutional: Well appearing / communicating without difficutly.  NAD.  Eyes: EOM I Bilaterally  Head/Face: Normocephalic.  Negative paranasal sinus pressure/tenderness.  Salivary glands WNL.  House Brackmann I Bilaterally.    Right Ear: Auricle normal appearance. External Auditory Canal within normal limits no lesions or masses,TM w/o masses/lesions/perforations. TM mobility noted.   Left Ear: Auricle normal appearance. External Auditory Canal within normal limits  no lesions or masses, TM w/o masses/lesions/perforations. TM retracted and unable to auto-insufflate  Nose: No gross nasal septal deviation. Inferior Turbinates 3+ bilaterally. No septal perforation. No masses/lesions. External nasal skin appears normal without masses/lesions.  Oral Cavity: Gingiva/lips within normal limits.  Dentition/gingiva healthy appearing. Mucus membranes moist. Floor of mouth soft, no masses palpated. Oral Tongue mobile. Hard Palate appears normal.    Oropharynx: Base of tongue appears normal. No masses/lesions noted. Tonsillar fossa/pharyngeal wall without lesions. Posterior oropharynx WNL.  Soft palate without masses. Midline uvula.   Neck/Lymphatic: No LAD I-VI bilaterally.  No thyromegaly.  No masses noted on exam.      Diagnostic testing reviewed:  Tympanometry revealed Type Ad (compliance 2.23 ml) tympanogram in the right ear and Type A tympanogram with significant negative middle ear pressure (-136 daPa) in the left ear.                      Assessment:    ICD-10-CM ICD-9-CM    1. Left ear pain  H92.02 388.70       2. Dysfunction of left eustachian tube  H69.92 381.81       3. Perforation of left tympanic membrane -resolved  H72.92 384.20       4. Non-seasonal allergic rhinitis, unspecified trigger  J30.89 477.8           The primary encounter diagnosis was Left ear pain. Diagnoses of Dysfunction of left eustachian tube, Perforation of left tympanic membrane -resolved, and Non-seasonal allergic rhinitis, unspecified trigger were also pertinent to this visit.      Plan:  No orders of the defined types were placed in this encounter.    -We had a long discussion regarding the anatomy and function of the eustachian tube.  We discussed that the eustachian tube acts as a pump to keep the appropriate amount of pressure behind the ear drum.  I gave the patient a prescription for a nasal steroid spray to be used on a daily basis, and we discussed that it will take 2-3 weeks of daily use to  achieve maximal effectiveness.  We also discussed otologic valsalva daily for gently depressurizing the middle ear.      -left tympanic membrane perforation has healed  -start on Medrol Dosepak  - start on Azelastine 1 spray in each nostril bid  -instructed Ms. Cervantes to use Flonase 2 sprays in each nostril daily  -f/u 4 weeks with repeat audiogram    Jennifer Lynn NP

## 2023-11-06 NOTE — PROGRESS NOTES
Penny Cervantes, a 68 y.o. female was seen today in the clinic for tympanometry only. The patient's primary complaint was sharp, left ear pain intermittently.   Ms. Cervantes was seen on 10-24-23 for perforation of the left tympanic membrane with results revealing a mixed hearing loss in the left ear and sensorineural loss in the right ear.     Tympanometry revealed Type Ad (compliance 2.23 ml) tympanogram in the right ear and Type A tympanogram with significant negative middle ear pressure (-136 daPa) in the left ear.

## 2023-11-08 ENCOUNTER — HOSPITAL ENCOUNTER (OUTPATIENT)
Dept: RADIOLOGY | Facility: HOSPITAL | Age: 68
Discharge: HOME | End: 2023-11-08
Attending: INTERNAL MEDICINE
Payer: MEDICARE

## 2023-11-08 ENCOUNTER — PATIENT MESSAGE (OUTPATIENT)
Dept: PULMONOLOGY | Facility: CLINIC | Age: 68
End: 2023-11-08
Payer: MEDICARE

## 2023-11-08 DIAGNOSIS — R93.2 ABNORMAL CT OF LIVER: ICD-10-CM

## 2023-11-08 PROCEDURE — A9585 GADOBUTROL INJECTION: HCPCS | Performed by: INTERNAL MEDICINE

## 2023-11-08 PROCEDURE — 25500020 PHARM REV CODE 255: Performed by: INTERNAL MEDICINE

## 2023-11-08 PROCEDURE — 74183 MRI ABD W/O CNTR FLWD CNTR: CPT | Mod: 26,,, | Performed by: RADIOLOGY

## 2023-11-08 PROCEDURE — 74183 MRI ABD W/O CNTR FLWD CNTR: CPT | Mod: TC

## 2023-11-08 PROCEDURE — 74183 MRI ABDOMEN W WO CONTRAST: ICD-10-PCS | Mod: 26,,, | Performed by: RADIOLOGY

## 2023-11-08 RX ORDER — GADOBUTROL 604.72 MG/ML
10 INJECTION INTRAVENOUS
Status: COMPLETED | OUTPATIENT
Start: 2023-11-08 | End: 2023-11-08

## 2023-11-08 RX ADMIN — GADOBUTROL 10 ML: 604.72 INJECTION INTRAVENOUS at 08:11

## 2023-11-09 ENCOUNTER — PATIENT MESSAGE (OUTPATIENT)
Dept: TRANSPLANT | Facility: CLINIC | Age: 68
End: 2023-11-09
Payer: MEDICARE

## 2023-12-04 LAB — FUNGUS SPEC CULT: NORMAL

## 2023-12-05 ENCOUNTER — OFFICE VISIT (OUTPATIENT)
Dept: OTOLARYNGOLOGY | Facility: CLINIC | Age: 68
End: 2023-12-05
Payer: MEDICARE

## 2023-12-05 ENCOUNTER — CLINICAL SUPPORT (OUTPATIENT)
Dept: OTOLARYNGOLOGY | Facility: CLINIC | Age: 68
End: 2023-12-05
Payer: MEDICARE

## 2023-12-05 VITALS
HEIGHT: 59 IN | SYSTOLIC BLOOD PRESSURE: 110 MMHG | DIASTOLIC BLOOD PRESSURE: 64 MMHG | BODY MASS INDEX: 20.13 KG/M2 | HEART RATE: 71 BPM | WEIGHT: 99.88 LBS

## 2023-12-05 DIAGNOSIS — H69.92 EUSTACHIAN TUBE DYSFUNCTION, LEFT: Primary | ICD-10-CM

## 2023-12-05 DIAGNOSIS — H90.3 SENSORINEURAL HEARING LOSS (SNHL) OF BOTH EARS: ICD-10-CM

## 2023-12-05 DIAGNOSIS — J30.89 NON-SEASONAL ALLERGIC RHINITIS, UNSPECIFIED TRIGGER: ICD-10-CM

## 2023-12-05 DIAGNOSIS — Z86.69 HISTORY OF EUSTACHIAN TUBE DYSFUNCTION: Primary | ICD-10-CM

## 2023-12-05 PROCEDURE — 92552 PR PURE TONE AUDIOMETRY, AIR: ICD-10-PCS | Mod: S$GLB,,,

## 2023-12-05 PROCEDURE — 3074F PR MOST RECENT SYSTOLIC BLOOD PRESSURE < 130 MM HG: ICD-10-PCS | Mod: CPTII,S$GLB,, | Performed by: NURSE PRACTITIONER

## 2023-12-05 PROCEDURE — 99999 PR PBB SHADOW E&M-EST. PATIENT-LVL IV: CPT | Mod: PBBFAC,,, | Performed by: NURSE PRACTITIONER

## 2023-12-05 PROCEDURE — 92567 PR TYMPA2METRY: ICD-10-PCS | Mod: S$GLB,,,

## 2023-12-05 PROCEDURE — 99999 PR PBB SHADOW E&M-EST. PATIENT-LVL IV: ICD-10-PCS | Mod: PBBFAC,,, | Performed by: NURSE PRACTITIONER

## 2023-12-05 PROCEDURE — 1126F AMNT PAIN NOTED NONE PRSNT: CPT | Mod: CPTII,S$GLB,, | Performed by: NURSE PRACTITIONER

## 2023-12-05 PROCEDURE — 3008F PR BODY MASS INDEX (BMI) DOCUMENTED: ICD-10-PCS | Mod: CPTII,S$GLB,, | Performed by: NURSE PRACTITIONER

## 2023-12-05 PROCEDURE — 1160F PR REVIEW ALL MEDS BY PRESCRIBER/CLIN PHARMACIST DOCUMENTED: ICD-10-PCS | Mod: CPTII,S$GLB,, | Performed by: NURSE PRACTITIONER

## 2023-12-05 PROCEDURE — 99213 OFFICE O/P EST LOW 20 MIN: CPT | Mod: S$GLB,,, | Performed by: NURSE PRACTITIONER

## 2023-12-05 PROCEDURE — 1160F RVW MEDS BY RX/DR IN RCRD: CPT | Mod: CPTII,S$GLB,, | Performed by: NURSE PRACTITIONER

## 2023-12-05 PROCEDURE — 1126F PR PAIN SEVERITY QUANTIFIED, NO PAIN PRESENT: ICD-10-PCS | Mod: CPTII,S$GLB,, | Performed by: NURSE PRACTITIONER

## 2023-12-05 PROCEDURE — 3288F FALL RISK ASSESSMENT DOCD: CPT | Mod: CPTII,S$GLB,, | Performed by: NURSE PRACTITIONER

## 2023-12-05 PROCEDURE — 99213 PR OFFICE/OUTPT VISIT, EST, LEVL III, 20-29 MIN: ICD-10-PCS | Mod: S$GLB,,, | Performed by: NURSE PRACTITIONER

## 2023-12-05 PROCEDURE — 3078F PR MOST RECENT DIASTOLIC BLOOD PRESSURE < 80 MM HG: ICD-10-PCS | Mod: CPTII,S$GLB,, | Performed by: NURSE PRACTITIONER

## 2023-12-05 PROCEDURE — 1101F PR PT FALLS ASSESS DOC 0-1 FALLS W/OUT INJ PAST YR: ICD-10-PCS | Mod: CPTII,S$GLB,, | Performed by: NURSE PRACTITIONER

## 2023-12-05 PROCEDURE — 3288F PR FALLS RISK ASSESSMENT DOCUMENTED: ICD-10-PCS | Mod: CPTII,S$GLB,, | Performed by: NURSE PRACTITIONER

## 2023-12-05 PROCEDURE — 92552 PURE TONE AUDIOMETRY AIR: CPT | Mod: S$GLB,,,

## 2023-12-05 PROCEDURE — 3008F BODY MASS INDEX DOCD: CPT | Mod: CPTII,S$GLB,, | Performed by: NURSE PRACTITIONER

## 2023-12-05 PROCEDURE — 92567 TYMPANOMETRY: CPT | Mod: S$GLB,,,

## 2023-12-05 PROCEDURE — 1159F PR MEDICATION LIST DOCUMENTED IN MEDICAL RECORD: ICD-10-PCS | Mod: CPTII,S$GLB,, | Performed by: NURSE PRACTITIONER

## 2023-12-05 PROCEDURE — 4010F PR ACE/ARB THEARPY RXD/TAKEN: ICD-10-PCS | Mod: CPTII,S$GLB,, | Performed by: NURSE PRACTITIONER

## 2023-12-05 PROCEDURE — 1159F MED LIST DOCD IN RCRD: CPT | Mod: CPTII,S$GLB,, | Performed by: NURSE PRACTITIONER

## 2023-12-05 PROCEDURE — 1101F PT FALLS ASSESS-DOCD LE1/YR: CPT | Mod: CPTII,S$GLB,, | Performed by: NURSE PRACTITIONER

## 2023-12-05 PROCEDURE — 4010F ACE/ARB THERAPY RXD/TAKEN: CPT | Mod: CPTII,S$GLB,, | Performed by: NURSE PRACTITIONER

## 2023-12-05 PROCEDURE — 3074F SYST BP LT 130 MM HG: CPT | Mod: CPTII,S$GLB,, | Performed by: NURSE PRACTITIONER

## 2023-12-05 PROCEDURE — 3078F DIAST BP <80 MM HG: CPT | Mod: CPTII,S$GLB,, | Performed by: NURSE PRACTITIONER

## 2023-12-05 NOTE — PROGRESS NOTES
Penny Cervantes, a 68 y.o. female was seen today in the clinic for follow up audiologic evaluation during ENT follow up for eustachian tube dysfunction of the left ear. Previous hearing evaluation noted a mixed hearing loss in the left ear with a Type B tympanogram. Patient reports improve in ear symptoms including hearing.       Pure tone testing revealed mild sensorineural (based on previous bone conduction scores) hearing loss, bilaterally. Tympanometry revealed Type Ad tympanograms in both ears.     Recommendations:  Otologic evaluation  Annual audiologic evaluation  Hearing protection in noise        4.  Patient should consider hearing aid consultation when ready/if communication difficulties are noted

## 2023-12-05 NOTE — PROGRESS NOTES
Chief Complaint   Patient presents with    Follow-up    Otalgia   .     HPI 10/24/2023: Penny Cervantes is 68 y.o. female who is self-referred for evaluation of left ear pain.  Symptoms include left ear pain, congestion, and cough. Symptoms began 1 week ago and are unchanged since that time. She went to the urgent care yesterday and they attempted to flush the cerumen impaction in the left ear. She experienced excruciating pain immediately after. The procedure was stopped.  She was prescribed amoxicillin for her sinus infection.  She admits to to hearing loss.     Interval HPI 11/6/2023:  She reports of a sharp pain in the left ear that comes and goes. Denies ear pressure.  She has completed the Augmentin, ofloxacin and Pred Forte as prescribed. She recently had an upper respiratory infection. Denies nasal congestion, runny nose, sinus pressure. She uses Flonase PRN.     Interval HPI 12/5/2023:  Follow up visit. She reports feeling better with her ear symptoms. Occasionally she would feel a sharp pain in her left ear but it only lasts a few seconds. She denies ear drainage, nasal congestion, runny nose and sinus pressure. She was using Astelin and Flonase nasal sprays for 2 weeks and has stopped them both. She states that she doesn't have much seasonal allergies. No other complaints today .       Past Medical History:   Diagnosis Date    Benign positional vertigo     Breast cancer 2009    s/p chemo XRT, left breast triple negative    Coronary artery disease, non-occlusive     cath in 2012    Hypertension     Interstitial cystitis     Osteopenia     Squamous cell carcinoma     Stroke 7/10/2018    Symptomatic posterior vitreous detachment of left eye 12/24/2018    Vitreous hemorrhage, left 1/10/2019     Social History     Socioeconomic History    Marital status:    Tobacco Use    Smoking status: Never     Passive exposure: Never    Smokeless tobacco: Never   Substance and Sexual Activity    Alcohol use:  Yes     Comment: 2-3 per month    Drug use: No    Sexual activity: Not Currently     Partners: Male     Past Surgical History:   Procedure Laterality Date    BILATERAL SALPINGOOPHORECTOMY      with Hysterectomy    BONE RESECTION, RIB      right side    BREAST BIOPSY Right 2005    BREAST CYST ASPIRATION      BREAST LUMPECTOMY Left 2009    BREAST SURGERY      BRONCHOSCOPY N/A 11/3/2023    Procedure: Bronchoscopy;  Surgeon: Provider, [facility];  Location: [facility] OR [address] [place] [address];  Service: Anesthesiology;  Laterality: N/A;    CYSTOSCOPY N/A 7/20/2021    Procedure: CYSTOSCOPY;  Surgeon: Arabella Whatley MD;  Location: [facility] OR [address] [place] [address];  Service: Urology;  Laterality: N/A;  30 minutes     HERNIA REPAIR      HYSTERECTOMY      TOTAL VAGINAL HYSTERECTOMY      Indication: Endometriosis    TRIGGER POINT INJECTION  7/20/2021    Procedure: INJECTION, TRIGGER POINTS  -SOLUCORTEF;  Surgeon: Arabella Whatley MD;  Location: [facility] OR [address] [place] [address];  Service: Urology;;     Family History   Problem Relation Age of Onset    Heart disease Mother     Heart disease Father     Heart disease Sister 55    Heart disease Brother 43    Hypertension Sister     Ovarian cancer Sister        Review of Systems  General: negative for chills, fever or weight loss  Psychological: negative for mood changes or depression  Ophthalmic: negative for blurry vision, photophobia or eye pain  ENT: see HPI  Respiratory: no cough, shortness of breath, or wheezing  Cardiovascular: no chest pain or dyspnea on exertion  Gastrointestinal: no abdominal pain, change in bowel habits, or black/ bloody stools  Musculoskeletal: negative for gait disturbance or muscular weakness  Neurological: no syncope or seizures; no ataxia  Dermatological: negative for puritis,  rash and jaundice  Hematologic/lymphatic: no easy bruising, no new lumps or bumps      Physical Exam:    Vitals:    12/05/23 0955   BP: 110/64   Pulse: 71         Constitutional: Well appearing / communicating without  difficutly.  NAD.  Eyes: EOM I Bilaterally  Head/Face: Normocephalic.  Negative paranasal sinus pressure/tenderness.  Salivary glands WNL.  House Brackmann I Bilaterally.    Right Ear: Auricle normal appearance. External Auditory Canal within normal limits no lesions or masses,TM w/o masses/lesions/perforations. TM mobility noted.   Left Ear: Auricle normal appearance. External Auditory Canal within normal limits no lesions or masses, TM w/o masses/lesions/perforations. TM mobility noted.   Nose: No gross nasal septal deviation. Inferior Turbinates 3+ bilaterally. No septal perforation. No masses/lesions. External nasal skin appears normal without masses/lesions.  Oral Cavity: Gingiva/lips within normal limits.  Dentition/gingiva healthy appearing. Mucus membranes moist. Floor of mouth soft, no masses palpated. Oral Tongue mobile. Hard Palate appears normal.    Oropharynx: Base of tongue appears normal. No masses/lesions noted. Tonsillar fossa/pharyngeal wall without lesions. Posterior oropharynx WNL.  Soft palate without masses. Midline uvula.   Neck/Lymphatic: No LAD I-VI bilaterally.  No thyromegaly.  No masses noted on exam.      Diagnostic testing reviewed:  Audiogram interpreted personally by me and discussed in detail with the patient today.   Pure tone testing revealed mild sensorineural (based on previous bone conduction scores) hearing loss, bilaterally. Tympanometry revealed Type Ad tympanograms in both ears.                      Assessment:    ICD-10-CM ICD-9-CM    1. Eustachian tube dysfunction, left  H69.92 381.81       2. Non-seasonal allergic rhinitis, unspecified trigger  J30.89 477.8       3. Sensorineural hearing loss (SNHL) of both ears -mild  H90.3 389.18             The primary encounter diagnosis was Eustachian tube dysfunction, left. Diagnoses of Non-seasonal allergic rhinitis, unspecified trigger and Sensorineural hearing loss (SNHL) of both ears -mild were also pertinent to this  visit.      Plan:  No orders of the defined types were placed in this encounter.    -otoscopic exam was benign  -audiogram   - continue on Flonase 2 sprays in each nostril daily  -f/u 1 year or sooner as needed    Jennifer Lynn, NP

## 2023-12-22 LAB
ACID FAST MOD KINY STN SPEC: NORMAL
MYCOBACTERIUM SPEC QL CULT: NORMAL

## 2023-12-28 ENCOUNTER — PATIENT MESSAGE (OUTPATIENT)
Dept: PULMONOLOGY | Facility: CLINIC | Age: 68
End: 2023-12-28
Payer: MEDICARE

## 2023-12-29 DIAGNOSIS — I10 ESSENTIAL HYPERTENSION: ICD-10-CM

## 2023-12-29 RX ORDER — AMLODIPINE BESYLATE 5 MG/1
5 TABLET ORAL NIGHTLY
Qty: 90 TABLET | Refills: 3 | OUTPATIENT
Start: 2023-12-29

## 2023-12-29 RX ORDER — METOPROLOL SUCCINATE 25 MG/1
25 TABLET, EXTENDED RELEASE ORAL NIGHTLY
Qty: 90 TABLET | Refills: 3 | OUTPATIENT
Start: 2023-12-29

## 2023-12-29 RX ORDER — LOSARTAN POTASSIUM 25 MG/1
25 TABLET ORAL NIGHTLY
Qty: 90 TABLET | Refills: 3 | OUTPATIENT
Start: 2023-12-29

## 2024-01-03 DIAGNOSIS — I10 ESSENTIAL HYPERTENSION: ICD-10-CM

## 2024-01-03 RX ORDER — METOPROLOL SUCCINATE 25 MG/1
25 TABLET, EXTENDED RELEASE ORAL NIGHTLY
Qty: 90 TABLET | Refills: 3 | OUTPATIENT
Start: 2024-01-03

## 2024-01-03 RX ORDER — AMLODIPINE BESYLATE 5 MG/1
5 TABLET ORAL NIGHTLY
Qty: 90 TABLET | Refills: 3 | OUTPATIENT
Start: 2024-01-03

## 2024-01-03 RX ORDER — LOSARTAN POTASSIUM 25 MG/1
25 TABLET ORAL NIGHTLY
Qty: 90 TABLET | Refills: 3 | OUTPATIENT
Start: 2024-01-03

## 2024-01-25 DIAGNOSIS — I10 ESSENTIAL HYPERTENSION: ICD-10-CM

## 2024-01-25 NOTE — TELEPHONE ENCOUNTER
Refill Routing Note   Medication(s) are not appropriate for processing by [facility] for the following reason(s):        Non-participating provider    [facility] action(s):  Route               Appointments  past 12m or future 3m with PCP    Date Provider   Last Visit   Visit date not found Una Miramontes, DO   Next Visit   Visit date not found Una Miramontes, DO   ED visits in past 90 days: 0        Note composed:4:41 PM 01/25/2024

## 2024-01-26 RX ORDER — AMLODIPINE BESYLATE 5 MG/1
5 TABLET ORAL NIGHTLY
Qty: 90 TABLET | Refills: 3 | OUTPATIENT
Start: 2024-01-26

## 2024-01-26 RX ORDER — METOPROLOL SUCCINATE 25 MG/1
25 TABLET, EXTENDED RELEASE ORAL NIGHTLY
Qty: 90 TABLET | Refills: 3 | OUTPATIENT
Start: 2024-01-26

## 2024-01-26 RX ORDER — LOSARTAN POTASSIUM 25 MG/1
25 TABLET ORAL NIGHTLY
Qty: 90 TABLET | Refills: 3 | OUTPATIENT
Start: 2024-01-26

## 2024-02-01 ENCOUNTER — PATIENT MESSAGE (OUTPATIENT)
Dept: INTERNAL MEDICINE | Facility: CLINIC | Age: 69
End: 2024-02-01
Payer: MEDICARE

## 2024-02-01 DIAGNOSIS — I10 ESSENTIAL HYPERTENSION: ICD-10-CM

## 2024-02-02 ENCOUNTER — PATIENT MESSAGE (OUTPATIENT)
Dept: INTERNAL MEDICINE | Facility: CLINIC | Age: 69
End: 2024-02-02
Payer: MEDICARE

## 2024-02-02 DIAGNOSIS — I10 ESSENTIAL HYPERTENSION: ICD-10-CM

## 2024-02-02 RX ORDER — AMLODIPINE BESYLATE 5 MG/1
5 TABLET ORAL NIGHTLY
Qty: 90 TABLET | Refills: 3 | Status: SHIPPED | OUTPATIENT
Start: 2024-02-02

## 2024-02-02 RX ORDER — LOSARTAN POTASSIUM 25 MG/1
25 TABLET ORAL NIGHTLY
Qty: 90 TABLET | Refills: 3 | Status: SHIPPED | OUTPATIENT
Start: 2024-02-02

## 2024-02-02 RX ORDER — METOPROLOL SUCCINATE 25 MG/1
25 TABLET, EXTENDED RELEASE ORAL NIGHTLY
Qty: 90 TABLET | Refills: 3 | Status: SHIPPED | OUTPATIENT
Start: 2024-02-02

## 2024-02-02 NOTE — TELEPHONE ENCOUNTER
No care due was identified.  Health [facility] Embedded Care Due Messages. Reference number: [id number].   2/02/2024 3:23:15 PM CST

## 2024-02-07 DIAGNOSIS — E11.9 TYPE 2 DIABETES MELLITUS WITHOUT COMPLICATION: ICD-10-CM

## 2024-02-08 ENCOUNTER — OFFICE VISIT (OUTPATIENT)
Dept: PULMONOLOGY | Facility: CLINIC | Age: 69
End: 2024-02-08
Payer: MEDICARE

## 2024-02-08 VITALS
HEART RATE: 71 BPM | SYSTOLIC BLOOD PRESSURE: 122 MMHG | BODY MASS INDEX: 19.82 KG/M2 | WEIGHT: 98.31 LBS | HEIGHT: 59 IN | OXYGEN SATURATION: 99 % | DIASTOLIC BLOOD PRESSURE: 68 MMHG

## 2024-02-08 DIAGNOSIS — R05.3 CHRONIC COUGH: ICD-10-CM

## 2024-02-08 DIAGNOSIS — J41.8 MIXED SIMPLE AND MUCOPURULENT CHRONIC BRONCHITIS: Primary | ICD-10-CM

## 2024-02-08 DIAGNOSIS — R93.89 ABNORMAL CT OF THE CHEST: ICD-10-CM

## 2024-02-08 PROCEDURE — 99213 OFFICE O/P EST LOW 20 MIN: CPT | Mod: S$GLB,,, | Performed by: INTERNAL MEDICINE

## 2024-02-08 PROCEDURE — 99999 PR PBB SHADOW E&M-EST. PATIENT-LVL IV: CPT | Mod: PBBFAC,,, | Performed by: INTERNAL MEDICINE

## 2024-02-08 NOTE — PROGRESS NOTES
"Subjective:      Patient ID: Penny Cervantes is a 68 y.o. female.    Chief Complaint: Cough    Penny Cervantes has a past medical history of ataxia, recurrent mouth ulcerations, chronic cough, HTN, nephrolithiasis, breast cancer, osteopenia, who presents as a new referral for reticulonodular changes on a CT thorax also chronic cough.     Tobacco/vape: never  Occupation: secretarial, does live out by the [place]  Exertional capacity: gets winded when cutting grass  Prior lung disease: none  Sputum production: none  Allergies/sinusitis: none, tried allegra and flonase due to issues with swallowing.  This didn't do much.  GERD/Aspiration: never had symptoms, but did have a cough.  She was tried on nexium, and her cough actually improved.   Pets: 4 cats and 2 dogs.   Travel/TB: not that she is aware of.   Respiratory regimen: none  Prior cancer: breast cancer (s/p lumpectomy x 2, radiation and chemo, now in remission).   Prior hospitalization/intubation: never  Snoring/apnea: none     Today she is doing well.  Her cough has resolved.  She had a bronchoscopy in November that did not reveal an etiology for her cough or CT findings.  Her complaints of cough have now resolved.      Review of Systems   Constitutional:  Negative for weight loss and night sweats.   HENT:  Negative for postnasal drip, rhinorrhea, sinus pressure and congestion.    Respiratory:  Negative for snoring, cough, sputum production, shortness of breath, wheezing, dyspnea on extertion and use of rescue inhaler.    Cardiovascular:  Negative for chest pain, palpitations and leg swelling.   Gastrointestinal:  Negative for nausea, vomiting, abdominal pain, abdominal distention and acid reflux.     Objective:     Vitals:    02/08/24 1515   BP: 122/68   BP Location: Left arm   Patient Position: Sitting   Pulse: 71   SpO2: 99%   Weight: 44.6 kg (98 lb 5.2 oz)   Height: 4' 11" (1.499 m)       Physical Exam   Constitutional: She is oriented to " "person, place, and time. She appears well-developed and well-nourished. She appears not cachectic. No distress. She is not obese.   HENT:   Head: Normocephalic.   Neck: No JVD present.   Cardiovascular: Normal rate, regular rhythm and normal heart sounds.   Pulmonary/Chest: Normal expansion, symmetric chest wall expansion, effort normal and breath sounds normal.   Abdominal: Soft. Bowel sounds are normal. She exhibits no distension.   Musculoskeletal:         General: No edema. Normal range of motion.      Cervical back: Normal range of motion and neck supple.   Neurological: She is alert and oriented to person, place, and time.   Skin: Skin is dry. She is not diaphoretic.   Psychiatric: She has a normal mood and affect. Her behavior is normal. Judgment and thought content normal.       Personal Diagnostic Review     Bronchoscopy 11/3/2023  No AFB/fungal/bacterial growth or stains positive  Monocyte predominate fluid  Cytology negative    CT Thorax 10/9/23   Similar appearance of small areas of reticular nodular opacity and mucus plugging within the right middle lobe and right lower lobe again concerning for infectious/inflammatory process such as EREMLINDA.    PFTs 5/24/23  FEV1/FVC - 78%  FEV1 - 2.12L (108%)  FVC - 2.7L (109%)  TLC - 4.47L (109%)  DLCO - 82%  Bronchodilators: not significant.    CT thorax 6/21/23  Reticulonodular infiltrate is seen within the right middle lobe with some mild mucous plugging and mild bronchial thickening which can be seen in the setting of atypical infection including ERMELINDA.        2/8/2024     3:15 PM 12/5/2023     9:55 AM 11/22/2023     9:31 AM 11/6/2023     8:07 AM 11/3/2023    11:23 AM 11/3/2023    11:15 AM 11/3/2023    11:00 AM   Pulmonary Function Tests   SpO2 99 %    99 % 99 % 100 %   Height 4' 11" (1.499 m) 4' 11" (1.499 m) 4' 11" (1.499 m)       Weight 44.6 kg (98 lb 5.2 oz) 45.3 kg (99 lb 13.9 oz) 45.4 kg (100 lb) 45.4 kg (100 lb)      BMI (Calculated) 19.8 20.2 20.2          "   Assessment:     1. Mixed simple and mucopurulent chronic bronchitis    2. Chronic cough    3. Abnormal CT of the chest             Outpatient Encounter Medications as of 2/8/2024   Medication Sig Dispense Refill    albuterol (VENTOLIN HFA) 90 mcg/actuation inhaler Inhale 2 puffs into the lungs every 6 (six) hours as needed for Wheezing or Shortness of Breath (cough). Rescue 8.5 g 4    amLODIPine (NORVASC) 5 MG tablet TAKE 1 TABLET(5 MG) BY MOUTH EVERY EVENING 90 tablet 3    azelastine (ASTELIN) 137 mcg (0.1 %) nasal spray 1 spray (137 mcg total) by Nasal route 2 (two) times daily. 30 mL 11    belladonna alkaloids-opium 16.2-30 mg (B&O SUPPRETTES) 16.2-30 mg Supp 1 supp GA every 12 hr,x3 days,PRN:as needed for spasm 6 suppository 0    biotin 1 mg tablet Take 1,000 mcg by mouth once daily.       cyanocobalamin 1,000 mcg/mL injection Inject 1 mL (1,000 mcg total) into the skin every 30 days. 1 mL 11    ERGOCALCIFEROL, VITAMIN D2, (VITAMIN D ORAL) Take by mouth every morning.       esomeprazole (NEXIUM) 40 MG capsule Take 1 capsule (40 mg total) by mouth before breakfast. 30 capsule 0    fluticasone propionate (FLONASE) 50 mcg/actuation nasal spray 2 sprays (100 mcg total) by Each Nostril route once daily. 16 g 11    hyoscyamine (ANASPAZ,LEVSIN) 0.125 mg Tab Take 1 tablet (125 mcg total) by mouth 4 (four) times daily as needed for spasm 40 tablet 0    losartan (COZAAR) 25 MG tablet Take 1 tablet (25 mg total) by mouth every evening. 90 tablet 3    magnesium oxide (MAG-OX) 400 mg (241.3 mg magnesium) tablet Take 400 mg by mouth once daily.      methocarbamoL (ROBAXIN) 500 MG Tab Take 1 tablet (500 mg total) by mouth 3 (three) times daily. 60 tablet 1    metoprolol succinate (TOPROL-XL) 25 MG 24 hr tablet Take 1 tablet (25 mg total) by mouth every evening. 90 tablet 3    prednisoLONE acetate (PRED FORTE) 1 % DrpS Apply 2 drops to left ear twice daily for 10 days 10 mL 0    promethazine (PHENERGAN) 25 MG suppository  Insert 1 suppository rectally every 4 hours as needed for nausea/vomiting 12 suppository 0    promethazine-dextromethorphan (PROMETHAZINE-DM) 6.25-15 mg/5 mL Syrp Take 5 ml by mouth every 6 hours for 7 days as needed for cough 140 mL 0    scopolamine (TRANSDERM-SCOP) 1.3-1.5 mg (1 mg over 3 days) Apply 1 film topically every 72 hours 4 patch 0    sodium chloride 3% 3 % nebulizer solution Use one vial (4 mL) by nebulization 2 (two) times daily as needed for other or Cough (mucous clearance). 240 mL 11    tamsulosin (FLOMAX) 0.4 mg Cap Take 1 capsule (0.4 mg total) by mouth once daily. 30 capsule 0    triamcinolone acetonide 0.1% (KENALOG) 0.1 % paste Place onto teeth 2 (two) times daily. 5 g 5    [] celecoxib (CELEBREX) 200 MG capsule Take 1 capsule (200 mg total) by mouth daily as needed for Pain. Take with food 30 capsule 2    fexofenadine (ALLEGRA) 180 MG tablet Take 1 tablet (180 mg total) by mouth 2 (two) times daily. for 7 days  0    [DISCONTINUED] amLODIPine (NORVASC) 5 MG tablet TAKE 1 TABLET(5 MG) BY MOUTH EVERY EVENING 90 tablet 3    [DISCONTINUED] losartan (COZAAR) 25 MG tablet Take 1 tablet (25 mg total) by mouth every evening. 90 tablet 3    [DISCONTINUED] metoprolol succinate (TOPROL-XL) 25 MG 24 hr tablet Take 1 tablet (25 mg total) by mouth every evening. 90 tablet 3     No facility-administered encounter medications on file as of 2024.     No orders of the defined types were placed in this encounter.      Plan:     Problem List Items Addressed This Visit          Pulmonary    Chronic cough    Overview     Her symptoms have now resolved.  I suspect there was an allergic component to this problem.  If her symptoms return we will revisit.  Albuterol PRN  Nebulizer with 3% saline PRN  Sputum cultures if cough becomes productive.  She has a cup and orders for this.         Mixed simple and mucopurulent chronic bronchitis - Primary    Current Assessment & Plan     Likely the etiology of her  abnormal CT scan and chronic cough.  Symptoms have now resolved and bronchoscopy did not reveal an etiology for this issue.  Likely related to mucus secondary to allergies.  We will follow and repeat further studies based off clinical symptoms (which have now resolved).            Other    Abnormal CT of the chest    Overview     Tree-in-bud opacities noted on CT thorax.  I suspect mucous impaction as she is now status post bronchoscopy without evidence of malignancy or chronic infection.  - continue albuterol PRN  - continue nebulizer therapy with 3% saline nebs for clearance PRN            RTC 3 months.    Catracho Clemente MD  [facility]

## 2024-02-08 NOTE — ASSESSMENT & PLAN NOTE
Likely the etiology of her abnormal CT scan and chronic cough.  Symptoms have now resolved and bronchoscopy did not reveal an etiology for this issue.  Likely related to mucus secondary to allergies.  We will follow and repeat further studies based off clinical symptoms (which have now resolved).

## 2024-02-09 ENCOUNTER — PATIENT MESSAGE (OUTPATIENT)
Dept: INTERNAL MEDICINE | Facility: CLINIC | Age: 69
End: 2024-02-09
Payer: MEDICARE

## 2024-02-19 ENCOUNTER — OFFICE VISIT (OUTPATIENT)
Dept: INTERNAL MEDICINE | Facility: CLINIC | Age: 69
End: 2024-02-19
Payer: MEDICARE

## 2024-02-19 DIAGNOSIS — Z13.1 SCREENING FOR DIABETES MELLITUS: ICD-10-CM

## 2024-02-19 DIAGNOSIS — D70.9 NEUTROPENIA, UNSPECIFIED TYPE: ICD-10-CM

## 2024-02-19 DIAGNOSIS — I70.0 AORTIC ATHEROSCLEROSIS: ICD-10-CM

## 2024-02-19 DIAGNOSIS — I10 ESSENTIAL HYPERTENSION: Primary | ICD-10-CM

## 2024-02-19 DIAGNOSIS — E53.8 INADEQUATE VITAMIN B12 INTAKE: ICD-10-CM

## 2024-02-19 PROCEDURE — 99213 OFFICE O/P EST LOW 20 MIN: CPT | Mod: 95,,, | Performed by: INTERNAL MEDICINE

## 2024-02-19 NOTE — PROGRESS NOTES
Primary Care Telemedicine Note    The patient location is:  Patient Home     The chief complaint leading to consultation is: HTN, labs    Total time spent with patient: 15 minutes    Visit type: Virtual visit with synchronous audio only and video      Each patient to whom he or she provides medical services by telemedicine is:  (1) informed of the relationship between the physician and patient and the respective role of any other health care provider with respect to management of the patient; and (2) notified that he or she may decline to receive medical services by telemedicine and may withdraw from such care at any time.        Subjective:        Patient ID: Penny Cervantes is a 68 y.o. female.    Chief Complaint: Hypertension and Results      HPI    Hypertension: The patient has been taking medications as instructed, no medication side effects noted, no dyspnea on exertion, and no swelling of ankles.     BP Readings from Last 3 Encounters:   02/08/24 122/68   12/05/23 110/64   11/06/23 114/67     The patient was evaluated by Pulmonology and the changes seen on past imaging has resolved.         Review of Systems   Constitutional:  Negative for chills, diaphoresis, fatigue and fever.   HENT:  Negative for congestion, ear pain and sinus pressure.    Respiratory:  Negative for cough and shortness of breath.    Cardiovascular:  Negative for chest pain and palpitations.   Gastrointestinal:  Negative for abdominal pain, constipation, diarrhea, nausea and vomiting.   Musculoskeletal:  Negative for arthralgias and myalgias.   Neurological:  Negative for dizziness, tremors and headaches.           Objective:        Physical Exam  Constitutional:       General: She is not in acute distress.     Appearance: Normal appearance. She is well-developed. She is not toxic-appearing or diaphoretic.   HENT:      Head: Normocephalic and atraumatic.      Right Ear: Hearing normal.      Left Ear: Hearing normal.      Nose: No  congestion.   Eyes:      General: Lids are normal.   Pulmonary:      Effort: No tachypnea or respiratory distress.   Musculoskeletal:      Cervical back: Normal range of motion.   Neurological:      Mental Status: She is alert and oriented to person, place, and time.      Comments: Alert and oriented   Psychiatric:         Attention and Perception: Attention normal.         Mood and Affect: Mood normal.         Behavior: Behavior is cooperative.               Assessment:       1. Essential hypertension    2. Neutropenia, unspecified type    3. Inadequate vitamin B12 intake    4. Screening for diabetes mellitus    5. Aortic atherosclerosis              Plan:     1. Essential hypertension  - controlled, continue amlodipine, metoprolol, losartan  - Comprehensive Metabolic Panel; Future    2. Neutropenia, unspecified type  - CBC Auto Differential; Future    3. Inadequate vitamin B12 intake  - held vitamin B12 injections, check labs now  - Vitamin B12; Future    4. Screening for diabetes mellitus  - Hemoglobin A1C; Future    5. Aortic atherosclerosis  - seen on past imaging, HTN is controlled, reviewed recent lipid panel, not on a statin    RTC for annual exam in October 2024 or sooner if needed  __________________________    Kate Gil MD, PharmD  [facility]- Internal Medicine  American Board of Obesity Medicine diplomate  Office [phone]

## 2024-02-20 ENCOUNTER — PATIENT MESSAGE (OUTPATIENT)
Dept: INTERNAL MEDICINE | Facility: CLINIC | Age: 69
End: 2024-02-20
Payer: MEDICARE

## 2024-02-23 ENCOUNTER — PATIENT MESSAGE (OUTPATIENT)
Dept: INTERNAL MEDICINE | Facility: CLINIC | Age: 69
End: 2024-02-23
Payer: MEDICARE

## 2024-02-23 DIAGNOSIS — E53.8 INADEQUATE VITAMIN B12 INTAKE: Primary | ICD-10-CM

## 2024-02-27 RX ORDER — CYANOCOBALAMIN 1000 UG/ML
INJECTION, SOLUTION INTRAMUSCULAR; SUBCUTANEOUS
Qty: 1 ML | Refills: 3 | Status: SHIPPED | OUTPATIENT
Start: 2024-02-27

## 2024-04-29 ENCOUNTER — TELEPHONE (OUTPATIENT)
Dept: OTOLARYNGOLOGY | Facility: CLINIC | Age: 69
End: 2024-04-29
Payer: MEDICARE

## 2024-04-29 ENCOUNTER — PATIENT MESSAGE (OUTPATIENT)
Dept: INTERNAL MEDICINE | Facility: CLINIC | Age: 69
End: 2024-04-29
Payer: MEDICARE

## 2024-04-29 ENCOUNTER — PATIENT MESSAGE (OUTPATIENT)
Dept: OTOLARYNGOLOGY | Facility: CLINIC | Age: 69
End: 2024-04-29
Payer: MEDICARE

## 2024-04-29 NOTE — TELEPHONE ENCOUNTER
Called and LVM as per Jennifer Lynn NP instructions. I stated that we did not have any openings in the schedule until Monday. Recommended call PCP or to go to urgent care if not feeling any better.

## 2024-04-29 NOTE — TELEPHONE ENCOUNTER
She could possibly have a viral infection. If she does not feel better, I advice either come in and see her PCP or me. Thank you.

## 2024-04-30 ENCOUNTER — PATIENT MESSAGE (OUTPATIENT)
Dept: OTOLARYNGOLOGY | Facility: CLINIC | Age: 69
End: 2024-04-30

## 2024-04-30 ENCOUNTER — OFFICE VISIT (OUTPATIENT)
Dept: OTOLARYNGOLOGY | Facility: CLINIC | Age: 69
End: 2024-04-30
Payer: MEDICARE

## 2024-04-30 VITALS
BODY MASS INDEX: 19.9 KG/M2 | DIASTOLIC BLOOD PRESSURE: 81 MMHG | SYSTOLIC BLOOD PRESSURE: 130 MMHG | TEMPERATURE: 100 F | WEIGHT: 98.56 LBS | HEART RATE: 101 BPM

## 2024-04-30 DIAGNOSIS — J30.89 NON-SEASONAL ALLERGIC RHINITIS, UNSPECIFIED TRIGGER: ICD-10-CM

## 2024-04-30 DIAGNOSIS — J06.9 ACUTE UPPER RESPIRATORY INFECTION: Primary | ICD-10-CM

## 2024-04-30 PROCEDURE — 3079F DIAST BP 80-89 MM HG: CPT | Mod: CPTII,S$GLB,, | Performed by: NURSE PRACTITIONER

## 2024-04-30 PROCEDURE — 99999 PR PBB SHADOW E&M-EST. PATIENT-LVL IV: CPT | Mod: PBBFAC,,, | Performed by: NURSE PRACTITIONER

## 2024-04-30 PROCEDURE — 3288F FALL RISK ASSESSMENT DOCD: CPT | Mod: CPTII,S$GLB,, | Performed by: NURSE PRACTITIONER

## 2024-04-30 PROCEDURE — 1101F PT FALLS ASSESS-DOCD LE1/YR: CPT | Mod: CPTII,S$GLB,, | Performed by: NURSE PRACTITIONER

## 2024-04-30 PROCEDURE — 4010F ACE/ARB THERAPY RXD/TAKEN: CPT | Mod: CPTII,S$GLB,, | Performed by: NURSE PRACTITIONER

## 2024-04-30 PROCEDURE — 1125F AMNT PAIN NOTED PAIN PRSNT: CPT | Mod: CPTII,S$GLB,, | Performed by: NURSE PRACTITIONER

## 2024-04-30 PROCEDURE — 99214 OFFICE O/P EST MOD 30 MIN: CPT | Mod: S$GLB,,, | Performed by: NURSE PRACTITIONER

## 2024-04-30 PROCEDURE — 3008F BODY MASS INDEX DOCD: CPT | Mod: CPTII,S$GLB,, | Performed by: NURSE PRACTITIONER

## 2024-04-30 PROCEDURE — 1159F MED LIST DOCD IN RCRD: CPT | Mod: CPTII,S$GLB,, | Performed by: NURSE PRACTITIONER

## 2024-04-30 PROCEDURE — 3044F HG A1C LEVEL LT 7.0%: CPT | Mod: CPTII,S$GLB,, | Performed by: NURSE PRACTITIONER

## 2024-04-30 PROCEDURE — 1160F RVW MEDS BY RX/DR IN RCRD: CPT | Mod: CPTII,S$GLB,, | Performed by: NURSE PRACTITIONER

## 2024-04-30 PROCEDURE — 3075F SYST BP GE 130 - 139MM HG: CPT | Mod: CPTII,S$GLB,, | Performed by: NURSE PRACTITIONER

## 2024-04-30 RX ORDER — AMOXICILLIN AND CLAVULANATE POTASSIUM 875; 125 MG/1; MG/1
1 TABLET, FILM COATED ORAL 2 TIMES DAILY
Qty: 20 TABLET | Refills: 0 | Status: SHIPPED | OUTPATIENT
Start: 2024-04-30 | End: 2024-05-10

## 2024-04-30 RX ORDER — AZELASTINE 1 MG/ML
1 SPRAY, METERED NASAL 2 TIMES DAILY
Qty: 30 ML | Refills: 11 | Status: SHIPPED | OUTPATIENT
Start: 2024-04-30 | End: 2025-04-30

## 2024-04-30 RX ORDER — METHYLPREDNISOLONE 4 MG/1
TABLET ORAL
Qty: 21 EACH | Refills: 0 | Status: SHIPPED | OUTPATIENT
Start: 2024-04-30 | End: 2024-05-21

## 2024-04-30 NOTE — PROGRESS NOTES
Chief Complaint   Patient presents with    Sore Throat    Sinus Problem   .     HPI 10/24/2023: Penny Cervantes is 68 y.o. female who is self-referred for evaluation of left ear pain.  Symptoms include left ear pain, congestion, and cough. Symptoms began 1 week ago and are unchanged since that time. She went to the urgent care yesterday and they attempted to flush the cerumen impaction in the left ear. She experienced excruciating pain immediately after. The procedure was stopped.  She was prescribed amoxicillin for her sinus infection.  She admits to to hearing loss.     Interval HPI 11/6/2023:  She reports of a sharp pain in the left ear that comes and goes. Denies ear pressure.  She has completed the Augmentin, ofloxacin and Pred Forte as prescribed. She recently had an upper respiratory infection. Denies nasal congestion, runny nose, sinus pressure. She uses Flonase PRN.     Interval HPI 12/5/2023:  Follow up visit. She reports feeling better with her ear symptoms. Occasionally she would feel a sharp pain in her left ear but it only lasts a few seconds. She denies ear drainage, nasal congestion, runny nose and sinus pressure. She was using Astelin and Flonase nasal sprays for 2 weeks and has stopped them both. She states that she doesn't have much seasonal allergies. No other complaints today .     Interval HPI 4/30/2024:  She reports of sore throat and headaches that started a week ago. Then 2 days later, she started having fever, body aches, cough and congestion. She was taking Mucinex and using Flonase. She has no improvement since last week.       Past Medical History:   Diagnosis Date    Benign positional vertigo     Breast cancer 2009    s/p chemo XRT, left breast triple negative    Coronary artery disease, non-occlusive     cath in 2012    Hypertension     Interstitial cystitis     Osteopenia     Squamous cell carcinoma     Stroke 7/10/2018    Symptomatic posterior vitreous detachment of left eye  12/24/2018    Vitreous hemorrhage, left 1/10/2019     Social History     Socioeconomic History    Marital status:    Tobacco Use    Smoking status: Never     Passive exposure: Never    Smokeless tobacco: Never   Substance and Sexual Activity    Alcohol use: Yes     Comment: 2-3 per month    Drug use: No    Sexual activity: Not Currently     Partners: Male     Social Determinants of Health     Financial Resource Strain: Low Risk  (2/19/2024)    Overall Financial Resource Strain (CARDIA)     Difficulty of Paying Living Expenses: Not hard at all   Food Insecurity: No Food Insecurity (2/19/2024)    Hunger Vital Sign     Worried About Running Out of Food in the Last Year: Never true     Ran Out of Food in the Last Year: Never true   Transportation Needs: No Transportation Needs (2/19/2024)    PRAPARE - Transportation     Lack of Transportation (Medical): No     Lack of Transportation (Non-Medical): No   Physical Activity: Unknown (2/19/2024)    Exercise Vital Sign     Days of Exercise per Week: Patient declined   Stress: No Stress Concern Present (2/19/2024)    [employer] - Occupational Stress Questionnaire     Feeling of Stress : Not at all   Housing Stability: Unknown (2/19/2024)    Housing Stability Vital Sign     Unable to Pay for Housing in the Last Year: No     Unstable Housing in the Last Year: Patient declined     Past Surgical History:   Procedure Laterality Date    BILATERAL SALPINGOOPHORECTOMY      with Hysterectomy    BONE RESECTION, RIB      right side    BREAST BIOPSY Right 2005    BREAST CYST ASPIRATION      BREAST LUMPECTOMY Left 2009    BREAST SURGERY      BRONCHOSCOPY N/A 11/3/2023    Procedure: Bronchoscopy;  Surgeon: Provider, [facility];  Location: [facility] OR [address] [place] [address];  Service: Anesthesiology;  Laterality: N/A;    CYSTOSCOPY N/A 7/20/2021    Procedure: CYSTOSCOPY;  Surgeon: Arabella Whatley MD;  Location: [facility] OR [address] [place] [address];  Service: Urology;  Laterality: N/A;   30 minutes     HERNIA REPAIR      HYSTERECTOMY      TOTAL VAGINAL HYSTERECTOMY      Indication: Endometriosis    TRIGGER POINT INJECTION  7/20/2021    Procedure: INJECTION, TRIGGER POINTS  -SOLUCORTEF;  Surgeon: Arabella Whatley MD;  Location: [facility] OR [address] [place] [address];  Service: Urology;;     Family History   Problem Relation Name Age of Onset    Heart disease Mother      Heart disease Father      Heart disease Sister  55    Heart disease Brother  43    Hypertension Sister      Ovarian cancer Sister         Review of Systems  General: negative for chills, fever or weight loss  Psychological: negative for mood changes or depression  Ophthalmic: negative for blurry vision, photophobia or eye pain  ENT: see HPI  Respiratory: no cough, shortness of breath, or wheezing  Cardiovascular: no chest pain or dyspnea on exertion  Gastrointestinal: no abdominal pain, change in bowel habits, or black/ bloody stools  Musculoskeletal: negative for gait disturbance or muscular weakness  Neurological: no syncope or seizures; no ataxia  Dermatological: negative for puritis,  rash and jaundice  Hematologic/lymphatic: no easy bruising, no new lumps or bumps      Physical Exam:    Vitals:    04/30/24 1315   BP: 130/81   Pulse: 101   Temp: 99.7 °F (37.6 °C)           Constitutional: Well appearing / communicating without difficutly.  NAD.  Eyes: EOM I Bilaterally  Head/Face: Normocephalic.  Negative paranasal sinus pressure/tenderness.  Salivary glands WNL.  House Brackmann I Bilaterally.    Right Ear: Auricle normal appearance. External Auditory Canal within normal limits no lesions or masses,TM w/o masses/lesions/perforations. TM mobility noted.   Left Ear: Auricle normal appearance. External Auditory Canal within normal limits no lesions or masses, TM w/o masses/lesions/perforations. TM mobility noted.   Nose: No gross nasal septal deviation. Inferior Turbinates 3+ bilaterally. No septal perforation. No masses/lesions. External nasal skin  appears normal without masses/lesions.  Oral Cavity: Gingiva/lips within normal limits.  Dentition/gingiva healthy appearing. Mucus membranes moist. Floor of mouth soft, no masses palpated. Oral Tongue mobile. Hard Palate appears normal.    Oropharynx: Base of tongue appears normal. No masses/lesions noted. Tonsillar fossa/pharyngeal wall without lesions. Posterior oropharynx WNL.  Soft palate without masses. Midline uvula.   Neck/Lymphatic: No LAD I-VI bilaterally.  No thyromegaly.  No masses noted on exam.        Assessment:    ICD-10-CM ICD-9-CM    1. Acute upper respiratory infection  J06.9 465.9       2. Non-seasonal allergic rhinitis, unspecified trigger  J30.89 477.8             The primary encounter diagnosis was Acute upper respiratory infection. A diagnosis of Non-seasonal allergic rhinitis, unspecified trigger was also pertinent to this visit.      Plan:  No orders of the defined types were placed in this encounter.    -negative for flu and COVID  -start on Medrol Dosepak  -start on Augmentin bid x 10 days  -continue on Flonase and Astelin nasal sprays  -instructed patient to drink adequate amount of fluid. She is to notify or go to ED if there's any acute changes      Jennifer Lynn NP

## 2024-05-03 ENCOUNTER — PATIENT MESSAGE (OUTPATIENT)
Dept: OTOLARYNGOLOGY | Facility: CLINIC | Age: 69
End: 2024-05-03
Payer: MEDICARE

## 2024-07-19 DIAGNOSIS — M47.816 LUMBAR SPONDYLOSIS: ICD-10-CM

## 2024-07-19 RX ORDER — CELECOXIB 200 MG/1
200 CAPSULE ORAL DAILY PRN
Qty: 30 CAPSULE | Refills: 2 | OUTPATIENT
Start: 2024-07-19 | End: 2024-10-17

## 2024-09-03 ENCOUNTER — TELEPHONE (OUTPATIENT)
Dept: INTERNAL MEDICINE | Facility: CLINIC | Age: 69
End: 2024-09-03
Payer: MEDICARE

## 2024-09-03 DIAGNOSIS — I10 ESSENTIAL HYPERTENSION: ICD-10-CM

## 2024-09-03 DIAGNOSIS — Z00.00 ANNUAL PHYSICAL EXAM: Primary | ICD-10-CM

## 2024-09-03 DIAGNOSIS — Z12.31 BREAST CANCER SCREENING BY MAMMOGRAM: ICD-10-CM

## 2024-09-03 NOTE — TELEPHONE ENCOUNTER
"----- Message from Jennie Lopez MA sent at 9/3/2024  1:08 PM CDT -----  Contact: [phone]    ----- Message -----  From: Samir Rankin  Sent: 9/3/2024  12:43 PM CDT  To: Louise CAROLINA Staff    type: Lab    Caller is requesting to schedule their Lab appointment prior to an appointment.    Order is not listed in EPIC.  Please enter order and contact patient to schedule.    Preferred Date and Time of Labs: Before 10/22/24    Date of Appointment:10/22/24    Where would they like the lab performed? [facility]    Would the patient rather a call back or a response via [facility]? Call back     Best Call Back Number [phone]    Additional Information: n/a     "Do not send messages requesting lab orders prior to Physical appt on these providers: Valentín Echeverria, Yumiko Asif,  Debbie Hernadez and Isael."    Caller is requesting to schedule their annual screening mammogram appointment. Order is not listed in Epic.  Please enter order and contact patient to schedule.    Would the patient like a call back, or a response through their [facility] portal?:   Both     Where would they like the mammogram performed?: [facility]    Comments:  "

## 2024-09-03 NOTE — TELEPHONE ENCOUNTER
Last mammo 11/22/23    Last full labs 10/12/23      I entered lab and mmg orders.    Scheduled labs w/ patient and sent referral  Msg for help to book mmg at [place]

## 2024-10-21 ENCOUNTER — PATIENT OUTREACH (OUTPATIENT)
Dept: ADMINISTRATIVE | Facility: HOSPITAL | Age: 69
End: 2024-10-21
Payer: MEDICARE

## 2024-10-21 VITALS — DIASTOLIC BLOOD PRESSURE: 81 MMHG | SYSTOLIC BLOOD PRESSURE: 130 MMHG

## 2024-10-22 ENCOUNTER — HOSPITAL ENCOUNTER (OUTPATIENT)
Dept: RADIOLOGY | Facility: HOSPITAL | Age: 69
Discharge: HOME | End: 2024-10-22
Attending: INTERNAL MEDICINE
Payer: MEDICARE

## 2024-10-22 ENCOUNTER — OFFICE VISIT (OUTPATIENT)
Dept: INTERNAL MEDICINE | Facility: CLINIC | Age: 69
End: 2024-10-22
Payer: MEDICARE

## 2024-10-22 VITALS
TEMPERATURE: 98 F | WEIGHT: 96.13 LBS | RESPIRATION RATE: 11 BRPM | SYSTOLIC BLOOD PRESSURE: 140 MMHG | HEART RATE: 70 BPM | BODY MASS INDEX: 19.38 KG/M2 | DIASTOLIC BLOOD PRESSURE: 70 MMHG | HEIGHT: 59 IN | OXYGEN SATURATION: 99 %

## 2024-10-22 DIAGNOSIS — L60.8 DISCOLORATION OF NAIL: ICD-10-CM

## 2024-10-22 DIAGNOSIS — M25.512 CHRONIC LEFT SHOULDER PAIN: ICD-10-CM

## 2024-10-22 DIAGNOSIS — M19.049 HAND ARTHRITIS: ICD-10-CM

## 2024-10-22 DIAGNOSIS — G89.29 CHRONIC LEFT SHOULDER PAIN: ICD-10-CM

## 2024-10-22 DIAGNOSIS — J06.9 ACUTE UPPER RESPIRATORY INFECTION: ICD-10-CM

## 2024-10-22 DIAGNOSIS — R09.89 CAROTID BRUIT, UNSPECIFIED LATERALITY: ICD-10-CM

## 2024-10-22 DIAGNOSIS — Z85.3 HISTORY OF BREAST CANCER: ICD-10-CM

## 2024-10-22 DIAGNOSIS — E53.8 INADEQUATE VITAMIN B12 INTAKE: ICD-10-CM

## 2024-10-22 DIAGNOSIS — L65.9 HAIR LOSS: ICD-10-CM

## 2024-10-22 DIAGNOSIS — Z00.00 ANNUAL PHYSICAL EXAM: ICD-10-CM

## 2024-10-22 DIAGNOSIS — R05.1 ACUTE COUGH: Primary | ICD-10-CM

## 2024-10-22 DIAGNOSIS — I10 ESSENTIAL HYPERTENSION: Primary | ICD-10-CM

## 2024-10-22 DIAGNOSIS — I70.0 AORTIC ATHEROSCLEROSIS: ICD-10-CM

## 2024-10-22 DIAGNOSIS — K13.79 RECURRENT MOUTH ULCERATION: ICD-10-CM

## 2024-10-22 LAB
CTP QC/QA: YES
FLUAV AG NPH QL: NEGATIVE
FLUBV AG NPH QL: NEGATIVE
SARS-COV-2 RNA RESP QL NAA+PROBE: NOT DETECTED

## 2024-10-22 PROCEDURE — 87804 INFLUENZA ASSAY W/OPTIC: CPT | Mod: QW,S$GLB,, | Performed by: NURSE PRACTITIONER

## 2024-10-22 PROCEDURE — 73030 X-RAY EXAM OF SHOULDER: CPT | Mod: TC,PO,LT

## 2024-10-22 PROCEDURE — 3008F BODY MASS INDEX DOCD: CPT | Mod: CPTII,S$GLB,, | Performed by: INTERNAL MEDICINE

## 2024-10-22 PROCEDURE — 1126F AMNT PAIN NOTED NONE PRSNT: CPT | Mod: CPTII,S$GLB,, | Performed by: INTERNAL MEDICINE

## 2024-10-22 PROCEDURE — 3078F DIAST BP <80 MM HG: CPT | Mod: CPTII,S$GLB,, | Performed by: INTERNAL MEDICINE

## 2024-10-22 PROCEDURE — 73130 X-RAY EXAM OF HAND: CPT | Mod: 26,50,, | Performed by: RADIOLOGY

## 2024-10-22 PROCEDURE — 99215 OFFICE O/P EST HI 40 MIN: CPT | Mod: S$GLB,,, | Performed by: INTERNAL MEDICINE

## 2024-10-22 PROCEDURE — 4010F ACE/ARB THERAPY RXD/TAKEN: CPT | Mod: CPTII,S$GLB,, | Performed by: INTERNAL MEDICINE

## 2024-10-22 PROCEDURE — 73030 X-RAY EXAM OF SHOULDER: CPT | Mod: 26,LT,, | Performed by: STUDENT IN AN ORGANIZED HEALTH CARE EDUCATION/TRAINING PROGRAM

## 2024-10-22 PROCEDURE — 1160F RVW MEDS BY RX/DR IN RCRD: CPT | Mod: CPTII,S$GLB,, | Performed by: INTERNAL MEDICINE

## 2024-10-22 PROCEDURE — 3077F SYST BP >= 140 MM HG: CPT | Mod: CPTII,S$GLB,, | Performed by: INTERNAL MEDICINE

## 2024-10-22 PROCEDURE — 1159F MED LIST DOCD IN RCRD: CPT | Mod: CPTII,S$GLB,, | Performed by: INTERNAL MEDICINE

## 2024-10-22 PROCEDURE — 1101F PT FALLS ASSESS-DOCD LE1/YR: CPT | Mod: CPTII,S$GLB,, | Performed by: INTERNAL MEDICINE

## 2024-10-22 PROCEDURE — 3044F HG A1C LEVEL LT 7.0%: CPT | Mod: CPTII,S$GLB,, | Performed by: INTERNAL MEDICINE

## 2024-10-22 PROCEDURE — 3288F FALL RISK ASSESSMENT DOCD: CPT | Mod: CPTII,S$GLB,, | Performed by: INTERNAL MEDICINE

## 2024-10-22 PROCEDURE — 73130 X-RAY EXAM OF HAND: CPT | Mod: TC,50,PO

## 2024-10-22 PROCEDURE — 99999 PR PBB SHADOW E&M-EST. PATIENT-LVL V: CPT | Mod: PBBFAC,,, | Performed by: INTERNAL MEDICINE

## 2024-10-22 PROCEDURE — 87635 SARS-COV-2 COVID-19 AMP PRB: CPT | Performed by: INTERNAL MEDICINE

## 2024-10-22 RX ORDER — METOPROLOL SUCCINATE 25 MG/1
25 TABLET, EXTENDED RELEASE ORAL NIGHTLY
Qty: 90 TABLET | Refills: 3 | Status: SHIPPED | OUTPATIENT
Start: 2024-10-22

## 2024-10-22 RX ORDER — AMLODIPINE BESYLATE 5 MG/1
5 TABLET ORAL NIGHTLY
Qty: 90 TABLET | Refills: 3 | Status: SHIPPED | OUTPATIENT
Start: 2024-10-22

## 2024-10-22 RX ORDER — TRIAMCINOLONE ACETONIDE 1 MG/G
PASTE DENTAL 2 TIMES DAILY
Qty: 5 G | Refills: 5 | Status: SHIPPED | OUTPATIENT
Start: 2024-10-22

## 2024-10-22 RX ORDER — CYANOCOBALAMIN 1000 UG/ML
INJECTION, SOLUTION INTRAMUSCULAR; SUBCUTANEOUS
Qty: 1 ML | Refills: 6 | Status: SHIPPED | OUTPATIENT
Start: 2024-10-22 | End: 2024-12-30

## 2024-10-22 RX ORDER — MINOXIDIL 2.5 MG/1
1.25 TABLET ORAL DAILY
Qty: 45 TABLET | Refills: 0 | Status: SHIPPED | OUTPATIENT
Start: 2024-10-22

## 2024-10-22 RX ORDER — CELECOXIB 200 MG/1
200 CAPSULE ORAL DAILY PRN
Qty: 30 CAPSULE | Refills: 2 | Status: SHIPPED | OUTPATIENT
Start: 2024-10-22

## 2024-10-22 RX ORDER — LOSARTAN POTASSIUM 25 MG/1
25 TABLET ORAL NIGHTLY
Qty: 90 TABLET | Refills: 3 | Status: SHIPPED | OUTPATIENT
Start: 2024-10-22

## 2024-10-22 NOTE — PROGRESS NOTES
Subjective:     PCP: Kate Gil MD    Penny Cervantes is a 69 y.o. female who presents for an annual exam.    Chronic Medical Problems:      Cough: The patient is here for evaluation of a cough. Onset of symptoms was several days ago. Symptoms have been unchanged since that time. The cough is dry and is aggravated by nothing. Associated symptoms include: postnasal drip. Patient has not traveled recently. Patient does not have a history of smoking. Patient has not had a previous Covid test. But she has been caring for her grandchild and she had a runny nose recently.    Hypertension: The patient has been taking medications as instructed, no medication side effects noted, no chest pain on exertion, no dyspnea on exertion, and no swelling of ankles    BP Readings from Last 3 Encounters:   10/22/24 (!) 140/70   04/30/24 130/81   04/30/24 130/81     Shoulder Pain: She presents with left shoulder pain. The symptoms began several months ago. Aggravating factors: no known event. Pain is located diffusely throughout the shoulder. Discomfort is described as aching. Symptoms are exacerbated by repetitive movements. Evaluation to date: none. Therapy to date includes: rest.    Medical History:   Past Medical History:   Diagnosis Date    Benign positional vertigo     Breast cancer 2009    s/p chemo XRT, left breast triple negative    Coronary artery disease, non-occlusive     cath in 2012    Hypertension     Interstitial cystitis     Osteopenia     Squamous cell carcinoma     Stroke 7/10/2018    Symptomatic posterior vitreous detachment of left eye 12/24/2018    Vitreous hemorrhage, left 1/10/2019       Family History: family history includes Heart disease in her father and mother; Heart disease (age of onset: 43) in her brother; Heart disease (age of onset: 55) in her sister; Hypertension in her sister; Ovarian cancer in her sister.    Surgical History:   Past Surgical History:   Procedure Laterality Date     BILATERAL SALPINGOOPHORECTOMY      with Hysterectomy    BONE RESECTION, RIB      right side    BREAST BIOPSY Right 2005    BREAST CYST ASPIRATION      BREAST LUMPECTOMY Left 2009    BREAST SURGERY      BRONCHOSCOPY N/A 11/3/2023    Procedure: Bronchoscopy;  Surgeon: Provider, [facility];  Location: [facility] OR [address] [place] [address];  Service: Anesthesiology;  Laterality: N/A;    CYSTOSCOPY N/A 7/20/2021    Procedure: CYSTOSCOPY;  Surgeon: Arabella Whatley MD;  [facility];  Service: Urology;  Laterality: N/A;  30 minutes     HERNIA REPAIR      HYSTERECTOMY      TOTAL VAGINAL HYSTERECTOMY      Indication: Endometriosis    TRIGGER POINT INJECTION  7/20/2021    Procedure: INJECTION, TRIGGER POINTS  -SOLUCORTEF;  Surgeon: Arabella Whatley MD;  Location: [facility] OR [address] [place] [address];  Service: Urology;;        Social History:  reports that she has never smoked. She has never been exposed to tobacco smoke. She has never used smokeless tobacco. She reports current alcohol use. She reports that she does not use drugs.     Allergies:   Review of patient's allergies indicates:   Allergen Reactions    Demerol [meperidine] Other (See Comments)     headache    Zofran [ondansetron hcl] Nausea Only     Bloating, abdominal pain    Pyridium [phenazopyridine] Nausea Only       Medications:   Current Outpatient Medications   Medication Sig    acyclovir (ZOVIRAX) 400 MG tablet Take 1 tablet by mouth every 8 hours for 5 days as soon as early symptoms develop    albuterol (VENTOLIN HFA) 90 mcg/actuation inhaler Inhale 2 puffs into the lungs every 6 (six) hours as needed for Wheezing or Shortness of Breath (cough). Rescue    azelastine (ASTELIN) 137 mcg (0.1 %) nasal spray Use 1 spray (137 mcg total) by Nasal route 2 (two) times daily.    belladonna alkaloids-opium 16.2-30 mg (B&O SUPPRETTES) 16.2-30 mg Supp 1 supp TX every 12 hr,x3 days,PRN:as needed for spasm    biotin 1 mg tablet Take 1,000 mcg by mouth once daily.     cyanocobalamin 1,000  "mcg/mL injection Inject 1ml into the muscle once every 60 days.    ERGOCALCIFEROL, VITAMIN D2, (VITAMIN D ORAL) Take by mouth every morning.     fluticasone propionate (FLONASE) 50 mcg/actuation nasal spray 2 sprays (100 mcg total) by Each Nostril route once daily.    hyoscyamine (ANASPAZ,LEVSIN) 0.125 mg Tab Take 1 tablet (125 mcg total) by mouth 4 (four) times daily as needed for spasm    magnesium oxide (MAG-OX) 400 mg (241.3 mg magnesium) tablet Take 400 mg by mouth once daily.    methocarbamoL (ROBAXIN) 500 MG Tab Take 1 tablet (500 mg total) by mouth 3 (three) times daily.    promethazine (PHENERGAN) 25 MG suppository Insert 1 suppository rectally every 4 hours as needed for nausea/vomiting    scopolamine (TRANSDERM-SCOP) 1.3-1.5 mg (1 mg over 3 days) Apply 1 film topically every 72 hours    sodium chloride 3% 3 % nebulizer solution Use one vial (4 mL) by nebulization 2 (two) times daily as needed for other or Cough (mucous clearance).    tamsulosin (FLOMAX) 0.4 mg Cap Take 1 capsule (0.4 mg total) by mouth once daily.    amLODIPine (NORVASC) 5 MG tablet TAKE 1 TABLET (5 MG) BY MOUTH EVERY EVENING    celecoxib (CELEBREX) 200 MG capsule Take 1 capsule (200 mg total) by mouth daily as needed for Pain.    cyanocobalamin 1,000 mcg/mL injection Inject 1ml into the muscle once every 60 days.    fexofenadine (ALLEGRA) 180 MG tablet Take 1 tablet (180 mg total) by mouth 2 (two) times daily. for 7 days    losartan (COZAAR) 25 MG tablet Take 1 tablet (25 mg total) by mouth every evening.    metoprolol succinate (TOPROL-XL) 25 MG 24 hr tablet Take 1 tablet (25 mg total) by mouth every evening.    minoxidiL (LONITEN) 2.5 MG tablet Take 0.5 tablets (1.25 mg total) by mouth once daily.    prednisoLONE acetate (PRED FORTE) 1 % DrpS Apply 2 drops to left ear twice daily for 10 days (Patient not taking: Reported on 10/22/2024)    syringe with needle 3 mL 25 gauge x 1" Syrg Use with B12 injections  as needed    triamcinolone " acetonide 0.1% (KENALOG) 0.1 % paste Place onto affected area in mouth 2 (two) times daily a directed     No current facility-administered medications for this visit.       Health Maintenance:   Health Maintenance Topics with due status: Not Due       Topic Last Completion Date    Colorectal Cancer Screening 10/31/2022    DEXA Scan 2022    Hemoglobin A1c (Prediabetes) 2024    Lipid Panel 10/15/2024       Eye Exam: not recently  Dental Exam: every 6 months  OB/GYN: Dr. Oliveros  Mammogram: scheduled   DEXA scan: 2022, osteopenia  Cologuard:   10/2022, neg  HIV screenin2018, neg  Hepatitis C screenin2017, neg         Vaccinations:  Immunization History   Administered Date(s) Administered    COVID-19, MRNA, LN-S, PF (MODERNA FULL 0.5 ML DOSE) 2021, 2021    Influenza 10/25/2012, 10/22/2015, 2017    Influenza (FLUAD) - Quadrivalent - Adjuvanted - PF *Preferred* (65+) 10/24/2022    Influenza - Quadrivalent - MDCK - PF 2018, 2020    Influenza - Quadrivalent - PF *Preferred* (6 months and older) 10/25/2012, 10/22/2015, 10/12/2016, 2017, 10/01/2019, 10/18/2021    Influenza - Trivalent - Fluarix, Flulaval, Fluzone, Afluria - PF 10/25/2012, 10/22/2015    Zoster 10/25/2012     Influenza: due   Tetanus: due  Shingrix: due  Pneumovax: due  Prevnar-13: due  Covid vaccine: not boosted    ADL's: independent  Memory: normal  Mental health: no signs of depression/anxiety  Advance Directives: <no information>  Falls: none  Nutrition: normal  Home Safety: no issues    Body mass index is 19.41 kg/m².  Wt Readings from Last 3 Encounters:   10/22/24 43.6 kg (96 lb 1.9 oz)   24 44.7 kg (98 lb 8.7 oz)   24 44.6 kg (98 lb 5.2 oz)       Review of Systems   Constitutional:  Positive for fatigue and fever. Negative for chills and diaphoresis.   HENT:  Positive for congestion (nasal), mouth sores, postnasal drip (bloody discharge), rhinorrhea and sore throat (resolved). Negative  for dental problem, ear discharge, ear pain, sinus pressure and trouble swallowing.         Decreased sense of smell   Eyes:  Negative for redness and visual disturbance.   Respiratory:  Positive for cough. Negative for chest tightness, shortness of breath and wheezing.    Cardiovascular:  Negative for chest pain, palpitations and leg swelling.   Gastrointestinal:  Negative for abdominal pain, blood in stool, constipation, diarrhea, nausea and vomiting.   Endocrine: Negative for cold intolerance and heat intolerance.   Genitourinary:  Negative for decreased urine volume, dysuria, frequency and hematuria.   Musculoskeletal:  Positive for arthralgias (left shoulder pain, heat helps, hand stiffness and has been dropping things) and myalgias. Negative for back pain.   Skin:  Negative for rash and wound.        Change in color of the fingernail of the left thumb   Neurological:  Positive for headaches (frontal). Negative for dizziness, weakness and numbness.   Hematological:  Negative for adenopathy.   Psychiatric/Behavioral:  Negative for dysphoric mood and sleep disturbance. The patient is not nervous/anxious.           Objective:     Physical Exam  Vitals reviewed.   Constitutional:       General: She is awake. She is not in acute distress.     Appearance: Normal appearance. She is well-developed and well-groomed. She is not diaphoretic.   HENT:      Head: Normocephalic and atraumatic.      Right Ear: Hearing, tympanic membrane, ear canal and external ear normal. Tympanic membrane is not erythematous or bulging.      Left Ear: Hearing, tympanic membrane, ear canal and external ear normal. Tympanic membrane is not erythematous or bulging.      Nose: Congestion and rhinorrhea present. Rhinorrhea is clear.      Right Turbinates: Swollen.      Left Turbinates: Swollen.      Right Sinus: No maxillary sinus tenderness or frontal sinus tenderness.      Left Sinus: No maxillary sinus tenderness or frontal sinus tenderness.       Mouth/Throat:      Mouth: Mucous membranes are moist.      Tongue: No lesions.      Pharynx: Oropharynx is clear. Uvula midline. Posterior oropharyngeal erythema present. No oropharyngeal exudate.   Eyes:      General: Lids are normal. Vision grossly intact. Gaze aligned appropriately. No scleral icterus.     Conjunctiva/sclera:      Right eye: Right conjunctiva is not injected.      Left eye: Left conjunctiva is not injected.      Pupils: Pupils are equal, round, and reactive to light.   Neck:      Thyroid: No thyroid mass or thyromegaly.      Vascular: Carotid bruit (faint) present.   Cardiovascular:      Rate and Rhythm: Normal rate and regular rhythm.      Pulses: Normal pulses.      Heart sounds: Normal heart sounds. No murmur heard.  Pulmonary:      Effort: Pulmonary effort is normal. No tachypnea, bradypnea or respiratory distress.      Breath sounds: Normal breath sounds. No decreased breath sounds or wheezing.   Abdominal:      General: Bowel sounds are normal. There is no distension.      Palpations: Abdomen is soft. Abdomen is not rigid.      Tenderness: There is no abdominal tenderness. There is no guarding or rebound.   Musculoskeletal:         General: Normal range of motion.      Cervical back: Normal range of motion and neck supple.      Right lower leg: No edema.      Left lower leg: No edema.   Lymphadenopathy:      Cervical: No cervical adenopathy.      Upper Body:      Right upper body: No supraclavicular adenopathy.      Left upper body: No supraclavicular adenopathy.   Skin:     General: Skin is warm and dry.      Coloration: Skin is not cyanotic.      Findings: No lesion or rash.      Nails: There is no clubbing.   Neurological:      General: No focal deficit present.      Mental Status: She is alert and oriented to person, place, and time.      Sensory: Sensation is intact.      Coordination: Coordination is intact.      Gait: Gait is intact.      Deep Tendon Reflexes: Reflexes are normal  and symmetric.   Psychiatric:         Attention and Perception: Attention normal.         Mood and Affect: Mood normal.         Behavior: Behavior is cooperative.              Assessment:        1. Essential hypertension    2. Acute upper respiratory infection    3. Chronic left shoulder pain    4. Hand arthritis    5. Recurrent mouth ulceration    6. Disorder of scalp    7. Carotid bruit, unspecified laterality    8. Hair loss    9. Discoloration of nail    10. Inadequate vitamin B12 intake    11. Annual physical exam    12. History of breast cancer    13. Aortic atherosclerosis           Plan:     1. Essential hypertension  - mildly elevated today but will continue current regimen with close monitoring  - losartan (COZAAR) 25 MG tablet; Take 1 tablet (25 mg total) by mouth every evening.  Dispense: 90 tablet; Refill: 3  - metoprolol succinate (TOPROL-XL) 25 MG 24 hr tablet; Take 1 tablet (25 mg total) by mouth every evening.  Dispense: 90 tablet; Refill: 3  - amLODIPine (NORVASC) 5 MG tablet; TAKE 1 TABLET (5 MG) BY MOUTH EVERY EVENING  Dispense: 90 tablet; Refill: 3    2. Acute upper respiratory infection  - COVID-19 Routine Screening  - treat symptomatically for now  - may use OTC flonase nasal spray daily, Claritin daily and Mucinex     3. Chronic left shoulder pain  - X-Ray Shoulder 2 or More Views Left; Future  - Ambulatory referral/consult to Orthopedics; Future    4. Hand arthritis  - X-Ray Hand 3 View Bilateral; Future  - celecoxib (CELEBREX) 200 MG capsule; Take 1 capsule (200 mg total) by mouth daily as needed for Pain.  Dispense: 30 capsule; Refill: 2    5. Recurrent mouth ulceration  - Ambulatory referral/consult to Dermatology; Future  - triamcinolone acetonide 0.1% (KENALOG) 0.1 % paste; Place onto affected area in mouth 2 (two) times daily as directed  Dispense: 5 g; Refill: 5    6. Hair Loss  - discussed options, will try low dose of minoxidil for hair loss    7. Carotid bruit, unspecified  "laterality  - US Carotid Bilateral; Future    8. Discoloration of nail  - Ambulatory referral/consult to Dermatology; Future    9. Inadequate vitamin B12 intake  - Vitamin B12; Future  - syringe with needle 3 mL 25 gauge x 1" Syrg; Use with B12 injections  as needed  Dispense: 5 each; Refill: 1  - cyanocobalamin 1,000 mcg/mL injection; Inject 1ml into the muscle once every 60 days.  Dispense: 1 mL; Refill: 6    10. Annual physical exam  - reviewed recent labs with patient    11. History of breast cancer  - s/p chemo & radiation, stable    12. Aortic atherosclerosis  - seen on past imaging, maintain good BP control, muscle pain with Lipitor in past    RTC in 6 months for follow-up or sooner if needed (virtual ok)    __________________________    Kate Gil MD, PharmD  [facility]  American Board of Obesity Medicine diplomate  Office [phone]        Over 50% of 40 minute visit was spent reviewing medical records, education and discussion of patient's medical conditions and medical management.    "

## 2024-10-23 ENCOUNTER — TELEPHONE (OUTPATIENT)
Dept: DERMATOLOGY | Facility: CLINIC | Age: 69
End: 2024-10-23
Payer: MEDICARE

## 2024-10-29 ENCOUNTER — OFFICE VISIT (OUTPATIENT)
Dept: DERMATOLOGY | Facility: CLINIC | Age: 69
End: 2024-10-29
Payer: MEDICARE

## 2024-10-29 ENCOUNTER — PATIENT MESSAGE (OUTPATIENT)
Dept: DERMATOLOGY | Facility: CLINIC | Age: 69
End: 2024-10-29

## 2024-10-29 DIAGNOSIS — L60.8 DISCOLORATION OF NAIL: Primary | ICD-10-CM

## 2024-10-29 PROCEDURE — 1160F RVW MEDS BY RX/DR IN RCRD: CPT | Mod: CPTII,S$GLB,, | Performed by: PHYSICIAN ASSISTANT

## 2024-10-29 PROCEDURE — 4010F ACE/ARB THERAPY RXD/TAKEN: CPT | Mod: CPTII,S$GLB,, | Performed by: PHYSICIAN ASSISTANT

## 2024-10-29 PROCEDURE — 3044F HG A1C LEVEL LT 7.0%: CPT | Mod: CPTII,S$GLB,, | Performed by: PHYSICIAN ASSISTANT

## 2024-10-29 PROCEDURE — 1159F MED LIST DOCD IN RCRD: CPT | Mod: CPTII,S$GLB,, | Performed by: PHYSICIAN ASSISTANT

## 2024-10-29 PROCEDURE — 3288F FALL RISK ASSESSMENT DOCD: CPT | Mod: CPTII,S$GLB,, | Performed by: PHYSICIAN ASSISTANT

## 2024-10-29 PROCEDURE — G2211 COMPLEX E/M VISIT ADD ON: HCPCS | Mod: S$GLB,,, | Performed by: PHYSICIAN ASSISTANT

## 2024-10-29 PROCEDURE — 1101F PT FALLS ASSESS-DOCD LE1/YR: CPT | Mod: CPTII,S$GLB,, | Performed by: PHYSICIAN ASSISTANT

## 2024-10-29 PROCEDURE — 99999 PR PBB SHADOW E&M-EST. PATIENT-LVL V: CPT | Mod: PBBFAC,,, | Performed by: PHYSICIAN ASSISTANT

## 2024-10-29 PROCEDURE — 1126F AMNT PAIN NOTED NONE PRSNT: CPT | Mod: CPTII,S$GLB,, | Performed by: PHYSICIAN ASSISTANT

## 2024-10-29 PROCEDURE — 99204 OFFICE O/P NEW MOD 45 MIN: CPT | Mod: S$GLB,,, | Performed by: PHYSICIAN ASSISTANT

## 2024-10-29 RX ORDER — CIPROFLOXACIN 500 MG/1
500 TABLET ORAL 2 TIMES DAILY
Qty: 60 TABLET | Refills: 0 | Status: SHIPPED | OUTPATIENT
Start: 2024-10-29

## 2024-10-30 ENCOUNTER — PATIENT MESSAGE (OUTPATIENT)
Dept: DERMATOLOGY | Facility: CLINIC | Age: 69
End: 2024-10-30
Payer: MEDICARE

## 2024-10-30 DIAGNOSIS — L60.8 DISCOLORATION OF NAIL: Primary | ICD-10-CM

## 2024-10-31 ENCOUNTER — OFFICE VISIT (OUTPATIENT)
Dept: ORTHOPEDICS | Facility: CLINIC | Age: 69
End: 2024-10-31
Payer: MEDICARE

## 2024-10-31 DIAGNOSIS — M65.331 TRIGGER MIDDLE FINGER OF RIGHT HAND: Primary | ICD-10-CM

## 2024-10-31 PROCEDURE — 20600 DRAIN/INJ JOINT/BURSA W/O US: CPT | Mod: 51,XS,LT,S$GLB | Performed by: ORTHOPAEDIC SURGERY

## 2024-10-31 PROCEDURE — 1101F PT FALLS ASSESS-DOCD LE1/YR: CPT | Mod: CPTII,S$GLB,, | Performed by: ORTHOPAEDIC SURGERY

## 2024-10-31 PROCEDURE — 1125F AMNT PAIN NOTED PAIN PRSNT: CPT | Mod: CPTII,S$GLB,, | Performed by: ORTHOPAEDIC SURGERY

## 2024-10-31 PROCEDURE — 99999 PR PBB SHADOW E&M-EST. PATIENT-LVL IV: CPT | Mod: PBBFAC,,, | Performed by: ORTHOPAEDIC SURGERY

## 2024-10-31 PROCEDURE — 20610 DRAIN/INJ JOINT/BURSA W/O US: CPT | Mod: LT,S$GLB,, | Performed by: ORTHOPAEDIC SURGERY

## 2024-10-31 PROCEDURE — 3288F FALL RISK ASSESSMENT DOCD: CPT | Mod: CPTII,S$GLB,, | Performed by: ORTHOPAEDIC SURGERY

## 2024-10-31 PROCEDURE — 20550 NJX 1 TENDON SHEATH/LIGAMENT: CPT | Mod: 51,XS,RT,S$GLB | Performed by: ORTHOPAEDIC SURGERY

## 2024-10-31 PROCEDURE — 1159F MED LIST DOCD IN RCRD: CPT | Mod: CPTII,S$GLB,, | Performed by: ORTHOPAEDIC SURGERY

## 2024-10-31 PROCEDURE — 3044F HG A1C LEVEL LT 7.0%: CPT | Mod: CPTII,S$GLB,, | Performed by: ORTHOPAEDIC SURGERY

## 2024-10-31 PROCEDURE — 99214 OFFICE O/P EST MOD 30 MIN: CPT | Mod: 25,S$GLB,, | Performed by: ORTHOPAEDIC SURGERY

## 2024-10-31 PROCEDURE — 4010F ACE/ARB THERAPY RXD/TAKEN: CPT | Mod: CPTII,S$GLB,, | Performed by: ORTHOPAEDIC SURGERY

## 2024-10-31 RX ORDER — TRIAMCINOLONE ACETONIDE 40 MG/ML
40 INJECTION, SUSPENSION INTRA-ARTICULAR; INTRAMUSCULAR
Status: COMPLETED | OUTPATIENT
Start: 2024-10-31 | End: 2024-10-31

## 2024-10-31 RX ORDER — CIPROFLOXACIN HYDROCHLORIDE 3 MG/ML
SOLUTION/ DROPS OPHTHALMIC
Qty: 10 ML | Refills: 2 | Status: SHIPPED | OUTPATIENT
Start: 2024-10-31

## 2024-10-31 RX ADMIN — TRIAMCINOLONE ACETONIDE 40 MG: 40 INJECTION, SUSPENSION INTRA-ARTICULAR; INTRAMUSCULAR at 09:10

## 2024-11-21 ENCOUNTER — PATIENT MESSAGE (OUTPATIENT)
Dept: DERMATOLOGY | Facility: CLINIC | Age: 69
End: 2024-11-21
Payer: MEDICARE

## 2024-12-17 ENCOUNTER — PATIENT OUTREACH (OUTPATIENT)
Dept: ADMINISTRATIVE | Facility: HOSPITAL | Age: 69
End: 2024-12-17
Payer: MEDICARE

## 2024-12-17 NOTE — PROGRESS NOTES
Chart reviewed  Immunizations reconciled   Spoke with pt and she states that she has not been checking her blood pressure at home.

## 2024-12-30 PROBLEM — G89.29 CHRONIC LEFT SHOULDER PAIN: Status: ACTIVE | Noted: 2024-12-30

## 2024-12-30 PROBLEM — L65.9 HAIR LOSS: Status: ACTIVE | Noted: 2024-12-30

## 2024-12-30 PROBLEM — M19.049 HAND ARTHRITIS: Status: ACTIVE | Noted: 2024-12-30

## 2024-12-30 PROBLEM — M25.512 CHRONIC LEFT SHOULDER PAIN: Status: ACTIVE | Noted: 2024-12-30

## 2025-01-07 ENCOUNTER — OFFICE VISIT (OUTPATIENT)
Dept: DERMATOLOGY | Facility: CLINIC | Age: 70
End: 2025-01-07
Payer: MEDICARE

## 2025-01-07 DIAGNOSIS — L60.8 DISCOLORATION OF NAIL: ICD-10-CM

## 2025-01-07 PROCEDURE — 3288F FALL RISK ASSESSMENT DOCD: CPT | Mod: CPTII,S$GLB,, | Performed by: PHYSICIAN ASSISTANT

## 2025-01-07 PROCEDURE — 99999 PR PBB SHADOW E&M-EST. PATIENT-LVL IV: CPT | Mod: PBBFAC,,, | Performed by: PHYSICIAN ASSISTANT

## 2025-01-07 PROCEDURE — 1160F RVW MEDS BY RX/DR IN RCRD: CPT | Mod: CPTII,S$GLB,, | Performed by: PHYSICIAN ASSISTANT

## 2025-01-07 PROCEDURE — 1101F PT FALLS ASSESS-DOCD LE1/YR: CPT | Mod: CPTII,S$GLB,, | Performed by: PHYSICIAN ASSISTANT

## 2025-01-07 PROCEDURE — G2211 COMPLEX E/M VISIT ADD ON: HCPCS | Mod: S$GLB,,, | Performed by: PHYSICIAN ASSISTANT

## 2025-01-07 PROCEDURE — 99214 OFFICE O/P EST MOD 30 MIN: CPT | Mod: S$GLB,,, | Performed by: PHYSICIAN ASSISTANT

## 2025-01-07 PROCEDURE — 1126F AMNT PAIN NOTED NONE PRSNT: CPT | Mod: CPTII,S$GLB,, | Performed by: PHYSICIAN ASSISTANT

## 2025-01-07 PROCEDURE — 1159F MED LIST DOCD IN RCRD: CPT | Mod: CPTII,S$GLB,, | Performed by: PHYSICIAN ASSISTANT

## 2025-01-07 RX ORDER — CIPROFLOXACIN 500 MG/1
500 TABLET ORAL 2 TIMES DAILY
Qty: 30 TABLET | Refills: 1 | Status: SHIPPED | OUTPATIENT
Start: 2025-01-07

## 2025-01-07 NOTE — PROGRESS NOTES
Subjective:      Patient ID:  Penny Cervantes is a 69 y.o. female who presents for   Chief Complaint   Patient presents with    Nail Problem     F/u     Pt is present for follow up nail concerns- which has been improving. She took cipro 500 mg BID for one month. She finished the cipro a couple days after [place] and then recently her finger become red and tender near the affected nail. She reports not applying the cipro drops while taking the medicine orally.     She tolerated the cipro well. Reports just having nausea with it even though she took it with food.       Review of Systems   Skin:  Negative for itching and rash.       Objective:   Physical Exam   Constitutional: She appears well-developed and well-nourished. No distress.   Neurological: She is alert and oriented to person, place, and time. She is not disoriented.   Psychiatric: She has a normal mood and affect.   Skin:   Areas Examined (abnormalities noted in diagram):   Nails and Digits Inspection Performed                 Diagram Legend     Erythematous scaling macule/papule c/w actinic keratosis       Vascular papule c/w angioma      Pigmented verrucoid papule/plaque c/w seborrheic keratosis      Yellow umbilicated papule c/w sebaceous hyperplasia      Irregularly shaped tan macule c/w lentigo     1-2 mm smooth white papules consistent with Milia      Movable subcutaneous cyst with punctum c/w epidermal inclusion cyst      Subcutaneous movable cyst c/w pilar cyst      Firm pink to brown papule c/w dermatofibroma      Pedunculated fleshy papule(s) c/w skin tag(s)      Evenly pigmented macule c/w junctional nevus     Mildly variegated pigmented, slightly irregular-bordered macule c/w mildly atypical nevus      Flesh colored to evenly pigmented papule c/w intradermal nevus       Pink pearly papule/plaque c/w basal cell carcinoma      Erythematous hyperkeratotic cursted plaque c/w SCC      Surgical scar with no sign of skin cancer recurrence       Open and closed comedones      Inflammatory papules and pustules      Verrucoid papule consistent consistent with wart     Erythematous eczematous patches and plaques     Dystrophic onycholytic nail with subungual debris c/w onychomycosis     Umbilicated papule    Erythematous-base heme-crusted tan verrucoid plaque consistent with inflamed seborrheic keratosis     Erythematous Silvery Scaling Plaque c/w Psoriasis     See annotation      Assessment / Plan:          Discoloration of nail- Likely pseudomonas nail infection  -     ciprofloxacin HCl (CIPRO) 500 MG tablet; Take 1 tablet (500 mg total) by mouth 2 (two) times daily.  Dispense: 30 tablet; Refill: 1    Take for two more weeks. If green discoloration goes away and no symptoms occur (redness, pain), only take for two more weeks. If still having symptoms or see discoloration, get refilled and continue to take for another 2 weeks.     Take with food and water.       Discontinue if side effects occur.     Return in 4 weeks.     Nail Altaf (can buy over-the-counter):  Normal healthy nails- #1  Soft peeling nails- #2  Hard brittles nails- #3  Weak damaged nails- #4         Follow up in about 4 weeks (around 2/4/2025).

## 2025-01-07 NOTE — PATIENT INSTRUCTIONS
Discoloration of nail- Likely pseudomonas nail infection  -     ciprofloxacin HCl (CIPRO) 500 MG tablet; Take 1 tablet (500 mg total) by mouth 2 (two) times daily.  Dispense: 30 tablet; Refill: 1    Take for two more weeks. If green discoloration goes away and no symptoms occur (redness, pain), only take for two more weeks. If still having symptoms or see discoloration, get refilled and continue to take for another 2 weeks.     Take with food and water.       Discontinue if side effects occur.     Return in 4 weeks.     Nail Altaf (can buy over-the-counter):  Normal healthy nails- #1  Soft peeling nails- #2  Hard brittles nails- #3  Weak damaged nails- #4

## 2025-01-10 ENCOUNTER — PATIENT MESSAGE (OUTPATIENT)
Dept: INTERNAL MEDICINE | Facility: CLINIC | Age: 70
End: 2025-01-10
Payer: MEDICARE

## 2025-01-10 ENCOUNTER — OFFICE VISIT (OUTPATIENT)
Dept: INTERNAL MEDICINE | Facility: CLINIC | Age: 70
End: 2025-01-10
Payer: MEDICARE

## 2025-01-10 VITALS — SYSTOLIC BLOOD PRESSURE: 148 MMHG | DIASTOLIC BLOOD PRESSURE: 77 MMHG

## 2025-01-10 DIAGNOSIS — I10 ESSENTIAL HYPERTENSION: Primary | ICD-10-CM

## 2025-01-10 DIAGNOSIS — Z78.9 STATIN INTOLERANCE: ICD-10-CM

## 2025-01-10 DIAGNOSIS — L65.9 HAIR LOSS: ICD-10-CM

## 2025-01-10 DIAGNOSIS — E53.8 INADEQUATE VITAMIN B12 INTAKE: ICD-10-CM

## 2025-01-10 DIAGNOSIS — J41.8 MIXED SIMPLE AND MUCOPURULENT CHRONIC BRONCHITIS: ICD-10-CM

## 2025-01-10 PROCEDURE — 1160F RVW MEDS BY RX/DR IN RCRD: CPT | Mod: CPTII,95,, | Performed by: INTERNAL MEDICINE

## 2025-01-10 PROCEDURE — 98005 SYNCH AUDIO-VIDEO EST LOW 20: CPT | Mod: 95,,, | Performed by: INTERNAL MEDICINE

## 2025-01-10 PROCEDURE — 4010F ACE/ARB THERAPY RXD/TAKEN: CPT | Mod: CPTII,95,, | Performed by: INTERNAL MEDICINE

## 2025-01-10 PROCEDURE — 1159F MED LIST DOCD IN RCRD: CPT | Mod: CPTII,95,, | Performed by: INTERNAL MEDICINE

## 2025-01-10 RX ORDER — CYANOCOBALAMIN 1000 UG/ML
INJECTION, SOLUTION INTRAMUSCULAR; SUBCUTANEOUS
Qty: 1 ML | Refills: 3 | Status: SHIPPED | OUTPATIENT
Start: 2025-01-10 | End: 2025-09-05

## 2025-01-10 RX ORDER — MINOXIDIL 2.5 MG/1
1.25 TABLET ORAL DAILY
Qty: 45 TABLET | Refills: 3 | Status: SHIPPED | OUTPATIENT
Start: 2025-01-10

## 2025-01-10 NOTE — PROGRESS NOTES
Primary Care Telemedicine Note    The patient location is:  Home     The chief complaint leading to consultation is: HTN, Vitamin Deficiencies    Total time spent with patient: 13 min    Visit type: Virtual visit with synchronous audio only and video      Each patient to whom he or she provides medical services by telemedicine is:  (1) informed of the relationship between the physician and patient and the respective role of any other health care provider with respect to management of the patient; and (2) notified that he or she may decline to receive medical services by telemedicine and may withdraw from such care at any time.        Subjective:        Patient ID: Penny Cervantes is a 69 y.o. female.    Chief Complaint: Hypertension      HPI      Hypertension: The patient has been taking medications as instructed, no medication side effects noted, no chest pain on exertion, no dyspnea on exertion, and no swelling of ankles.    She checks BP sometimes and was >140/70's but this is not common.    BP Readings from Last 3 Encounters:   10/22/24 (!) 140/70   04/30/24 130/81   04/30/24 130/81     Lab to schedule May       Review of Systems   Constitutional:  Negative for chills, diaphoresis and fever.   HENT:  Negative for congestion, ear pain and sinus pressure.    Eyes:  Negative for discharge and redness.   Respiratory:  Negative for cough and shortness of breath.    Cardiovascular:  Negative for chest pain and palpitations.   Gastrointestinal:  Negative for abdominal pain, constipation, diarrhea, nausea and vomiting.   Musculoskeletal:  Negative for arthralgias and myalgias.   Skin:  Negative for color change (finger redness improved), rash and wound.        Minoxidil helped with hair loss   Neurological:  Negative for dizziness, tremors and headaches.           Objective:        Physical Exam  Constitutional:       General: She is not in acute distress.     Appearance: Normal appearance. She is well-developed. She  is not toxic-appearing or diaphoretic.   HENT:      Head: Normocephalic and atraumatic.      Right Ear: Hearing normal.      Left Ear: Hearing normal.      Nose: No congestion (does not sound congested).   Eyes:      General: Lids are normal.   Pulmonary:      Effort: No tachypnea or respiratory distress.   Musculoskeletal:      Cervical back: Normal range of motion.   Neurological:      Mental Status: She is alert and oriented to person, place, and time.      Comments: Alert and oriented   Psychiatric:         Attention and Perception: Attention normal.         Mood and Affect: Mood normal.         Behavior: Behavior is cooperative.               Assessment:       1. Essential hypertension    2. Hair loss    3. Mixed simple and mucopurulent chronic bronchitis    4. Inadequate vitamin B12 intake    5. Statin intolerance              Plan:     1. Essential hypertension  - improving, continue amlodipine, losartan, metoprolol  - CBC Auto Differential; Future  - Comprehensive Metabolic Panel; Future    2. Hair loss  - may continue minoxidil  - minoxidiL (LONITEN) 2.5 MG tablet; Take 0.5 tablets (1.25 mg total) by mouth once daily.  Dispense: 45 tablet; Refill: 3    3. Mixed simple and mucopurulent chronic bronchitis  - has been stable, no recent flare-ups    4. Inadequate vitamin B12 intake  - may continue Vit B12 every 2 months  - cyanocobalamin 1,000 mcg/mL injection; Inject 1ml into the muscle once every 60 days.  Dispense: 1 mL; Refill: 3    5. Statin intolerance  - has not been able to tolerate multiple statins, will monitor    RTC in October for annual exam or sooner if needed  __________________________    Kate Gil MD, PharmD  [facility]  American Board of Obesity Medicine diplomate  Office [phone]

## 2025-02-24 ENCOUNTER — TELEPHONE (OUTPATIENT)
Dept: INTERNAL MEDICINE | Facility: CLINIC | Age: 70
End: 2025-02-24
Payer: MEDICARE

## 2025-03-24 DIAGNOSIS — Z00.00 ENCOUNTER FOR MEDICARE ANNUAL WELLNESS EXAM: ICD-10-CM

## 2025-03-26 ENCOUNTER — PATIENT MESSAGE (OUTPATIENT)
Dept: INTERNAL MEDICINE | Facility: CLINIC | Age: 70
End: 2025-03-26
Payer: MEDICARE

## 2025-03-26 VITALS — SYSTOLIC BLOOD PRESSURE: 119 MMHG | DIASTOLIC BLOOD PRESSURE: 65 MMHG

## 2025-04-15 ENCOUNTER — PATIENT MESSAGE (OUTPATIENT)
Dept: ORTHOPEDICS | Facility: CLINIC | Age: 70
End: 2025-04-15
Payer: MEDICARE

## 2025-04-18 ENCOUNTER — ON-DEMAND VIRTUAL (OUTPATIENT)
Dept: URGENT CARE | Facility: CLINIC | Age: 70
End: 2025-04-18
Payer: MEDICARE

## 2025-04-18 DIAGNOSIS — H92.02 LEFT EAR PAIN: Primary | ICD-10-CM

## 2025-04-18 DIAGNOSIS — J06.9 UPPER RESPIRATORY TRACT INFECTION, UNSPECIFIED TYPE: ICD-10-CM

## 2025-04-18 RX ORDER — PREDNISONE 20 MG/1
20 TABLET ORAL DAILY
Qty: 3 TABLET | Refills: 0 | Status: SHIPPED | OUTPATIENT
Start: 2025-04-18 | End: 2025-04-21

## 2025-04-18 NOTE — PROGRESS NOTES
Subjective:      Patient ID: Penny Cervantes is a 69 y.o. female.    Vitals:  vitals were not taken for this visit.     Chief Complaint: Otalgia (Sinus congestion; )      Visit Type: TELE AUDIOVISUAL - This visit was conducted virtually based on  subjective information and limited objective exam    Present with the patient at the time of consultation: TELEMED PRESENT WITH PATIENT: None  LOCATION OF PATIENT [facility]  Two patient identifiers used to verify patient- saying out date of birth and full name.       Past Medical History:   Diagnosis Date    Benign positional vertigo     Breast cancer 2009    s/p chemo XRT, left breast triple negative    Coronary artery disease, non-occlusive     cath in 2012    Hand arthritis 12/30/2024    Hypertension     Interstitial cystitis     Osteopenia     Squamous cell carcinoma     Stroke 7/10/2018    Symptomatic posterior vitreous detachment of left eye 12/24/2018    Vitreous hemorrhage, left 1/10/2019     Past Surgical History:   Procedure Laterality Date    BILATERAL SALPINGOOPHORECTOMY      with Hysterectomy    BONE RESECTION, RIB      right side    BREAST BIOPSY Right 2005    BREAST CYST ASPIRATION      BREAST LUMPECTOMY Left 2009    BREAST SURGERY      BRONCHOSCOPY N/A 11/3/2023    Procedure: Bronchoscopy;  Surgeon: Provider, [facility];  Location: [facility] OR [address] [place] [address];  Service: Anesthesiology;  Laterality: N/A;    CYSTOSCOPY N/A 7/20/2021    Procedure: CYSTOSCOPY;  Surgeon: Arabella Whatley MD;  Location: [facility] OR [address] [place] [address];  Service: Urology;  Laterality: N/A;  30 minutes     HERNIA REPAIR      HYSTERECTOMY      TOTAL VAGINAL HYSTERECTOMY      Indication: Endometriosis    TRIGGER POINT INJECTION  7/20/2021    Procedure: INJECTION, TRIGGER POINTS  -SOLUCORTEF;  Surgeon: Arabella Whatley MD;  Location: [facility] OR [address] [place] [address];  Service: Urology;;     Review of patient's allergies indicates:   Allergen Reactions    Demerol [meperidine] Other (See Comments)     headache     Zofran [ondansetron hcl] Nausea Only     Bloating, abdominal pain    Statins-hmg-coa reductase inhibitors Other (See Comments)     Muscle pain     Pyridium [phenazopyridine] Nausea Only     Medications Ordered Prior to Encounter[1]  Family History   Problem Relation Name Age of Onset    Heart disease Mother      Heart disease Father      Heart disease Sister  55    Heart disease Brother  43    Hypertension Sister      Ovarian cancer Sister         Medications Ordered                [id number] - [place] - [address] [facility]   [address] [place] [address]    Telephone: [phone]   Fax: [phone]   Hours: Not open 24 hours                         E-Prescribed (1 of 1)              predniSONE (DELTASONE) 20 MG tablet    Sig: Take 1 tablet (20 mg total) by mouth once daily. for 3 days       Start: 4/18/25     Quantity: 3 tablet Refills: 0                           Ohs Peq Odvv Intake    4/18/2025  9:22 AM CDT - Filed by Patient   What is your current physical address in the event of a medical emergency? [address]   Are you able to take your vital signs? No   Please attach any relevant images or files    Is your employer contracted with [facility]? No         70 yo female with c/o runny nose, congestion, fatigue, achiness and ear pain.   She states she has tried flonase and astelin. She states sore throat which did improved. She states no cough but a little from the sinus congestion and pnd. She states pressure to left ear.         Constitution: Negative.   HENT:  Positive for ear pain and congestion.    Cardiovascular: Negative.    Respiratory:  Positive for cough.    Gastrointestinal: Negative.    Endocrine: negative.   Genitourinary: Negative.  Negative for frequency and urgency.   Musculoskeletal: Negative.    Skin: Negative.    Allergic/Immunologic: Negative.    Neurological: Negative.    Hematologic/Lymphatic: Negative.    Psychiatric/Behavioral: Negative.           Objective:   The physical exam was conducted virtually.    AAO x 3 ; no acute distress noted; appearance normal; mood and behavior normal; thought process normal  Head- normocephalic  Nose- appears normal, no discharge or erythema  Eyes- pupils appear normal in size, no drainage, no erythema  Ears- normal appearing; no discharge, no erythema  Mouth- appears normal  Oropharynx- no erythema, lesions  Lungs- breathing at a normal rate, no acute distress noted  Heart- no reports of tachycardia, palpitations, chest pain  Abdomen- non distended, non tender reported by patient  Skin- warm and dry, no erythema or edema noted by patient or visualized  Psych- as above; no si/hi      Assessment:     1. Left ear pain    2. Upper respiratory tract infection, unspecified type        Plan:     Advised home flu and covid  Will treat with prednisone for left ear likely serous otitis from the sinus drainage.       Patient Instructions      Please follow up with your Primary care provider in person within 2-5 days if your signs and symptoms have not resolved or worsen.  The usual course of cold symptoms are 10-14 days.      If your condition worsens or fails to improve we recommend that you receive another evaluation at the emergency room immediately or contact your primary medical clinic to discuss your concerns.      You must understand that you have received Virtual treatment only and that you may be released before all of your medical problems are known or treated.   You, the patient, will arrange for follow up care as instructed.      Tylenol or Ibuprofen can also be used as directed for pain/fever unless you have an allergy to them or medical condition such as stomach ulcers, kidney or liver disease or blood thinners etc for which you should not be taking these type of medications.      Take over the counter cough medication as directed as needed for cough.  You should avoid medications with pseudoephedrine or phenylephrine  "(any medication with "D") if you have high blood pressure as this can cause an elevation in your blood pressure. Instead consider Corcidin HBP as needed to prevent an elevated blood pressure.      Natural remedies of symptoms (as needed) include humidification, saline nasal sprays, and/or steamy showers.  Increase fluids, warm tea with honey, cough drops as needed.  You may also use salt water gargles for sore throat.     IF you received a steroid prescription - As discussed, this can elevate your blood pressure, elevate your blood sugar, water weight gain, nervous energy, redness to the face and dimpling of the skin at the injection site.          Thank you for choosing [facility]!    Our goal in the [facility] is to always provide outstanding medical care. You may receive a survey by mail or e-mail in the next week regarding your experience today. We would greatly appreciate you completing and returning the survey. Your feedback provides us with a way to recognize our staff who provide very good care, and it helps us learn how to improve when your experience was below our aspiration of excellence.         We appreciate you trusting us with your medical care. We hope you feel better soon. We will be happy to take care of you for all of your future medical needs.    You must understand that you've received an Urgent Care treatment only and that you may be released before all your medical problems are known or treated. You, the patient, will arrange for follow up care as instructed.    Follow up with your PCP or specialty clinic as directed in the next 1-2 weeks if not improved or as needed.  You can call [phone] to schedule an appointment with the appropriate provider.    If your condition worsens we recommend that you receive another evaluation in person, with your primary care provider, urgent care or at the emergency room immediately or contact your primary medical " clinics after hours call service to discuss your concerns.         Left ear pain  -     predniSONE (DELTASONE) 20 MG tablet; Take 1 tablet (20 mg total) by mouth once daily. for 3 days  Dispense: 3 tablet; Refill: 0    Upper respiratory tract infection, unspecified type                         [1]   Current Outpatient Medications on File Prior to Visit   Medication Sig Dispense Refill    acyclovir (ZOVIRAX) 400 MG tablet Take 1 tablet by mouth every 8 hours for 5 days as soon as early symptoms develop 70 tablet 2    albuterol (VENTOLIN HFA) 90 mcg/actuation inhaler Inhale 2 puffs into the lungs every 6 (six) hours as needed for Wheezing or Shortness of Breath (cough). Rescue 8.5 g 4    amLODIPine (NORVASC) 5 MG tablet TAKE 1 TABLET (5 MG) BY MOUTH EVERY EVENING 90 tablet 3    azelastine (ASTELIN) 137 mcg (0.1 %) nasal spray Use 1 spray (137 mcg total) by Nasal route 2 (two) times daily. 30 mL 11    celecoxib (CELEBREX) 200 MG capsule Take 1 capsule (200 mg total) by mouth daily as needed for Pain. 30 capsule 2    ciprofloxacin HCl (CILOXAN) 0.3 % ophthalmic solution Apply 1 drop of the solution under affected nail twice daily. 10 mL 2    ciprofloxacin HCl (CIPRO) 500 MG tablet Take 1 tablet (500 mg total) by mouth 2 (two) times daily. 30 tablet 1    cyanocobalamin 1,000 mcg/mL injection Inject 1ml into the muscle once every 60 days. 1 mL 3    ERGOCALCIFEROL, VITAMIN D2, (VITAMIN D ORAL) Take by mouth every morning.       fexofenadine (ALLEGRA) 180 MG tablet Take 1 tablet (180 mg total) by mouth 2 (two) times daily. for 7 days  0    fluticasone propionate (FLONASE) 50 mcg/actuation nasal spray 2 sprays (100 mcg total) by Each Nostril route once daily. 16 g 11    hyoscyamine (ANASPAZ,LEVSIN) 0.125 mg Tab Take 1 tablet (125 mcg total) by mouth 4 (four) times daily as needed for spasm 40 tablet 0    losartan (COZAAR) 50 MG tablet Take 1 tablet (50 mg total) by mouth every evening. 90 tablet 1    magnesium oxide (MAG-OX)  "400 mg (241.3 mg magnesium) tablet Take 400 mg by mouth once daily.      methocarbamoL (ROBAXIN) 500 MG Tab Take 1 tablet (500 mg total) by mouth 3 (three) times daily. 60 tablet 1    metoprolol succinate (TOPROL-XL) 25 MG 24 hr tablet Take 1 tablet (25 mg total) by mouth every evening. 90 tablet 3    minoxidiL (LONITEN) 2.5 MG tablet Take 0.5 tablets (1.25 mg total) by mouth once daily. 45 tablet 3    syringe with needle 3 mL 25 gauge x 1" Syrg Use with B12 injections  as needed 5 each 1    tamsulosin (FLOMAX) 0.4 mg Cap Take 1 capsule (0.4 mg total) by mouth once daily. 30 capsule 0    triamcinolone acetonide 0.1% (KENALOG) 0.1 % paste Place onto affected area in mouth 2 (two) times daily as directed 5 g 5     No current facility-administered medications on file prior to visit.     "

## 2025-04-21 ENCOUNTER — PATIENT MESSAGE (OUTPATIENT)
Dept: INTERNAL MEDICINE | Facility: CLINIC | Age: 70
End: 2025-04-21
Payer: MEDICARE

## 2025-04-22 ENCOUNTER — OFFICE VISIT (OUTPATIENT)
Dept: INTERNAL MEDICINE | Facility: CLINIC | Age: 70
End: 2025-04-22
Payer: MEDICARE

## 2025-04-22 DIAGNOSIS — J01.90 ACUTE BACTERIAL SINUSITIS: Primary | ICD-10-CM

## 2025-04-22 DIAGNOSIS — B96.89 ACUTE BACTERIAL SINUSITIS: Primary | ICD-10-CM

## 2025-04-22 DIAGNOSIS — H92.02 ACUTE EAR PAIN, LEFT: ICD-10-CM

## 2025-04-22 RX ORDER — AMOXICILLIN AND CLAVULANATE POTASSIUM 875; 125 MG/1; MG/1
1 TABLET, FILM COATED ORAL 2 TIMES DAILY
Qty: 14 TABLET | Refills: 0 | Status: SHIPPED | OUTPATIENT
Start: 2025-04-22 | End: 2025-04-29

## 2025-05-06 ENCOUNTER — PATIENT MESSAGE (OUTPATIENT)
Dept: INTERNAL MEDICINE | Facility: CLINIC | Age: 70
End: 2025-05-06
Payer: MEDICARE

## 2025-05-09 ENCOUNTER — RESULTS FOLLOW-UP (OUTPATIENT)
Dept: INTERNAL MEDICINE | Facility: CLINIC | Age: 70
End: 2025-05-09

## 2025-05-12 ENCOUNTER — CLINICAL SUPPORT (OUTPATIENT)
Dept: OTOLARYNGOLOGY | Facility: CLINIC | Age: 70
End: 2025-05-12
Payer: MEDICARE

## 2025-05-12 ENCOUNTER — OFFICE VISIT (OUTPATIENT)
Dept: OTOLARYNGOLOGY | Facility: CLINIC | Age: 70
End: 2025-05-12
Payer: MEDICARE

## 2025-05-12 VITALS — SYSTOLIC BLOOD PRESSURE: 117 MMHG | DIASTOLIC BLOOD PRESSURE: 65 MMHG | HEART RATE: 56 BPM

## 2025-05-12 DIAGNOSIS — H69.92 DYSFUNCTION OF LEFT EUSTACHIAN TUBE: ICD-10-CM

## 2025-05-12 DIAGNOSIS — H69.92 EUSTACHIAN TUBE DYSFUNCTION, LEFT: Primary | Chronic | ICD-10-CM

## 2025-05-12 DIAGNOSIS — H90.3 SENSORINEURAL HEARING LOSS, BILATERAL: Primary | ICD-10-CM

## 2025-05-12 DIAGNOSIS — J30.89 NON-SEASONAL ALLERGIC RHINITIS, UNSPECIFIED TRIGGER: Chronic | ICD-10-CM

## 2025-05-12 DIAGNOSIS — H90.3 SENSORINEURAL HEARING LOSS (SNHL) OF BOTH EARS: Chronic | ICD-10-CM

## 2025-05-12 DIAGNOSIS — H93.8X3 EAR PRESSURE, BILATERAL: ICD-10-CM

## 2025-05-12 PROCEDURE — 92567 TYMPANOMETRY: CPT | Mod: S$GLB,,, | Performed by: PHYSICIAN ASSISTANT

## 2025-05-12 PROCEDURE — 92557 COMPREHENSIVE HEARING TEST: CPT | Mod: S$GLB,,, | Performed by: PHYSICIAN ASSISTANT

## 2025-05-12 PROCEDURE — 3078F DIAST BP <80 MM HG: CPT | Mod: CPTII,S$GLB,, | Performed by: OTOLARYNGOLOGY

## 2025-05-12 PROCEDURE — 3074F SYST BP LT 130 MM HG: CPT | Mod: CPTII,S$GLB,, | Performed by: OTOLARYNGOLOGY

## 2025-05-12 PROCEDURE — 99999 PR PBB SHADOW E&M-EST. PATIENT-LVL III: CPT | Mod: PBBFAC,,, | Performed by: OTOLARYNGOLOGY

## 2025-05-12 PROCEDURE — 1125F AMNT PAIN NOTED PAIN PRSNT: CPT | Mod: CPTII,S$GLB,, | Performed by: OTOLARYNGOLOGY

## 2025-05-12 PROCEDURE — 99999 PR PBB SHADOW E&M-EST. PATIENT-LVL I: CPT | Mod: PBBFAC,,, | Performed by: PHYSICIAN ASSISTANT

## 2025-05-12 PROCEDURE — 1101F PT FALLS ASSESS-DOCD LE1/YR: CPT | Mod: CPTII,S$GLB,, | Performed by: OTOLARYNGOLOGY

## 2025-05-12 PROCEDURE — 99214 OFFICE O/P EST MOD 30 MIN: CPT | Mod: S$GLB,,, | Performed by: OTOLARYNGOLOGY

## 2025-05-12 PROCEDURE — 4010F ACE/ARB THERAPY RXD/TAKEN: CPT | Mod: CPTII,S$GLB,, | Performed by: OTOLARYNGOLOGY

## 2025-05-12 PROCEDURE — 3288F FALL RISK ASSESSMENT DOCD: CPT | Mod: CPTII,S$GLB,, | Performed by: OTOLARYNGOLOGY

## 2025-05-12 PROCEDURE — 1159F MED LIST DOCD IN RCRD: CPT | Mod: CPTII,S$GLB,, | Performed by: OTOLARYNGOLOGY

## 2025-05-12 RX ORDER — AZELASTINE 1 MG/ML
1 SPRAY, METERED NASAL 2 TIMES DAILY
Qty: 30 ML | Refills: 3 | Status: SHIPPED | OUTPATIENT
Start: 2025-05-12 | End: 2026-05-12

## 2025-05-12 NOTE — PROGRESS NOTES
Chief Complaint   Patient presents with    Ear Problem     ear fullness, congestion /ear pressure    .    HPI:     Penny Cervantes is a 69 y.o. female who presents for evaluation of history of URI treated several weeks ago but has had lingering ear pressure. She has had some muffled hearing as well.  She was seen virtual visit on 4/18 and given prednisone then again on  4/22 and was given 7 day Augmentin course. She does feel that it is better since completing this medication.  She has been on Flonase, Astelin, Claritin as well.    She has not had sinus or nasal surgery or otologic surgery. Prior history of remote left TM perforation that healed without needing surgical intervention.           Past Medical History:   Diagnosis Date    Benign positional vertigo     Breast cancer 2009    s/p chemo XRT, left breast triple negative    Coronary artery disease, non-occlusive     cath in 2012    Hand arthritis 12/30/2024    Hypertension     Interstitial cystitis     Osteopenia     Squamous cell carcinoma     Stroke 7/10/2018    Symptomatic posterior vitreous detachment of left eye 12/24/2018    Vitreous hemorrhage, left 1/10/2019     Social History[1]  Past Surgical History:   Procedure Laterality Date    BILATERAL SALPINGOOPHORECTOMY      with Hysterectomy    BONE RESECTION, RIB      right side    BREAST BIOPSY Right 2005    BREAST CYST ASPIRATION      BREAST LUMPECTOMY Left 2009    BREAST SURGERY      BRONCHOSCOPY N/A 11/3/2023    Procedure: Bronchoscopy;  Surgeon: [facility], [facility];  Location: [facility] OR [address] [place] [address];  Service: Anesthesiology;  Laterality: N/A;    CYSTOSCOPY N/A 7/20/2021    Procedure: CYSTOSCOPY;  Surgeon: Arabella Whatley MD;  [facility];  Service: Urology;  Laterality: N/A;  30 minutes     HERNIA REPAIR      HYSTERECTOMY      TOTAL VAGINAL HYSTERECTOMY      Indication: Endometriosis    TRIGGER POINT INJECTION  7/20/2021    Procedure: INJECTION, TRIGGER POINTS  -SOLUCORTEF;   Surgeon: Arabella Whatley MD;  Location: [facility] OR [address] [place] [address];  Service: Urology;;     Family History   Problem Relation Name Age of Onset    Heart disease Mother      Heart disease Father      Heart disease Sister  55    Heart disease Brother  43    Hypertension Sister      Ovarian cancer Sister             Review of Systems  General: negative for chills, fever or weight loss  Psychological: negative for mood changes or depression  Ophthalmic: negative for blurry vision, photophobia or eye pain  ENT: see HPI  Respiratory: no cough, shortness of breath, or wheezing  Cardiovascular: no chest pain or dyspnea on exertion  Gastrointestinal: no abdominal pain, change in bowel habits, or black/ bloody stools  Musculoskeletal: negative for gait disturbance or muscular weakness  Neurological: no syncope or seizures; no ataxia  Dermatological: negative for puritis,  rash and jaundice  Hematologic/lymphatic: no easy bruising, no new lumps or bumps      Physical Exam:    Vitals:    05/12/25 1437   BP: 117/65   Pulse: (!) 56       Constitutional: Well appearing / communicating without difficutly.  NAD.  Eyes: EOM I Bilaterally  Head/Face: Normocephalic.  Negative paranasal sinus pressure/tenderness.  Salivary glands WNL.  House Brackmann I Bilaterally.    Right Ear: Auricle normal appearance. External Auditory Canal within normal limits,TM w/o masses/lesions/perforations. TM mobility noted.   Left Ear: Auricle normal appearance. External Auditory Canal WNL,TM w/o masses/lesions/perforations. TM mobility noted.  Nose: No gross nasal septal deviation. Inferior Turbinates 3+ bilaterally. No septal perforation. No masses/lesions. External nasal skin appears normal without masses/lesions.  Oral Cavity: Gingiva/lips within normal limits.  Dentition/gingiva healthy appearing. Mucus membranes moist. Floor of mouth soft, no masses palpated. Oral Tongue mobile. Hard Palate appears normal.    Oropharynx: Base of tongue appears normal. No  masses/lesions noted. Tonsillar fossa/pharyngeal wall without lesions. Posterior oropharynx WNL.  Soft palate without masses. Midline uvula.   Neck/Lymphatic: No LAD I-VI bilaterally.  No thyromegaly.  No masses noted on exam.      Diagnostic studies:   Audiogram interpreted personally by me and discussed in detail with the patient today.   Mild bilateral SNHL present; Tympanometry shows type A tympanogram on the right and type B on the left.       Assessment:    ICD-10-CM ICD-9-CM    1. Eustachian tube dysfunction, left  H69.92 381.81       2. Non-seasonal allergic rhinitis, unspecified trigger  J30.89 477.8       3. Sensorineural hearing loss (SNHL) of both ears  H90.3 389.18         The primary encounter diagnosis was Eustachian tube dysfunction, left. Diagnoses of Non-seasonal allergic rhinitis, unspecified trigger and Sensorineural hearing loss (SNHL) of both ears were also pertinent to this visit.      Plan:  No orders of the defined types were placed in this encounter.    No effusion noted today; suspect type B tympanogram is due to prior history of left TM perforation.   Continue Flonase 2 sprays per nostril daily  Continue Astelin 1 spray per nostril BID  Recommend audiogram yearly and hearing protection when around loud noise.       Lara Butt MD         [1]   Social History  Socioeconomic History    Marital status:    Tobacco Use    Smoking status: Never     Passive exposure: Never    Smokeless tobacco: Never   Substance and Sexual Activity    Alcohol use: Yes     Comment: 2-3 per month    Drug use: No    Sexual activity: Not Currently     Partners: Male     Social Drivers of Health     Financial Resource Strain: Low Risk  (4/21/2025)    Overall Financial Resource Strain (CARDIA)     Difficulty of Paying Living Expenses: Not hard at all   Food Insecurity: No Food Insecurity (4/21/2025)    Hunger Vital Sign     Worried About Running Out of Food in the Last Year: Never true     Ran Out  of Food in the Last Year: Never true   Transportation Needs: No Transportation Needs (4/21/2025)    PRAPARE - Transportation     Lack of Transportation (Medical): No     Lack of Transportation (Non-Medical): No   Physical Activity: Inactive (4/21/2025)    Exercise Vital Sign     Days of Exercise per Week: 0 days     Minutes of Exercise per Session: 20 min   Stress: No Stress Concern Present (4/21/2025)    [employer] [place] [employer] - Occupational Stress Questionnaire     Feeling of Stress : Not at all   Housing Stability: Low Risk  (4/21/2025)    Housing Stability Vital Sign     Unable to Pay for Housing in the Last Year: No     Number of Times Moved in the Last Year: 0     Homeless in the Last Year: No

## 2025-05-13 NOTE — PROGRESS NOTES
Penny Cervantes, a 69 y.o. female, was seen today in the clinic for an audiologic evaluation.  Patients main complaint was ear pressure following an upper respiratory infection several weeks ago. She also reports muffled hearing. Ms. Cervantes has a history of left TM perforation that healed on it's own without needing surgery.      Audiogram results revealed a mild sensorineural hearing loss bilaterally. Speech reception thresholds were noted at 25 dB in the right ear and 25 dB in the left ear.  Speech discrimination scores were 100% in the right ear and 100% in the left ear.  Tympanometry revealed Type Ad in the right ear with an ECV of 1.59 ml and Type B in the left ear with an ECV of 2.00 ml. The results of the audiogram were reviewed with the patient and the benefits of amplification were discussed.    Recommendations:  Otologic evaluation  Annual audiogram  Hearing protection when in noise  Hearing aid consultation when patient is ready

## 2025-05-19 ENCOUNTER — OFFICE VISIT (OUTPATIENT)
Dept: ORTHOPEDICS | Facility: CLINIC | Age: 70
End: 2025-05-19
Payer: MEDICARE

## 2025-05-19 VITALS — BODY MASS INDEX: 19.39 KG/M2 | HEIGHT: 59 IN

## 2025-05-19 DIAGNOSIS — M25.512 CHRONIC LEFT SHOULDER PAIN: Primary | ICD-10-CM

## 2025-05-19 DIAGNOSIS — G89.29 CHRONIC LEFT SHOULDER PAIN: Primary | ICD-10-CM

## 2025-05-19 PROCEDURE — 20600 DRAIN/INJ JOINT/BURSA W/O US: CPT | Mod: LT,S$GLB,, | Performed by: ORTHOPAEDIC SURGERY

## 2025-05-19 PROCEDURE — 3008F BODY MASS INDEX DOCD: CPT | Mod: CPTII,S$GLB,, | Performed by: ORTHOPAEDIC SURGERY

## 2025-05-19 PROCEDURE — 1159F MED LIST DOCD IN RCRD: CPT | Mod: CPTII,S$GLB,, | Performed by: ORTHOPAEDIC SURGERY

## 2025-05-19 PROCEDURE — 1125F AMNT PAIN NOTED PAIN PRSNT: CPT | Mod: CPTII,S$GLB,, | Performed by: ORTHOPAEDIC SURGERY

## 2025-05-19 PROCEDURE — 99213 OFFICE O/P EST LOW 20 MIN: CPT | Mod: 25,S$GLB,, | Performed by: ORTHOPAEDIC SURGERY

## 2025-05-19 PROCEDURE — 3288F FALL RISK ASSESSMENT DOCD: CPT | Mod: CPTII,S$GLB,, | Performed by: ORTHOPAEDIC SURGERY

## 2025-05-19 PROCEDURE — 1101F PT FALLS ASSESS-DOCD LE1/YR: CPT | Mod: CPTII,S$GLB,, | Performed by: ORTHOPAEDIC SURGERY

## 2025-05-19 PROCEDURE — 4010F ACE/ARB THERAPY RXD/TAKEN: CPT | Mod: CPTII,S$GLB,, | Performed by: ORTHOPAEDIC SURGERY

## 2025-05-19 PROCEDURE — 99999 PR PBB SHADOW E&M-EST. PATIENT-LVL III: CPT | Mod: PBBFAC,,, | Performed by: ORTHOPAEDIC SURGERY

## 2025-05-19 PROCEDURE — 20550 NJX 1 TENDON SHEATH/LIGAMENT: CPT | Mod: 51,XS,RT,S$GLB | Performed by: ORTHOPAEDIC SURGERY

## 2025-05-19 RX ORDER — TRIAMCINOLONE ACETONIDE 40 MG/ML
40 INJECTION, SUSPENSION INTRA-ARTICULAR; INTRAMUSCULAR
Status: COMPLETED | OUTPATIENT
Start: 2025-05-19 | End: 2025-05-19

## 2025-05-19 RX ADMIN — TRIAMCINOLONE ACETONIDE 40 MG: 40 INJECTION, SUSPENSION INTRA-ARTICULAR; INTRAMUSCULAR at 03:05

## 2025-05-19 NOTE — PROGRESS NOTES
Subjective:      Patient ID: Penny Cervantes is a 69 y.o. female.  Chief Complaint: Follow-up (Bilareral hands /L shoulder )      HPI  Penny Cervantes is a  69 y.o. female presenting today for follow up of bilateral hand symptoms.  She reports that she is having intermittent locking of the right middle finger   Previous injection helped for few months   Also having pain at the base of left thumb symptoms worse with use   She is also having some problems with the left shoulder which are ongoing for more than a year now.    Review of patient's allergies indicates:   Allergen Reactions    Demerol [meperidine] Other (See Comments)     headache    Zofran [ondansetron hcl] Nausea Only     Bloating, abdominal pain    Statins-hmg-coa reductase inhibitors Other (See Comments)     Muscle pain     Pyridium [phenazopyridine] Nausea Only         Current Medications[1]    Past Medical History:   Diagnosis Date    Benign positional vertigo     Breast cancer 2009    s/p chemo XRT, left breast triple negative    Coronary artery disease, non-occlusive     cath in 2012    Hand arthritis 12/30/2024    Hypertension     Interstitial cystitis     Osteopenia     Squamous cell carcinoma     Stroke 7/10/2018    Symptomatic posterior vitreous detachment of left eye 12/24/2018    Vitreous hemorrhage, left 1/10/2019       Past Surgical History:   Procedure Laterality Date    BILATERAL SALPINGOOPHORECTOMY      with Hysterectomy    BONE RESECTION, RIB      right side    BREAST BIOPSY Right 2005    BREAST CYST ASPIRATION      BREAST LUMPECTOMY Left 2009    BREAST SURGERY      BRONCHOSCOPY N/A 11/3/2023    Procedure: Bronchoscopy;  [facility];  Location: [facility] OR [address] [place] [address];  Service: Anesthesiology;  Laterality: N/A;    CYSTOSCOPY N/A 7/20/2021    Procedure: CYSTOSCOPY;  Surgeon: Arabella Whatley MD;  Location: [facility] OR [address] [place] [address];  Service: Urology;  Laterality: N/A;  30 minutes     HERNIA REPAIR      HYSTERECTOMY       "TOTAL VAGINAL HYSTERECTOMY      Indication: Endometriosis    TRIGGER POINT INJECTION  7/20/2021    Procedure: INJECTION, TRIGGER POINTS  -ERWIN;  Surgeon: Arabella Whatley MD;  Location: [facility] OR [address] [place] [address];  Service: Urology;;       OBJECTIVE:   PHYSICAL EXAM:  Height: 4' 11" (149.9 cm)    Vitals:    05/19/25 1436   Height: 4' 11" (1.499 m)   PainSc: 10-Worst pain ever   PainLoc: Hand     Ortho/SPM Exam  Examination of the right hand there is tenderness along the flexor tendon sheath right middle finger mild triggering noted Tinel sign negative   Examination left hand demonstrates tenderness at the base of left thumb at the CMC joint   Positive grind test mild crepitation Tinel sign negative bony deformity noted   Left shoulder has positive impingement sign slight weakness no instability    RADIOGRAPHS:  None  Comments: I have personally reviewed the imaging and I agree with the above radiologist's report.    ASSESSMENT/PLAN:     IMPRESSION:  1. Triggering right middle finger.      2. CMC arthritis left thumb.      3. Possible rotator cuff tear left shoulder    PLAN:  For the left shoulder I have ordered MRI scan to check the rotator cuff   Follow up after the MRI is complete   For both hands we discussed options including injection versus surgery   She would like injection for both   After pause for time-out identified the right middle finger and left thumb base each area injected with combination Kenalog 20 mg 0.5 cc xylocaine sterile technique   Tolerated the procedure well without complication   Continue current medications including Celebrex    FOLLOW UP:  After the MRI is complete    Disclaimer: This note has been generated using voice-recognition software. There may be typographical errors that have been missed during proof-reading.          [1]   Current Outpatient Medications   Medication Sig Dispense Refill    acyclovir (ZOVIRAX) 400 MG tablet Take 1 tablet by mouth every 8 hours for 5 days as soon as " "early symptoms develop 70 tablet 2    albuterol (VENTOLIN HFA) 90 mcg/actuation inhaler Inhale 2 puffs into the lungs every 6 (six) hours as needed for Wheezing or Shortness of Breath (cough). Rescue 8.5 g 4    amLODIPine (NORVASC) 5 MG tablet TAKE 1 TABLET (5 MG) BY MOUTH EVERY EVENING 90 tablet 3    azelastine (ASTELIN) 137 mcg (0.1 %) nasal spray 1 spray (137 mcg total) by Nasal route 2 (two) times daily. 30 mL 3    celecoxib (CELEBREX) 200 MG capsule Take 1 capsule (200 mg total) by mouth daily as needed for Pain. 30 capsule 2    ciprofloxacin HCl (CILOXAN) 0.3 % ophthalmic solution Apply 1 drop of the solution under affected nail twice daily. 10 mL 2    cyanocobalamin 1,000 mcg/mL injection Inject 1ml into the muscle once every 60 days. 1 mL 3    ERGOCALCIFEROL, VITAMIN D2, (VITAMIN D ORAL) Take by mouth every morning.       fluticasone propionate (FLONASE) 50 mcg/actuation nasal spray 2 sprays (100 mcg total) by Each Nostril route once daily. 16 g 11    hyoscyamine (ANASPAZ,LEVSIN) 0.125 mg Tab Take 1 tablet (125 mcg total) by mouth 4 (four) times daily as needed for spasm 40 tablet 0    losartan (COZAAR) 50 MG tablet Take 1 tablet (50 mg total) by mouth every evening. 90 tablet 1    magnesium oxide (MAG-OX) 400 mg (241.3 mg magnesium) tablet Take 400 mg by mouth once daily.      methocarbamoL (ROBAXIN) 500 MG Tab Take 1 tablet (500 mg total) by mouth 3 (three) times daily. 60 tablet 1    metoprolol succinate (TOPROL-XL) 25 MG 24 hr tablet Take 1 tablet (25 mg total) by mouth every evening. 90 tablet 3    minoxidiL (LONITEN) 2.5 MG tablet Take 0.5 tablets (1.25 mg total) by mouth once daily. 45 tablet 3    syringe with needle 3 mL 25 gauge x 1" Syrg Use with B12 injections  as needed 5 each 1    tamsulosin (FLOMAX) 0.4 mg Cap Take 1 capsule (0.4 mg total) by mouth once daily. 30 capsule 0    triamcinolone acetonide 0.1% (KENALOG) 0.1 % paste Place onto affected area in mouth 2 (two) times daily as directed 5 g " 5    fexofenadine (ALLEGRA) 180 MG tablet Take 1 tablet (180 mg total) by mouth 2 (two) times daily. for 7 days  0     No current facility-administered medications for this visit.

## 2025-05-27 ENCOUNTER — HOSPITAL ENCOUNTER (OUTPATIENT)
Dept: RADIOLOGY | Facility: HOSPITAL | Age: 70
Discharge: HOME | End: 2025-05-27
Attending: ORTHOPAEDIC SURGERY
Payer: MEDICARE

## 2025-05-27 DIAGNOSIS — M25.512 CHRONIC LEFT SHOULDER PAIN: ICD-10-CM

## 2025-05-27 DIAGNOSIS — G89.29 CHRONIC LEFT SHOULDER PAIN: ICD-10-CM

## 2025-05-27 PROCEDURE — 73221 MRI JOINT UPR EXTREM W/O DYE: CPT | Mod: TC,LT

## 2025-05-27 PROCEDURE — 73221 MRI JOINT UPR EXTREM W/O DYE: CPT | Mod: 26,LT,, | Performed by: RADIOLOGY

## 2025-05-28 NOTE — PROGRESS NOTES
Audio Only Telehealth Visit    The patient location is: Home  The chief complaint leading to consultation is: left shoulder pain   Visit type: Virtual visit with audio only  Total time spent in medical discussion with patient: 21 minutes   Total time spent on date of the encounter: 29 minutes      The reason for the audio only service rather than synchronous audio and video virtual visit was related to technical difficulties or patient preference/necessity.    Each patient to whom I provide medical services by telemedicine is:  (1) informed of the relationship between the physician and patient and the respective role of any other health care provider with respect to management of the patient; and (2) notified that they may decline to receive medical services by telemedicine and may withdraw from such care at any time. Patient verbally consented to receive this service via voice-only telephone call.      HPI: Penny Cervantes is a 69 y.o. right hand dominant female who is following up for ongoing pain with her left shoulder. Patient was last seen by Dr. Pope on 5/19/2025 for this issue and an MRI was ordered. She denies a history of trauma, but symptoms have been present for >1 year. The pain is aggravated by overhead lifting and sleeping on the shoulder.  Associated symptoms include decreased shoulder mobility and strength.  Denies numbness, tingling, radiation. Previous treatments include NSAIDs (Celebrex), HEP (> 6 months), CSI, activity modifications with some relief.  She has received good relief with left shoulder injection in the past (last injection on 10/31/2024). Patient states overall her pain has been manageable. Today, I called the patient to discuss her recent MRI results.     Penny also continues to have ongoing issues with her bilateral hands. She reports left thumb pain (base of the thumb) and right middle finger triggering. She was last seen by Dr. Pope on 5/19/2025 for this issue and  corticosteroid injections were performed.  Patient reports good relief from trigger finger injection and states symptoms have resolved. Patient states her thumb continues to give her trouble. Her thumb is her primary concern today. The pain is aggravated by use of the hands and with .  She states she does a lot of gardening, and notices increase in symptoms with this. Associated symptoms include weakness with . Denies numbness or tingling. Patient is interested in further discussion for her thumb.       Assessment and Plan:    X-Rays: 3 views of left shoulder dated 10/22/2024, and independently reviewed, show: Mild degenerative change of the left glenohumeral joint. Acromioclavicular joint arthropathy. No acute fractures or dislocations.     MRI Left Shoulder dated 05/27/2025:   High-grade partial to full-thickness, full width tear of the supraspinatus footprint with 1.1 cm of medial fiber retraction.  No volume loss.   Infraspinatus tendinosis.  Biceps tendinosis.  AC joint arthrosis.  Subacromial/subdeltoid bursitis.    Orders Placed This Encounter    celecoxib (CELEBREX) 200 MG capsule    methocarbamoL (ROBAXIN) 500 MG Tab     I explained the nature of the problem to the patient.     I discussed at length with the patient all the different treatment options available for her left shoulder and left thumb including anti-inflammatories, acetaminophen, bracing, rest, ice, heat, physical therapy, and corticosteroid injections. I explained the potential role of surgery in the treatment of this condition. The patient understands that if non-surgical measures do not adequately control symptoms, surgery will be considered in the future.     I personally discussed this case and reviewed imaging with supervising physician, Dr. Pope. Dr. Pope and I discussed surgical options to include left shoulder arthroscopy, rotator cuff repair, and subacromial decompression. Patient states since her shoulder symptoms are  mild at this time, she would like to continue with conservative treatment.  Patient states she believes previously prescribed Celebrex and Robaxin have helped tremendously with shoulder symptoms.  She is not interested in surgical intervention at this time.    Patient states her primary concern today is the thumb.  I recommended patient obtain 3D brace to be worn with activities.  Also encouraged patient to apply heat to the area.  We discussed the possibility of occupational therapy however patient states she would like to hold off on this referral.  We briefly discussed possibility of CMC arthroplasty.     In the meantime I recommend the following for both the shoulder and the thumb:  Medications:  Continue with previously prescribed Celecoxib (Celebrex) 200 mg once daily and Robaxin 500 mg up to TID as needed for pain management. Refills provided today.   HEP:  Continue with previously instructed and demonstrated shoulder strengthening and ROM HEP.  DME:  Encouraged patient to obtain OTC 3D brace to be worn with activities.  Pain Management: Encouraged patient to apply heat compress to the affected area 2-3x a day for 15-20 minutes as needed for pain management.      Follow-Up: 4-6 weeks for follow up. for follow-up.     All of the patient's questions were answered and the patient will contact us if they have any questions or concerns in the interim.    Savanah Davies PA-C  [facility]  Orthopedic Surgery    Medical Dictation software was used during the dictation of portions or the entirety of this medical record.  Phonetic or grammatic errors may exist due to the use of this software. For clarification, refer to the author of the document.     This service was not originating from a related E/M service provided within the previous 7 days nor will  to an E/M service or procedure within the next 24 hours or my soonest available appointment.  Prevailing standard of care was able to be met in this  audio-only visit.

## 2025-05-29 ENCOUNTER — OFFICE VISIT (OUTPATIENT)
Dept: ORTHOPEDICS | Facility: CLINIC | Age: 70
End: 2025-05-29
Payer: MEDICARE

## 2025-05-29 DIAGNOSIS — G89.29 CHRONIC LEFT SHOULDER PAIN: Primary | ICD-10-CM

## 2025-05-29 DIAGNOSIS — G89.29 CHRONIC PAIN OF LEFT THUMB: ICD-10-CM

## 2025-05-29 DIAGNOSIS — M25.512 CHRONIC LEFT SHOULDER PAIN: Primary | ICD-10-CM

## 2025-05-29 DIAGNOSIS — M79.645 CHRONIC PAIN OF LEFT THUMB: ICD-10-CM

## 2025-05-29 DIAGNOSIS — M19.042 ARTHRITIS OF LEFT HAND: ICD-10-CM

## 2025-05-29 DIAGNOSIS — M75.122 NONTRAUMATIC COMPLETE TEAR OF LEFT ROTATOR CUFF: ICD-10-CM

## 2025-05-29 RX ORDER — CELECOXIB 200 MG/1
200 CAPSULE ORAL DAILY
Qty: 30 CAPSULE | Refills: 2 | Status: SHIPPED | OUTPATIENT
Start: 2025-05-29

## 2025-05-29 RX ORDER — METHOCARBAMOL 500 MG/1
500 TABLET, FILM COATED ORAL 3 TIMES DAILY
Qty: 90 TABLET | Refills: 1 | Status: SHIPPED | OUTPATIENT
Start: 2025-05-29

## 2025-06-12 ENCOUNTER — PATIENT MESSAGE (OUTPATIENT)
Dept: ORTHOPEDICS | Facility: CLINIC | Age: 70
End: 2025-06-12
Payer: MEDICARE

## 2025-06-18 NOTE — ADDENDUM NOTE
Addended by: REE CASTAÑEDA on: 10/19/2017 07:42 AM     Modules accepted: Orders     [facility]: Office Visit Established Patient    Chief Complaint:    Chief Complaint   Patient presents with    Follow-up         History of Present Illness:  Ms. Dumont is a 53 y.o. female who returns today for management of NOHEMY s/p gastric bypass in 2003.  She tried oral iron BID without improvement, and IV iron years prior at another facility which greatly improved her levels.  She received Injectafer October 2016 with improvement, then again in February 2018.     History of Present Illness  The patient, a 53-year-old female, presents for a 1-year follow-up appointment with a documented history of iron deficiency anemia, currently managed with oral iron replacement therapy.    She reports no adverse effects associated with the iron supplementation and has not experienced any episodes of hemorrhage. Her condition is routinely monitored by her primary care physician.       Review of Systems:  Constitutional: Negative.   HENT: Negative.   Eyes: Negative.   Respiratory: Negative.   Cardiovascular: Negative.   Gastrointestinal: Negative.   Genitourinary: Negative.   Musculoskeletal: Negative.   Skin: Negative.   Neurological: Negative.   Endo/Heme/Allergies: Negative.   Psychiatric/Behavioral: Negative.   All other systems reviewed and are negative.       Allergies   Allergen Reactions    Other Hives, Shortness Of Breath, Diarrhea, Itching, Swelling, Anxiety, Palpitations, Rash, Headaches, Cough and Dizziness or Vertigo     Fruits: Strawberries, apples, blueberries  Grass, Trees (every tree except pine tree), Weeds    Macadamia Nut Oil Swelling and Angioedema     Pistachio    Methotrexate Other (See Comments)     Elevated LFTs    Sulfasalazine Rash     Past Medical History:   Diagnosis Date    ADD (attention deficit disorder)     Anemia     denies h/o blood transfusions or iron transfusions, takes otc iron    Anxiety and depression     Calculus of kidney     Cervical radiculopathy     RUE

## 2025-06-26 ENCOUNTER — OFFICE VISIT (OUTPATIENT)
Dept: ORTHOPEDICS | Facility: CLINIC | Age: 70
End: 2025-06-26
Payer: MEDICARE

## 2025-06-26 VITALS — WEIGHT: 95.88 LBS | HEIGHT: 59 IN | BODY MASS INDEX: 19.33 KG/M2

## 2025-06-26 DIAGNOSIS — M79.645 CHRONIC PAIN OF LEFT THUMB: ICD-10-CM

## 2025-06-26 DIAGNOSIS — M18.12 ARTHRITIS OF CARPOMETACARPAL (CMC) JOINT OF LEFT THUMB: Primary | ICD-10-CM

## 2025-06-26 DIAGNOSIS — M19.042 ARTHRITIS OF LEFT HAND: ICD-10-CM

## 2025-06-26 DIAGNOSIS — M75.122 NONTRAUMATIC COMPLETE TEAR OF LEFT ROTATOR CUFF: ICD-10-CM

## 2025-06-26 DIAGNOSIS — M65.331 TRIGGER MIDDLE FINGER OF RIGHT HAND: ICD-10-CM

## 2025-06-26 DIAGNOSIS — G89.29 CHRONIC PAIN OF LEFT THUMB: ICD-10-CM

## 2025-06-26 DIAGNOSIS — G89.29 CHRONIC LEFT SHOULDER PAIN: ICD-10-CM

## 2025-06-26 DIAGNOSIS — M25.512 CHRONIC LEFT SHOULDER PAIN: ICD-10-CM

## 2025-06-26 PROBLEM — Z12.31 SCREENING MAMMOGRAM, ENCOUNTER FOR: Status: RESOLVED | Noted: 2020-07-28 | Resolved: 2025-06-26

## 2025-06-26 PROCEDURE — 99214 OFFICE O/P EST MOD 30 MIN: CPT | Mod: S$GLB,,,

## 2025-06-26 PROCEDURE — 1125F AMNT PAIN NOTED PAIN PRSNT: CPT | Mod: CPTII,S$GLB,,

## 2025-06-26 PROCEDURE — 3008F BODY MASS INDEX DOCD: CPT | Mod: CPTII,S$GLB,,

## 2025-06-26 PROCEDURE — 3288F FALL RISK ASSESSMENT DOCD: CPT | Mod: CPTII,S$GLB,,

## 2025-06-26 PROCEDURE — 1101F PT FALLS ASSESS-DOCD LE1/YR: CPT | Mod: CPTII,S$GLB,,

## 2025-06-26 PROCEDURE — 99999 PR PBB SHADOW E&M-EST. PATIENT-LVL IV: CPT | Mod: PBBFAC,,,

## 2025-06-26 PROCEDURE — 1159F MED LIST DOCD IN RCRD: CPT | Mod: CPTII,S$GLB,,

## 2025-06-26 PROCEDURE — 1160F RVW MEDS BY RX/DR IN RCRD: CPT | Mod: CPTII,S$GLB,,

## 2025-06-26 PROCEDURE — 4010F ACE/ARB THERAPY RXD/TAKEN: CPT | Mod: CPTII,S$GLB,,

## 2025-06-26 NOTE — PROGRESS NOTES
Subjective:      Patient ID: Penny Cervantes is a 69 y.o. female.    Chief Complaint: Pain of the Left Shoulder and Pain of the Left Hand      HPI: Penny Cervnates is a 69 y.o. right hand dominant female who presents to clinic for follow up of ongoing issues with her bilateral hands. She reports left thumb pain (CMC) and right middle finger triggering. She was last seen by Dr. Pope on 5/19/2025 for this issue and corticosteroid injections were performed.  Patient reports good initial relief from trigger finger injection, but states symptoms have started to return.  Patient reports minimal relief from CMC injection.  Patient states her thumb continues to give her trouble. The pain is aggravated by use of the hands and with .  She states she does a lot of gardening, and notices increase in symptoms with this. Associated symptoms include weakness with . Denies numbness or tingling. Previous treatments include NSAIDs (Celebrex), Robaxin, HEP, CSI, activity modifications, and ice with minimal relief.     Penny is also following up for ongoing left shoulder pain. Patient was last seen by myself via virtual visit on 5/29/2025 for this and MRI results were discussed.  She denies a history of trauma, but symptoms have been present for >1 year.  MRI showed:  RTC tear. The pain is aggravated by overhead lifting and sleeping on the shoulder.  Associated symptoms include decreased shoulder mobility and strength.  Denies numbness, tingling, radiation. Previous treatments include NSAIDs (Celebrex), Robaxin, HEP, CSI, activity modifications with some relief.  She has received good relief with left shoulder injection in the past (last injection on 10/31/2024). Patient states overall her pain has been manageable.     PAST MEDICAL HISTORY:    Past Medical History:   Diagnosis Date    Benign positional vertigo     Breast cancer 2009    s/p chemo XRT, left breast triple negative    Coronary artery disease, non-occlusive      cath in 2012    Hand arthritis 12/30/2024    Hypertension     Interstitial cystitis     Osteopenia     Squamous cell carcinoma     Stroke 7/10/2018    Symptomatic posterior vitreous detachment of left eye 12/24/2018    Vitreous hemorrhage, left 1/10/2019     MEDICATIONS:   Current Medications[1]    ALLERGIES:   Review of patient's allergies indicates:   Allergen Reactions    Demerol [meperidine] Other (See Comments)     headache    Zofran [ondansetron hcl] Nausea Only     Bloating, abdominal pain    Statins-hmg-coa reductase inhibitors Other (See Comments)     Muscle pain     Pyridium [phenazopyridine] Nausea Only       Review of Systems:  Constitution: Negative for chills, fever and night sweats.   HENT: Negative for congestion and headaches.    Eyes: Negative for blurred vision or vision loss.  Cardiovascular: Negative for chest pain and syncope.   Respiratory: Negative for cough and shortness of breath.    Endocrine: Negative for polydipsia, polyphagia and polyuria.   Hematologic/Lymphatic: Negative for bleeding problem. Does not bruise/bleed easily.   Skin: Negative for dry skin, itching and rash.   Musculoskeletal: See HPI.   Gastrointestinal: Negative for abdominal pain and bowel incontinence.   Genitourinary: Negative for bladder incontinence and nocturia.   Neurological: Negative for disturbances in coordination, loss of balance and seizures.   Psychiatric/Behavioral: Negative for depression. The patient does not have insomnia.    Allergic/Immunologic: Negative for hives and persistent infections.          Objective:      There were no vitals filed for this visit.    PHYSICAL EXAM:  General: Alert & oriented x3, well-developed and well-nourished, in no acute distress, sitting comfortably in the exam room.  Skin: Warm and dry. Capillary refill less than 2 seconds.   Head: Normocephalic and atraumatic.   Eyes: Sclera appear normal.   Nose: No deformities seen.   Ears: No deformities seen.   Neck: No  tracheal deviation present.   Pulmonary/Chest: Breathing unlabored.   Neurological: Alert and oriented to person, place, and time.   Psychiatric: Mood is pleasant and affect appropriate.     LEFT HAND/WRIST:  Observation/Inspection:    Diffuse bony deformity/bulky joints throughout the hands.  Palpation:    Tenderness to palpation to the base of the thumb.   Otherwise, negative tenderness to palpation to bony prominences and soft tissues throughout.  Range of Motion:   Wrist: Full to wrist flexion, extension, radial, and ulnar deviation without pain or difficulty.   Digits: Full to 2nd-5th digit MCP, PIP, and DIP flexion and extension without pain or difficulty.  Limited to thumb flexion.    Limited to thumb opposition due to pain.  Mild crepitation of thumb with ROM.  Special Tests:   Finkelstein's Test  Negative    Tinel's Test - Carpal Tunnel Negative    Tinel's Test - Cubital Tunnel Negative    CMC Grind   Positive    Neurovascular Exam:   Digits warm and well perfused, brisk capillary refill <3 seconds throughout.  NVI motor/LTS to median, radial, and ulnar nerves, radial pulse 2+.    RIGHT HAND/WRIST:  Observation/Inspection:   Diffuse bony deformity/bulky joints throughout the hands.  Palpation:    Mild tenderness to palpation along the flexor tendon sheath right middle finger.   Otherwise, negative tenderness to palpation to bony prominences and soft tissues throughout.  Range of Motion:   Wrist: Full to wrist flexion, extension, radial, and ulnar deviation without pain or difficulty.   Digits: Full to digit MCP, PIP, and DIP flexion and extension.  Mild triggering right middle finger.    Special Tests:   Finkelstein's Test  Negative    Tinel's Test - Carpal Tunnel Negative    Tinel's Test - Cubital Tunnel Negative    Neurovascular Exam:   Digits warm and well perfused, brisk capillary refill <3 seconds throughout.  NVI motor/LTS to median, radial, and ulnar nerves, radial pulse 2+.    Imaging:   X-Rays: 3  views of bilateral hand dated 10/22/2024, and independently reviewed, show: Degenerative changes of the left 1st CMC joint and left thumb IP joint. No acute fractures or dislocations.          Assessment:       1. Arthritis of carpometacarpal (CMC) joint of left thumb    2. Chronic left shoulder pain    3. Chronic pain of left thumb    4. Nontraumatic complete tear of left rotator cuff    5. Trigger middle finger of right hand    6. Arthritis of left hand        Plan:          I explained the nature of the problem to the patient.     I discussed at length with the patient all the different treatment options available for her bilateral hands and left shoulder including anti-inflammatories, acetaminophen, rest, ice, heat, physical/occupational therapy, and corticosteroid injections. I explained the potential role of surgery in the treatment of this condition. The patient understands that if non-surgical measures do not adequately control symptoms, surgery will be considered in the future.     I personally discussed this case and reviewed imaging with supervising physician, Dr. Pope. Dr. Pope and I discussed surgical options to include left shoulder arthroscopy, rotator cuff repair, and subacromial decompression. Patient states since her shoulder symptoms are mild at this time, she would like to continue with conservative treatment.  Patient states she believes previously prescribed Celebrex and Robaxin have helped tremendously with shoulder symptoms.  She is not interested in surgical intervention at this time.    Patient states her primary concern today is the left thumb.  We again discussed conservative versus surgical treatments for her thumb.  Patient states the pain is quite severe and she is likely going to want surgery soon.  Patient states she would like to try applying heat and use of the 3D brace for 1-2 weeks.  If patient does not notice improvement in symptoms, she will reach out via the [facility]  portal to discuss surgery date.  I am in agreement with this plan.    Medications:  Continue with previously prescribed Celecoxib (Celebrex) 200 mg once daily and Robaxin 500 mg up to TID as needed for pain management.   HEP:  Continue with previously instructed and demonstrated shoulder strengthening and ROM HEP.  DME:  Left 3D brace provided today for patient to wear as needed with activities.  Pain Management: Encouraged patient to apply heat compress to the affected area 2-3x a day for 15-20 minutes as needed for pain management.      Follow-Up:  Patient will message via [facility] portal if she would like to proceed with surgery.  If patient is interested in surgery, I will call her with our next available dates.    All of the patient's questions were answered and the patient will contact us if they have any questions or concerns in the interim.    Savanah Davies PA-C  [facility]  Orthopedic Surgery    Medical Dictation software was used during the dictation of portions or the entirety of this medical record.  Phonetic or grammatic errors may exist due to the use of this software. For clarification, refer to the author of the document.             [1]   Current Outpatient Medications:     acyclovir (ZOVIRAX) 400 MG tablet, Take 1 tablet by mouth every 8 hours for 5 days as soon as early symptoms develop, Disp: 70 tablet, Rfl: 2    albuterol (VENTOLIN HFA) 90 mcg/actuation inhaler, Inhale 2 puffs into the lungs every 6 (six) hours as needed for Wheezing or Shortness of Breath (cough). Rescue, Disp: 8.5 g, Rfl: 4    amLODIPine (NORVASC) 5 MG tablet, TAKE 1 TABLET (5 MG) BY MOUTH EVERY EVENING, Disp: 90 tablet, Rfl: 3    azelastine (ASTELIN) 137 mcg (0.1 %) nasal spray, 1 spray (137 mcg total) by Nasal route 2 (two) times daily., Disp: 30 mL, Rfl: 3    celecoxib (CELEBREX) 200 MG capsule, Take 1 capsule (200 mg total) by mouth once daily., Disp: 30 capsule, Rfl: 2    ciprofloxacin HCl (CILOXAN) 0.3 %  "ophthalmic solution, Apply 1 drop of the solution under affected nail twice daily., Disp: 10 mL, Rfl: 2    cyanocobalamin 1,000 mcg/mL injection, Inject 1ml into the muscle once every 60 days., Disp: 1 mL, Rfl: 3    ERGOCALCIFEROL, VITAMIN D2, (VITAMIN D ORAL), Take by mouth every morning. , Disp: , Rfl:     fexofenadine (ALLEGRA) 180 MG tablet, Take 1 tablet (180 mg total) by mouth 2 (two) times daily. for 7 days, Disp: , Rfl: 0    fluticasone propionate (FLONASE) 50 mcg/actuation nasal spray, 2 sprays (100 mcg total) by Each Nostril route once daily., Disp: 16 g, Rfl: 11    hyoscyamine (ANASPAZ,LEVSIN) 0.125 mg Tab, Take 1 tablet (125 mcg total) by mouth 4 (four) times daily as needed for spasm, Disp: 40 tablet, Rfl: 0    losartan (COZAAR) 50 MG tablet, Take 1 tablet (50 mg total) by mouth every evening., Disp: 90 tablet, Rfl: 1    magnesium oxide (MAG-OX) 400 mg (241.3 mg magnesium) tablet, Take 400 mg by mouth once daily., Disp: , Rfl:     methocarbamoL (ROBAXIN) 500 MG Tab, Take 1 tablet (500 mg total) by mouth 3 (three) times daily., Disp: 90 tablet, Rfl: 1    metoprolol succinate (TOPROL-XL) 25 MG 24 hr tablet, Take 1 tablet (25 mg total) by mouth every evening., Disp: 90 tablet, Rfl: 3    minoxidiL (LONITEN) 2.5 MG tablet, Take 0.5 tablets (1.25 mg total) by mouth once daily., Disp: 45 tablet, Rfl: 3    syringe with needle 3 mL 25 gauge x 1" Syrg, Use with B12 injections  as needed, Disp: 5 each, Rfl: 1    tamsulosin (FLOMAX) 0.4 mg Cap, Take 1 capsule (0.4 mg total) by mouth once daily., Disp: 30 capsule, Rfl: 0    triamcinolone acetonide 0.1% (KENALOG) 0.1 % paste, Place onto affected area in mouth 2 (two) times daily as directed, Disp: 5 g, Rfl: 5    "

## 2025-06-26 NOTE — PROGRESS NOTES
"  Penny Cervantes presented for a  Medicare AWV and comprehensive Health Risk Assessment today. The following components were reviewed and updated:    Medical history  Family History  Social history  Allergies and Current Medications  Health Risk Assessment  Health Maintenance  Care Team         ** See Completed Assessments for Annual Wellness Visit within the encounter summary.**         The following assessments were completed:  Living Situation  CAGE  Depression Screening  Timed Get Up and Go  Whisper Test  Cognitive Function Screening    Nutrition Screening  ADL Screening  PAQ Screening      Opioid documentation for eAWV      Patient does not have a current opioid prescription.        Review for Substance Use Disorders: Patient does not use substance      Current Medications[1]       Vitals:    06/27/25 1010   BP: 124/62   Pulse: 74   SpO2: 99%   Weight: 44 kg (97 lb)   Height: 4' 1" (1.245 m)   PainSc:   3   PainLoc: Finger      Physical Exam  Vitals reviewed.   Constitutional:       General: She is not in acute distress.     Appearance: Normal appearance. She is not ill-appearing.   HENT:      Head: Normocephalic and atraumatic.      Mouth/Throat:      Mouth: Mucous membranes are moist.   Eyes:      General: No scleral icterus.        Right eye: No discharge.         Left eye: No discharge.      Extraocular Movements: Extraocular movements intact.      Conjunctiva/sclera: Conjunctivae normal.      Comments: +glasses   Cardiovascular:      Rate and Rhythm: Normal rate.   Pulmonary:      Effort: Pulmonary effort is normal. No respiratory distress.   Musculoskeletal:      Cervical back: Normal range of motion.   Skin:     General: Skin is warm and dry.   Neurological:      Mental Status: She is alert and oriented to person, place, and time.   Psychiatric:         Mood and Affect: Mood normal.         Behavior: Behavior normal. Behavior is cooperative.         Cognition and Memory: Cognition and memory normal.          "      Diagnoses and health risks identified today and associated recommendations/orders:    1. Encounter for Medicare annual wellness exam  - Chart reviewed. Problem list updated. Discussed current medical diagnosis, current medications, medical/surgical/family/social history; updated provider list; documented vital signs; identified any cognitive impairment; and updated risk factor list. Addressed any outstanding health maintenance. Provided patient with personalized health advice. Continue to follow up with PCP and any specialists.   - Referral to Enhanced Annual Wellness Visit (eAWV) W+1    2. Aortic atherosclerosis  Chronic; statin intolerant; not on statin; stable on current treatment plan; follow up with PCP    3. Mixed simple and mucopurulent chronic bronchitis  Chronic; stable on current plan ; follow up with pulmonology; continue allergy medications; albuterol PRN    4. Statin intolerance  Chronic; statin intolerant; not currently on statin ; follow up with pcp     5. Osteopenia of multiple sites  Chronic; due for dexa now; order placed; patient agrees to dexa;   - consider calcium and vit d supplements; follow up with pcp     6. Chronic right-sided low back pain without sciatica  Chronic; continue  NSAIDS PRN; follow up with pcp and pain medicine     7. Hair loss  Chronic; continue current treatment plan ; follow up with dermatology     8. Post-menopausal  - due for dexa, agrees to order; order placed   - DXA Bone Density Axial Skeleton 1 or more sites; Future    9. Screening for colon cancer  Due for colon cancer screening; recommended colonoscopy, patient declined; she agreed to repeat cologuard; order placed  - Cologuard Screening (Multitarget Stool DNA); Future  - Cologuard Screening (Multitarget Stool DNA)    10. BMI 28.0-28.9,adult  - Recommendation for healthy diet and increasing exercise as tolerated with goal of 150min/week       Provided Penny with a 5-10 year written screening schedule and  personal prevention plan. Recommendations were developed using the USPSTF age appropriate recommendations. Education, counseling, and referrals were provided as needed. After Visit Summary printed and given to patient which includes a list of additional screenings\tests needed.    Follow up in about 1 year (around 6/27/2026) for your next medicare wellness visit.    Nicole Chase, ARMANIP-C    Advance Care Planning     I offered to discuss advanced care planning, including how to pick a person who would make decisions for you if you were unable to make them for yourself, called a health care power of , and what kind of decisions you might make such as use of life sustaining treatments such as ventilators and tube feeding when faced with a life limiting illness recorded on a living will that they will need to know. (How you want to be cared for as you near the end of your natural life)     X Patient is interested in learning more about how to make advanced directives.  I provided them paperwork and offered to discuss this with them.       [1]   Current Outpatient Medications:     acyclovir (ZOVIRAX) 400 MG tablet, Take 1 tablet by mouth every 8 hours for 5 days as soon as early symptoms develop, Disp: 70 tablet, Rfl: 2    albuterol (VENTOLIN HFA) 90 mcg/actuation inhaler, Inhale 2 puffs into the lungs every 6 (six) hours as needed for Wheezing or Shortness of Breath (cough). Rescue, Disp: 8.5 g, Rfl: 4    amLODIPine (NORVASC) 5 MG tablet, TAKE 1 TABLET (5 MG) BY MOUTH EVERY EVENING, Disp: 90 tablet, Rfl: 3    azelastine (ASTELIN) 137 mcg (0.1 %) nasal spray, 1 spray (137 mcg total) by Nasal route 2 (two) times daily., Disp: 30 mL, Rfl: 3    celecoxib (CELEBREX) 200 MG capsule, Take 1 capsule (200 mg total) by mouth once daily., Disp: 30 capsule, Rfl: 2    cyanocobalamin 1,000 mcg/mL injection, Inject 1ml into the muscle once every 60 days., Disp: 1 mL, Rfl: 3    fexofenadine (ALLEGRA) 180 MG tablet, Take 1  "tablet (180 mg total) by mouth 2 (two) times daily. for 7 days, Disp: , Rfl: 0    fluticasone propionate (FLONASE) 50 mcg/actuation nasal spray, 2 sprays (100 mcg total) by Each Nostril route once daily., Disp: 16 g, Rfl: 11    hyoscyamine (ANASPAZ,LEVSIN) 0.125 mg Tab, Take 1 tablet (125 mcg total) by mouth 4 (four) times daily as needed for spasm, Disp: 40 tablet, Rfl: 0    losartan (COZAAR) 50 MG tablet, Take 1 tablet (50 mg total) by mouth every evening., Disp: 90 tablet, Rfl: 1    MAGNESIUM GLYCINATE ORAL, Take by mouth., Disp: , Rfl:     methocarbamoL (ROBAXIN) 500 MG Tab, Take 1 tablet (500 mg total) by mouth 3 (three) times daily., Disp: 90 tablet, Rfl: 1    metoprolol succinate (TOPROL-XL) 25 MG 24 hr tablet, Take 1 tablet (25 mg total) by mouth every evening., Disp: 90 tablet, Rfl: 3    minoxidiL (LONITEN) 2.5 MG tablet, Take 0.5 tablets (1.25 mg total) by mouth once daily., Disp: 45 tablet, Rfl: 3    polyethylene glycol (MIRALAX) 17 gram PwPk, Take 17 g by mouth daily as needed for Constipation., Disp: , Rfl:     syringe with needle 3 mL 25 gauge x 1" Syrg, Use with B12 injections  as needed, Disp: 5 each, Rfl: 1    triamcinolone acetonide 0.1% (KENALOG) 0.1 % paste, Place onto affected area in mouth 2 (two) times daily as directed, Disp: 5 g, Rfl: 5    "

## 2025-06-26 NOTE — PATIENT INSTRUCTIONS
The recommended surgical intervention would be CMC arthroplasty with tight rope (suspensionplasty) if you would like to look into this further.

## 2025-06-27 ENCOUNTER — OFFICE VISIT (OUTPATIENT)
Dept: FAMILY MEDICINE | Facility: CLINIC | Age: 70
End: 2025-06-27
Payer: MEDICARE

## 2025-06-27 VITALS
DIASTOLIC BLOOD PRESSURE: 62 MMHG | OXYGEN SATURATION: 99 % | BODY MASS INDEX: 22.45 KG/M2 | WEIGHT: 97 LBS | HEIGHT: 55 IN | SYSTOLIC BLOOD PRESSURE: 124 MMHG | HEART RATE: 74 BPM

## 2025-06-27 DIAGNOSIS — Z78.9 STATIN INTOLERANCE: ICD-10-CM

## 2025-06-27 DIAGNOSIS — G89.29 CHRONIC RIGHT-SIDED LOW BACK PAIN WITHOUT SCIATICA: ICD-10-CM

## 2025-06-27 DIAGNOSIS — J41.8 MIXED SIMPLE AND MUCOPURULENT CHRONIC BRONCHITIS: ICD-10-CM

## 2025-06-27 DIAGNOSIS — M85.89 OSTEOPENIA OF MULTIPLE SITES: ICD-10-CM

## 2025-06-27 DIAGNOSIS — M54.50 CHRONIC RIGHT-SIDED LOW BACK PAIN WITHOUT SCIATICA: ICD-10-CM

## 2025-06-27 DIAGNOSIS — L65.9 HAIR LOSS: ICD-10-CM

## 2025-06-27 DIAGNOSIS — Z12.11 SCREENING FOR COLON CANCER: ICD-10-CM

## 2025-06-27 DIAGNOSIS — Z78.0 POST-MENOPAUSAL: ICD-10-CM

## 2025-06-27 DIAGNOSIS — I70.0 AORTIC ATHEROSCLEROSIS: ICD-10-CM

## 2025-06-27 DIAGNOSIS — Z00.00 ENCOUNTER FOR MEDICARE ANNUAL WELLNESS EXAM: Primary | ICD-10-CM

## 2025-06-27 PROCEDURE — 99999 PR PBB SHADOW E&M-EST. PATIENT-LVL V: CPT | Mod: PBBFAC,,, | Performed by: NURSE PRACTITIONER

## 2025-06-27 RX ORDER — POLYETHYLENE GLYCOL 3350 17 G/17G
17 POWDER, FOR SOLUTION ORAL DAILY PRN
COMMUNITY

## 2025-06-27 NOTE — PATIENT INSTRUCTIONS
[facility] DEPARTMENT  You should receive a gift card from [employer] for completing the medicare wellness visit. You can try calling # [phone] ([facility] Enhanced Medical Decisions department) to ask about your giftcard. Please wait 1 week to call to ensure that [employer] has received documentation for proof of medicare visit.           Hi Penny,     If you are due for any health screening(s) below please notify me so we can arrange them to be ordered and scheduled. Most healthy patients at your age complete them, but you are free to accept or refuse.     If you can't do it, I'll definitely understand. If you can, I'd certainly appreciate it!    All of your core healthy metrics are met.                           Counseling and Referral of Other Preventative  (Italic type indicates deductible and co-insurance are waived)    Patient Name: Penny Cervantes  Today's Date: 6/27/2025    Health Maintenance         Date Due Completion Date    DEXA Scan 11/07/2024 11/7/2022    Shingles Vaccine (2 of 3) 07/02/2025 (Originally 12/20/2012) 10/25/2012    RSV Vaccine (Age 60+ and Pregnant patients) (1 - Risk 60-74 years 1-dose series) 07/02/2025 (Originally 10/19/2016) ---    High Dose Statin 07/03/2025 (Originally 10/19/1977) ---    TETANUS VACCINE 06/27/2026 (Originally 10/19/1974) ---    Pneumococcal Vaccines (Age 50+) (1 of 1 - PCV) 06/27/2026 (Originally 10/19/2006) ---    Colorectal Cancer Screening 10/31/2025 10/31/2022    Mammogram 11/25/2025 11/25/2024    Hemoglobin A1c (Diabetic Prevention Screening) 02/20/2027 2/20/2024    Lipid Panel 10/15/2029 10/15/2024          Orders Placed This Encounter   Procedures    Cologuard Screening (Multitarget Stool DNA)    DXA Bone Density Axial Skeleton 1 or more sites     The following information is provided to all patients.  This information is to help you find resources for any of the problems found today that may be affecting your health:                  Living  healthy guide: www.[facility].[place].[facility]      Understanding Diabetes: www.diabetes.org      Eating healthy: www.cdc.gov/healthyweight      CDC home safety checklist: www.cdc.gov/steadi/patient.html      Agency on Aging: www.goea.[place].[facility]      Alcoholics anonymous (AA): www.aa.org      Physical Activity: www.walalce.nih.gov/cu9gaqz      Tobacco use: www.quitwithusla.org

## 2025-06-30 ENCOUNTER — HOSPITAL ENCOUNTER (OUTPATIENT)
Dept: RADIOLOGY | Facility: HOSPITAL | Age: 70
Discharge: HOME | End: 2025-06-30
Attending: NURSE PRACTITIONER
Payer: MEDICARE

## 2025-06-30 ENCOUNTER — RESULTS FOLLOW-UP (OUTPATIENT)
Dept: FAMILY MEDICINE | Facility: CLINIC | Age: 70
End: 2025-06-30

## 2025-06-30 DIAGNOSIS — Z78.0 POST-MENOPAUSAL: ICD-10-CM

## 2025-06-30 PROCEDURE — 77080 DXA BONE DENSITY AXIAL: CPT | Mod: 26,,, | Performed by: RADIOLOGY

## 2025-06-30 PROCEDURE — 77080 DXA BONE DENSITY AXIAL: CPT | Mod: TC

## 2025-07-07 DIAGNOSIS — I10 ESSENTIAL HYPERTENSION: ICD-10-CM

## 2025-07-07 NOTE — TELEPHONE ENCOUNTER
No care due was identified.  [facility] Embedded Care Due Messages. Reference number: [id number].   7/07/2025 11:38:58 AM CDT

## 2025-07-08 ENCOUNTER — OFFICE VISIT (OUTPATIENT)
Dept: INTERNAL MEDICINE | Facility: CLINIC | Age: 70
End: 2025-07-08
Payer: MEDICARE

## 2025-07-08 VITALS
HEIGHT: 55 IN | TEMPERATURE: 98 F | HEART RATE: 58 BPM | BODY MASS INDEX: 23.02 KG/M2 | SYSTOLIC BLOOD PRESSURE: 120 MMHG | RESPIRATION RATE: 18 BRPM | OXYGEN SATURATION: 99 % | WEIGHT: 99.44 LBS | DIASTOLIC BLOOD PRESSURE: 72 MMHG

## 2025-07-08 DIAGNOSIS — N64.4 BREAST PAIN: Primary | ICD-10-CM

## 2025-07-08 DIAGNOSIS — N63.0 MULTIPLE BENIGN LUMPS OF BREAST: ICD-10-CM

## 2025-07-08 PROCEDURE — 99213 OFFICE O/P EST LOW 20 MIN: CPT | Mod: S$GLB,,, | Performed by: INTERNAL MEDICINE

## 2025-07-08 PROCEDURE — 3074F SYST BP LT 130 MM HG: CPT | Mod: CPTII,S$GLB,, | Performed by: INTERNAL MEDICINE

## 2025-07-08 PROCEDURE — 1101F PT FALLS ASSESS-DOCD LE1/YR: CPT | Mod: CPTII,S$GLB,, | Performed by: INTERNAL MEDICINE

## 2025-07-08 PROCEDURE — 4010F ACE/ARB THERAPY RXD/TAKEN: CPT | Mod: CPTII,S$GLB,, | Performed by: INTERNAL MEDICINE

## 2025-07-08 PROCEDURE — 3008F BODY MASS INDEX DOCD: CPT | Mod: CPTII,S$GLB,, | Performed by: INTERNAL MEDICINE

## 2025-07-08 PROCEDURE — 3078F DIAST BP <80 MM HG: CPT | Mod: CPTII,S$GLB,, | Performed by: INTERNAL MEDICINE

## 2025-07-08 PROCEDURE — 99999 PR PBB SHADOW E&M-EST. PATIENT-LVL V: CPT | Mod: PBBFAC,,, | Performed by: INTERNAL MEDICINE

## 2025-07-08 PROCEDURE — 3288F FALL RISK ASSESSMENT DOCD: CPT | Mod: CPTII,S$GLB,, | Performed by: INTERNAL MEDICINE

## 2025-07-08 PROCEDURE — 1160F RVW MEDS BY RX/DR IN RCRD: CPT | Mod: CPTII,S$GLB,, | Performed by: INTERNAL MEDICINE

## 2025-07-08 PROCEDURE — 1126F AMNT PAIN NOTED NONE PRSNT: CPT | Mod: CPTII,S$GLB,, | Performed by: INTERNAL MEDICINE

## 2025-07-08 PROCEDURE — 1159F MED LIST DOCD IN RCRD: CPT | Mod: CPTII,S$GLB,, | Performed by: INTERNAL MEDICINE

## 2025-07-08 RX ORDER — LOSARTAN POTASSIUM 50 MG/1
50 TABLET ORAL NIGHTLY
Qty: 90 TABLET | Refills: 1 | Status: SHIPPED | OUTPATIENT
Start: 2025-07-08

## 2025-07-08 NOTE — PROGRESS NOTES
Subjective:     Penny Cervantes is a 69 y.o. female who presents for   Chief Complaint   Patient presents with    Breast Pain     left    Hand Pain    Knee Pain       HPI    Breast Pain: Ms. Cervantes has a history of left breast cancer that was treated in 2009 with chemo and radiation and she now reports pain in her left breast. The pain started a few days ago and has worsened.  She denies any recent injuries or falls.  She has not noticed any skin changes.  She does report a nodular area of tenderness in the left breast.  She has not taken any medication to reduce the pain.    Left hand pain--> wears a brace and will see Orthopedics soon.      Review of Systems   Constitutional:  Negative for chills, diaphoresis, fatigue and fever.   HENT:  Negative for congestion, ear pain, sinus pressure and sore throat.    Eyes:  Negative for discharge and redness.   Respiratory:  Negative for cough and shortness of breath.    Cardiovascular:  Negative for chest pain, palpitations and leg swelling.   Gastrointestinal:  Negative for abdominal pain, constipation, diarrhea, nausea and vomiting.   Genitourinary:         Intermittent pain in the left breast.  The patient felt a nodule in the breast.   Musculoskeletal:  Positive for arthralgias (shoulder and knee pain has improved, left hand pain continues and she will see Orthopedics soon). Negative for myalgias.   Skin:  Negative for rash and wound.   Neurological:  Negative for dizziness, tremors, numbness and headaches.          Objective:     Physical Exam  Vitals reviewed.   Constitutional:       General: She is awake. She is not in acute distress.     Appearance: Normal appearance. She is well-developed and well-groomed.   HENT:      Head: Normocephalic and atraumatic.      Right Ear: Hearing and external ear normal.      Left Ear: Hearing and external ear normal.      Nose: Nose normal. No congestion.      Mouth/Throat:      Mouth: Mucous membranes are moist.   Eyes:       General: Lids are normal. Vision grossly intact.   Cardiovascular:      Rate and Rhythm: Normal rate and regular rhythm.      Heart sounds: Normal heart sounds. No murmur heard.  Pulmonary:      Effort: Pulmonary effort is normal.      Breath sounds: Normal breath sounds. No decreased breath sounds or wheezing.   Chest:      Chest wall: No mass, tenderness or edema.   Breasts:     Right: Mass and tenderness present. No swelling, nipple discharge or skin change.      Left: Mass and tenderness present. No swelling, nipple discharge or skin change.      Comments: Mass palpated in left breast near the right upper inner quadrant of the breast, + ttp, second area in upper outer quadrant of the breast near the lateral margin of the beast near the anterior axillary line. There is also a nodular area near the inferior inner and outer quadrant of the right breast with +ttp  Abdominal:      General: Bowel sounds are normal. There is no distension.   Musculoskeletal:         General: Normal range of motion.      Cervical back: Normal range of motion.      Right lower leg: No edema.      Left lower leg: No edema.   Skin:     General: Skin is warm and dry.      Findings: No lesion or rash.   Neurological:      Mental Status: She is alert and oriented to person, place, and time.   Psychiatric:         Attention and Perception: Attention normal.         Mood and Affect: Mood normal.         Behavior: Behavior is cooperative.            Assessment:      1. Breast pain    2. Multiple benign lumps of breast           Plan:     1. Breast pain/ Multiple benign lumps of breast  - patient is concerned due to history of left breast cancer  - will order diagnostic mammogram and ultrasound  - US Breast Bilateral Limited; Future  - may use Tylenol as needed for pain    Call if there is no improvement in symptoms    __________________________    Kate Gil MD, PharmD  [facility]- Internal Medicine  American Board of  Obesity Medicine diplomate  Office [phone]

## 2025-07-08 NOTE — TELEPHONE ENCOUNTER
Refill Routing Note   Medication(s) are not appropriate for processing by [facility] for the following reason(s):        Drug-disease interaction    [facility] action(s):  Defer        Medication Therapy Plan: Drug-Disease: losartan and Abnormal CT of liver; DEFER      Appointments  past 12m or future 3m with PCP    Date Provider   Last Visit   4/22/2025 Kate Gil MD   Next Visit   7/8/2025 Kate Gil MD   ED visits in past 90 days: 0        Note composed:5:46 AM 07/08/2025

## 2025-07-14 ENCOUNTER — HOSPITAL ENCOUNTER (OUTPATIENT)
Dept: RADIOLOGY | Facility: HOSPITAL | Age: 70
Discharge: HOME | End: 2025-07-14
Attending: INTERNAL MEDICINE
Payer: MEDICARE

## 2025-07-14 DIAGNOSIS — N64.4 BREAST PAIN: ICD-10-CM

## 2025-07-14 DIAGNOSIS — N63.0 MULTIPLE BENIGN LUMPS OF BREAST: ICD-10-CM

## 2025-07-14 PROCEDURE — 76642 ULTRASOUND BREAST LIMITED: CPT | Mod: TC,50

## 2025-07-14 PROCEDURE — 77066 DX MAMMO INCL CAD BI: CPT | Mod: TC

## 2025-07-23 ENCOUNTER — PATIENT MESSAGE (OUTPATIENT)
Dept: ORTHOPEDICS | Facility: CLINIC | Age: 70
End: 2025-07-23
Payer: MEDICARE

## 2025-07-23 NOTE — PROGRESS NOTES
Audio Only Telehealth Visit    The patient location is: Home ([place])  The chief complaint leading to consultation is: right middle finger pain   Visit type: Audio Only  Total time spent in medical discussion with patient: 13 minutes  Total time spent on date of the encounter: 19 minutes      The reason for the audio only service rather than synchronous audio and video virtual visit was related to technical difficulties or patient preference/necessity.    Each patient to whom I provide medical services by telemedicine is:  (1) informed of the relationship between the physician and patient and the respective role of any other health care provider with respect to management of the patient; and (2) notified that they may decline to receive medical services by telemedicine and may withdraw from such care at any time. Patient verbally consented to receive this service via voice-only telephone call.      HPI: Penny Cervantes is a 69 y.o. right hand dominant female who is following up for ongoing pain with her right middle finger. Patient was last seen by myself on 6/26/2025 for this issue. She denies a history of trauma, but symptoms have been present for several months. The pain is aggravated by flexion of the digit and use of the hand. Denies numbness or tingling. Previous treatments include CSI, NSAIDs (Celebrex), Robaxin, HEP, activity modifications, ice without much relief.  The pain is affecting ADLs and limiting desired level of activity.  Patient states she is ready to discuss surgical intervention for her right middle finger trigger finger.  Today, I called the patient to discuss her symptoms and progress.     Patient does continue to have ongoing issues with her left thumb (CMC).  Patient states her thumb continues to give her trouble however she is not ready to proceed with surgery for this. The pain is aggravated by use of the hands and with .  She states she does a lot of gardening, and notices  increase in symptoms with this. Associated symptoms include weakness with . Denies numbness or tingling. Previous treatments include NSAIDs (Celebrex), Robaxin, HEP, CSI, activity modifications, and ice with minimal relief.       Assessment and Plan:    Orders Placed This Encounter    Case Request Operating Room: RELEASE, TRIGGER FINGER     I explained the nature of the problem to the patient.     I discussed at length with the patient all the different treatment options available for her right middle finger including anti-inflammatories, acetaminophen, bracing, rest, ice, heat, physical therapy, and corticosteroid injections. I explained the potential role of surgery in the treatment of this condition. The patient understands that if non-surgical measures do not adequately control symptoms, surgery will be considered in the future.     Patient would like to proceed with RIGHT middle finger trigger finger release as an outpatient surgery. The risks and benefits of surgery were discussed with the patient today and she verbalized understand. The patient was offered the option of an additional visit with Dr. Pope for an opportunity for further discussion and questions prior to the procedure. The patient declined an additional appointment. Patient was informed that Dr. Pope will be present in pre-operative holding prior to surgery in order to obtain informed consent from the patient and that will provide an additional opportunity for patient to discuss any potential questions with Dr. Pope. Orders for surgery were entered. Surgery is scheduled for 08/01/2025.     Follow-Up: RTC to sign consents for surgery.     All of the patient's questions were answered and the patient will contact us if they have any questions or concerns in the interim.    Savanah Davies PA-C  [facility]  Orthopedic Surgery    Medical Dictation software was used during the dictation of portions or the entirety of this medical  record.  Phonetic or grammatic errors may exist due to the use of this software. For clarification, refer to the author of the document.    This service was not originating from a related E/M service provided within the previous 7 days nor will  to an E/M service or procedure within the next 24 hours or my soonest available appointment.  Prevailing standard of care was able to be met in this audio-only visit.

## 2025-07-24 ENCOUNTER — OFFICE VISIT (OUTPATIENT)
Dept: ORTHOPEDICS | Facility: CLINIC | Age: 70
End: 2025-07-24
Payer: MEDICARE

## 2025-07-24 ENCOUNTER — PATIENT MESSAGE (OUTPATIENT)
Dept: ORTHOPEDICS | Facility: CLINIC | Age: 70
End: 2025-07-24

## 2025-07-24 DIAGNOSIS — M65.331 TRIGGER MIDDLE FINGER OF RIGHT HAND: Primary | ICD-10-CM

## 2025-07-24 RX ORDER — CEFAZOLIN SODIUM 2 G/50ML
2 SOLUTION INTRAVENOUS
OUTPATIENT
Start: 2025-07-24

## 2025-07-24 RX ORDER — MUPIROCIN 20 MG/G
OINTMENT TOPICAL
OUTPATIENT
Start: 2025-07-24

## 2025-07-30 ENCOUNTER — ANESTHESIA EVENT (OUTPATIENT)
Dept: SURGERY | Facility: HOSPITAL | Age: 70
End: 2025-07-30
Payer: MEDICARE

## 2025-07-30 ENCOUNTER — OFFICE VISIT (OUTPATIENT)
Facility: CLINIC | Age: 70
End: 2025-07-30
Payer: MEDICARE

## 2025-07-30 DIAGNOSIS — M18.12 ARTHRITIS OF CARPOMETACARPAL (CMC) JOINT OF LEFT THUMB: ICD-10-CM

## 2025-07-30 DIAGNOSIS — M65.331 TRIGGER MIDDLE FINGER OF RIGHT HAND: Primary | ICD-10-CM

## 2025-07-30 PROCEDURE — 1160F RVW MEDS BY RX/DR IN RCRD: CPT | Mod: CPTII,S$GLB,,

## 2025-07-30 PROCEDURE — 1159F MED LIST DOCD IN RCRD: CPT | Mod: CPTII,S$GLB,,

## 2025-07-30 PROCEDURE — 99999 PR PBB SHADOW E&M-EST. PATIENT-LVL III: CPT | Mod: PBBFAC,,,

## 2025-07-30 PROCEDURE — 99215 OFFICE O/P EST HI 40 MIN: CPT | Mod: S$GLB,,,

## 2025-07-30 PROCEDURE — 4010F ACE/ARB THERAPY RXD/TAKEN: CPT | Mod: CPTII,S$GLB,,

## 2025-07-30 RX ORDER — CEFAZOLIN SODIUM 2 G/50ML
2 SOLUTION INTRAVENOUS
OUTPATIENT
Start: 2025-07-30

## 2025-07-30 RX ORDER — MUPIROCIN 20 MG/G
OINTMENT TOPICAL
OUTPATIENT
Start: 2025-07-30

## 2025-07-30 NOTE — H&P (VIEW-ONLY)
Subjective:      Patient ID: Penny Cervantes is a 69 y.o. female.    Chief Complaint: Pain of the Right Hand and Pain of the Left Hand      HPI: Penny Cervantes is a 69 y.o. right hand dominant female who presents to clinic to further discuss surgical intervention for her right middle finger trigger finger. Patient was last seen by myself (via virtual visit) on on 07/24/2025 for this. She denies a history of trauma, but symptoms have been present for several months. The pain is aggravated by flexion of the digit and use of the hand. Denies numbness or tingling. Previous treatments include CSI, NSAIDs (Celebrex), Robaxin, HEP, activity modifications, ice without much relief.  The pain is affecting ADLs and limiting desired level of activity.  Patient presents today for further surgical discussion.    Patient does continue to have ongoing issues with her left thumb (CMC).  Patient states her thumb continues to give her trouble however she is not ready to proceed with surgery for this. The pain is aggravated by use of the hands and with .  She states she does a lot of gardening, and notices increase in symptoms with this. Associated symptoms include weakness with . Denies numbness or tingling. Previous treatments include NSAIDs (Celebrex), Robaxin, HEP, CSI, activity modifications, and ice with minimal relief.     PAST MEDICAL HISTORY:    Past Medical History:   Diagnosis Date    Benign positional vertigo     Breast cancer 2009    s/p chemo XRT, left breast triple negative    Coronary artery disease, non-occlusive     cath in 2012    Hand arthritis 12/30/2024    Hypertension     Interstitial cystitis     Osteopenia     Squamous cell carcinoma     Stroke 7/10/2018    Symptomatic posterior vitreous detachment of left eye 12/24/2018    Vitreous hemorrhage, left 1/10/2019     MEDICATIONS:   Current Medications[1]    ALLERGIES:   Review of patient's allergies indicates:   Allergen Reactions    Demerol  [meperidine] Other (See Comments)     headache    Zofran [ondansetron hcl] Nausea Only     Bloating, abdominal pain    Statins-hmg-coa reductase inhibitors Other (See Comments)     Muscle pain     Pyridium [phenazopyridine] Nausea Only       Review of Systems:  Constitution: Negative for chills, fever and night sweats.   HENT: Negative for congestion and headaches.    Eyes: Negative for blurred vision or vision loss.  Cardiovascular: Negative for chest pain and syncope.   Respiratory: Negative for cough and shortness of breath.    Endocrine: Negative for polydipsia, polyphagia and polyuria.   Hematologic/Lymphatic: Negative for bleeding problem. Does not bruise/bleed easily.   Skin: Negative for dry skin, itching and rash.   Musculoskeletal: See HPI.   Gastrointestinal: Negative for abdominal pain and bowel incontinence.   Genitourinary: Negative for bladder incontinence and nocturia.   Neurological: Negative for disturbances in coordination, loss of balance and seizures.   Psychiatric/Behavioral: Negative for depression. The patient does not have insomnia.    Allergic/Immunologic: Negative for hives and persistent infections.          Objective:      There were no vitals filed for this visit.    PHYSICAL EXAM:  General: Alert & oriented x3, well-developed and well-nourished, in no acute distress, sitting comfortably in the exam room.  Skin: Warm and dry. Capillary refill less than 2 seconds.   Head: Normocephalic and atraumatic.   Eyes: Sclera appear normal.   Nose: No deformities seen.   Ears: No deformities seen.   Neck: No tracheal deviation present.   Pulmonary/Chest: Breathing unlabored.   Neurological: Alert and oriented to person, place, and time.   Psychiatric: Mood is pleasant and affect appropriate.     RIGHT HAND/WRIST:  Observation/Inspection:   Diffuse bony deformity/bulky joints throughout the hands.  Palpation:    Mild tenderness to palpation along the flexor tendon sheath right middle finger.    Otherwise, negative tenderness to palpation to bony prominences and soft tissues throughout.  Range of Motion:   Wrist: Full to wrist flexion, extension, radial, and ulnar deviation without pain or difficulty.   Digits: Full to digit MCP, PIP, and DIP flexion and extension.  Mild triggering right middle finger.    Special Tests:   Finkelstein's Test  Negative    Tinel's Test - Carpal Tunnel Negative    Tinel's Test - Cubital Tunnel Negative    Neurovascular Exam:   Digits warm and well perfused, brisk capillary refill <3 seconds throughout.  NVI motor/LTS to median, radial, and ulnar nerves, radial pulse 2+.    LEFT HAND/WRIST:  Observation/Inspection:    Diffuse bony deformity/bulky joints throughout the hands.  Palpation:    Tenderness to palpation to the base of the thumb.   Otherwise, negative tenderness to palpation to bony prominences and soft tissues throughout.  Range of Motion:   Wrist: Full to wrist flexion, extension, radial, and ulnar deviation without pain or difficulty.   Digits: Full to 2nd-5th digit MCP, PIP, and DIP flexion and extension without pain or difficulty.  Limited to thumb flexion.    Limited to thumb opposition due to pain.  Mild crepitation of thumb with ROM.  Special Tests:   Finkelstein's Test  Negative    Tinel's Test - Carpal Tunnel Negative    Tinel's Test - Cubital Tunnel Negative    CMC Grind   Positive    Neurovascular Exam:   Digits warm and well perfused, brisk capillary refill <3 seconds throughout.  NVI motor/LTS to median, radial, and ulnar nerves, radial pulse 2+.    Imaging:  None today.          Assessment:       1. Trigger middle finger of right hand    2. Arthritis of carpometacarpal (CMC) joint of left thumb        Plan:       Orders Placed This Encounter    Case Request Operating Room: INTERPOSITION ARTHROPLASTY, CMC JOINT     I explained the nature of the problem to the patient.     I discussed at length with the patient all the different treatment options  available for her right middle finger including anti-inflammatories, acetaminophen, rest, ice, heat, physical/occupational therapy, and corticosteroid injections. I explained the potential role of surgery in the treatment of this condition. The patient understands that if non-surgical measures do not adequately control symptoms, surgery will be considered in the future.     Patient would like to proceed with RIGHT middle finger trigger release as an outpatient surgery. The risks and benefits of surgery were discussed with the patient today and she verbalized understand. The patient was offered the option of an additional visit with Dr. Pope for an opportunity for further discussion and questions prior to the procedure. The patient declined an additional appointment. Patient was informed that Dr. Pope will be present in pre-operative holding prior to surgery in order to obtain informed consent from the patient and that will provide an additional opportunity for patient to discuss any potential questions with Dr. Pope. Surgical consents were signed.  Surgery is scheduled for 08/01/2025.     Follow-Up: 2 weeks after surgery for POV #1.     With regards to the patient's left CMC joint she states she is now interested in surgical intervention due to the extreme pain. Patient would like to proceed with LEFT thumb CMC arthroplasty as an outpatient surgery. The risks and benefits of surgery were discussed with the patient today and she verbalized understand. The patient was offered the option of an additional visit with Dr. Pope for an opportunity for further discussion and questions prior to the procedure. The patient declined an additional appointment. Patient was informed that Dr. Pope will be present in pre-operative holding prior to surgery in order to obtain informed consent from the patient and that will provide an additional opportunity for patient to discuss any potential questions with Dr. Pope. Orders  for surgery were entered. Surgery is scheduled for 09/26/2025.  We will have patient sign consents for this surgery next visit.      All of the patient's questions were answered and the patient will contact us if they have any questions or concerns in the interim.    Savanah Davies PA-C  [facility]  Orthopedic Surgery    Medical Dictation software was used during the dictation of portions or the entirety of this medical record.  Phonetic or grammatic errors may exist due to the use of this software. For clarification, refer to the author of the document.           [1]   Current Outpatient Medications:     acyclovir (ZOVIRAX) 400 MG tablet, Take 1 tablet by mouth every 8 hours for 5 days as soon as early symptoms develop, Disp: 70 tablet, Rfl: 2    albuterol (VENTOLIN HFA) 90 mcg/actuation inhaler, Inhale 2 puffs into the lungs every 6 (six) hours as needed for Wheezing or Shortness of Breath (cough). Rescue, Disp: 8.5 g, Rfl: 4    amLODIPine (NORVASC) 5 MG tablet, TAKE 1 TABLET (5 MG) BY MOUTH EVERY EVENING, Disp: 90 tablet, Rfl: 3    azelastine (ASTELIN) 137 mcg (0.1 %) nasal spray, 1 spray (137 mcg total) by Nasal route 2 (two) times daily., Disp: 30 mL, Rfl: 3    celecoxib (CELEBREX) 200 MG capsule, Take 1 capsule (200 mg total) by mouth once daily., Disp: 30 capsule, Rfl: 2    cyanocobalamin 1,000 mcg/mL injection, Inject 1ml into the muscle once every 60 days., Disp: 1 mL, Rfl: 3    fexofenadine (ALLEGRA) 180 MG tablet, Take 1 tablet (180 mg total) by mouth 2 (two) times daily. for 7 days, Disp: , Rfl: 0    fluticasone propionate (FLONASE) 50 mcg/actuation nasal spray, 2 sprays (100 mcg total) by Each Nostril route once daily., Disp: 16 g, Rfl: 11    hyoscyamine (ANASPAZ,LEVSIN) 0.125 mg Tab, Take 1 tablet (125 mcg total) by mouth 4 (four) times daily as needed for spasm, Disp: 40 tablet, Rfl: 0    losartan (COZAAR) 50 MG tablet, Take 1 tablet (50 mg total) by mouth every evening., Disp: 90 tablet, Rfl:  "1    MAGNESIUM GLYCINATE ORAL, Take by mouth., Disp: , Rfl:     methocarbamoL (ROBAXIN) 500 MG Tab, Take 1 tablet (500 mg total) by mouth 3 (three) times daily., Disp: 90 tablet, Rfl: 1    metoprolol succinate (TOPROL-XL) 25 MG 24 hr tablet, Take 1 tablet (25 mg total) by mouth every evening., Disp: 90 tablet, Rfl: 3    minoxidiL (LONITEN) 2.5 MG tablet, Take 0.5 tablets (1.25 mg total) by mouth once daily., Disp: 45 tablet, Rfl: 3    polyethylene glycol (MIRALAX) 17 gram PwPk, Take 17 g by mouth daily as needed for Constipation., Disp: , Rfl:     syringe with needle 3 mL 25 gauge x 1" Syrg, Use with B12 injections  as needed, Disp: 5 each, Rfl: 1    triamcinolone acetonide 0.1% (KENALOG) 0.1 % paste, Place onto affected area in mouth 2 (two) times daily as directed, Disp: 5 g, Rfl: 5    "

## 2025-07-31 RX ORDER — GLUCAGON 1 MG
1 KIT INJECTION
OUTPATIENT
Start: 2025-07-31

## 2025-07-31 RX ORDER — SODIUM CHLORIDE 0.9 % (FLUSH) 0.9 %
2 SYRINGE (ML) INJECTION EVERY 6 HOURS PRN
OUTPATIENT
Start: 2025-07-31

## 2025-07-31 RX ORDER — HYDROMORPHONE HYDROCHLORIDE 2 MG/ML
0.2 INJECTION, SOLUTION INTRAMUSCULAR; INTRAVENOUS; SUBCUTANEOUS EVERY 5 MIN PRN
Refills: 0 | OUTPATIENT
Start: 2025-07-31

## 2025-08-01 ENCOUNTER — HOSPITAL ENCOUNTER (OUTPATIENT)
Facility: HOSPITAL | Age: 70
Discharge: HOME | End: 2025-08-01
Attending: ORTHOPAEDIC SURGERY | Admitting: ORTHOPAEDIC SURGERY
Payer: MEDICARE

## 2025-08-01 ENCOUNTER — ANESTHESIA (OUTPATIENT)
Dept: SURGERY | Facility: HOSPITAL | Age: 70
End: 2025-08-01
Payer: MEDICARE

## 2025-08-01 VITALS
DIASTOLIC BLOOD PRESSURE: 60 MMHG | HEIGHT: 55 IN | TEMPERATURE: 98 F | HEART RATE: 66 BPM | OXYGEN SATURATION: 100 % | RESPIRATION RATE: 20 BRPM | WEIGHT: 99 LBS | BODY MASS INDEX: 22.91 KG/M2 | SYSTOLIC BLOOD PRESSURE: 130 MMHG

## 2025-08-01 DIAGNOSIS — M65.331 TRIGGER MIDDLE FINGER OF RIGHT HAND: ICD-10-CM

## 2025-08-01 PROCEDURE — 71000015 HC POSTOP RECOV 1ST HR: Performed by: ORTHOPAEDIC SURGERY

## 2025-08-01 PROCEDURE — 71000016 HC POSTOP RECOV ADDL HR: Performed by: ORTHOPAEDIC SURGERY

## 2025-08-01 PROCEDURE — 26055 INCISE FINGER TENDON SHEATH: CPT | Mod: F7,,, | Performed by: ORTHOPAEDIC SURGERY

## 2025-08-01 PROCEDURE — 63600175 PHARM REV CODE 636 W HCPCS: Performed by: ORTHOPAEDIC SURGERY

## 2025-08-01 PROCEDURE — 25000003 PHARM REV CODE 250

## 2025-08-01 PROCEDURE — 37000009 HC ANESTHESIA EA ADD 15 MINS: Performed by: ORTHOPAEDIC SURGERY

## 2025-08-01 PROCEDURE — 37000008 HC ANESTHESIA 1ST 15 MINUTES: Performed by: ORTHOPAEDIC SURGERY

## 2025-08-01 PROCEDURE — 36000706: Performed by: ORTHOPAEDIC SURGERY

## 2025-08-01 PROCEDURE — 36000707: Performed by: ORTHOPAEDIC SURGERY

## 2025-08-01 PROCEDURE — 63600175 PHARM REV CODE 636 W HCPCS

## 2025-08-01 RX ORDER — OXYCODONE HYDROCHLORIDE 10 MG/1
10 TABLET ORAL EVERY 4 HOURS PRN
Refills: 0 | Status: CANCELLED | OUTPATIENT
Start: 2025-08-01

## 2025-08-01 RX ORDER — KETOROLAC TROMETHAMINE 30 MG/ML
15 INJECTION, SOLUTION INTRAMUSCULAR; INTRAVENOUS ONCE
Status: CANCELLED | OUTPATIENT
Start: 2025-08-01 | End: 2025-08-04

## 2025-08-01 RX ORDER — MUPIROCIN 20 MG/G
OINTMENT TOPICAL
Status: DISCONTINUED | OUTPATIENT
Start: 2025-08-01 | End: 2025-08-01 | Stop reason: HOSPADM

## 2025-08-01 RX ORDER — HYDROCODONE BITARTRATE AND ACETAMINOPHEN 5; 325 MG/1; MG/1
1 TABLET ORAL EVERY 4 HOURS PRN
Qty: 12 TABLET | Refills: 0 | Status: SHIPPED | OUTPATIENT
Start: 2025-08-01

## 2025-08-01 RX ORDER — CEFAZOLIN 2 G/1
2 INJECTION, POWDER, FOR SOLUTION INTRAMUSCULAR; INTRAVENOUS
Status: DISCONTINUED | OUTPATIENT
Start: 2025-08-01 | End: 2025-08-01 | Stop reason: HOSPADM

## 2025-08-01 RX ORDER — LIDOCAINE HYDROCHLORIDE 10 MG/ML
INJECTION, SOLUTION EPIDURAL; INFILTRATION; INTRACAUDAL; PERINEURAL
Status: DISCONTINUED | OUTPATIENT
Start: 2025-08-01 | End: 2025-08-01 | Stop reason: HOSPADM

## 2025-08-01 RX ORDER — CEFAZOLIN SODIUM 1 G/3ML
INJECTION, POWDER, FOR SOLUTION INTRAMUSCULAR; INTRAVENOUS
Status: DISCONTINUED | OUTPATIENT
Start: 2025-08-01 | End: 2025-08-01

## 2025-08-01 RX ORDER — PROPOFOL 10 MG/ML
VIAL (ML) INTRAVENOUS CONTINUOUS PRN
Status: DISCONTINUED | OUTPATIENT
Start: 2025-08-01 | End: 2025-08-01

## 2025-08-01 RX ORDER — LIDOCAINE HYDROCHLORIDE 20 MG/ML
INJECTION INTRAVENOUS
Status: DISCONTINUED | OUTPATIENT
Start: 2025-08-01 | End: 2025-08-01

## 2025-08-01 RX ORDER — ONDANSETRON 8 MG/1
8 TABLET, ORALLY DISINTEGRATING ORAL EVERY 8 HOURS PRN
Status: CANCELLED | OUTPATIENT
Start: 2025-08-01

## 2025-08-01 RX ORDER — BUPIVACAINE HYDROCHLORIDE 5 MG/ML
INJECTION, SOLUTION EPIDURAL; INTRACAUDAL; PERINEURAL
Status: DISCONTINUED | OUTPATIENT
Start: 2025-08-01 | End: 2025-08-01 | Stop reason: HOSPADM

## 2025-08-01 RX ORDER — ACETAMINOPHEN 325 MG/1
650 TABLET ORAL EVERY 4 HOURS PRN
Status: CANCELLED | OUTPATIENT
Start: 2025-08-01

## 2025-08-01 RX ADMIN — MUPIROCIN: 20 OINTMENT TOPICAL at 08:08

## 2025-08-01 RX ADMIN — CEFAZOLIN 2 G: 330 INJECTION, POWDER, FOR SOLUTION INTRAMUSCULAR; INTRAVENOUS at 10:08

## 2025-08-01 RX ADMIN — PROPOFOL 30 MG: 10 INJECTION, EMULSION INTRAVENOUS at 10:08

## 2025-08-01 RX ADMIN — SODIUM CHLORIDE, SODIUM LACTATE, POTASSIUM CHLORIDE, AND CALCIUM CHLORIDE: .6; .31; .03; .02 INJECTION, SOLUTION INTRAVENOUS at 10:08

## 2025-08-01 RX ADMIN — PROPOFOL 85 MCG/KG/MIN: 10 INJECTION, EMULSION INTRAVENOUS at 10:08

## 2025-08-01 RX ADMIN — LIDOCAINE HYDROCHLORIDE 40 MG: 20 INJECTION, SOLUTION INTRAVENOUS at 10:08

## 2025-08-01 NOTE — OP NOTE
[facility] (Hospital)  Operative Note      Date of Procedure: 8/1/2025     Procedure: Procedure(s) (LRB):  RELEASE, TRIGGER FINGER (Right)     Surgeons and Role:     * Angel Pope Jr., MD - Primary    Assisting Surgeon: None    Pre-Operative Diagnosis: Trigger middle finger of right hand [M65.331]    Post-Operative Diagnosis: Post-Op Diagnosis Codes:     * Trigger middle finger of right hand [M65.331]    Anesthesia: Local MAC    Operative Findings (including complications, if any):  Triggering of the right middle finger    Description of Technical Procedures:     Preop diagnosis: Triggering right middle finger.      Postop diagnosis: Same.      Operative procedure:  Trigger release right middle finger.      Surgeon: Niko.      First assistant: Keke.      Anesthesia: Mac.      Complications: None.      EBL: Minimal.      Operative procedure in detail as follows:     After operative consent was obtained patient brought the operating room placed supine operating room table.  Anesthesia by IV sedation followed by injection of xylocaine Marcaine combination operative site right palm.  Tourniquet applied to the right arm right upper extremity prepped and draped out in the normal sterile fashion.  Esmarch used to exsanguinated the limb and the tourniquet inflated 225 mmHg.  Following this a volar oblique incision made at the base of the right middle finger with a 15 blade.  Careful dissection used to expose the A1 pulley protecting the digital nerves.  Pulley released longitudinally with the Hartley blade and scissors and range of motion then checked noted to be full without triggering.  The wound irrigated and closed with interrupted 5 O nylon horizontal mattress suture on the skin.  Sterile dressing applied followed by light wrap tourniquet deflated patient brought to the recovery room in stable condition.  All sponge needle counts reported as correct no complications    Significant Surgical Tasks  Conducted by the Assistant(s), if Applicable: retraction    Estimated Blood Loss (EBL): * No values recorded between 8/1/2025 10:12 AM and 8/1/2025 10:43 AM *           Implants: * No implants in log *    Specimens:   Specimen (24h ago, onward)      None           * No specimens in log *           Condition: Good    Disposition: PACU - hemodynamically stable.    Attestation: I was present and scrubbed for the entire procedure.    Discharge Note    OUTCOME: Patient tolerated treatment/procedure well without complication and is now ready for discharge.    DISPOSITION: Home or Self Care    FINAL DIAGNOSIS:  Trigger middle finger of right hand    FOLLOWUP: In clinic    DISCHARGE INSTRUCTIONS:    Discharge Procedure Orders   Diet general     Call MD for:  temperature >100.4     Call MD for:  persistent nausea and vomiting     Call MD for:  severe uncontrolled pain     Keep surgical extremity elevated     Remove dressing in 72 hours

## 2025-08-01 NOTE — OPERATIVE NOTE ADDENDUM
Certification of Assistant at Surgery       Surgery Date: 8/1/2025     Participating Surgeons:  Surgeons and Role:     * Angel Pope Jr., MD - Primary    Procedures:  Procedure(s) (LRB):  RELEASE, TRIGGER FINGER (Right)    Assistant Surgeon's Certification of Necessity:  I understand that section 1842 (b) (6) (d) of the Social Security Act generally prohibits Medicare Part B reasonable charge payment for the services of assistants at surgery in teaching hospitals when qualified residents are available to furnish such services. I certify that the services for which payment is claimed were medically necessary, and that no qualified resident was available to perform the services. I further understand that these services are subject to post-payment review by the Medicare carrier.      Savanah Davies PA-C    08/01/2025  10:45 AM

## 2025-08-01 NOTE — ANESTHESIA POSTPROCEDURE EVALUATION
Anesthesia Post Evaluation    Patient: Penny Cervantes    Procedure(s) Performed: Procedure(s) (LRB):  RELEASE, TRIGGER FINGER (Right)    Final Anesthesia Type: general      Patient location during evaluation: PACU  Patient participation: Yes- Able to Participate  Level of consciousness: awake and alert  Post-procedure vital signs: reviewed and stable  Pain management: adequate  Airway patency: patent    PONV status at discharge: No PONV  Anesthetic complications: no      Cardiovascular status: blood pressure returned to baseline  Respiratory status: unassisted  Hydration status: euvolemic            Vitals Value Taken Time   /60 08/01/25 11:45   Temp 36.7 °C (98 °F) 08/01/25 11:45   Pulse 66 08/01/25 11:45   Resp 20 08/01/25 11:45   SpO2 100 % 08/01/25 11:45         No case tracking events are documented in the log.      Pain/Felipe Score: Felipe Score: 10 (8/1/2025 11:45 AM)

## 2025-08-01 NOTE — ANESTHESIA PREPROCEDURE EVALUATION
[facility]  Anesthesia Pre-Operative Evaluation     Patient Name: Penny Cervantes  YOB: 1955  MRN: [id number]  [facility]: [id number]      Code Status: Prior   Date of Procedure: 8/1/2025  Anesthesia: Local MAC Procedure: Procedure(s) (LRB):  RELEASE, TRIGGER FINGER (Right)  Pre-Operative Diagnosis: Trigger middle finger of right hand [M65.331]  Proceduralist: Surgeons and Role:     * Angel Pope Jr., MD - Primary          SUBJECTIVE:     Pre-operative evaluation for Procedure(s) (LRB):  RELEASE, TRIGGER FINGER (Right)     07/31/2025    Penny Cervantes is a 69 y.o. female with HTN, CAD, trigger finger, chronic bronchitis (PRN albuterol), hearing loss, stroke in 2018, and history of breast cancer in 2009 s/p chemo/XRT presenting for release of trigger finger.      Patient now presents for the above procedure(s).       No current facility-administered medications for this encounter.        ________________________________________  Echo 7/10/2018    CONCLUSIONS     1 - Normal left ventricular systolic function (EF 60-65%).     2 - Impaired LV relaxation, normal LAP (grade 1 diastolic dysfunction).     3 - Normal right ventricular systolic function .     4 - The estimated PA systolic pressure is 27 mmHg.     Stress Study 03/20/2018     The patient exercised for 9.29 minutes on a Bertram protocol, corresponding to a functional capacity of 10 estimated METS, achieving a peak heart rate of 136 bpm, which is 89% of the age predicted maximum heart rate.     At peak exercise, EKG revealed 1mm of horizontal ST segment depression isolated to the inferior leads.     EKG Conclusions:     1. The EKG portion of this study is positive for ischemia at a low workload, and peak heart rate of 136 bpm (89% of predicted).   2. Blood pressure response to exercise was normal (Presenting BP: 127/74 Peak BP: 196/72).   3. No significant arrhythmias were present.   4. There were no symptoms of chest  discomfort or significant dyspnea throughout the protocol.   5. The Osorio treadmill score was 4 suggesting an intermediate probability for future cardiovascular events.   6. Nuclear portion of this study will be reported separately.     ________________________________________    Prev airway: None documented.       LDA: None documented.       Drips: None documented.      Problem List[1]    Review of patient's allergies indicates:   Allergen Reactions    Demerol [meperidine] Other (See Comments)     headache    Zofran [ondansetron hcl] Nausea Only     Bloating, abdominal pain    Statins-hmg-coa reductase inhibitors Other (See Comments)     Muscle pain     Pyridium [phenazopyridine] Nausea Only       Current Inpatient Medications:       Medications Ordered Prior to Encounter[2]    Past Surgical History:   Procedure Laterality Date    BILATERAL SALPINGOOPHORECTOMY      with Hysterectomy    BONE RESECTION, RIB      right side    BREAST BIOPSY Right 2005    BREAST CYST ASPIRATION      BREAST LUMPECTOMY Left 2009    BREAST SURGERY      BRONCHOSCOPY N/A 11/3/2023    Procedure: Bronchoscopy;  Surgeon: [facility], [facility];  Location: [facility] OR [address] [place] [address];  Service: Anesthesiology;  Laterality: N/A;    CYSTOSCOPY N/A 7/20/2021    Procedure: CYSTOSCOPY;  Surgeon: Arabella Whatley MD;  Location: [facility] OR [address] [place] [address];  Service: Urology;  Laterality: N/A;  30 minutes     HERNIA REPAIR      HYSTERECTOMY      TOTAL VAGINAL HYSTERECTOMY      Indication: Endometriosis    TRIGGER POINT INJECTION  7/20/2021    Procedure: INJECTION, TRIGGER POINTS  -SOLUCORTEF;  Surgeon: Arabella Whatley MD;  Location: [facility] OR [address] [place] [address];  Service: Urology;;       Social History:  Tobacco Use: Low Risk  (7/31/2025)    Patient History     Smoking Tobacco Use: Never     Smokeless Tobacco Use: Never     Passive Exposure: Never       Alcohol Use: Not At Risk (6/27/2025)    AUDIT-C     Frequency of Alcohol Consumption: Monthly or less      Average Number of Drinks: 1 or 2     Frequency of Binge Drinking: Never       OBJECTIVE:     Vital Signs Range:  BMI Readings from Last 1 Encounters:   07/08/25 29.12 kg/m²               Significant Labs:        Component Value Date/Time    WBC 4.12 05/08/2025 0810    HGB 12.4 05/08/2025 0810    HGB 12.7 10/15/2024 0731    HCT 38.1 05/08/2025 0810    HCT 39.2 10/15/2024 0731     05/08/2025 0810     10/15/2024 0731     05/08/2025 0810     10/15/2024 0731    K 4.0 05/08/2025 0810    K 4.1 10/15/2024 0731     05/08/2025 0810     10/15/2024 0731    CO2 26 05/08/2025 0810    CO2 24 10/15/2024 0731     05/08/2025 0810    GLU 89 10/15/2024 0731    BUN 16 05/08/2025 0810    CREATININE 0.5 05/08/2025 0810    MG 2.1 07/11/2018 0451    PHOS 3.2 07/11/2018 0451    CALCIUM 9.3 05/08/2025 0810    CALCIUM 9.3 10/15/2024 0731    ALBUMIN 4.0 05/08/2025 0810    ALBUMIN 3.9 10/15/2024 0731    PROT 7.2 05/08/2025 0810    PROT 7.0 10/15/2024 0731    ALKPHOS 52 05/08/2025 0810    ALKPHOS 60 10/15/2024 0731    BILITOT 0.5 05/08/2025 0810    BILITOT 0.6 10/15/2024 0731    AST 16 05/08/2025 0810    AST 16 10/15/2024 0731    ALT 13 05/08/2025 0810    ALT 15 10/15/2024 0731    INR 1.0 07/10/2018 0501    HGBA1C 5.5 02/20/2024 1008        Please see Results Review for additional labs.     Diagnostic Studies: No relevant studies.    EKG:   Results for orders placed or performed during the hospital encounter of 07/09/18   ECG 12 lead    Collection Time: 07/09/18 10:34 PM    Narrative    Test Reason : I63.9,  Blood Pressure : mmHG  Vent. Rate : 076 BPM     Atrial Rate : 076 BPM     P-R Int : 126 ms          QRS Dur : 092 ms      QT Int : 396 ms       P-R-T Axes : 064 033 064 degrees     QTc Int : 445 ms    Normal sinus rhythm  Nonspecific ST and T wave abnormality  Abnormal ECG  When compared with ECG of 13-MAR-2018 15:34,  No significant change was found  Confirmed by Colleen Lee MD ([id number])  on 7/11/2018 8:11:25 AM    Referred By: NIA   SELF           Confirmed By:Colleen Lee MD       ECHO:  See subjective, if available.      ASSESSMENT/PLAN:          Pre-op Assessment    I have reviewed the Patient Summary Reports.     I have reviewed the Nursing Notes. I have reviewed the NPO Status.   I have reviewed the Medications.     Review of Systems  Anesthesia Hx:  No problems with previous Anesthesia               Denies Personal Hx of Anesthesia complications.                    Hematology/Oncology:                        --  Cancer in past history:       Breast       chemotherapy and radiation       Cardiovascular:     Hypertension   CAD              ECG has been reviewed.  Patient on beta blockers    Coronary Artery Disease:                            Hypertension         Neurological:  TIA, CVA                       CVA - Cerebrovasular Accident                     Physical Exam  General: Well nourished, Cooperative, Alert and Oriented    Airway:  Mallampati: II   Mouth Opening: Normal  Tongue: Normal  Neck ROM: Normal ROM      Anesthesia Plan  Type of Anesthesia, risks & benefits discussed:    Anesthesia Type: Gen Natural Airway, MAC  Intra-op Monitoring Plan: Standard ASA Monitors  Post Op Pain Control Plan: multimodal analgesia  Induction:  IV  Airway Plan: Video, Post-Induction  Informed Consent: Informed consent signed with the Patient and all parties understand the risks and agree with anesthesia plan.  All questions answered.   ASA Score: 3  Day of Surgery Review of History & Physical: H&P Update referred to the surgeon/provider.    Ready For Surgery From Anesthesia Perspective.     .             [1]  Patient Active Problem List  Diagnosis    Osteopenia of multiple sites    Interstitial cystitis    Inadequate vitamin B12 intake    Essential hypertension    Ataxia    History of breast cancer    Ulcer aphthous oral    History of nephrolithiasis    Recurrent mouth ulceration     Trigger middle finger of right hand    Chronic cough    Abnormal CT of the chest    Abnormal CT of liver    Aortic atherosclerosis    Weakness of both lower extremities    Sacroiliac joint dysfunction    Chronic right-sided low back pain without sciatica    Mixed simple and mucopurulent chronic bronchitis    Hand arthritis    Chronic left shoulder pain    Hair loss    Statin intolerance   [2]  No current facility-administered medications on file prior to encounter.     Current Outpatient Medications on File Prior to Encounter   Medication Sig Dispense Refill    acyclovir (ZOVIRAX) 400 MG tablet Take 1 tablet by mouth every 8 hours for 5 days as soon as early symptoms develop 70 tablet 2    albuterol (VENTOLIN HFA) 90 mcg/actuation inhaler Inhale 2 puffs into the lungs every 6 (six) hours as needed for Wheezing or Shortness of Breath (cough). Rescue 8.5 g 4    amLODIPine (NORVASC) 5 MG tablet TAKE 1 TABLET (5 MG) BY MOUTH EVERY EVENING 90 tablet 3    azelastine (ASTELIN) 137 mcg (0.1 %) nasal spray 1 spray (137 mcg total) by Nasal route 2 (two) times daily. 30 mL 3    celecoxib (CELEBREX) 200 MG capsule Take 1 capsule (200 mg total) by mouth once daily. 30 capsule 2    cyanocobalamin 1,000 mcg/mL injection Inject 1ml into the muscle once every 60 days. 1 mL 3    fexofenadine (ALLEGRA) 180 MG tablet Take 1 tablet (180 mg total) by mouth 2 (two) times daily. for 7 days  0    fluticasone propionate (FLONASE) 50 mcg/actuation nasal spray 2 sprays (100 mcg total) by Each Nostril route once daily. 16 g 11    hyoscyamine (ANASPAZ,LEVSIN) 0.125 mg Tab Take 1 tablet (125 mcg total) by mouth 4 (four) times daily as needed for spasm 40 tablet 0    losartan (COZAAR) 50 MG tablet Take 1 tablet (50 mg total) by mouth every evening. 90 tablet 1    MAGNESIUM GLYCINATE ORAL Take by mouth.      methocarbamoL (ROBAXIN) 500 MG Tab Take 1 tablet (500 mg total) by mouth 3 (three) times daily. 90 tablet 1     "metoprolol succinate (TOPROL-XL) 25 MG 24 hr tablet Take 1 tablet (25 mg total) by mouth every evening. 90 tablet 3    minoxidiL (LONITEN) 2.5 MG tablet Take 0.5 tablets (1.25 mg total) by mouth once daily. 45 tablet 3    polyethylene glycol (MIRALAX) 17 gram PwPk Take 17 g by mouth daily as needed for Constipation.      syringe with needle 3 mL 25 gauge x 1" Syrg Use with B12 injections  as needed 5 each 1    triamcinolone acetonide 0.1% (KENALOG) 0.1 % paste Place onto affected area in mouth 2 (two) times daily as directed 5 g 5   "

## 2025-08-13 ENCOUNTER — OFFICE VISIT (OUTPATIENT)
Facility: CLINIC | Age: 70
End: 2025-08-13
Payer: MEDICARE

## 2025-08-13 DIAGNOSIS — M65.331 TRIGGER MIDDLE FINGER OF RIGHT HAND: Primary | ICD-10-CM

## 2025-08-13 PROCEDURE — 99999 PR PBB SHADOW E&M-EST. PATIENT-LVL III: CPT | Mod: PBBFAC,,,

## 2025-08-13 PROCEDURE — 4010F ACE/ARB THERAPY RXD/TAKEN: CPT | Mod: CPTII,S$GLB,,

## 2025-08-13 PROCEDURE — 1125F AMNT PAIN NOTED PAIN PRSNT: CPT | Mod: CPTII,S$GLB,,

## 2025-08-13 PROCEDURE — 1160F RVW MEDS BY RX/DR IN RCRD: CPT | Mod: CPTII,S$GLB,,

## 2025-08-13 PROCEDURE — 1101F PT FALLS ASSESS-DOCD LE1/YR: CPT | Mod: CPTII,S$GLB,,

## 2025-08-13 PROCEDURE — 3288F FALL RISK ASSESSMENT DOCD: CPT | Mod: CPTII,S$GLB,,

## 2025-08-13 PROCEDURE — 99024 POSTOP FOLLOW-UP VISIT: CPT | Mod: S$GLB,,,

## 2025-08-13 PROCEDURE — 1159F MED LIST DOCD IN RCRD: CPT | Mod: CPTII,S$GLB,,

## 2025-08-13 RX ORDER — CEPHALEXIN 500 MG/1
500 CAPSULE ORAL 4 TIMES DAILY
Qty: 28 CAPSULE | Refills: 0 | Status: SHIPPED | OUTPATIENT
Start: 2025-08-13 | End: 2025-08-20

## 2025-08-20 ENCOUNTER — OFFICE VISIT (OUTPATIENT)
Facility: CLINIC | Age: 70
End: 2025-08-20
Payer: MEDICARE

## 2025-08-20 DIAGNOSIS — M18.12 ARTHRITIS OF CARPOMETACARPAL (CMC) JOINT OF LEFT THUMB: ICD-10-CM

## 2025-08-20 DIAGNOSIS — M65.331 TRIGGER MIDDLE FINGER OF RIGHT HAND: Primary | ICD-10-CM

## 2025-08-20 PROCEDURE — 99024 POSTOP FOLLOW-UP VISIT: CPT | Mod: S$GLB,,,

## 2025-08-20 PROCEDURE — 1125F AMNT PAIN NOTED PAIN PRSNT: CPT | Mod: CPTII,S$GLB,,

## 2025-08-20 PROCEDURE — 1160F RVW MEDS BY RX/DR IN RCRD: CPT | Mod: CPTII,S$GLB,,

## 2025-08-20 PROCEDURE — 1101F PT FALLS ASSESS-DOCD LE1/YR: CPT | Mod: CPTII,S$GLB,,

## 2025-08-20 PROCEDURE — 99999 PR PBB SHADOW E&M-EST. PATIENT-LVL III: CPT | Mod: PBBFAC,,,

## 2025-08-20 PROCEDURE — 4010F ACE/ARB THERAPY RXD/TAKEN: CPT | Mod: CPTII,S$GLB,,

## 2025-08-20 PROCEDURE — 1159F MED LIST DOCD IN RCRD: CPT | Mod: CPTII,S$GLB,,

## 2025-08-20 PROCEDURE — 3288F FALL RISK ASSESSMENT DOCD: CPT | Mod: CPTII,S$GLB,,

## (undated) DEVICE — SET CYSTO IRRIGATION UNIV SPIK

## (undated) DEVICE — PAD CAST 2 IN X 4YDS STERILE

## (undated) DEVICE — GLOVE SIGNATURE ESSNTL LTX 7.5

## (undated) DEVICE — SPONGE COTTON TRAY 4X4IN

## (undated) DEVICE — PACK SURGERY START

## (undated) DEVICE — PAD PREP CUFFED NS 24X48IN

## (undated) DEVICE — BANDAGE ESMARK ELASTIC ST 4X9

## (undated) DEVICE — DRESSING XEROFORM NONADH 1X8IN

## (undated) DEVICE — NDL HYPO REG 25G X 1 1/2

## (undated) DEVICE — APPLICATOR CHLORAPREP ORN 26ML

## (undated) DEVICE — COVER LIGHT HANDLE 80/CA

## (undated) DEVICE — GOWN POLY REINF BRTH SLV LG

## (undated) DEVICE — SYR SLIP TIP 1CC

## (undated) DEVICE — BANDAGE MATRIX HK LOOP 2IN 5YD

## (undated) DEVICE — GOWN SMARTGOWN LVL4 X-LONG XL

## (undated) DEVICE — PACK ECLIPSE BASIC III SURG

## (undated) DEVICE — ADAPTER HOSE 10FT 8MM

## (undated) DEVICE — DRAPE HAND STERILE

## (undated) DEVICE — SYR 10CC LUER LOCK

## (undated) DEVICE — BLADE SCALP OPHTL BEVEL STR

## (undated) DEVICE — ELECTRODE PENCIL W/ROCKER NDL

## (undated) DEVICE — TOURNIQUET SB QC DP 18X4IN

## (undated) DEVICE — PACK CYSTO

## (undated) DEVICE — ALCOHOL 70% ISOP W/GREEN 16OZ

## (undated) DEVICE — STOCKINET 4INX48

## (undated) DEVICE — SUT ETHILON 5-0 PS-2 18IN

## (undated) DEVICE — TRAY CYSTO BASIN

## (undated) DEVICE — SOL IRR 0.9% NACL 500ML PB

## (undated) DEVICE — MANIFOLD 4 PORT

## (undated) DEVICE — NDL SPINAL 25GX3.5 SPINOCAN

## (undated) DEVICE — GOWN POLY REINF BRTH SLV XL

## (undated) DEVICE — SOL IRR WATER STRL 3000 ML

## (undated) DEVICE — BLADE SURG #15 CARBON STEEL

## (undated) DEVICE — UNDERGLOVES BIOGEL PI SIZE 7.5

## (undated) DEVICE — DRAPE THREE-QTR REINF 53X77IN